# Patient Record
Sex: MALE | Race: WHITE | NOT HISPANIC OR LATINO | Employment: FULL TIME | URBAN - METROPOLITAN AREA
[De-identification: names, ages, dates, MRNs, and addresses within clinical notes are randomized per-mention and may not be internally consistent; named-entity substitution may affect disease eponyms.]

---

## 2018-02-14 ENCOUNTER — OFFICE VISIT (OUTPATIENT)
Dept: CARDIOLOGY CLINIC | Facility: CLINIC | Age: 53
End: 2018-02-14
Payer: COMMERCIAL

## 2018-02-14 VITALS
SYSTOLIC BLOOD PRESSURE: 124 MMHG | DIASTOLIC BLOOD PRESSURE: 82 MMHG | HEIGHT: 71 IN | OXYGEN SATURATION: 98 % | WEIGHT: 214 LBS | HEART RATE: 76 BPM | BODY MASS INDEX: 29.96 KG/M2

## 2018-02-14 DIAGNOSIS — Z72.0 TOBACCO ABUSE: ICD-10-CM

## 2018-02-14 DIAGNOSIS — E78.2 MIXED HYPERLIPIDEMIA: ICD-10-CM

## 2018-02-14 DIAGNOSIS — R06.02 SHORTNESS OF BREATH ON EXERTION: Primary | ICD-10-CM

## 2018-02-14 DIAGNOSIS — I25.10 CAD S/P PERCUTANEOUS CORONARY ANGIOPLASTY: ICD-10-CM

## 2018-02-14 DIAGNOSIS — I10 ESSENTIAL HYPERTENSION: ICD-10-CM

## 2018-02-14 DIAGNOSIS — Z98.61 CAD S/P PERCUTANEOUS CORONARY ANGIOPLASTY: ICD-10-CM

## 2018-02-14 PROCEDURE — 93000 ELECTROCARDIOGRAM COMPLETE: CPT | Performed by: INTERNAL MEDICINE

## 2018-02-14 PROCEDURE — 99244 OFF/OP CNSLTJ NEW/EST MOD 40: CPT | Performed by: INTERNAL MEDICINE

## 2018-02-14 RX ORDER — GLIMEPIRIDE 4 MG/1
4 TABLET ORAL
COMMUNITY
Start: 2017-06-15

## 2018-02-14 RX ORDER — ROSUVASTATIN CALCIUM 10 MG/1
1 TABLET, COATED ORAL DAILY
COMMUNITY
Start: 2018-02-13

## 2018-02-14 RX ORDER — CLONAZEPAM 1 MG/1
1 TABLET ORAL 2 TIMES DAILY
COMMUNITY
Start: 2017-12-12 | End: 2019-03-22

## 2018-02-14 RX ORDER — CARVEDILOL 6.25 MG/1
6.25 TABLET ORAL 2 TIMES DAILY
COMMUNITY

## 2018-02-14 RX ORDER — LISINOPRIL 5 MG/1
1 TABLET ORAL DAILY
COMMUNITY
Start: 2018-02-13

## 2018-02-14 NOTE — PROGRESS NOTES
CARDIOLOGY OFFICE VISIT  [facility]  [address], [place], [address]  Tel: [phone]      NAME: Malorie John  AGE: 46 y o  SEX: male  : 1965   MRN: [id number]      Chief Complaint:  Chief Complaint   Patient presents with    Establish Care         History of Present Illness:   Patient comes to establish with our practice  He works in [employer] with [employer]  States he is doing fine otherwise except that he gets short of breath if he exerts a lot  Denies chest pain    CAD s/p MI s/p PCI 2007 at [address] - denies chest pain / pressure / tightness, palpitations, lightheadedness, syncope, swelling feet, orthopnea, PND, claudication  On aspirin, carvedilol, lisinopril, rosuvastatin which he takes regularly    HTN -  Has been hypertensive for many years  Taking medications regularly  Denies lightheadedness, headache, medication side effects  HLP -  Has had hyperlipidemia for many years  Taking statin regularly along with diet control  Denies myalgia  PCP closely monitoring the blood work        Past Medical History:  CAD s/p PCI  HTN  HLP      Past Surgical History:  Past Surgical History:   Procedure Laterality Date    CORONARY ANGIOPLASTY WITH STENT PLACEMENT      HERNIA REPAIR           Family History:  Family History   Problem Relation Age of Onset    Heart attack Father          Social History:  Social History     Social History    Marital status: /Civil Union     Spouse name: N/A    Number of children: N/A    Years of education: N/A     Social History Main Topics    Smoking status: Current Every Day Smoker    Smokeless tobacco: Not on file    Alcohol use Not on file    Drug use: Unknown    Sexual activity: Not on file     Other Topics Concern    Not on file     Social History Narrative    No narrative on file       The following portions of the patient's history were reviewed and updated as appropriate: past medical history, past surgical history, past family history,  past social history, current medications, allergies  Review of Systems:  Constitutional: Denies fever, chills, fatigue  Eyes: Denies eye redness, eye discharge, double vision  ENT: Denies hearing loss, tinnitus, sneezing, nasal discharge, sore throat   Respiratory: Denies cough, expectoration, hemoptysis  +shortness of breath  Cardiovascular: Denies chest pain, palpitations, orthopnea, PND, lower extremity swelling  Gastrointestinal: Denies abdominal pain, nausea, vomiting, hematemesis, diarrhea, bloody stools  Genito-Urinary: Denies dysuria, incontinence  Musculoskeletal: Denies back pain, muscle pain  + left knee pain  Neurologic: Denies confusion, lightheadedness, syncope, headache, focal weakness, sensory changes, seizures  Endocrine: Denies polyuria, polydipsia, temperature intolerance  Allergy and Immunology: Denies hives, insect bite sensitivity  Hematological and Lymphatic: Denies bleeding problems, swollen glands   Psychological: Denies depression, suicidal ideation, anxiety, panic  Dermatological: Denies pruritus, rash, skin lesion changes      Vitals:  Vitals:    02/14/18 1425   BP: 124/82   Pulse: 76   SpO2: 98%       Body mass index is 29 85 kg/m²  Weight (last 2 days)     Date/Time   Weight    02/14/18 1425  97 1 (214)                Physical Examination:  General: Patient is not in acute distress  Awake, alert, oriented in time, place and person  Responding to commands  Head: Normocephalic  Atraumatic  Eyes: Both pupils normal sized, round and reactive to light  Nonicteric  ENT: Normal external ear canals  Nares normal, no drainage  Lips and oral mucosa normal  Neck: Supple  JVP not raised  Trachea central  No thyromegaly  Lungs: Bilateral bronchovascular breath sounds with no crackles or rhonchi  Chest wall: No tenderness  Cardiovascular: RRR   S1 and S2 normal  No murmur, rub or gallop  Gastrointestinal: Abdomen soft, nontender  No guarding or rigidity  Liver and spleen not palpable  Bowel sounds present  Neurologic: Patient is awake, alert, oriented in time, place and person  Responding to command  Moving all extremities  Integumentary:  No skin rash  Lymphatic: No cervical lymphadenopathy  Back: Symmetric  No CVA tenderness  Extremities: No clubbing, cyanosis or edema      Laboratory Results:    EKG:  Sinus rhythm      Medications:    Current Outpatient Prescriptions:     clonazePAM (KlonoPIN) 1 mg tablet, Take 1 mg by mouth 2 (two) times a day, Disp: , Rfl:     glimepiride (AMARYL) 4 mg tablet, Take 4 mg by mouth, Disp: , Rfl:     lisinopril (ZESTRIL) 5 mg tablet, Take 1 tablet by mouth daily, Disp: , Rfl:     rosuvastatin (CRESTOR) 10 MG tablet, Take 1 tablet by mouth daily, Disp: , Rfl:     aspirin 81 MG tablet, 1 tab(s), Disp: , Rfl:     carvedilol (COREG) 6 25 mg tablet, Take 6 25 mg by mouth 2 (two) times a day, Disp: , Rfl:     metFORMIN (GLUCOPHAGE) 1000 MG tablet, Take 1,000 mg by mouth, Disp: , Rfl:       Allergies:  No Known Allergies      Assessment and Plan:  1  Shortness of breath on exertion   2D echocardiogram and pharmacological nuclear stress test ordered for evaluation  Patient unable to walk on a treadmill due to left knee pain    2  CAD S/P percutaneous coronary angioplasty   workup as mentioned above  For now continue aspirin, beta-blocker, Ace inhibitor, statin    3  Mixed hyperlipidemia   continue statin  His PCP closely monitors his blood work    4  Essential hypertension   BP stable  Continue current medications    5  Tobacco abuse   strongly counseled to stop smoking  Patient states he is not ready to quit yet      Recommend aggressive risk factor modification and therapeutic lifestyle changes  Low-salt, low-calorie, low-fat, low-cholesterol diet with regular exercise and to optimize weight    I will defer the ordering and monitoring of necessity lab studies to you, but I am available and happy to review and manage any of the data at your request in the future  Discussed concepts of atherosclerosis, including signs and symptoms of cardiac disease  Previous studies were reviewed  Safety measures were reviewed  Questions were entertained and answered  Patient was advised to report any problems requiring medical attention  Follow-up with PCP and appropriate specialist and lab work as discussed  Return for follow up visit as scheduled or earlier, if needed  Further recommendations will be forthcoming after the above testing is performed and results available  Thank you for allowing me to participate in the care and evaluation of your patient  Should you have any questions, please feel free to contact me        Isai Benitez MD  5/72/4102,5:75 PM

## 2018-03-06 ENCOUNTER — TELEPHONE (OUTPATIENT)
Dept: CARDIOLOGY CLINIC | Facility: CLINIC | Age: 53
End: 2018-03-06

## 2019-03-22 ENCOUNTER — OFFICE VISIT (OUTPATIENT)
Dept: CARDIOLOGY CLINIC | Facility: CLINIC | Age: 54
End: 2019-03-22
Payer: COMMERCIAL

## 2019-03-22 VITALS
HEIGHT: 71 IN | DIASTOLIC BLOOD PRESSURE: 76 MMHG | HEART RATE: 82 BPM | SYSTOLIC BLOOD PRESSURE: 120 MMHG | OXYGEN SATURATION: 97 % | BODY MASS INDEX: 28.84 KG/M2 | WEIGHT: 206 LBS

## 2019-03-22 DIAGNOSIS — E78.2 MIXED HYPERLIPIDEMIA: ICD-10-CM

## 2019-03-22 DIAGNOSIS — I25.10 CAD S/P PERCUTANEOUS CORONARY ANGIOPLASTY: ICD-10-CM

## 2019-03-22 DIAGNOSIS — Z72.0 TOBACCO ABUSE: ICD-10-CM

## 2019-03-22 DIAGNOSIS — I10 ESSENTIAL HYPERTENSION: ICD-10-CM

## 2019-03-22 DIAGNOSIS — R06.02 SHORTNESS OF BREATH ON EXERTION: Primary | ICD-10-CM

## 2019-03-22 DIAGNOSIS — Z98.61 CAD S/P PERCUTANEOUS CORONARY ANGIOPLASTY: ICD-10-CM

## 2019-03-22 PROCEDURE — 99214 OFFICE O/P EST MOD 30 MIN: CPT | Performed by: INTERNAL MEDICINE

## 2019-03-22 NOTE — PROGRESS NOTES
CARDIOLOGY OFFICE VISIT  [facility]  [facility] [address], [facility], [address] [facility], [place], [facility] [address]  Tel: [phone]      NAME: Tonya Neff  AGE: 48 y o  SEX: male  : 1965   MRN: [id number]      Chief Complaint:  Chief Complaint   Patient presents with    Follow-up     1 yr          History of Present Illness:   Patient comes for follow up  States he feels short of breath with exertion such as climbing steps  Patient denies chest pain, palpitations, lightheadedness, syncope, swelling feet, orthopnea, PND, claudication  CAD s/p MI s/p PCI  at [facility], [place] [address] all medications ( aspirin, carvedilol, lisinopril, rosuvastatin) regularly  HTN -  Has been hypertensive for many years  Taking medications regularly  Denies lightheadedness, headache, medication side effects  HLP -  Has had hyperlipidemia for many years  Taking statin regularly along with diet control  Denies myalgia  PCP closely monitoring the blood work  He works in [employer] with [employer]          Past Medical History:  CAD, HTN, HLP      Past Surgical History:  Past Surgical History:   Procedure Laterality Date    CORONARY ANGIOPLASTY WITH STENT PLACEMENT      HERNIA REPAIR           Family History:  Family History   Problem Relation Age of Onset    Heart attack Father          Social History:  Social History     Socioeconomic History    Marital status: /Civil Union     Spouse name: Not on file    Number of children: Not on file    Years of education: Not on file    Highest education level: Not on file   Occupational History    Not on file   Social Needs    Financial resource strain: Not on file    Food insecurity:     Worry: Not on file     Inability: Not on file    Transportation needs:     Medical: Not on file     Non-medical: Not on file   Tobacco Use    Smoking status: Current Every Day Smoker   Substance and Sexual Activity    Alcohol use: Not on file    Drug use: Not on file    Sexual activity: Not on file   Lifestyle    Physical activity:     Days per week: Not on file     Minutes per session: Not on file    Stress: Not on file   Relationships    Social connections:     Talks on phone: Not on file     Gets together: Not on file     Attends Zoroastrianism service: Not on file     Active member of club or organization: Not on file     Attends meetings of clubs or organizations: Not on file     Relationship status: Not on file    Intimate partner violence:     Fear of current or ex partner: Not on file     Emotionally abused: Not on file     Physically abused: Not on file     Forced sexual activity: Not on file   Other Topics Concern    Not on file   Social History Narrative    Not on file       The following portions of the patient's history were reviewed and updated as appropriate: past medical history, past surgical history, past family history,  past social history, current medications, allergies list       Review of Systems:  Constitutional: Denies fever, chills, fatigue  Eyes: Denies eye redness, eye discharge, double vision  ENT: Denies hearing loss, tinnitus, sneezing, nasal discharge, sore throat   Respiratory: Denies cough, expectoration, hemoptysis   +shortness of breath  Cardiovascular: Denies chest pain, palpitations, orthopnea, PND, lower extremity swelling  Gastrointestinal: Denies abdominal pain, nausea, vomiting, hematemesis, diarrhea, bloody stools  Genito-Urinary: Denies dysuria, incontinence  Musculoskeletal: Denies back pain, joint pain, muscle pain  Neurologic: Denies confusion, lightheadedness, syncope, headache, focal weakness, sensory changes, seizures  Endocrine: Denies polyuria, polydipsia, temperature intolerance  Allergy and Immunology: Denies hives, insect bite sensitivity  Hematological and Lymphatic: Denies bleeding problems, swollen glands   Psychological: Denies depression, suicidal ideation, anxiety, panic  Dermatological: Denies pruritus, rash, skin lesion changes      Vitals:  Vitals:    03/22/19 0909   BP: 120/76   Pulse: 82   SpO2: 97%       Body mass index is 28 73 kg/m²  Weight (last 2 days)     Date/Time   Weight    03/22/19 0909   93 4 (206)                Physical Examination:  General: Patient is not in acute distress  Awake, alert, oriented in time, place and person  Responding to commands  Head: Normocephalic  Atraumatic  Eyes: Both pupils normal sized, round and reactive to light  Nonicteric  ENT: Normal external ear canals  Nares normal, no drainage  Lips and oral mucosa normal  Neck: Supple  JVP not raised  Trachea central  No thyromegaly  Lungs: Bilateral bronchovascular breath sounds with no crackles or rhonchi  Chest wall: No tenderness  Cardiovascular: RRR  S1 and S2 normal  No murmur, rub or gallop  Gastrointestinal: Abdomen soft, nontender  No guarding or rigidity  Liver and spleen not palpable  Bowel sounds present  Neurologic: Patient is awake, alert, oriented in time, place and person  Responding to command  Moving all extremities  Integumentary:  No skin rash  Lymphatic: No cervical lymphadenopathy  Back: Symmetric   No CVA tenderness  Extremities: No clubbing, cyanosis or edema    Medications:    Current Outpatient Medications:     Amphetamine-Dextroamphetamine (ADDERALL PO), Take by mouth, Disp: , Rfl:     aspirin 81 MG tablet, 1 tab(s), Disp: , Rfl:     carvedilol (COREG) 6 25 mg tablet, Take 6 25 mg by mouth 2 (two) times a day, Disp: , Rfl:     glimepiride (AMARYL) 4 mg tablet, Take 4 mg by mouth, Disp: , Rfl:     lisinopril (ZESTRIL) 5 mg tablet, Take 1 tablet by mouth daily, Disp: , Rfl:     metFORMIN (GLUCOPHAGE) 1000 MG tablet, Take 1,000 mg by mouth, Disp: , Rfl:     rosuvastatin (CRESTOR) 10 MG tablet, Take 1 tablet by mouth daily, Disp: , Rfl:     Zolpidem Tartrate (AMBIEN PO), Take by mouth, Disp: , Rfl:       Allergies:  No Known Allergies      Assessment and Plan:  1  Shortness of breath on exertion   2D echocardiogram and pharmacological nuclear stress test ordered for evaluation  Patient unable to walk on a treadmill due to left knee pain   CBC, CMP, lipid panel scripts also given    2  CAD S/P percutaneous coronary angioplasty   workup as mentioned above  Continue aspirin, beta-blocker, Ace inhibitor, statin    3  Essential hypertension   BP stable  Continue current medications    4  Mixed hyperlipidemia   continue statin and diet control  His PCP closely monitors his blood work    5  Tobacco abuse   strongly counseled to stop smoking    Recommend aggressive risk factor modification and therapeutic lifestyle changes  Low-salt, low-calorie, low-fat, low-cholesterol diet with regular exercise and to optimize weight  I will defer the ordering and monitoring of necessity lab studies to you, but I am available and happy to review and manage any of the data at your request in the future  Discussed concepts of atherosclerosis, including signs and symptoms of cardiac disease  Previous studies were reviewed  Safety measures were reviewed  Questions were entertained and answered  Patient was advised to report any problems requiring medical attention  Follow-up with PCP and appropriate specialist and lab work as discussed  Return for follow up visit as scheduled or earlier, if needed  Thank you for allowing me to participate in the care and evaluation of your patient  Should you have any questions, please feel free to contact me        Onesimo Yuan MD  4/53/9350,5:42 AM

## 2019-04-10 ENCOUNTER — APPOINTMENT (OUTPATIENT)
Dept: NON INVASIVE DIAGNOSTICS | Facility: CLINIC | Age: 54
End: 2019-04-10
Payer: COMMERCIAL

## 2019-04-10 ENCOUNTER — HOSPITAL ENCOUNTER (OUTPATIENT)
Dept: NON INVASIVE DIAGNOSTICS | Facility: CLINIC | Age: 54
Discharge: HOME | End: 2019-04-10
Payer: COMMERCIAL

## 2019-04-10 ENCOUNTER — HOSPITAL ENCOUNTER (OUTPATIENT)
Dept: NON INVASIVE DIAGNOSTICS | Facility: CLINIC | Age: 54
End: 2019-04-10
Payer: COMMERCIAL

## 2019-04-10 DIAGNOSIS — R06.02 SHORTNESS OF BREATH ON EXERTION: ICD-10-CM

## 2019-04-10 DIAGNOSIS — I25.10 CAD S/P PERCUTANEOUS CORONARY ANGIOPLASTY: ICD-10-CM

## 2019-04-10 DIAGNOSIS — Z98.61 CAD S/P PERCUTANEOUS CORONARY ANGIOPLASTY: ICD-10-CM

## 2019-04-10 PROCEDURE — 93306 TTE W/DOPPLER COMPLETE: CPT | Performed by: INTERNAL MEDICINE

## 2019-04-10 PROCEDURE — 93306 TTE W/DOPPLER COMPLETE: CPT

## 2019-04-11 ENCOUNTER — TELEPHONE (OUTPATIENT)
Dept: CARDIOLOGY CLINIC | Facility: CLINIC | Age: 54
End: 2019-04-11

## 2019-04-15 ENCOUNTER — HOSPITAL ENCOUNTER (OUTPATIENT)
Dept: NON INVASIVE DIAGNOSTICS | Facility: CLINIC | Age: 54
Discharge: HOME | End: 2019-04-15
Payer: COMMERCIAL

## 2019-04-15 DIAGNOSIS — Z98.61 CAD S/P PERCUTANEOUS CORONARY ANGIOPLASTY: ICD-10-CM

## 2019-04-15 DIAGNOSIS — I25.10 CAD S/P PERCUTANEOUS CORONARY ANGIOPLASTY: ICD-10-CM

## 2019-04-15 DIAGNOSIS — R06.02 SHORTNESS OF BREATH ON EXERTION: ICD-10-CM

## 2019-04-15 LAB
MAX DIASTOLIC BP: 86 MMHG
MAX HEART RATE: 100 BPM
MAX PREDICTED HEART RATE: 167 BPM
MAX. SYSTOLIC BP: 116 MMHG
PROTOCOL NAME: NORMAL
REASON FOR TERMINATION: NORMAL
TARGET HR FORMULA: NORMAL
TIME IN EXERCISE PHASE: NORMAL

## 2019-04-15 PROCEDURE — 78452 HT MUSCLE IMAGE SPECT MULT: CPT

## 2019-04-15 PROCEDURE — 93017 CV STRESS TEST TRACING ONLY: CPT

## 2019-04-15 PROCEDURE — A9502 TC99M TETROFOSMIN: HCPCS

## 2019-04-15 RX ADMIN — REGADENOSON 0.4 MG: 0.08 INJECTION, SOLUTION INTRAVENOUS at 09:14

## 2019-04-16 PROCEDURE — 78452 HT MUSCLE IMAGE SPECT MULT: CPT | Performed by: INTERNAL MEDICINE

## 2019-04-16 PROCEDURE — 93018 CV STRESS TEST I&R ONLY: CPT | Performed by: INTERNAL MEDICINE

## 2019-04-16 PROCEDURE — 93016 CV STRESS TEST SUPVJ ONLY: CPT | Performed by: INTERNAL MEDICINE

## 2023-06-01 LAB
LEFT EYE DIABETIC RETINOPATHY: NORMAL
RIGHT EYE DIABETIC RETINOPATHY: NORMAL

## 2023-10-23 ENCOUNTER — HOSPITAL ENCOUNTER (EMERGENCY)
Facility: HOSPITAL | Age: 58
Discharge: HOME | End: 2023-10-23
Attending: EMERGENCY MEDICINE
Payer: COMMERCIAL

## 2023-10-23 ENCOUNTER — APPOINTMENT (EMERGENCY)
Dept: CT IMAGING | Facility: HOSPITAL | Age: 58
End: 2023-10-23
Payer: COMMERCIAL

## 2023-10-23 VITALS
DIASTOLIC BLOOD PRESSURE: 93 MMHG | OXYGEN SATURATION: 96 % | TEMPERATURE: 97.9 F | HEART RATE: 84 BPM | RESPIRATION RATE: 16 BRPM | SYSTOLIC BLOOD PRESSURE: 162 MMHG

## 2023-10-23 DIAGNOSIS — R17 JAUNDICE: ICD-10-CM

## 2023-10-23 DIAGNOSIS — K86.89 PANCREATIC MASS: Primary | ICD-10-CM

## 2023-10-23 LAB
ALBUMIN SERPL BCP-MCNC: 3.7 G/DL (ref 3.5–5)
ALP SERPL-CCNC: 393 U/L (ref 34–104)
ALT SERPL W P-5'-P-CCNC: 164 U/L (ref 7–52)
ANION GAP SERPL CALCULATED.3IONS-SCNC: 6 MMOL/L
ANISOCYTOSIS BLD QL SMEAR: PRESENT
AST SERPL W P-5'-P-CCNC: 65 U/L (ref 13–39)
ATRIAL RATE: 63 BPM
BASOPHILS # BLD MANUAL: 0 THOUSAND/UL (ref 0–0.1)
BASOPHILS NFR MAR MANUAL: 0 % (ref 0–1)
BILIRUB SERPL-MCNC: 11.99 MG/DL (ref 0.2–1)
BUN SERPL-MCNC: 15 MG/DL (ref 5–25)
CALCIUM SERPL-MCNC: 9.1 MG/DL (ref 8.4–10.2)
CHLORIDE SERPL-SCNC: 107 MMOL/L (ref 96–108)
CO2 SERPL-SCNC: 26 MMOL/L (ref 21–32)
CREAT SERPL-MCNC: 0.86 MG/DL (ref 0.6–1.3)
EOSINOPHIL # BLD MANUAL: 0.25 THOUSAND/UL (ref 0–0.4)
EOSINOPHIL NFR BLD MANUAL: 2 % (ref 0–6)
ERYTHROCYTE [DISTWIDTH] IN BLOOD BY AUTOMATED COUNT: 15.8 % (ref 11.6–15.1)
GFR SERPL CREATININE-BSD FRML MDRD: 96 ML/MIN/1.73SQ M
GLUCOSE SERPL-MCNC: 199 MG/DL (ref 65–140)
HCT VFR BLD AUTO: 47.6 % (ref 36.5–49.3)
HGB BLD-MCNC: 16.5 G/DL (ref 12–17)
LIPASE SERPL-CCNC: 71 U/L (ref 11–82)
LYMPHOCYTES # BLD AUTO: 1.88 THOUSAND/UL (ref 0.6–4.47)
LYMPHOCYTES # BLD AUTO: 14 % (ref 14–44)
MCH RBC QN AUTO: 32.4 PG (ref 26.8–34.3)
MCHC RBC AUTO-ENTMCNC: 34.7 G/DL (ref 31.4–37.4)
MCV RBC AUTO: 93 FL (ref 82–98)
MONOCYTES # BLD AUTO: 1.51 THOUSAND/UL (ref 0–1.22)
MONOCYTES NFR BLD: 12 % (ref 4–12)
NEUTROPHILS # BLD MANUAL: 8.91 THOUSAND/UL (ref 1.85–7.62)
NEUTS SEG NFR BLD AUTO: 71 % (ref 43–75)
P AXIS: 76 DEGREES
PLATELET # BLD AUTO: 308 THOUSANDS/UL (ref 149–390)
PLATELET BLD QL SMEAR: ADEQUATE
PMV BLD AUTO: 10.7 FL (ref 8.9–12.7)
POIKILOCYTOSIS BLD QL SMEAR: PRESENT
POTASSIUM SERPL-SCNC: 3.6 MMOL/L (ref 3.5–5.3)
PR INTERVAL: 142 MS
PROT SERPL-MCNC: 6.4 G/DL (ref 6.4–8.4)
QRS AXIS: 99 DEGREES
QRSD INTERVAL: 90 MS
QT INTERVAL: 432 MS
QTC INTERVAL: 442 MS
RBC # BLD AUTO: 5.1 MILLION/UL (ref 3.88–5.62)
SODIUM SERPL-SCNC: 139 MMOL/L (ref 135–147)
STOMATOCYTES BLD QL SMEAR: PRESENT
T WAVE AXIS: 90 DEGREES
VARIANT LYMPHS # BLD AUTO: 1 %
VENTRICULAR RATE: 63 BPM
WBC # BLD AUTO: 12.55 THOUSAND/UL (ref 4.31–10.16)

## 2023-10-23 PROCEDURE — 36415 COLL VENOUS BLD VENIPUNCTURE: CPT | Performed by: EMERGENCY MEDICINE

## 2023-10-23 PROCEDURE — 99285 EMERGENCY DEPT VISIT HI MDM: CPT

## 2023-10-23 PROCEDURE — 99285 EMERGENCY DEPT VISIT HI MDM: CPT | Performed by: EMERGENCY MEDICINE

## 2023-10-23 PROCEDURE — 83690 ASSAY OF LIPASE: CPT | Performed by: EMERGENCY MEDICINE

## 2023-10-23 PROCEDURE — 80053 COMPREHEN METABOLIC PANEL: CPT | Performed by: EMERGENCY MEDICINE

## 2023-10-23 PROCEDURE — 93005 ELECTROCARDIOGRAM TRACING: CPT

## 2023-10-23 PROCEDURE — 85027 COMPLETE CBC AUTOMATED: CPT | Performed by: EMERGENCY MEDICINE

## 2023-10-23 PROCEDURE — 74177 CT ABD & PELVIS W/CONTRAST: CPT

## 2023-10-23 PROCEDURE — 85007 BL SMEAR W/DIFF WBC COUNT: CPT | Performed by: EMERGENCY MEDICINE

## 2023-10-23 PROCEDURE — 93010 ELECTROCARDIOGRAM REPORT: CPT | Performed by: INTERNAL MEDICINE

## 2023-10-23 RX ADMIN — IOHEXOL 100 ML: 350 INJECTION, SOLUTION INTRAVENOUS at 18:23

## 2023-10-24 ENCOUNTER — TELEPHONE (OUTPATIENT)
Dept: GASTROENTEROLOGY | Facility: CLINIC | Age: 58
End: 2023-10-24

## 2023-10-24 ENCOUNTER — PREP FOR PROCEDURE (OUTPATIENT)
Dept: GASTROENTEROLOGY | Facility: CLINIC | Age: 58
End: 2023-10-24

## 2023-10-24 ENCOUNTER — PREP FOR PROCEDURE (OUTPATIENT)
Dept: GASTROENTEROLOGY | Facility: MEDICAL CENTER | Age: 58
End: 2023-10-24

## 2023-10-24 DIAGNOSIS — K83.1 BILIARY OBSTRUCTION: Primary | ICD-10-CM

## 2023-10-24 DIAGNOSIS — K86.9 PANCREATIC LESION: Primary | ICD-10-CM

## 2023-10-24 NOTE — DISCHARGE INSTRUCTIONS
You were seen and evaluated today for jaundice. Your evaluation today including a CAT scan demonstrates a mass in the pancreas which is unfortunately suspicious for possible cancer. The next steps in your care involve getting a biopsy. I have included information on how to arrange this promptly.   Please feel free to return to the emergency department at any time if you have any further concerns, though specifically if you have severe abdominal pain nausea or vomiting, chest pain, or unable to catch your breath at rest.

## 2023-10-24 NOTE — TELEPHONE ENCOUNTER
----- Message from José Antonio Hobbs MD sent at 10/24/2023 12:17 PM EDT -----  Hi. We will need an EUS and ERCP for him this week please. Thank you.    Anneliese Vargas

## 2023-10-24 NOTE — ED PROVIDER NOTES
History  Chief Complaint   Patient presents with    Jaundice     Pt reports new onsetJaundice starting one week ago, ayse colored stools, denies CP, SOB. Reports L sided discomfort, heartburn. 49-year-old male presents emergency department for evaluation of jaundice. Patient has had progressive painless jaundice for the past week. Some noticed acholic stools in this time. No nausea vomiting no fevers or chills. Prior to Admission Medications   Prescriptions Last Dose Informant Patient Reported? Taking? Amphetamine-Dextroamphetamine (ADDERALL PO)   Yes No   Sig: Take by mouth   Zolpidem Tartrate (AMBIEN PO)   Yes No   Sig: Take by mouth   aspirin 81 MG tablet   Yes No   Si tab(s)   carvedilol (COREG) 6.25 mg tablet   Yes No   Sig: Take 6.25 mg by mouth 2 (two) times a day   glimepiride (AMARYL) 4 mg tablet   Yes No   Sig: Take 4 mg by mouth   lisinopril (ZESTRIL) 5 mg tablet   Yes No   Sig: Take 1 tablet by mouth daily   metFORMIN (GLUCOPHAGE) 1000 MG tablet   Yes No   Sig: Take 1,000 mg by mouth   rosuvastatin (CRESTOR) 10 MG tablet   Yes No   Sig: Take 1 tablet by mouth daily      Facility-Administered Medications: None       Past Medical History:   Diagnosis Date    Coronary artery disease     Hyperlipidemia     Hypertension        Past Surgical History:   Procedure Laterality Date    CORONARY ANGIOPLASTY WITH STENT PLACEMENT      HERNIA REPAIR         Family History   Problem Relation Age of Onset    Heart attack Father      I have reviewed and agree with the history as documented. E-Cigarette/Vaping     E-Cigarette/Vaping Substances     Social History     Tobacco Use    Smoking status: Every Day       Review of Systems   Constitutional:  Negative for appetite change, chills, fatigue and fever. HENT:  Negative for sneezing and sore throat. Eyes:  Negative for visual disturbance. Respiratory:  Negative for cough, choking, chest tightness, shortness of breath and wheezing. Cardiovascular:  Negative for chest pain and palpitations. Gastrointestinal:  Negative for abdominal pain, constipation, diarrhea, nausea and vomiting. Genitourinary:  Negative for difficulty urinating and dysuria. Skin:  Positive for color change. Neurological:  Negative for dizziness, weakness, light-headedness, numbness and headaches. All other systems reviewed and are negative. Physical Exam  Physical Exam  Constitutional:       General: He is not in acute distress. Appearance: He is well-developed. He is not diaphoretic. HENT:      Head: Normocephalic and atraumatic. Eyes:      Pupils: Pupils are equal, round, and reactive to light. Neck:      Vascular: No JVD. Trachea: No tracheal deviation. Cardiovascular:      Rate and Rhythm: Normal rate and regular rhythm. Heart sounds: Normal heart sounds. No murmur heard. No friction rub. No gallop. Pulmonary:      Effort: Pulmonary effort is normal. No respiratory distress. Breath sounds: Normal breath sounds. No wheezing or rales. Abdominal:      General: Bowel sounds are normal. There is no distension. Palpations: Abdomen is soft. Tenderness: There is abdominal tenderness in the right upper quadrant. There is no guarding or rebound. Skin:     General: Skin is warm and dry. Coloration: Skin is jaundiced. Skin is not pale. Neurological:      Mental Status: He is alert and oriented to person, place, and time. Cranial Nerves: No cranial nerve deficit. Motor: No abnormal muscle tone.    Psychiatric:         Behavior: Behavior normal.         Vital Signs  ED Triage Vitals   Temperature Pulse Respirations Blood Pressure SpO2   10/23/23 1626 10/23/23 1626 10/23/23 1626 10/23/23 1626 10/23/23 1626   97.9 °F (36.6 °C) 98 18 145/82 98 %      Temp Source Heart Rate Source Patient Position - Orthostatic VS BP Location FiO2 (%)   10/23/23 1626 10/23/23 1626 10/23/23 1626 10/23/23 1626 --   Temporal Monitor Sitting Right arm       Pain Score       10/23/23 1730       5           Vitals:    10/23/23 1900 10/23/23 1930 10/23/23 2000 10/23/23 2100   BP: 142/80 144/81 141/81 162/93   Pulse: 68 68 68 84   Patient Position - Orthostatic VS: Lying Lying  Lying         Visual Acuity      ED Medications  Medications   iohexol (OMNIPAQUE) 350 MG/ML injection (MULTI-DOSE) 100 mL (100 mL Intravenous Given 10/23/23 1823)       Diagnostic Studies  Results Reviewed       Procedure Component Value Units Date/Time    RBC Morphology Reflex Test [[id number]] Collected: 10/23/23 1641    Lab Status: Final result Specimen: Blood from Arm, Left Updated: 10/23/23 1801    Comprehensive metabolic panel [[id number]]  (Abnormal) Collected: 10/23/23 1738    Lab Status: Final result Specimen: Blood from Arm, Left Updated: 10/23/23 1759     Sodium 139 mmol/L      Potassium 3.6 mmol/L      Chloride 107 mmol/L      CO2 26 mmol/L      ANION GAP 6 mmol/L      BUN 15 mg/dL      Creatinine 0.86 mg/dL      Glucose 199 mg/dL      Calcium 9.1 mg/dL      AST 65 U/L       U/L      Alkaline Phosphatase 393 U/L      Total Protein 6.4 g/dL      Albumin 3.7 g/dL      Total Bilirubin 11.99 mg/dL      eGFR 96 ml/min/1.73sq m     Narrative:      Specimen Icteric.   [facility] guidelines for Chronic Kidney Disease (CKD):     Stage 1 with normal or high GFR (GFR > 90 mL/min/1.73 square meters)    Stage 2 Mild CKD (GFR = 60-89 mL/min/1.73 square meters)    Stage 3A Moderate CKD (GFR = 45-59 mL/min/1.73 square meters)    Stage 3B Moderate CKD (GFR = 30-44 mL/min/1.73 square meters)    Stage 4 Severe CKD (GFR = 15-29 mL/min/1.73 square meters)    Stage 5 End Stage CKD (GFR <15 mL/min/1.73 square meters)  Note: GFR calculation is accurate only with a steady state creatinine    Lipase [[id number]]  (Normal) Collected: 10/23/23 1738    Lab Status: Final result Specimen: Blood from Arm, Left Updated: 10/23/23 1759     Lipase 71 u/L Narrative:      Specimen Icteric. CBC and differential [[id number]]  (Abnormal) Collected: 10/23/23 1641    Lab Status: Final result Specimen: Blood from Arm, Left Updated: 10/23/23 1728     WBC 12.55 Thousand/uL      RBC 5.10 Million/uL      Hemoglobin 16.5 g/dL      Hematocrit 47.6 %      MCV 93 fL      MCH 32.4 pg      MCHC 34.7 g/dL      RDW 15.8 %      MPV 10.7 fL      Platelets 908 Thousands/uL     Narrative: This is an appended report. These results have been appended to a previously verified report. Manual Differential(PHLEBS Do Not Order) [[id number]]  (Abnormal) Collected: 10/23/23 1641    Lab Status: Final result Specimen: Blood from Arm, Left Updated: 10/23/23 1728     Segmented % 71 %      Lymphocytes % 14 %      Monocytes % 12 %      Eosinophils, % 2 %      Basophils % 0 %      Atypical Lymphocytes % 1 %      Absolute Neutrophils 8.91 Thousand/uL      Lymphocytes Absolute 1.88 Thousand/uL      Monocytes Absolute 1.51 Thousand/uL      Eosinophils Absolute 0.25 Thousand/uL      Basophils Absolute 0.00 Thousand/uL      Total Counted --     Platelet Estimate Adequate     Anisocytosis Present     Poikilocytes Present     Stomatocytes Present                   CT abdomen pelvis with contrast   Final Result by Sujatha Murray MD (10/23 2032)      Ill-defined hypoattenuating mass within the neck of the pancreas with subsequent dilatation and obstruction of the biliary tree and pancreatic duct. Highly likely to represent pancreatic neoplasm. See above for further details. Partially encasing the    main portal vein at the confluence with splenic vein and SMV with approximately 50% narrowing of the caliber at this level and dilatation of the SMV. No evidence for distant metastatic disease evident. Several small nodules within the left adrenal with features suggesting benign etiology as described above. The study was marked in [facility] for immediate notification.       Workstation performed: [id number]                    Procedures  Procedures         ED Course                                             Medical Decision Making  40-year-old male with painless jaundice, will check labs, CT, reassess. Work-up is returned, unfortunately CT demonstrates a suspicious mass in the pancreas. This in combination with the patient's clinical symptoms is highly concerning for pancreatic cancer. I did have a discussion with the patient regarding the significance of these findings and that while cancer is not diagnosed until biopsy, will be important to arrange this promptly. He expresses understanding. I was able to arrange for him an outpatient biopsy through EUS after consultation with GI here. Patient prefers to be discharged at this time and that he can go home and talk with his family. This is reasonable at this time. Amount and/or Complexity of Data Reviewed  Labs: ordered. Radiology: ordered. Risk  Prescription drug management. Disposition  Final diagnoses:   Pancreatic mass   Jaundice     Time reflects when diagnosis was documented in both MDM as applicable and the Disposition within this note       Time User Action Codes Description Comment    10/23/2023  9:19 PM Jen Cancer Add [K86.89] Pancreatic mass     10/23/2023  9:19 PM Jen Cancer Add [R17] Jaundice           ED Disposition       ED Disposition   Discharge    Condition   Stable    Date/Time   Mon Oct 23, 2023  9:16 PM    Comment   Deanne Michel discharge to home/self care.                    Follow-up Information       Follow up With Specialties Details Why Gege Ulloa MD Gastroenterology   [address]  [phone]              Discharge Medication List as of 10/23/2023  9:20 PM        CONTINUE these medications which have NOT CHANGED    Details   Amphetamine-Dextroamphetamine (ADDERALL PO) Take by mouth, Historical Med      aspirin 81 MG tablet 1 tab(s), Historical Med      carvedilol (COREG) 6.25 mg tablet Take 6.25 mg by mouth 2 (two) times a day, Historical Med      glimepiride (AMARYL) 4 mg tablet Take 4 mg by mouth, Starting Thu 6/15/2017, Historical Med      lisinopril (ZESTRIL) 5 mg tablet Take 1 tablet by mouth daily, Starting Tue 2/13/2018, Historical Med      metFORMIN (GLUCOPHAGE) 1000 MG tablet Take 1,000 mg by mouth, Historical Med      rosuvastatin (CRESTOR) 10 MG tablet Take 1 tablet by mouth daily, Starting Tue 2/13/2018, Historical Med      Zolpidem Tartrate (AMBIEN PO) Take by mouth, Historical Med                 PDMP Review       None            ED Provider  Electronically Signed by             Christos Bazan MD  10/24/23 0533

## 2023-10-25 NOTE — TELEPHONE ENCOUNTER
I have looked at schedule to try and rearrange. I have called GI lab and Larisa Carpio will be getting back to me if this is something that can be done this week . 10/26/2023 called and asked GI lab again , they are looking into it . Patient apparently scheduled appointment for Tyler Dawson for tomorrow. 10/27/2023  Patient is going to have a virtual with Tyler Dawson today  Still looking for a slot for procedures. Dr. Tyler Dawson is okay with a virtual appt. Due to the fact the the wife is homebound.

## 2023-10-25 NOTE — TELEPHONE ENCOUNTER
Pt called in concerned that he has not heard anything about scheduling of EUS/ERCP.  I reassured pt that the advanced team is aware of urgency of his case and that I would send a msg to the advanced team.

## 2023-10-27 ENCOUNTER — TELEPHONE (OUTPATIENT)
Dept: GASTROENTEROLOGY | Facility: HOSPITAL | Age: 58
End: 2023-10-27

## 2023-10-27 NOTE — TELEPHONE ENCOUNTER
Patient's EUS/ERCP is being planned for Monday 10/30 with Dr. Corinne Figueroa. Patient is on Jardiance. Spoke with Dr. Tong Kelly from anesthesia via [facility] regarding this patient being on jardiance and not within the 4 day hold. Per Dr. Tong Kelly that is fine. Spoke with patient. Informed patient EUS/ERCP is being planned for Monday 10/30 with Dr. Corinne Figueroa. Patient is aware to hold his Jardiance until after procedure.

## 2023-10-27 NOTE — TELEPHONE ENCOUNTER
Called and confirmed with patient that he is scheduled for Monday and that he is not to take his Jardiace till after the procedure. Also confirmed with the patient that the doctor will speak to him after the procedure about findings . He was grateful to have the procedure so soon.

## 2023-10-29 ENCOUNTER — ANESTHESIA EVENT (OUTPATIENT)
Dept: ANESTHESIOLOGY | Facility: HOSPITAL | Age: 58
End: 2023-10-29

## 2023-10-29 ENCOUNTER — ANESTHESIA (OUTPATIENT)
Dept: ANESTHESIOLOGY | Facility: HOSPITAL | Age: 58
End: 2023-10-29

## 2023-10-29 NOTE — ANESTHESIA PREPROCEDURE EVALUATION
Procedure:  PRE-OP ONLY    ERCP and Endoscopic US - elevated LFTs and bilirubin  Stress negative for ischemia  TTE: EF 60% with G1DD, RV wnl      HTN - ASA, coreg, lisinopril                Anesthesia Plan  ASA Score- 3     Anesthesia Type- general with ASA Monitors. Additional Monitors:     Airway Plan: ETT. Plan Factors-    Chart reviewed. EKG reviewed. Existing labs reviewed. Patient summary reviewed.               Induction-     Postoperative Plan-     Informed Consent-

## 2023-10-30 ENCOUNTER — ANESTHESIA (OUTPATIENT)
Dept: GASTROENTEROLOGY | Facility: HOSPITAL | Age: 58
End: 2023-10-30

## 2023-10-30 ENCOUNTER — ANESTHESIA EVENT (OUTPATIENT)
Dept: GASTROENTEROLOGY | Facility: HOSPITAL | Age: 58
End: 2023-10-30

## 2023-10-30 ENCOUNTER — HOSPITAL ENCOUNTER (OUTPATIENT)
Dept: RADIOLOGY | Facility: HOSPITAL | Age: 58
Discharge: HOME | End: 2023-10-30
Attending: INTERNAL MEDICINE
Payer: COMMERCIAL

## 2023-10-30 ENCOUNTER — HOSPITAL ENCOUNTER (OUTPATIENT)
Dept: GASTROENTEROLOGY | Facility: HOSPITAL | Age: 58
Setting detail: OUTPATIENT SURGERY
Discharge: HOME | End: 2023-10-30
Attending: INTERNAL MEDICINE | Admitting: INTERNAL MEDICINE
Payer: COMMERCIAL

## 2023-10-30 VITALS
DIASTOLIC BLOOD PRESSURE: 94 MMHG | BODY MASS INDEX: 22.07 KG/M2 | HEIGHT: 69 IN | RESPIRATION RATE: 16 BRPM | HEART RATE: 80 BPM | SYSTOLIC BLOOD PRESSURE: 163 MMHG | WEIGHT: 149 LBS | TEMPERATURE: 97.7 F | OXYGEN SATURATION: 96 %

## 2023-10-30 DIAGNOSIS — K83.1 BILIARY OBSTRUCTION: ICD-10-CM

## 2023-10-30 DIAGNOSIS — K86.9 PANCREATIC LESION: ICD-10-CM

## 2023-10-30 PROCEDURE — C1769 GUIDE WIRE: HCPCS

## 2023-10-30 PROCEDURE — 88160 CYTOPATH SMEAR OTHER SOURCE: CPT | Performed by: PATHOLOGY

## 2023-10-30 PROCEDURE — 88305 TISSUE EXAM BY PATHOLOGIST: CPT | Performed by: PATHOLOGY

## 2023-10-30 PROCEDURE — 88342 IMHCHEM/IMCYTCHM 1ST ANTB: CPT | Performed by: PATHOLOGY

## 2023-10-30 PROCEDURE — 88172 CYTP DX EVAL FNA 1ST EA SITE: CPT | Performed by: PATHOLOGY

## 2023-10-30 PROCEDURE — C1874 STENT, COATED/COV W/DEL SYS: HCPCS

## 2023-10-30 PROCEDURE — C1889 IMPLANT/INSERT DEVICE, NOC: HCPCS

## 2023-10-30 PROCEDURE — 74330 X-RAY BILE/PANC ENDOSCOPY: CPT

## 2023-10-30 PROCEDURE — 88341 IMHCHEM/IMCYTCHM EA ADD ANTB: CPT | Performed by: PATHOLOGY

## 2023-10-30 RX ORDER — ONDANSETRON 2 MG/ML
4 INJECTION INTRAMUSCULAR; INTRAVENOUS ONCE AS NEEDED
Status: CANCELLED | OUTPATIENT
Start: 2023-10-30

## 2023-10-30 RX ORDER — GLYCOPYRROLATE 0.2 MG/ML
INJECTION INTRAMUSCULAR; INTRAVENOUS AS NEEDED
Status: DISCONTINUED | OUTPATIENT
Start: 2023-10-30 | End: 2023-10-30

## 2023-10-30 RX ORDER — LIDOCAINE HYDROCHLORIDE 10 MG/ML
INJECTION, SOLUTION EPIDURAL; INFILTRATION; INTRACAUDAL; PERINEURAL AS NEEDED
Status: DISCONTINUED | OUTPATIENT
Start: 2023-10-30 | End: 2023-10-30

## 2023-10-30 RX ORDER — INDOMETHACIN 50 MG/1
100 SUPPOSITORY RECTAL ONCE
Status: COMPLETED | OUTPATIENT
Start: 2023-10-30 | End: 2023-10-30

## 2023-10-30 RX ORDER — FENTANYL CITRATE 50 UG/ML
INJECTION, SOLUTION INTRAMUSCULAR; INTRAVENOUS AS NEEDED
Status: DISCONTINUED | OUTPATIENT
Start: 2023-10-30 | End: 2023-10-30

## 2023-10-30 RX ORDER — ROCURONIUM BROMIDE 10 MG/ML
INJECTION, SOLUTION INTRAVENOUS AS NEEDED
Status: DISCONTINUED | OUTPATIENT
Start: 2023-10-30 | End: 2023-10-30

## 2023-10-30 RX ORDER — PROPOFOL 10 MG/ML
INJECTION, EMULSION INTRAVENOUS AS NEEDED
Status: DISCONTINUED | OUTPATIENT
Start: 2023-10-30 | End: 2023-10-30

## 2023-10-30 RX ORDER — ALBUTEROL SULFATE 2.5 MG/3ML
2.5 SOLUTION RESPIRATORY (INHALATION) ONCE AS NEEDED
Status: CANCELLED | OUTPATIENT
Start: 2023-10-30

## 2023-10-30 RX ORDER — SODIUM CHLORIDE, SODIUM LACTATE, POTASSIUM CHLORIDE, CALCIUM CHLORIDE 600; 310; 30; 20 MG/100ML; MG/100ML; MG/100ML; MG/100ML
INJECTION, SOLUTION INTRAVENOUS CONTINUOUS PRN
Status: DISCONTINUED | OUTPATIENT
Start: 2023-10-30 | End: 2023-10-30

## 2023-10-30 RX ORDER — SUCCINYLCHOLINE/SOD CL,ISO/PF 100 MG/5ML
SYRINGE (ML) INTRAVENOUS AS NEEDED
Status: DISCONTINUED | OUTPATIENT
Start: 2023-10-30 | End: 2023-10-30

## 2023-10-30 RX ORDER — EPHEDRINE SULFATE 50 MG/ML
INJECTION INTRAVENOUS AS NEEDED
Status: DISCONTINUED | OUTPATIENT
Start: 2023-10-30 | End: 2023-10-30

## 2023-10-30 RX ORDER — FENTANYL CITRATE/PF 50 MCG/ML
25 SYRINGE (ML) INJECTION
Status: CANCELLED | OUTPATIENT
Start: 2023-10-30

## 2023-10-30 RX ADMIN — LIDOCAINE HYDROCHLORIDE 50 MG: 10 INJECTION, SOLUTION EPIDURAL; INFILTRATION; INTRACAUDAL; PERINEURAL at 14:02

## 2023-10-30 RX ADMIN — INDOMETHACIN 100 MG: 50 SUPPOSITORY RECTAL at 15:09

## 2023-10-30 RX ADMIN — EPHEDRINE SULFATE 10 MG: 50 INJECTION INTRAVENOUS at 14:51

## 2023-10-30 RX ADMIN — SODIUM CHLORIDE, SODIUM LACTATE, POTASSIUM CHLORIDE, AND CALCIUM CHLORIDE: .6; .31; .03; .02 INJECTION, SOLUTION INTRAVENOUS at 13:57

## 2023-10-30 RX ADMIN — EPHEDRINE SULFATE 10 MG: 50 INJECTION INTRAVENOUS at 14:38

## 2023-10-30 RX ADMIN — ROCURONIUM BROMIDE 10 MG: 10 INJECTION, SOLUTION INTRAVENOUS at 14:02

## 2023-10-30 RX ADMIN — Medication 100 MG: at 14:02

## 2023-10-30 RX ADMIN — IOHEXOL 4 ML: 300 INJECTION, SOLUTION INTRAVENOUS at 15:40

## 2023-10-30 RX ADMIN — FENTANYL CITRATE 100 MCG: 50 INJECTION, SOLUTION INTRAMUSCULAR; INTRAVENOUS at 14:02

## 2023-10-30 RX ADMIN — GLYCOPYRROLATE 0.2 MCG: 0.2 INJECTION, SOLUTION INTRAMUSCULAR; INTRAVENOUS at 14:31

## 2023-10-30 RX ADMIN — EPHEDRINE SULFATE 20 MG: 50 INJECTION INTRAVENOUS at 14:43

## 2023-10-30 RX ADMIN — ROCURONIUM BROMIDE 10 MG: 10 INJECTION, SOLUTION INTRAVENOUS at 14:51

## 2023-10-30 RX ADMIN — PROPOFOL 170 MG: 10 INJECTION, EMULSION INTRAVENOUS at 14:02

## 2023-10-30 NOTE — H&P
History and Physical - [facility]  Melissa Washington 62 y.o. male MRN: [id number]                  HPI: Melissa Washington is a 62y.o. year old male who presents for pancreatic mass. REVIEW OF SYSTEMS: Per the HPI, and otherwise unremarkable.     Historical Information   Past Medical History:   Diagnosis Date    Coronary artery disease     Diabetes mellitus ([address])     Hyperlipidemia     Hypertension      Past Surgical History:   Procedure Laterality Date    CORONARY ANGIOPLASTY WITH STENT PLACEMENT      HERNIA REPAIR       Social History   Social History     Substance and Sexual Activity   Alcohol Use None     Social History     Substance and Sexual Activity   Drug Use Not on file     Social History     Tobacco Use   Smoking Status Every Day    Packs/day: 1.50    Types: Cigarettes   Smokeless Tobacco Not on file     Family History   Problem Relation Age of Onset    Heart attack Father        Meds/Allergies       Current Outpatient Medications:     Amphetamine-Dextroamphetamine (ADDERALL PO)    aspirin 81 MG tablet    carvedilol (COREG) 6.25 mg tablet    desvenlafaxine succinate (PRISTIQ) 50 mg 24 hr tablet    famotidine (PEPCID) 40 MG tablet    lisinopril (ZESTRIL) 5 mg tablet    metFORMIN (GLUCOPHAGE) 1000 MG tablet    omeprazole (PriLOSEC) 40 MG capsule    rosuvastatin (CRESTOR) 10 MG tablet    Tresiba FlexTouch 100 units/mL injection pen    Zolpidem Tartrate (AMBIEN PO)    amoxicillin (AMOXIL) 500 mg capsule    Continuous Blood Gluc Sensor (FreeStyle Geovanny 3 Sensor) MISC    glimepiride (AMARYL) 4 mg tablet    Jardiance 25 MG TABS    nicotine (NICODERM CQ) 21 mg/24 hr TD 24 hr patch    No Known Allergies    Objective     /74   Pulse 55   Temp (!) 96.3 °F (35.7 °C) (Tympanic)   Resp 17   Ht 5' 9" (1.753 m)   Wt 67.6 kg (149 lb)   SpO2 97%   BMI 22.00 kg/m²       PHYSICAL EXAM    Gen: NAD  Head: NCAT  CV: RRR  CHEST: Clear  ABD: soft, NT/ND  EXT: no edema      ASSESSMENT/PLAN:  This is a 62y.o. year old male here for EUS possible FNB, ERCP possible stent placement, and he is stable and optimized for his procedure.

## 2023-10-30 NOTE — ANESTHESIA PREPROCEDURE EVALUATION
Procedure:  ENDOSCOPIC ULTRASOUND (UPPER)  ERCP    ERCP and Endoscopic US - elevated LFTs and bilirubin  Stress negative for ischemia  TTE: EF 60% with G1DD, RV wnl        HTN - ASA, coreg, lisinopril     Denies the following: CP/SOB with exertion, asthma, COPD, ZO, stroke/TIA, seizure  Physical Exam    Airway    Mallampati score: II  TM Distance: >3 FB  Neck ROM: full     Dental   No notable dental hx     Cardiovascular      Pulmonary      Other Findings        Anesthesia Plan  ASA Score- 2     Anesthesia Type- general with ASA Monitors. Additional Monitors:     Airway Plan: ETT. Plan Factors-Exercise tolerance (METS): >4 METS. Chart reviewed. EKG reviewed. Existing labs reviewed. Patient summary reviewed. Patient is not a current smoker. Obstructive sleep apnea risk education given perioperatively. Induction- intravenous. Postoperative Plan-     Informed Consent- Anesthetic plan and risks discussed with patient. I personally reviewed this patient with the CRNA. Discussed and agreed on the Anesthesia Plan with the CRNA. Fanny Tiwari

## 2023-10-31 ENCOUNTER — TELEPHONE (OUTPATIENT)
Age: 58
End: 2023-10-31

## 2023-11-01 ENCOUNTER — DOCUMENTATION (OUTPATIENT)
Dept: HEMATOLOGY ONCOLOGY | Facility: CLINIC | Age: 58
End: 2023-11-01

## 2023-11-01 NOTE — PROGRESS NOTES
Intake received and chart reviewed for pathway workup evaluation. Patient presented to the ED on 10/23 for jaundice for on week and ruq tenderness. Referring provider-  Dr. Christopher Chacko      EUS date-  10/30/23      Impression-  The pancreas appeared atrophic. The duct was normal in the head, not seen in the neck and dilated in the body and tail of the pancreas. Irregular mass measuring 32 mm x 24 mm with well-defined margins was visualized in the neck of the pancreas. Apparent abutment of the portal vein and splenic vein (confluence); 4 fine needle biopsy passes were taken, sample found to be adequate and sent sample for histology analysis  The proximal bile duct was dilated. The mid bile duct was strictured. ERCP -  Impression - Extrinsic and severe stricture was visualized in the mid common bile duct with upstream dilation  Biliary sphincterotomy was performed. One 8 mm x 6 cm fully covered stent was placed in the common bile duct      Pathology-    Final Diagnosis   A.B. Pancreas, Neck mass (Cell Block and smear Preparations): Malignant  Adenocarcinoma. Labs-  10/23 - CMP and CBC     - needs to be collected      Imaging-  10/23 - CT ABDOMEN AND PELVIS WITH IV CONTRAST     INDICATION:   jaundice, ruq tenderness. COMPARISON:  None. TECHNIQUE:  CT examination of the abdomen and pelvis was performed. Multiplanar 2D reformatted images were created from the source data. This examination, like all CT scans performed in the [facility], was performed utilizing techniques to minimize radiation dose exposure, including the use of iterative reconstruction and automated exposure control. Radiation dose length   product (DLP) for this visit:  678 mGy-cm     IV Contrast:  100 mL of iohexol (OMNIPAQUE)  Enteric Contrast:  Enteric contrast was not administered.      FINDINGS:     ABDOMEN     LOWER CHEST:  No clinically significant abnormality identified in the visualized lower chest.     LIVER/BILIARY TREE: Moderate intra and extrahepatic biliary ductal dilatation. GALLBLADDER: Abnormally distended. Mild thickened wall. No calcified stones. SPLEEN:  Unremarkable. PANCREAS: There is a lesion suspicious for pancreatic cancer, which will be described based on Society of Abdominal Radiologists and American Pancreatic Association recommendations:     MORPHOLOGIC EVALUATION:  Appearance: Hypoattenuating. Size: Ill-defined margins, approximately 3.0 cm. Location: Tumor involves the pancreatic neck, straddling the plane of the SMV. Pancreatic duct narrowing/abrupt cut off with or without upstream dilatation: Moderate pancreatic ductal dilatation with abrupt cut off at the level of the mass. Pancreatic duct measuring up to 8 mm. Biliary tree abrupt cut off with or without upstream dilatation: Abrupt cut off of the common bile duct as it enters the pancreatic head, caliber dilated up to 19 mm. ARTERIAL EVALUATION:  Superior Mesenteric Artery Involvement:  Presence: The mass directly abuts less than 180 degrees of the SMA at its confluence with the length of vein/portal vein. Celiac Axis Involvement:  Presence: Absent. Common Hepatic Artery Involvement:  Presence: Present. Degree of solid soft-tissue contact: Traverses along the margin of the mass but remains patent. Less than 180 degrees. Degree of increased hazy attenuation/stranding contact: </= 180 degrees. Focal vessel narrowing or contour irregularity: Absent. Extension to celiac axis: Absent. Extension to the bifurcation of right/left hepatic artery: Absent. Arterial Variant:  Type: There are no arterial variants. VENOUS EVALUATION:  Main Portal Vein Involvement: Present. Degree of solid soft-tissue contact: REPORT AS LESS THAN OR EQUAL  DEGREES OR GREATER THAN 180 DEGREES.   Degree of increased hazy attenuation/stranding contact: REPORT AS LESS THAN OR EQUAL  DEGREES OR GREATER THAN 180 DEGREES. Focal vessel narrowing or contour irregularity (tethering or teardrop): Absent. Superior Mesenteric Vein Involvement: Present. Degree of solid soft-tissue contact: </= 180 degrees. Focal vessel narrowing or contour irregularity (tethering or teardrop): Present. Extension to first draining vein: Absent. Thrombus within vein: Absent. Venous collaterals: Increased caliber of the splenic vein and SMV. But without significant varix formation. FOR EVALUATION FOR POTENTIAL EXTRAPANCREATIC TUMOR, PLEASE SEE THE REST OF THE BODY OF THIS REPORT. ADRENAL GLANDS: Right adrenal within normal limits. 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 A smaller few additional nodules are also noted with similar features. These do not have an   appearance suggesting metastatic disease. KIDNEYS/URETERS: A few small bilateral renal cortical cysts are noted. No suspicious findings. STOMACH AND BOWEL: There is colonic diverticulosis without evidence of acute diverticulitis. APPENDIX:  No findings to suggest appendicitis. ABDOMINOPELVIC CAVITY:  No ascites. No pneumoperitoneum. A few shotty subcentimeter lymph nodes in the efren hepatis and gastrohepatic region remaining subcentimeter. .     VESSELS: Atherosclerotic changes are present. No evidence of aneurysm. PELVIS     REPRODUCTIVE ORGANS:  Unremarkable for patient's age. URINARY BLADDER:  Unremarkable. ABDOMINAL WALL/INGUINAL REGIONS: Lower abdominal wall hernia mesh repair noted. OSSEOUS STRUCTURES:  No acute fracture or destructive osseous lesion. IMPRESSION:     Ill-defined hypoattenuating mass within the neck of the pancreas with subsequent dilatation and obstruction of the biliary tree and pancreatic duct. Highly likely to represent pancreatic neoplasm. See above for further details.  Partially encasing the   main portal vein at the confluence with splenic vein and SMV with approximately 50% narrowing of the caliber at this level and dilatation of the SMV. No evidence for distant metastatic disease evident. Several small nodules within the left adrenal with features suggesting benign etiology as described above.     CT Chest scheduled for 11/6        Scheduled appointments-    11/16 - Dr. María Lion

## 2023-11-01 NOTE — PROGRESS NOTES
Intake received/ Chart reviewed for services completed outside of [facility]    Pathology completed: 10/30/2023 pending results    Imaging completed: CT chest scheduled 11/6/2023, CT abd/pelvis 10/23/2023  Appointments with Arturo Zavala and Mehnaz Manjarrez 12/14/2023    All records needed are in patients chart. No records retrieval needed at this time.

## 2023-11-02 LAB
ALBUMIN SERPL-MCNC: 4.1 G/DL (ref 3.8–4.9)
ALBUMIN/GLOB SERPL: 1.6 {RATIO} (ref 1.2–2.2)
ALP SERPL-CCNC: 484 IU/L (ref 44–121)
ALT SERPL-CCNC: 78 IU/L (ref 0–44)
AST SERPL-CCNC: 26 IU/L (ref 0–40)
BILIRUB SERPL-MCNC: 5.6 MG/DL (ref 0–1.2)
BUN SERPL-MCNC: 18 MG/DL (ref 6–24)
BUN/CREAT SERPL: 21 (ref 9–20)
CALCIUM SERPL-MCNC: 9.6 MG/DL (ref 8.7–10.2)
CANCER AG19-9 SERPL-ACNC: 932 U/ML (ref 0–35)
CHLORIDE SERPL-SCNC: 101 MMOL/L (ref 96–106)
CO2 SERPL-SCNC: 23 MMOL/L (ref 20–29)
CREAT SERPL-MCNC: 0.86 MG/DL (ref 0.76–1.27)
EGFR: 101 ML/MIN/1.73
GLOBULIN SER-MCNC: 2.5 G/DL (ref 1.5–4.5)
GLUCOSE SERPL-MCNC: 146 MG/DL (ref 70–99)
POTASSIUM SERPL-SCNC: 4.2 MMOL/L (ref 3.5–5.2)
PROT SERPL-MCNC: 6.6 G/DL (ref 6–8.5)
SODIUM SERPL-SCNC: 140 MMOL/L (ref 134–144)

## 2023-11-02 PROCEDURE — 88305 TISSUE EXAM BY PATHOLOGIST: CPT | Performed by: PATHOLOGY

## 2023-11-02 PROCEDURE — 88342 IMHCHEM/IMCYTCHM 1ST ANTB: CPT | Performed by: PATHOLOGY

## 2023-11-02 PROCEDURE — 88172 CYTP DX EVAL FNA 1ST EA SITE: CPT | Performed by: PATHOLOGY

## 2023-11-02 PROCEDURE — 88341 IMHCHEM/IMCYTCHM EA ADD ANTB: CPT | Performed by: PATHOLOGY

## 2023-11-02 PROCEDURE — 88160 CYTOPATH SMEAR OTHER SOURCE: CPT | Performed by: PATHOLOGY

## 2023-11-03 ENCOUNTER — PATIENT OUTREACH (OUTPATIENT)
Dept: HEMATOLOGY ONCOLOGY | Facility: CLINIC | Age: 58
End: 2023-11-03

## 2023-11-03 ENCOUNTER — TELEPHONE (OUTPATIENT)
Age: 58
End: 2023-11-03

## 2023-11-03 DIAGNOSIS — C25.9 PANCREATIC ADENOCARCINOMA (HCC): Primary | ICD-10-CM

## 2023-11-03 NOTE — PROGRESS NOTES
Phone outreach to the patient, I introduced myself and explained my service to him. I went over his upcoming appointments and the patient is aware of them. I explained that we are still working on finding a medical oncology appointment and we will be in touch with appointment detail. The patient reports understanding his diagnosis and was "calm" when speaking with him. Pt reports persistent 1/10 LLQ abd discomfort onset a few weeks ago. He nina N/V or diarrhea at present. He reports having a good appetite and is eat very well even more than normal and denies any weight loss. Pt reports living at home with his wife and his family is a good support for him. He denies any barriers getting to or from any of his appointments and states he will drive himself or family members can drive him if needed. He denies any physical barriers at present. I placed referrals for Palliative Care, Social Work, Smoking Cessation, and Genetics. I explained what each service can offer and the patient agreed. I discussed with him and explained in detail about a possible port placement for Chemotherapy if this is planned for his treatment and he confirmed understanding. We completed a General Assessment together, I provided my contact information and Katina Guerrero and instructed him to please call if he has any questions or concerns. I thanked him for his time.

## 2023-11-03 NOTE — TELEPHONE ENCOUNTER
Patients GI provider:  Dr. Trinh Mohr    Number to return call: [phone]    Reason for call: Pt calling requesting recent lab results. Please review results and contact patient.

## 2023-11-03 NOTE — RESULT ENCOUNTER NOTE
Inform patient via Victorious. Please review the pathology/lab result of further discussion. Copied from Victorious message :       [address] [place],     Your cancer antigen (CA 19-9) was high at 932. This goes along with the diagnosis of the pancreatic cancer. This is as we had discussed. Also your bile levels are down from 12 to 5 and hopefully will continue to improve.    Best regards,     José Antonio Hobbs MD

## 2023-11-06 ENCOUNTER — PATIENT OUTREACH (OUTPATIENT)
Dept: HEMATOLOGY ONCOLOGY | Facility: CLINIC | Age: 58
End: 2023-11-06

## 2023-11-06 ENCOUNTER — TELEPHONE (OUTPATIENT)
Dept: GENETICS | Facility: CLINIC | Age: 58
End: 2023-11-06

## 2023-11-06 ENCOUNTER — HOSPITAL ENCOUNTER (OUTPATIENT)
Dept: CT IMAGING | Facility: HOSPITAL | Age: 58
Discharge: HOME | End: 2023-11-06
Payer: COMMERCIAL

## 2023-11-06 DIAGNOSIS — K83.1 BILIARY OBSTRUCTION: ICD-10-CM

## 2023-11-06 DIAGNOSIS — K86.9 PANCREATIC LESION: ICD-10-CM

## 2023-11-06 PROCEDURE — 71260 CT THORAX DX C+: CPT

## 2023-11-06 RX ADMIN — IOHEXOL 85 ML: 350 INJECTION, SOLUTION INTRAVENOUS at 15:54

## 2023-11-06 NOTE — TELEPHONE ENCOUNTER
I called [facility] to schedule a new patient appointment with the Cancer Risk and Genetics Program.      Outcome:  Genetics appointment scheduled for 11/8 at 1 via televisit    Personal/Family History Related to Appointment:  Recently dx with Pancreatic Ca. Skin ca. Fhx skin ca. Non-Alevism.      History of Genetic Testing:  Patient reports no personal or family history of genetic testing    Genetics Family History Questionnaire:  Declined

## 2023-11-06 NOTE — PROGRESS NOTES
Phone outreach to the patient and offered him an appointment with medical oncology with Dr. Jessica Rivero at our [facility] office at [address] [place] [address].  Patient accepted and he thanked me.

## 2023-11-07 ENCOUNTER — PATIENT OUTREACH (OUTPATIENT)
Dept: CASE MANAGEMENT | Facility: HOSPITAL | Age: 58
End: 2023-11-07

## 2023-11-07 ENCOUNTER — PATIENT OUTREACH (OUTPATIENT)
Dept: OTHER | Facility: CLINIC | Age: 58
End: 2023-11-07

## 2023-11-07 ENCOUNTER — DOCUMENTATION (OUTPATIENT)
Dept: GENETICS | Facility: CLINIC | Age: 58
End: 2023-11-07

## 2023-11-07 NOTE — PROGRESS NOTES
OncSW referral received, chart review completed. Pt has upcoming consults with Med onc and Surg Onc. MSW will outreach when a tx plan is in place, no immediate needs noted on chart review.

## 2023-11-08 ENCOUNTER — DOCUMENTATION (OUTPATIENT)
Dept: GENETICS | Facility: CLINIC | Age: 58
End: 2023-11-08

## 2023-11-08 ENCOUNTER — TELEPHONE (OUTPATIENT)
Dept: HEMATOLOGY ONCOLOGY | Facility: CLINIC | Age: 58
End: 2023-11-08

## 2023-11-08 ENCOUNTER — CLINICAL SUPPORT (OUTPATIENT)
Dept: GENETICS | Facility: CLINIC | Age: 58
End: 2023-11-08
Payer: COMMERCIAL

## 2023-11-08 ENCOUNTER — OFFICE VISIT (OUTPATIENT)
Dept: HEMATOLOGY ONCOLOGY | Facility: CLINIC | Age: 58
End: 2023-11-08
Payer: COMMERCIAL

## 2023-11-08 VITALS
TEMPERATURE: 98.2 F | WEIGHT: 156.5 LBS | HEART RATE: 82 BPM | RESPIRATION RATE: 16 BRPM | BODY MASS INDEX: 23.18 KG/M2 | OXYGEN SATURATION: 98 % | HEIGHT: 69 IN | DIASTOLIC BLOOD PRESSURE: 86 MMHG | SYSTOLIC BLOOD PRESSURE: 142 MMHG

## 2023-11-08 DIAGNOSIS — C25.9 PANCREATIC ADENOCARCINOMA (HCC): Primary | ICD-10-CM

## 2023-11-08 DIAGNOSIS — C25.9 MALIGNANT NEOPLASM OF PANCREAS, UNSPECIFIED LOCATION OF MALIGNANCY (HCC): Primary | ICD-10-CM

## 2023-11-08 PROCEDURE — 96040 PR MEDICAL GENETICS COUNSELING EACH 30 MINUTES: CPT | Performed by: GENETIC COUNSELOR, MS

## 2023-11-08 PROCEDURE — 99205 OFFICE O/P NEW HI 60 MIN: CPT | Performed by: INTERNAL MEDICINE

## 2023-11-08 NOTE — TELEPHONE ENCOUNTER
Patient is scheduled for 11/13 at 9 am at [address] [place] [address]. Auth should be back by 11/10. Patient updated.

## 2023-11-08 NOTE — PROGRESS NOTES
[address] [facility] [address] [facility]  1965      PRIMARY HEMATOLOGIC/ONCOLOGIC DIAGNOSIS:  Adenocarcinoma of the pancreas. CA 19-9 of 932 at time of diagnosis. PATHOLOGY:   Final Diagnosis   A.B. Pancreas, Neck mass (Cell Block and smear Preparations): Malignant  Adenocarcinoma. Satisfactory for evaluation. Note: As reported in the [facility] Reporting System for Pancreaticobiliary Cytopathology *” the diagnostic category of “malignant” carries a % risk of malignancy being found in subsequent FNAB or resection. The usual management following an initial diagnosis of “malignant” is per clinical stage. Ultimately clinical and radiologic correlation is needed for this patient in arriving at the actual management plan. STAGING: Cancer Staging   No matching staging information was found for the patient. Oncology History    No history exists. HISTORY OF PRESENT ILLNESS:  Clement Jauregui is a 61yo male who presents for the evaluation and management of newly diagnosed pancreatic cancer. The patient presented to the ED on 10/23/23 with new onset jaundice. He reported ayse colored stools and left sided discomfort. CT A/P showed ill-defined hypoattenuating mass within the neck of the pancreas with subsequent dilatation and obstruction of the biliary tree and pancreatic duct. Highly likely to represent pancreatic neoplasm. Partially encasing the main portal vein at the confluence with splenic vein and SMV with approximately 50% narrowing of the caliber at this level and dilatation of the SMV. No evidence for distant metastatic disease was evident. ERCP was performed 10/30/23. The common bile duct was deeply cannulated after 1 attempt using a traction sphincterotome with 260 cm x 0.035" guidewire. Extrinsic and severe stricture was visualized in the mid common bile duct.  The stricture was traversable by wire. Dilation was observed in the proximal common bile duct  Medium-sized biliary sphincterotomy was performed using a sphincterotome. One 8 mm x 6 cm fully covered metal stent was placed in the common bile duct. EUS was also performed. The pancreas appeared atrophic. The pancreatic parenchyma was visualized in the body of the pancreas and tail of the pancreas. The parenchyma had hyperechoic foci. The pancreatic duct measured 3 mm at the tail. The duct in the body and tail of the pancreas was dilated. Irregular mass measuring 32 mm x 24 mm with well-defined margins was visualized in the neck of the pancreas. Apparent abutment of the portal vein and splenic vein; 4 successful fine needle biopsy passes were taken with a 25 gauge needle using a transduodenal approach guided by Doppler, sample found to be adequate and sent sample for histology analysis. Small lymph nodes were seen in the area adjacent to the tumor. The proximal bile duct was dilated. The mid bile duct was strictured. The bile duct measured 16 mm at the distal end and 4 mm at the proximal end. The parenchyma of the liver appeared normal. The hepatic ducts appeared normal.The gallbladder appeared distended. The gallbladder contained biliary sludge. Normal celiac axis. No lymphadenopathy was seen. Pathology was c/w malignant adenocarcinoma. PAST MEDICAL,PAST SURGICAL, FAMILY AND SOCIAL HISTORY:    There is no problem list on file for this patient.     Past Medical History:   Diagnosis Date    Coronary artery disease     Diabetes mellitus ([address])     Hyperlipidemia     Hypertension      Past Surgical History:   Procedure Laterality Date    CORONARY ANGIOPLASTY WITH STENT PLACEMENT      HERNIA REPAIR       Family History   Problem Relation Age of Onset    Heart attack Father      Social History     Socioeconomic History    Marital status: /Civil Union     Spouse name: Not on file    Number of children: Not on file    Years of education: Not on file    Highest education level: Not on file   Occupational History    Not on file   Tobacco Use    Smoking status: Every Day     Packs/day: 1.50     Types: Cigarettes    Smokeless tobacco: Not on file   Substance and Sexual Activity    Alcohol use: Not on file    Drug use: Not on file    Sexual activity: Not on file   Other Topics Concern    Not on file   Social History Narrative    Not on file     Social Determinants of Health     Financial Resource Strain: Not on file   Food Insecurity: Not on file   Transportation Needs: Not on file   Physical Activity: Not on file   Stress: Not on file   Social Connections: Not on file   Intimate Partner Violence: Not on file   Housing Stability: Not on file       Current Outpatient Medications:     Amphetamine-Dextroamphetamine (ADDERALL PO), Take by mouth, Disp: , Rfl:     aspirin 81 MG tablet, 1 tab(s), Disp: , Rfl:     carvedilol (COREG) 6.25 mg tablet, Take 6.25 mg by mouth 2 (two) times a day, Disp: , Rfl:     Continuous Blood Gluc Sensor (FreeStyle Geovanny 3 Sensor) MISC, use as directed TO MONITOR GLUCOSE DAILY, Disp: , Rfl:     desvenlafaxine succinate (PRISTIQ) 50 mg 24 hr tablet, Take 50 mg by mouth daily, Disp: , Rfl:     famotidine (PEPCID) 40 MG tablet, Take 40 mg by mouth daily, Disp: , Rfl:     Jardiance 25 MG TABS, Take 1 tablet by mouth every morning, Disp: , Rfl:     lisinopril (ZESTRIL) 5 mg tablet, Take 1 tablet by mouth daily, Disp: , Rfl:     metFORMIN (GLUCOPHAGE) 1000 MG tablet, Take 1,000 mg by mouth, Disp: , Rfl:     omeprazole (PriLOSEC) 40 MG capsule, Take 40 mg by mouth daily, Disp: , Rfl:     rosuvastatin (CRESTOR) 10 MG tablet, Take 1 tablet by mouth daily, Disp: , Rfl:     Ken Steele FlexTouch 100 units/mL injection pen, 40 units before bed time and increase by 02 units every 3rd night As directed by clinic. Max dose 80 units.  Dose adjustment, Disp: , Rfl:     Zolpidem Tartrate (AMBIEN PO), Take by mouth, Disp: , Rfl: amoxicillin (AMOXIL) 500 mg capsule, Take 500 mg by mouth 3 (three) times a day, Disp: , Rfl:     glimepiride (AMARYL) 4 mg tablet, Take 4 mg by mouth, Disp: , Rfl:     nicotine (NICODERM CQ) 21 mg/24 hr TD 24 hr patch, Place 1 patch on the skin daily, Disp: , Rfl:   No Known Allergies  There were no vitals filed for this visit. ROS:  CONSTITUTIONAL:  No fever. No chills. No dizziness. No weakness. EYES:  No pain, erythema, or discharge. No blurring of vision. ENT:  No sore throat, URI symptoms. No epistaxis. No tinnitus. CARDIOVASCULAR:  No chest pain. No palpitations. No lower extremity edema. RESPIRATORY:  No shortness of breath, cough, pain with respiration, pleuritic chest pain. No hemoptysis. No dyspnea. No paroxysmal nocturnal dyspnea. GASTROINTESTINAL:  Normal appetite. No nausea, vomiting, diarrhea. No pain. No bloating. No melena. GENITOURINARY:  No frequency, urgency, nocturia. No hematuria or dysuria. MUSCULOSKELETAL:  No arthralgias or myalgias. INTEGUMENTARY:  No swelling. No bruising. No contusions. No abrasions. No lymphangitis. NEUROLOGIC:  No headache. No neck pain. No numbness or tingling of the extremities. No weakness. PSYCHIATRIC:  No confusion. ENDOCRINE:  No fatigue. No weakness. No history of thyroid, diabetes or adrenal problems. HEMATOLOGICAL:  No bleeding. No petechiae. No bruising.     PHYSICAL EXAM:    GENERAL: AAO x 3  HEENT: AT,NC  CVS: S1S2 RRR  LUNGS: CTA b/l  ABD: NT,ND, +BS  EXTR: no edema  NEURO: CN II-XII grossly intact    LABS:  I have reviewed pertinent labs:  CBC:   Lab Results   Component Value Date    WBC 12.55 (H) 10/23/2023    RBC 5.10 10/23/2023    HGB 16.5 10/23/2023    HCT 47.6 10/23/2023    MCV 93 10/23/2023     10/23/2023    MCH 32.4 10/23/2023    MCHC 34.7 10/23/2023    RDW 15.8 (H) 10/23/2023    MPV 10.7 10/23/2023     CMP:   Lab Results   Component Value Date    SODIUM 140 11/01/2023    K 4.2 11/01/2023     11/01/2023    CO2 23 11/01/2023    AGAP 6 10/23/2023    BUN 18 11/01/2023    CREATININE 0.86 11/01/2023    GLUC 146 (H) 11/01/2023    CALCIUM 9.1 10/23/2023    AST 26 11/01/2023    ALT 78 (H) 11/01/2023    ALKPHOS 393 (H) 10/23/2023    TP 6.6 11/01/2023    ALB 4.1 11/01/2023    TBILI 5.6 (H) 11/01/2023    EGFR 101 11/01/2023     Liver Enzymes:   Lab Results   Component Value Date    AST 26 11/01/2023    ALT 78 (H) 11/01/2023    ALKPHOS 393 (H) 10/23/2023    TP 6.6 11/01/2023    ALB 4.1 11/01/2023    TBILI 5.6 (H) 11/01/2023     Vitamin B12 No results found for: "SBAQFRVN84"  Iron Study No results found for: "RETIC", "RETICCTPCT", "FERRITIN", "CONCFE", "TIBC", "PRIMIDONE", "FOLATEHEMO", "IRON"  Folate No results found for: "FOLATE"  Magnesium No results found for: "MG"  Phosphorus No results found for: "PHOS"  Coagulation Panel No results found for: "DDIMER", "PROTIME", "INR", "PTT"    IMAGING:  FL ERCP biliary and pancreatic    Result Date: 11/1/2023  Narrative: ERCP INDICATION: K86.9: Disease of pancreas, unspecified. COMPARISON: October 23, 2023 IMAGES:  5 FLUOROSCOPY TIME:   62.3 seconds CONTRAST: 4 mL of iohexol (OMNIPAQUE) FINDINGS: Fluoroscopic guidance was provided for performance of ERCP. BILIARY: Limited contrast opacified static submitted images were obtained as part of fluoroscopic guidance. Stricture of distal common duct. Enlargement of proximal common duct demonstrated. Final image depicts the presence of a metallic distal common bile duct stent. PANCREATIC:  None of the submitted static fluoroscopic images demonstrate contrast opacification of the pancreatic duct. Impression: Fluoroscopic guidance for ERCP. Please see procedure report for full details. Workstation performed: [id number]     ERCP    Result Date: 10/30/2023  Narrative: Table formatting from the original result was not included.  [address] [place] [id number] [address] [facility] [address]: 10/30/23 PHYSICIAN(S): Attending: Melissa Mcknight MD Fellow: Palmira Vale DO INDICATION: Biliary obstruction POST-OP DIAGNOSIS: See the impression below. PREPROCEDURE: Informed consent was obtained for the procedure, including sedation. Risks of perforation, hemorrhage, adverse drug reaction and aspiration were discussed. The patient was placed in the left lateral decubitus position. Patient was explained about the risks and benefits of the procedure. Risks including but not limited to bleeding, infection, and perforation were explained in detail. Also explained about less than 100% sensitivity with the exam and other alternatives. PROCEDURE: ERCP DETAILS OF PROCEDURE: Patient was taken to the procedure room where a time out was performed to confirm correct patient and correct procedure. The patient underwent general anesthesia, which was administered by an anesthesia professional. The patient's blood pressure, heart rate, level of consciousness, respirations, oxygen, ECG and ETCO2 were monitored throughout the procedure. The scope was advanced to the duodenum. Clinical intention was achieved. The patient experienced no blood loss. The procedure was not difficult. The patient tolerated the procedure well. There were no apparent adverse events. ANESTHESIA INFORMATION: ASA: II Anesthesia Type: General MEDICATIONS: indomethacin (INDOCIN) rectal suppository 100 mg 100 mg (Totals for administrations occurring from 1356 to 1534 on 10/30/23) FINDINGS: The common bile duct was deeply cannulated after 1 attempt using a traction sphincterotome with 260 cm x 0.035" guidewire. Cannulation was not difficult. Contrast was injected Extrinsic and severe stricture was visualized in the mid common bile duct. The stricture was traversable by wire Dilation was observed in the proximal common bile duct Medium-sized biliary sphincterotomy was performed using a sphincterotome. No bleeding was noted at the procedure site.  One 8 mm x 6 cm fully covered metal stent was placed in the common bile duct. There was good bile and contrast drainage seen. The fluoroscopy images for the , cholangiogram and post procedure were personally reviewed and interpreted during the procedure. SPECIMENS: ID Type Source Tests Collected by Time Destination 1 : pancreatic neck mass FNA Pancreas NON-GYNECOLOGIC CYTOLOGY, FINE NEEDLE ASPIRATION Edelmira Canseco MD 10/30/2023 1419       Impression: Extrinsic and severe stricture was visualized in the mid common bile duct with upstream dilation Biliary sphincterotomy was performed. One 8 mm x 6 cm fully covered stent was placed in the common bile duct RECOMMENDATION: Follow labs in one week. Follow up with oncology. Follow up with biopsies. Edelmira Canseco MD     Endoscopic ultrasonography, GI (Upper)    Result Date: 10/30/2023  Narrative: Table formatting from the original result was not included. [facility] [address] [place] [address] [phone] DATE OF SERVICE: 10/30/23 PHYSICIAN(S): Attending: Edelmira Canseco MD Fellow: Jazmin Lyon DO INDICATION: Pancreatic lesion POST-OP DIAGNOSIS: See the impression below. PREPROCEDURE: Informed consent was obtained for the procedure, including sedation. Risks of perforation, hemorrhage, adverse drug reaction and aspiration were discussed. The patient was placed in the left lateral decubitus position. Patient was explained about the risks and benefits of the procedure. Risks including but not limited to bleeding, infection, and perforation were explained in detail. Also explained about less than 100% sensitivity with the exam and other alternatives. PROCEDURE: EUS UPPER DETAILS OF PROCEDURE: Patient was taken to the procedure room where a time out was performed to confirm correct patient and correct procedure.  The patient underwent monitored anesthesia care, which was administered by an anesthesia professional. The patient's blood pressure, heart rate, oxygen, respirations, level of consciousness and ECG were monitored throughout the procedure. The endoscope was advanced to the duodenum. The patient experienced no blood loss. The procedure was not difficult. The patient tolerated the procedure well. There were no apparent adverse events. ANESTHESIA INFORMATION: ASA: II Anesthesia Type: General MEDICATIONS: indomethacin (INDOCIN) rectal suppository 100 mg 100 mg (Totals for administrations occurring from 1356 to 1534 on 10/30/23) FINDINGS: Limited endoscopic evaluation using a side-viewing endoscope was unremarkable. The pancreas appeared atrophic. The pancreatic parenchyma was visualized in the body of the pancreas and tail of the pancreas. . The parenchyma had hyperechoic foci. The pancreatic duct measured 3 mm at the tail. The duct in the body and tail of the pancreas was dilated. Irregular mass measuring 32 mm x 24 mm with well-defined margins was visualized in the neck of the pancreas. Apparent abutment of the portal vein and splenic vein; 4 successful fine needle biopsy passes were taken with a 25 gauge needle using a transduodenal approach guided by Doppler, sample found to be adequate and sent sample for histology analysis. Small lymph nodes were seen in the area adjacent to the tumor. The proximal bile duct was dilated. The mid bile duct was strictured. The bile duct measured 16 mm at the distal end and 4 mm at the proximal end. The parenchyma of the liver appeared normal. The hepatic ducts appeared normal. The gallbladder appeared distended. The gallbladder contained biliary sludge. Normal celiac axis. No lymphadenopathy was seen. SPECIMENS: ID Type Source Tests Collected by Time Destination 1 : pancreatic neck mass FNA Pancreas NON-GYNECOLOGIC CYTOLOGY, FINE NEEDLE ASPIRATION Charla Zhao MD 10/30/2023 [id number]       Impression: The pancreas appeared atrophic.  The duct was normal in the head, not seen in the neck and dilated in the body and tail of the pancreas. Irregular mass measuring 32 mm x 24 mm with well-defined margins was visualized in the neck of the pancreas. Apparent abutment of the portal vein and splenic vein (confluence); 4 fine needle biopsy passes were taken, sample found to be adequate and sent sample for histology analysis The proximal bile duct was dilated. The mid bile duct was strictured. RECOMMENDATION:  Await pathology results ERCP today. CT chest Follow up with oncology. Clementina Phoenix, MD     CT abdomen pelvis with contrast    Result Date: 10/23/2023  Narrative: CT ABDOMEN AND PELVIS WITH IV CONTRAST INDICATION:   jaundice, ruq tenderness. COMPARISON:  None. TECHNIQUE:  CT examination of the abdomen and pelvis was performed. Multiplanar 2D reformatted images were created from the source data. This examination, like all CT scans performed in the [facility], was performed utilizing techniques to minimize radiation dose exposure, including the use of iterative reconstruction and automated exposure control. Radiation dose length product (DLP) for this visit:  678 mGy-cm IV Contrast:  100 mL of iohexol (OMNIPAQUE) Enteric Contrast:  Enteric contrast was not administered. FINDINGS: ABDOMEN LOWER CHEST:  No clinically significant abnormality identified in the visualized lower chest. LIVER/BILIARY TREE: Moderate intra and extrahepatic biliary ductal dilatation. GALLBLADDER: Abnormally distended. Mild thickened wall. No calcified stones. SPLEEN:  Unremarkable. PANCREAS: There is a lesion suspicious for pancreatic cancer, which will be described based on Society of Abdominal Radiologists and American Pancreatic Association recommendations: MORPHOLOGIC EVALUATION: Appearance: Hypoattenuating. Size: Ill-defined margins, approximately 3.0 cm. Location: Tumor involves the pancreatic neck, straddling the plane of the SMV.  Pancreatic duct narrowing/abrupt cut off with or without upstream dilatation: Moderate pancreatic ductal dilatation with abrupt cut off at the level of the mass. Pancreatic duct measuring up to 8 mm. Biliary tree abrupt cut off with or without upstream dilatation: Abrupt cut off of the common bile duct as it enters the pancreatic head, caliber dilated up to 19 mm. ARTERIAL EVALUATION: Superior Mesenteric Artery Involvement: Presence: The mass directly abuts less than 180 degrees of the SMA at its confluence with the length of vein/portal vein. Celiac Axis Involvement: Presence: Absent. Common Hepatic Artery Involvement: Presence: Present. Degree of solid soft-tissue contact: Traverses along the margin of the mass but remains patent. Less than 180 degrees. Degree of increased hazy attenuation/stranding contact: </= 180 degrees. Focal vessel narrowing or contour irregularity: Absent. Extension to celiac axis: Absent. Extension to the bifurcation of right/left hepatic artery: Absent. Arterial Variant: Type: There are no arterial variants. VENOUS EVALUATION: Main Portal Vein Involvement: Present. Degree of solid soft-tissue contact: REPORT AS LESS THAN OR EQUAL  DEGREES OR GREATER THAN 180 DEGREES. Degree of increased hazy attenuation/stranding contact: REPORT AS LESS THAN OR EQUAL  DEGREES OR GREATER THAN 180 DEGREES. Focal vessel narrowing or contour irregularity (tethering or teardrop): Absent. Superior Mesenteric Vein Involvement: Present. Degree of solid soft-tissue contact: </= 180 degrees. Focal vessel narrowing or contour irregularity (tethering or teardrop): Present. Extension to first draining vein: Absent. Thrombus within vein: Absent. Venous collaterals: Increased caliber of the splenic vein and SMV. But without significant varix formation. FOR EVALUATION FOR POTENTIAL EXTRAPANCREATIC TUMOR, PLEASE SEE THE REST OF THE BODY OF THIS REPORT. ADRENAL GLANDS: Right adrenal within normal limits. 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  A smaller few additional nodules are also noted with similar features. These do not have an  appearance suggesting metastatic disease. KIDNEYS/URETERS: A few small bilateral renal cortical cysts are noted. No suspicious findings. STOMACH AND BOWEL: There is colonic diverticulosis without evidence of acute diverticulitis. APPENDIX:  No findings to suggest appendicitis. ABDOMINOPELVIC CAVITY:  No ascites. No pneumoperitoneum. A few shotty subcentimeter lymph nodes in the efren hepatis and gastrohepatic region remaining subcentimeter. . VESSELS: Atherosclerotic changes are present. No evidence of aneurysm. PELVIS REPRODUCTIVE ORGANS:  Unremarkable for patient's age. URINARY BLADDER:  Unremarkable. ABDOMINAL WALL/INGUINAL REGIONS: Lower abdominal wall hernia mesh repair noted. OSSEOUS STRUCTURES:  No acute fracture or destructive osseous lesion. Impression: Ill-defined hypoattenuating mass within the neck of the pancreas with subsequent dilatation and obstruction of the biliary tree and pancreatic duct. Highly likely to represent pancreatic neoplasm. See above for further details. Partially encasing the main portal vein at the confluence with splenic vein and SMV with approximately 50% narrowing of the caliber at this level and dilatation of the SMV. No evidence for distant metastatic disease evident. Several small nodules within the left adrenal with features suggesting benign etiology as described above. The study was marked in [facility] for immediate notification. Workstation performed: [id number]     I reviewed the above laboratory and imaging data. ASSESSMENT/PLAN:  Adenocarcinoma of the pancreas. CA 19-9 of 932 at time of diagnosis. Will obtain PET/CT. The patient has a follow-up with surgical oncology. Path will be sent for Korrio. Discussed the utility of neoadjuvant chemotherapy. Referral to IR for port placement.

## 2023-11-08 NOTE — TELEPHONE ENCOUNTER
Patient Call    Who are you speaking with? Patient    If it is not the patient, are they listed on an active communication consent form? Yes   What is the reason for this call? Need orders and authorization for PET scan sent fax [phone] [facility]. [address]72 is CPT code   Does this require a call back? Yes   If a call back is required, please list best call back number [phone]    If a call back is required, advise that a message will be forwarded to their care team and someone will return their call as soon as possible. Did you relay this information to the patient?  Yes

## 2023-11-08 NOTE — TELEPHONE ENCOUNTER
Called patient and he stated he can get scheduled at [facility] on Friday 11/10. Called Antonio Cam with finance to review the need for auth. She will submit for auth now. Once submitted it may be approved right away or take some time. Will update patient once I have more information.

## 2023-11-08 NOTE — LETTER
2023     Oneal Tse MD   [facility] [address]    Patient: Jose Roberto Loyd  YOB: 1965  Date of Visit: 2023      Dear Dr. Konstantin Yan:    Thank you for referring Jose Roberto Loyd to me for evaluation. Below are my notes for this consultation. If you have questions, please do not hesitate to call me. I look forward to following your patient along with you. Sincerely,        Kiara Wong GC        CC: No Recipients        Pre-Test Genetic Counseling Consult Note    Patient Name: Jose Roberto Loyd   /Age: 1965/58 y.o. Referring Provider: Oneal Tse MD     Date of Service: 2023  Genetic Counselor: Violeta Hyde MS, [address] [place] [address]  Interpretation Services: None  Location: Telephone consult   Length of Visit: 27 Minutes    Nathan Prader was referred to the [address] [place], [place] [address] and Genetic Assessment Program due to his personal history of pancreatic cancer . He presents today to discuss the possibility of a hereditary cancer syndrome, options for genetic testing, and implications for him and his family. Cancer History and Treatment:     Personal History: Personal history of pancreatic cancer     PRIMARY HEMATOLOGIC/ONCOLOGIC DIAGNOSIS:  Adenocarcinoma of the pancreas. CA 19-9 of 932 at time of diagnosis. PATHOLOGY:   Final Diagnosis   A.B. Pancreas, Neck mass (Cell Block and smear Preparations): Malignant  Adenocarcinoma.      Screening Hx:     Colon:  Colonoscopy: 09/10/21, history of <5 polyps     Skin:  No current screening    Prostate:  Prostate screening: No current screening     Medical and Surgical History  Pertinent surgical history:   Past Surgical History:   Procedure Laterality Date   • CORONARY ANGIOPLASTY WITH STENT PLACEMENT     • HERNIA REPAIR        Pertinent medical history:  Past Medical History:   Diagnosis Date   • Coronary artery disease    • Diabetes mellitus ([address] [facility])    • Hyperlipidemia    • Hypertension        Other History:  Height:   Ht Readings from Last 1 Encounters:   11/08/23 5' 9" (1.753 m)     Weight:   Wt Readings from Last 1 Encounters:   11/08/23 71 kg (156 lb 8 oz)     Relevant Family History   Patient reports no Ashkenazi Latter day ancestry. There is no known history of cancer in maternal or paternal aunts, uncles or cousins. Please refer to the scanned pedigree in the Media Tab for a complete family history     *All history is reported as provided by the patient; records are not available for review, except where indicated. Assessment:  We discussed sporadic, familial and hereditary cancer. We also discussed the many factors that influence our risk for cancer such as age, environmental exposures, lifestyle choices and family history. We reviewed the indications suggestive of a hereditary predisposition to cancer. Genetic testing is indicated for Vince Reynoso based on the following criteria: Meets NCCN V2.2024 Testing Criteria for Pancreatic Cancer Susceptibility Genes: Personal history of pancreatic cancer     The risks, benefits, and limitations of genetic testing were reviewed with the patient, as well as genetic discrimination laws, and possible test results (positive, negative, variants of uncertain significance) and their clinical implications. If positive for a mutation, options for managing cancer risk including increased surveillance, chemoprevention, and in some cases prophylactic surgery were discussed. Vince Seen was informed that if a hereditary cancer syndrome was identified in him, first degree relatives (parents, siblings, and children) have a chance of also inheriting the condition. Genetic testing would allow for predictive genetic testing in other relatives, who may also be at risk depending on their degree of relation. Billing:  Most individuals pay <$100 for hereditary cancer genetic testing.  If insurance covers the cost of the testing, individuals may still pay out of pocket secondary to co-pays, co-insurance, or deductibles. If the cost of the testing exceeds $100, the lab will reach out to the patient via phone or e-mail. The patient will then have the option to proceed with the testing, cancel the testing, or elect the self-pay option of $250. Cat Jones verbalized understanding. Plan: Patient decided to proceed with testing and provided consent. Summary:     Sample Collection:  A blood kit was mailed home to the patient    Genetic Testing Preformed: CustomNext: Cancer® +[employer]® (61 genes): APC, ROGER, AXIN2, BAP1, BARD1, BMPR1A, BRCA1, BRCA2, BRIP1, CDH1, CDK4, CDKN1B, CDKN2A, CHEK2, CTNNA1, DICER1, EGLN1, EPCAM, FH, FLCN, GREM1, HOXB13, KIF1B, KIT, MAX, MEN1, MET, MITF, MLH1, MSH2, MSH3, MSH6, MUTYH, NBN, NF1, NTHL1, PALB2, PMS2, POLD1, POLE, POT1, PTEN, RAD50, RAD51C, RAD51D, RB1, RECQL, RET, SDHA, SDHAF2, SDHB, SDHC, SDHD, SMAD4, SMARCA4, STK11, JLHT385, TP53, TSC1, TSC2, VHL      Result Call Information:  In the event that we need to reach Cat Jones via telephone:  I confirmed the patient's mobile number on file as the best number to call with results [phone]   I confirmed with the patient that we can leave a voicemail on the provided numbers    Results take approximately 2-3 weeks to complete once test is started. Cat Jones will be notified via [employer] once results are available. Additional recommendations for surveillance/medical management will be made pending genetic test results.

## 2023-11-08 NOTE — PROGRESS NOTES
Pre-Test Genetic Counseling Consult Note    Patient Name: Fernandez Meier   /Age: 1965/58 y.o. Referring Provider: Divine Gerard MD     Date of Service: 2023  Genetic Counselor: Steven Barrera MS, [address] [facility]  Interpretation Services: None  Location: Telephone consult   Length of Visit: 27 Minutes    Chyna Quiles was referred to the [address] [place] [address],[address] Floor and Genetic Assessment Program due to his personal history of pancreatic cancer . He presents today to discuss the possibility of a hereditary cancer syndrome, options for genetic testing, and implications for him and his family. Cancer History and Treatment:     Personal History: Personal history of pancreatic cancer     PRIMARY HEMATOLOGIC/ONCOLOGIC DIAGNOSIS:  Adenocarcinoma of the pancreas. CA 19-9 of 932 at time of diagnosis. PATHOLOGY:   Final Diagnosis   A.B. Pancreas, Neck mass (Cell Block and smear Preparations): Malignant  Adenocarcinoma. Screening Hx:     Colon:  Colonoscopy: 09/10/21, history of <5 polyps     Skin:  No current screening    Prostate:  Prostate screening: No current screening     Medical and Surgical History  Pertinent surgical history:   Past Surgical History:   Procedure Laterality Date    CORONARY ANGIOPLASTY WITH STENT PLACEMENT      HERNIA REPAIR        Pertinent medical history:  Past Medical History:   Diagnosis Date    Coronary artery disease     Diabetes mellitus ([address])     Hyperlipidemia     Hypertension        Other History:  Height:   Ht Readings from Last 1 Encounters:   23 5' 9" (1.753 m)     Weight:   Wt Readings from Last 1 Encounters:   23 71 kg (156 lb 8 oz)     Relevant Family History   Patient reports no Ashkenazi Mandaen ancestry. There is no known history of cancer in maternal or paternal aunts, uncles or cousins.           Please refer to the scanned pedigree in the Media Tab for a complete family history     *All history is reported as provided by the patient; records are not available for review, except where indicated. Assessment:  We discussed sporadic, familial and hereditary cancer. We also discussed the many factors that influence our risk for cancer such as age, environmental exposures, lifestyle choices and family history. We reviewed the indications suggestive of a hereditary predisposition to cancer. Genetic testing is indicated for Tarik Dewitt based on the following criteria: Meets NCCN V2.2024 Testing Criteria for Pancreatic Cancer Susceptibility Genes: Personal history of pancreatic cancer     The risks, benefits, and limitations of genetic testing were reviewed with the patient, as well as genetic discrimination laws, and possible test results (positive, negative, variants of uncertain significance) and their clinical implications. If positive for a mutation, options for managing cancer risk including increased surveillance, chemoprevention, and in some cases prophylactic surgery were discussed. Tarik Dewitt was informed that if a hereditary cancer syndrome was identified in him, first degree relatives (parents, siblings, and children) have a chance of also inheriting the condition. Genetic testing would allow for predictive genetic testing in other relatives, who may also be at risk depending on their degree of relation. Billing:  Most individuals pay <$100 for hereditary cancer genetic testing. If insurance covers the cost of the testing, individuals may still pay out of pocket secondary to co-pays, co-insurance, or deductibles. If the cost of the testing exceeds $100, the lab will reach out to the patient via phone or e-mail. The patient will then have the option to proceed with the testing, cancel the testing, or elect the self-pay option of $250. Tarik Dewitt verbalized understanding. Plan: Patient decided to proceed with testing and provided consent.     Summary:     Sample Collection:  A blood kit was mailed home to the patient    Genetic Testing Preformed: CustomNext: Itzel Powell +[employer]® (61 genes): APC, ROGER, AXIN2, BAP1, BARD1, BMPR1A, BRCA1, BRCA2, BRIP1, CDH1, CDK4, CDKN1B, CDKN2A, CHEK2, CTNNA1, DICER1, EGLN1, EPCAM, FH, FLCN, GREM1, HOXB13, KIF1B, KIT, MAX, MEN1, MET, MITF, MLH1, MSH2, MSH3, MSH6, MUTYH, NBN, NF1, NTHL1, PALB2, PMS2, POLD1, POLE, POT1, PTEN, RAD50, RAD51C, RAD51D, RB1, RECQL, RET, SDHA, SDHAF2, SDHB, SDHC, SDHD, SMAD4, SMARCA4, STK11, [id number], TP53, TSC1, TSC2, VHL      Result Call Information:  In the event that we need to reach Akosua Munoz via telephone:  I confirmed the patient's mobile number on file as the best number to call with results [phone]   I confirmed with the patient that we can leave a voicemail on the provided numbers    Results take approximately 2-3 weeks to complete once test is started. Akosua Munoz will be notified via [employer] once results are available. Additional recommendations for surveillance/medical management will be made pending genetic test results.

## 2023-11-10 ENCOUNTER — TELEPHONE (OUTPATIENT)
Dept: HEMATOLOGY ONCOLOGY | Facility: CLINIC | Age: 58
End: 2023-11-10

## 2023-11-13 ENCOUNTER — TELEPHONE (OUTPATIENT)
Dept: HEMATOLOGY ONCOLOGY | Facility: CLINIC | Age: 58
End: 2023-11-13

## 2023-11-13 ENCOUNTER — PATIENT OUTREACH (OUTPATIENT)
Dept: HEMATOLOGY ONCOLOGY | Facility: CLINIC | Age: 58
End: 2023-11-13

## 2023-11-13 NOTE — PROGRESS NOTES
Phone outreach to the patient to follow up with his appointment with Dr. Christie Ortiz on 11/10 if he had any questions to please do not hesitate to call me. I also verbalized his next appointment with Dr. Kale Barboza for 11/16 at [facility] and if he had any questions to return my call.

## 2023-11-15 PROBLEM — C25.9 PANCREATIC ADENOCARCINOMA (HCC): Status: ACTIVE | Noted: 2023-11-15

## 2023-11-16 ENCOUNTER — CONSULT (OUTPATIENT)
Dept: SURGICAL ONCOLOGY | Facility: CLINIC | Age: 58
End: 2023-11-16
Payer: COMMERCIAL

## 2023-11-16 VITALS
DIASTOLIC BLOOD PRESSURE: 80 MMHG | OXYGEN SATURATION: 96 % | SYSTOLIC BLOOD PRESSURE: 128 MMHG | HEART RATE: 85 BPM | WEIGHT: 154 LBS | TEMPERATURE: 97.2 F | HEIGHT: 69 IN | BODY MASS INDEX: 22.81 KG/M2 | RESPIRATION RATE: 16 BRPM

## 2023-11-16 DIAGNOSIS — C25.9 PANCREATIC ADENOCARCINOMA (HCC): Primary | ICD-10-CM

## 2023-11-16 PROCEDURE — 99245 OFF/OP CONSLTJ NEW/EST HI 55: CPT | Performed by: SURGERY

## 2023-11-16 RX ORDER — OXYCODONE HYDROCHLORIDE 5 MG/1
5 TABLET ORAL EVERY 4 HOURS PRN
Qty: 10 TABLET | Refills: 0 | Status: SHIPPED | OUTPATIENT
Start: 2023-11-16

## 2023-11-16 RX ORDER — DEXTROAMPHETAMINE SACCHARATE, AMPHETAMINE ASPARTATE MONOHYDRATE, DEXTROAMPHETAMINE SULFATE AND AMPHETAMINE SULFATE 5; 5; 5; 5 MG/1; MG/1; MG/1; MG/1
20 CAPSULE, EXTENDED RELEASE ORAL EVERY MORNING
COMMUNITY
Start: 2023-10-29

## 2023-11-16 NOTE — H&P (VIEW-ONLY)
Surgical Oncology Consult       [facility] [address] [facility] [address]  [facility]  [address]  [place] [address]    Delpha Drop  1965  [phone]  [facility] [place]  [facility] [address]  [address]  [phone]    Diagnoses and all orders for this visit:    Pancreatic adenocarcinoma [facility])  -     Case request operating room: INSERTION VENOUS PORT (PORT-A-CATH); Standing  -     oxyCODONE (Roxicodone) 5 immediate release tablet; Take 1 tablet (5 mg total) by mouth every 4 (four) hours as needed for moderate pain Max Daily Amount: 30 mg  -     Case request operating room: INSERTION VENOUS PORT (PORT-A-CATH)    Other orders  -     amphetamine-dextroamphetamine (ADDERALL XR) 20 MG 24 hr capsule; Take 20 mg by mouth every morning  -     Incentive spirometry; Standing  -     Insert and maintain IV line; Standing  -     Void On-Call to O.R.; Standing  -     Place sequential compression device; Standing  -     ceFAZolin (ANCEF) 1,000 mg in dextrose 5 % 100 mL IVPB        Chief Complaint   Patient presents with    Consult       Return in about 3 months (around 2/16/2024) for Office Visit. Oncology History   Pancreatic adenocarcinoma ([address])   10/30/2023 Biopsy    Endoscopic ultrasound:  Pancreas, Neck mass:  Malignant  Adenocarcinoma. 11/16/2023 -  Cancer Staged    Staging form: Pancreas, AJCC 8th Edition  - Clinical: Stage IB (cT2, cN0, cM0) - Signed by Hazel Yeager MD on 11/16/2023  Total positive nodes: 0           History of Present Illness: 51-year-old male who recently noticed dark urine and ayse colored stools. At that time he was jaundiced. CT of the abdomen from October 23, 2023 reveals a 3 cm mass in the pancreatic neck the pancreatic duct was dilated. The mass appeared to abut less than 180 degrees of the SMA. There was common hepatic artery involvement less than 180 degrees. There is a less than 180 degrees of involvement on the portal vein. EUS on October 30, 2023 showed a 3.2 x 2.4 cm mass in the neck of the pancreas. There was abutment of the portal and splenic vein. Small lymph nodes were seen adjacent to the tumor. A metal stent was placed at the same setting. Follow-up chest CT on November 6, 2023 revealed a 6 mm spiculated left upper lobe subpleural lesion as well as subcentimeter nodules. I personally reviewed the films. He did have a PET/CT that was negative for metastasis including the lung nodule. He comes in now to discuss further therapy. He has lost approximately 40 pounds from 2 years ago. His appetite is good. He does have a early satiety as well as bloating. No mid back pain. Review of Systems  Complete ROS Surg Onc:   Constitutional: The patient denies new or recent history of general fatigue. He does have weight loss. Eyes: No complaints of visual problems, no scleral icterus. ENT: no complaints of ear pain, no hoarseness, no difficulty swallowing,  no tinnitus and no new masses in head, oral cavity, or neck. Cardiovascular: No complaints of chest pain, no palpitations, no ankle edema. Respiratory: No complaints of shortness of breath, no cough. Gastrointestinal: No complaints of jaundice, no bloody stools, no pale stools. Genitourinary: No complaints of dysuria, no hematuria, no nocturia, no frequent urination, no urethral discharge. Musculoskeletal: No complaints of weakness, paralysis, joint stiffness or arthralgias. Integumentary: No complaints of rash, no new lesions. Neurological: No complaints of convulsions, no seizures, no dizziness. Hematologic/Lymphatic: No complaints of easy bruising. Endocrine:  No hot or cold intolerance. No polydipsia, polyphagia, or polyuria. Allergy/immunology:  No environmental allergies. No food allergies.   Not immunocompromised. Skin:  No pallor or rash. No wound.           Patient Active Problem List   Diagnosis    Pancreatic adenocarcinoma [facility])     Past Medical History:   Diagnosis Date    Coronary artery disease     Diabetes mellitus ([address])     Hyperlipidemia     Hypertension      Past Surgical History:   Procedure Laterality Date    CORONARY ANGIOPLASTY WITH STENT PLACEMENT      HERNIA REPAIR       Family History   Problem Relation Age of Onset    Heart attack Father     Skin cancer Father      Social History     Socioeconomic History    Marital status: /Civil Union     Spouse name: Not on file    Number of children: Not on file    Years of education: Not on file    Highest education level: Not on file   Occupational History    Not on file   Tobacco Use    Smoking status: Every Day     Packs/day: 1.50     Years: 35.00     Total pack years: 52.50     Types: Cigarettes    Smokeless tobacco: Not on file   Vaping Use    Vaping Use: Never used   Substance and Sexual Activity    Alcohol use: Never    Drug use: Never    Sexual activity: Yes     Partners: Female   Other Topics Concern    Not on file   Social History Narrative    Not on file     Social Determinants of Health     Financial Resource Strain: Not on file   Food Insecurity: Not on file   Transportation Needs: Not on file   Physical Activity: Not on file   Stress: Not on file   Social Connections: Not on file   Intimate Partner Violence: Not on file   Housing Stability: Not on file       Current Outpatient Medications:     amphetamine-dextroamphetamine (ADDERALL XR) 20 MG 24 hr capsule, Take 20 mg by mouth every morning, Disp: , Rfl:     aspirin 81 MG tablet, 1 tab(s), Disp: , Rfl:     carvedilol (COREG) 6.25 mg tablet, Take 6.25 mg by mouth 2 (two) times a day, Disp: , Rfl:     Continuous Blood Gluc Sensor (FreeStyle Geovanny 3 Sensor) MISC, use as directed TO MONITOR GLUCOSE DAILY, Disp: , Rfl:     desvenlafaxine succinate (PRISTIQ) 50 mg 24 hr tablet, Take 50 mg by mouth daily, Disp: , Rfl:     famotidine (PEPCID) 40 MG tablet, Take 40 mg by mouth daily, Disp: , Rfl:     Jardiance 25 MG TABS, Take 1 tablet by mouth every morning, Disp: , Rfl:     lisinopril (ZESTRIL) 5 mg tablet, Take 1 tablet by mouth daily, Disp: , Rfl:     metFORMIN (GLUCOPHAGE) 1000 MG tablet, Take 1,000 mg by mouth, Disp: , Rfl:     nicotine (NICODERM CQ) 21 mg/24 hr TD 24 hr patch, Place 1 patch on the skin daily, Disp: , Rfl:     oxyCODONE (Roxicodone) 5 immediate release tablet, Take 1 tablet (5 mg total) by mouth every 4 (four) hours as needed for moderate pain Max Daily Amount: 30 mg, Disp: 10 tablet, Rfl: 0    pancrelipase, Lip-Prot-Amyl, (CREON) 12,000 units capsule, Take 12,000 units of lipase by mouth 3 (three) times a day with meals, Disp: 180 capsule, Rfl: 0    rosuvastatin (CRESTOR) 10 MG tablet, Take 1 tablet by mouth daily, Disp: , Rfl:     Diego Palacio FlexTouch 100 units/mL injection pen, 40 units before bed time and increase by 02 units every 3rd night As directed by clinic. Max dose 80 units. Dose adjustment, Disp: , Rfl:     Zolpidem Tartrate (AMBIEN PO), Take by mouth, Disp: , Rfl:     amoxicillin (AMOXIL) 500 mg capsule, Take 500 mg by mouth 3 (three) times a day (Patient not taking: Reported on 11/16/2023), Disp: , Rfl:     Amphetamine-Dextroamphetamine (ADDERALL PO), Take by mouth (Patient not taking: Reported on 11/16/2023), Disp: , Rfl:     glimepiride (AMARYL) 4 mg tablet, Take 4 mg by mouth (Patient not taking: Reported on 11/16/2023), Disp: , Rfl:     omeprazole (PriLOSEC) 40 MG capsule, Take 40 mg by mouth daily (Patient not taking: Reported on 11/16/2023), Disp: , Rfl:   No Known Allergies  Vitals:    11/16/23 0952   BP: 128/80   Pulse: 85   Resp: 16   Temp: (!) 97.2 °F (36.2 °C)   SpO2: 96%       Physical Exam   Constitutional: General appearance: The Patient is well-developed and well-nourished who appears the stated age in no acute distress.  Patient is pleasant and talkative. HEENT:  Normocephalic. Sclerae are anicteric. Mucous membranes are moist. Neck is supple without adenopathy. No JVD. Chest: The lungs are clear to auscultation. Cardiac: Heart is regular rate. Abdomen: Abdomen is soft, non-tender, non-distended and without masses. Extremities: There is no clubbing or cyanosis. There is no edema. Symmetric. Neuro: Grossly nonfocal. Gait is normal.     Lymphatic: No evidence of cervical adenopathy bilaterally. No evidence of axillary adenopathy bilaterally. No evidence of inguinal adenopathy bilaterally. Skin: Warm, anicteric. Psych:  Patient is pleasant and talkative. Breasts:      Pathology:  Final Diagnosis   A.B. Pancreas, Neck mass (Cell Block and smear Preparations): Malignant  Adenocarcinoma. Satisfactory for evaluation. Note: As reported in the [facility] Reporting System for Pancreaticobiliary Cytopathology *” the diagnostic category of “malignant” carries a % risk of malignancy being found in subsequent FNAB or resection. The usual management following an initial diagnosis of “malignant” is per clinical stage. Ultimately clinical and radiologic correlation is needed for this patient in arriving at the actual management plan. *[facility] Hospital[facility]. (2022). WHO Reporting System for Pancreaticobiliary Cytopathology. IA. FNAB - fine needle aspiration/biopsy               Electronically signed by Dwayne Walters MD on 11/2/2023 at  8:46 AM       Labs:  Component  Ref Range & Units 11/1/23 12:54 PM   CA 19-9  0 - 35 U/mL 932 High        Imaging  CT chest w contrast    Result Date: 11/13/2023  Narrative: CT CHEST WITH IV CONTRAST INDICATION:   K86.9: Disease of pancreas, unspecified K83.1: Obstruction of bile duct. Newly diagnosed pancreatic cancer. COMPARISON:  None. TECHNIQUE: CT examination of the chest was performed. Multiplanar 2D reformatted images were created from the source data.  This examination, like all CT scans performed in the [facility], was performed utilizing techniques to minimize radiation dose exposure, including the use of iterative reconstruction and automated exposure control. Radiation dose length product (DLP) for this visit:  338.99 mGy-cm IV Contrast:  85 mL of iohexol (OMNIPAQUE) FINDINGS: LUNGS: Spiculated 6 mm left upper lobe subpleural nodule #606/83. 3 mm left upper lobe nodule #606/88. 3 mm left upper lobe nodule #606/96. 3 mm left upper lobe nodule #606/106. 3 mm right upper lobe nodule #606/94. Numerous other 2 mm and smaller subpleural right upper lobe nodules. No endotracheal or endobronchial lesion. PLEURA:  Unremarkable. HEART/GREAT VESSELS: Normal heart size. Coronary artery calcifications. No pericardial effusion. No thoracic aortic aneurysm. MEDIASTINUM AND ORA:  Unremarkable. CHEST WALL AND LOWER NECK:  Unremarkable. VISUALIZED STRUCTURES IN THE UPPER ABDOMEN: Partially imaged biliary stent. Reidentified pancreatic mass with ill-defined margins. 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 OSSEOUS STRUCTURES:  No acute fracture or destructive osseous lesion. Impression: 6 mm and smaller bilateral pulmonary nodules. Workstation performed: [id number]     [place] ERCP biliary and pancreatic    Result Date: 11/1/2023  Narrative: ERCP INDICATION: K86.9: Disease of pancreas, unspecified. COMPARISON: October 23, 2023 IMAGES:  5 FLUOROSCOPY TIME:   62.3 seconds CONTRAST: 4 mL of iohexol (OMNIPAQUE) FINDINGS: Fluoroscopic guidance was provided for performance of ERCP. BILIARY: Limited contrast opacified static submitted images were obtained as part of fluoroscopic guidance. Stricture of distal common duct. Enlargement of proximal common duct demonstrated. Final image depicts the presence of a metallic distal common bile duct stent. PANCREATIC:  None of the submitted static fluoroscopic images demonstrate contrast opacification of the pancreatic duct.      Impression: Fluoroscopic guidance for ERCP. Please see procedure report for full details. Workstation performed: [id number]     ERCP    Result Date: 10/30/2023  Narrative: Table formatting from the original result was not included. [facility] [address] DATE OF SERVICE: 10/30/23 PHYSICIAN(S): Attending: Clementina Phoenix, MD Fellow: Jose Fournier DO INDICATION: Biliary obstruction POST-OP DIAGNOSIS: See the impression below. PREPROCEDURE: Informed consent was obtained for the procedure, including sedation. Risks of perforation, hemorrhage, adverse drug reaction and aspiration were discussed. The patient was placed in the left lateral decubitus position. Patient was explained about the risks and benefits of the procedure. Risks including but not limited to bleeding, infection, and perforation were explained in detail. Also explained about less than 100% sensitivity with the exam and other alternatives. PROCEDURE: ERCP DETAILS OF PROCEDURE: Patient was taken to the procedure room where a time out was performed to confirm correct patient and correct procedure. The patient underwent general anesthesia, which was administered by an anesthesia professional. The patient's blood pressure, heart rate, level of consciousness, respirations, oxygen, ECG and ETCO2 were monitored throughout the procedure. The scope was advanced to the duodenum. Clinical intention was achieved. The patient experienced no blood loss. The procedure was not difficult. The patient tolerated the procedure well. There were no apparent adverse events. ANESTHESIA INFORMATION: ASA: II Anesthesia Type: General MEDICATIONS: indomethacin (INDOCIN) rectal suppository 100 mg 100 mg (Totals for administrations occurring from 1356 to 1534 on 10/30/23) FINDINGS: The common bile duct was deeply cannulated after 1 attempt using a traction sphincterotome with 260 cm x 0.035" guidewire.  Cannulation was not difficult. Contrast was injected Extrinsic and severe stricture was visualized in the mid common bile duct. The stricture was traversable by wire Dilation was observed in the proximal common bile duct Medium-sized biliary sphincterotomy was performed using a sphincterotome. No bleeding was noted at the procedure site. One 8 mm x 6 cm fully covered metal stent was placed in the common bile duct. There was good bile and contrast drainage seen. The fluoroscopy images for the , cholangiogram and post procedure were personally reviewed and interpreted during the procedure. SPECIMENS: ID Type Source Tests Collected by Time Destination 1 : pancreatic neck mass FNA Pancreas NON-GYNECOLOGIC CYTOLOGY, FINE NEEDLE ASPIRATION Kalee Bynum MD 10/30/2023 1419       Impression: Extrinsic and severe stricture was visualized in the mid common bile duct with upstream dilation Biliary sphincterotomy was performed. One 8 mm x 6 cm fully covered stent was placed in the common bile duct RECOMMENDATION: Follow labs in one week. Follow up with oncology. Follow up with biopsies. Kalee Bynum MD     Endoscopic ultrasonography, GI (Upper)    Result Date: 10/30/2023  Narrative: Table formatting from the original result was not included. [address] [place] [address] Endoscopy [address] [place] [address] [phone] DATE OF SERVICE: 10/30/23 PHYSICIAN(S): Attending: Kalee Bynum MD Fellow: Zayra Good DO INDICATION: Pancreatic lesion POST-OP DIAGNOSIS: See the impression below. PREPROCEDURE: Informed consent was obtained for the procedure, including sedation. Risks of perforation, hemorrhage, adverse drug reaction and aspiration were discussed. The patient was placed in the left lateral decubitus position. Patient was explained about the risks and benefits of the procedure. Risks including but not limited to bleeding, infection, and perforation were explained in detail.  Also explained about less than 100% sensitivity with the exam and other alternatives. PROCEDURE: EUS UPPER DETAILS OF PROCEDURE: Patient was taken to the procedure room where a time out was performed to confirm correct patient and correct procedure. The patient underwent monitored anesthesia care, which was administered by an anesthesia professional. The patient's blood pressure, heart rate, oxygen, respirations, level of consciousness and ECG were monitored throughout the procedure. The endoscope was advanced to the duodenum. The patient experienced no blood loss. The procedure was not difficult. The patient tolerated the procedure well. There were no apparent adverse events. ANESTHESIA INFORMATION: ASA: II Anesthesia Type: General MEDICATIONS: indomethacin (INDOCIN) rectal suppository 100 mg 100 mg (Totals for administrations occurring from 1356 to 1534 on 10/30/23) FINDINGS: Limited endoscopic evaluation using a side-viewing endoscope was unremarkable. The pancreas appeared atrophic. The pancreatic parenchyma was visualized in the body of the pancreas and tail of the pancreas. . The parenchyma had hyperechoic foci. The pancreatic duct measured 3 mm at the tail. The duct in the body and tail of the pancreas was dilated. Irregular mass measuring 32 mm x 24 mm with well-defined margins was visualized in the neck of the pancreas. Apparent abutment of the portal vein and splenic vein; 4 successful fine needle biopsy passes were taken with a 25 gauge needle using a transduodenal approach guided by Doppler, sample found to be adequate and sent sample for histology analysis. Small lymph nodes were seen in the area adjacent to the tumor. The proximal bile duct was dilated. The mid bile duct was strictured. The bile duct measured 16 mm at the distal end and 4 mm at the proximal end. The parenchyma of the liver appeared normal. The hepatic ducts appeared normal. The gallbladder appeared distended. The gallbladder contained biliary sludge. Normal celiac axis.  No lymphadenopathy was seen. SPECIMENS: ID Type Source Tests Collected by Time Destination 1 : pancreatic neck mass FNA Pancreas NON-GYNECOLOGIC CYTOLOGY, FINE NEEDLE ASPIRATION Liya Higuera MD 10/30/2023 1419       Impression: The pancreas appeared atrophic. The duct was normal in the head, not seen in the neck and dilated in the body and tail of the pancreas. Irregular mass measuring 32 mm x 24 mm with well-defined margins was visualized in the neck of the pancreas. Apparent abutment of the portal vein and splenic vein (confluence); 4 fine needle biopsy passes were taken, sample found to be adequate and sent sample for histology analysis The proximal bile duct was dilated. The mid bile duct was strictured. RECOMMENDATION:  Await pathology results ERCP today. CT chest Follow up with oncology. Liya Higuera MD     CT abdomen pelvis with contrast    Result Date: 10/23/2023  Narrative: CT ABDOMEN AND PELVIS WITH IV CONTRAST INDICATION:   jaundice, ruq tenderness. COMPARISON:  None. TECHNIQUE:  CT examination of the abdomen and pelvis was performed. Multiplanar 2D reformatted images were created from the source data. This examination, like all CT scans performed in the [facility], was performed utilizing techniques to minimize radiation dose exposure, including the use of iterative reconstruction and automated exposure control. Radiation dose length product (DLP) for this visit:  678 mGy-cm IV Contrast:  100 mL of iohexol (OMNIPAQUE) Enteric Contrast:  Enteric contrast was not administered. FINDINGS: ABDOMEN LOWER CHEST:  No clinically significant abnormality identified in the visualized lower chest. LIVER/BILIARY TREE: Moderate intra and extrahepatic biliary ductal dilatation. GALLBLADDER: Abnormally distended. Mild thickened wall. No calcified stones. SPLEEN:  Unremarkable.  PANCREAS: There is a lesion suspicious for pancreatic cancer, which will be described based on Society of Abdominal Radiologists and American Pancreatic Association recommendations: MORPHOLOGIC EVALUATION: Appearance: Hypoattenuating. Size: Ill-defined margins, approximately 3.0 cm. Location: Tumor involves the pancreatic neck, straddling the plane of the SMV. Pancreatic duct narrowing/abrupt cut off with or without upstream dilatation: Moderate pancreatic ductal dilatation with abrupt cut off at the level of the mass. Pancreatic duct measuring up to 8 mm. Biliary tree abrupt cut off with or without upstream dilatation: Abrupt cut off of the common bile duct as it enters the pancreatic head, caliber dilated up to 19 mm. ARTERIAL EVALUATION: Superior Mesenteric Artery Involvement: Presence: The mass directly abuts less than 180 degrees of the SMA at its confluence with the length of vein/portal vein. Celiac Axis Involvement: Presence: Absent. Common Hepatic Artery Involvement: Presence: Present. Degree of solid soft-tissue contact: Traverses along the margin of the mass but remains patent. Less than 180 degrees. Degree of increased hazy attenuation/stranding contact: </= 180 degrees. Focal vessel narrowing or contour irregularity: Absent. Extension to celiac axis: Absent. Extension to the bifurcation of right/left hepatic artery: Absent. Arterial Variant: Type: There are no arterial variants. VENOUS EVALUATION: Main Portal Vein Involvement: Present. Degree of solid soft-tissue contact: REPORT AS LESS THAN OR EQUAL  DEGREES OR GREATER THAN 180 DEGREES. Degree of increased hazy attenuation/stranding contact: REPORT AS LESS THAN OR EQUAL  DEGREES OR GREATER THAN 180 DEGREES. Focal vessel narrowing or contour irregularity (tethering or teardrop): Absent. Superior Mesenteric Vein Involvement: Present. Degree of solid soft-tissue contact: </= 180 degrees. Focal vessel narrowing or contour irregularity (tethering or teardrop): Present. Extension to first draining vein: Absent. Thrombus within vein: Absent.  Venous collaterals: Increased caliber of the splenic vein and SMV. But without significant varix formation. FOR EVALUATION FOR POTENTIAL EXTRAPANCREATIC TUMOR, PLEASE SEE THE REST OF THE BODY OF THIS REPORT. ADRENAL GLANDS: Right adrenal within normal limits. 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 A smaller few additional nodules are also noted with similar features. These do not have an  appearance suggesting metastatic disease. KIDNEYS/URETERS: A few small bilateral renal cortical cysts are noted. No suspicious findings. STOMACH AND BOWEL: There is colonic diverticulosis without evidence of acute diverticulitis. APPENDIX:  No findings to suggest appendicitis. ABDOMINOPELVIC CAVITY:  No ascites. No pneumoperitoneum. A few shotty subcentimeter lymph nodes in the efren hepatis and gastrohepatic region remaining subcentimeter. . VESSELS: Atherosclerotic changes are present. No evidence of aneurysm. PELVIS REPRODUCTIVE ORGANS:  Unremarkable for patient's age. URINARY BLADDER:  Unremarkable. ABDOMINAL WALL/INGUINAL REGIONS: Lower abdominal wall hernia mesh repair noted. OSSEOUS STRUCTURES:  No acute fracture or destructive osseous lesion. Impression: Ill-defined hypoattenuating mass within the neck of the pancreas with subsequent dilatation and obstruction of the biliary tree and pancreatic duct. Highly likely to represent pancreatic neoplasm. See above for further details. Partially encasing the main portal vein at the confluence with splenic vein and SMV with approximately 50% narrowing of the caliber at this level and dilatation of the SMV. No evidence for distant metastatic disease evident. Several small nodules within the left adrenal with features suggesting benign etiology as described above. The [facility] for [facility] notification.  Workstation performed: [id number]     I personally reviewed and interpreted the above laboratory and imaging data.    Discussion/Summary: 59-year-old male with locally advanced pancreas cancer. His staging work-up is negative for metastasis. He is a smoker, making the spiculated lung nodule concerning also for primary lung cancer. But this was negative on his PET/CT. This will continue to be observed with serial imaging. We discussed that with his pancreas cancer, there may be vessel abutment/involvement. I would recommend neoadjuvant chemotherapy. We discussed the reasoning and rationale behind this approach. This would ensure that there is no tumor that progresses through chemotherapy as well as potentially converting him to node negative. He will need a port for chemotherapy. I explained the risks including bleeding, infection, need for further surgery, wound complications, port malfunction, DVT and pneumothorax. Informed consent was obtained. We will schedule this at our earliest mutual convenience. I will see him again at the time of port placement and then again in 3 months to reassess whether he will continue chemotherapy versus undergo surgical resection at that time. We discussed ideally if he could undergo 5 to 6 months of neoadjuvant chemotherapy, surgical literature suggest that this will give him the best long-term outcome. He is agreeable to this plan. All his questions were answered.

## 2023-11-16 NOTE — PROGRESS NOTES
Surgical Oncology Consult       [facility] [address] [facility] [address]  [facility] 203  2001 [facility] [address]    Bijan Albrecht  1965  [phone]  [facility] ONC [facility] [place]  [facility] 203  [address]  [phone]    Diagnoses and all orders for this visit:    Pancreatic adenocarcinoma [facility])  -     Case request operating room: INSERTION VENOUS PORT (PORT-A-CATH); Standing  -     oxyCODONE (Roxicodone) 5 immediate release tablet; Take 1 tablet (5 mg total) by mouth every 4 (four) hours as needed for moderate pain Max Daily Amount: 30 mg  -     Case request operating room: INSERTION VENOUS PORT (PORT-A-CATH)    Other orders  -     amphetamine-dextroamphetamine (ADDERALL XR) 20 MG 24 hr capsule; Take 20 mg by mouth every morning  -     Incentive spirometry; Standing  -     Insert and maintain IV line; Standing  -     Void On-Call to O.R.; Standing  -     Place sequential compression device; Standing  -     ceFAZolin (ANCEF) 1,000 mg in dextrose 5 % 100 mL IVPB        Chief Complaint   Patient presents with    Consult       Return in about 3 months (around 2/16/2024) for Office Visit. Oncology History   Pancreatic adenocarcinoma ([address])   10/30/2023 Biopsy    Endoscopic ultrasound:  Pancreas, Neck mass:  Malignant  Adenocarcinoma. 11/16/2023 -  Cancer Staged    Staging form: Pancreas, AJCC 8th Edition  - Clinical: Stage IB (cT2, cN0, cM0) - Signed by Sydni Meek MD on 11/16/2023  Total positive nodes: 0           History of Present Illness: 42-year-old male who recently noticed dark urine and ayse colored stools. At that time he was jaundiced. CT of the abdomen from October 23, 2023 reveals a 3 cm mass in the pancreatic neck the pancreatic duct was dilated. The mass appeared to abut less than 180 degrees of the SMA. There was common hepatic artery involvement less than 180 degrees. There is a less than 180 degrees of involvement on the portal vein. EUS on October 30, 2023 showed a 3.2 x 2.4 cm mass in the neck of the pancreas. There was abutment of the portal and splenic vein. Small lymph nodes were seen adjacent to the tumor. A metal stent was placed at the same setting. Follow-up chest CT on November 6, 2023 revealed a 6 mm spiculated left upper lobe subpleural lesion as well as subcentimeter nodules. I personally reviewed the films. He did have a PET/CT that was negative for metastasis including the lung nodule. He comes in now to discuss further therapy. He has lost approximately 40 pounds from 2 years ago. His appetite is good. He does have a early satiety as well as bloating. No mid back pain. Review of Systems  Complete ROS Surg Onc:   Constitutional: The patient denies new or recent history of general fatigue. He does have weight loss. Eyes: No complaints of visual problems, no scleral icterus. ENT: no complaints of ear pain, no hoarseness, no difficulty swallowing,  no tinnitus and no new masses in head, oral cavity, or neck. Cardiovascular: No complaints of chest pain, no palpitations, no ankle edema. Respiratory: No complaints of shortness of breath, no cough. Gastrointestinal: No complaints of jaundice, no bloody stools, no pale stools. Genitourinary: No complaints of dysuria, no hematuria, no nocturia, no frequent urination, no urethral discharge. Musculoskeletal: No complaints of weakness, paralysis, joint stiffness or arthralgias. Integumentary: No complaints of rash, no new lesions. Neurological: No complaints of convulsions, no seizures, no dizziness. Hematologic/Lymphatic: No complaints of easy bruising. Endocrine:  No hot or cold intolerance. No polydipsia, polyphagia, or polyuria. Allergy/immunology:  No environmental allergies. No food allergies.   Not immunocompromised. Skin:  No pallor or rash. No wound.           Patient Active Problem List   Diagnosis    Pancreatic adenocarcinoma [facility])     Past Medical History:   Diagnosis Date    Coronary artery disease     Diabetes mellitus ([address])     Hyperlipidemia     Hypertension      Past Surgical History:   Procedure Laterality Date    CORONARY ANGIOPLASTY WITH STENT PLACEMENT      HERNIA REPAIR       Family History   Problem Relation Age of Onset    Heart attack Father     Skin cancer Father      Social History     Socioeconomic History    Marital status: /Civil Union     Spouse name: Not on file    Number of children: Not on file    Years of education: Not on file    Highest education level: Not on file   Occupational History    Not on file   Tobacco Use    Smoking status: Every Day     Packs/day: 1.50     Years: 35.00     Total pack years: 52.50     Types: Cigarettes    Smokeless tobacco: Not on file   Vaping Use    Vaping Use: Never used   Substance and Sexual Activity    Alcohol use: Never    Drug use: Never    Sexual activity: Yes     Partners: Female   Other Topics Concern    Not on file   Social History Narrative    Not on file     Social Determinants of Health     Financial Resource Strain: Not on file   Food Insecurity: Not on file   Transportation Needs: Not on file   Physical Activity: Not on file   Stress: Not on file   Social Connections: Not on file   Intimate Partner Violence: Not on file   Housing Stability: Not on file       Current Outpatient Medications:     amphetamine-dextroamphetamine (ADDERALL XR) 20 MG 24 hr capsule, Take 20 mg by mouth every morning, Disp: , Rfl:     aspirin 81 MG tablet, 1 tab(s), Disp: , Rfl:     carvedilol (COREG) 6.25 mg tablet, Take 6.25 mg by mouth 2 (two) times a day, Disp: , Rfl:     Continuous Blood Gluc Sensor (FreeStyle Geovanny 3 Sensor) MISC, use as directed TO MONITOR GLUCOSE DAILY, Disp: , Rfl:     desvenlafaxine succinate (PRISTIQ) 50 mg 24 hr tablet, Take 50 mg by mouth daily, Disp: , Rfl:     famotidine (PEPCID) 40 MG tablet, Take 40 mg by mouth daily, Disp: , Rfl:     Jardiance 25 MG TABS, Take 1 tablet by mouth every morning, Disp: , Rfl:     lisinopril (ZESTRIL) 5 mg tablet, Take 1 tablet by mouth daily, Disp: , Rfl:     metFORMIN (GLUCOPHAGE) 1000 MG tablet, Take 1,000 mg by mouth, Disp: , Rfl:     nicotine (NICODERM CQ) 21 mg/24 hr TD 24 hr patch, Place 1 patch on the skin daily, Disp: , Rfl:     oxyCODONE (Roxicodone) 5 immediate release tablet, Take 1 tablet (5 mg total) by mouth every 4 (four) hours as needed for moderate pain Max Daily Amount: 30 mg, Disp: 10 tablet, Rfl: 0    pancrelipase, Lip-Prot-Amyl, (CREON) 12,000 units capsule, Take 12,000 units of lipase by mouth 3 (three) times a day with meals, Disp: 180 capsule, Rfl: 0    rosuvastatin (CRESTOR) 10 MG tablet, Take 1 tablet by mouth daily, Disp: , Rfl:     Koby Amaro FlexTouch 100 units/mL injection pen, 40 units before bed time and increase by 02 units every 3rd night As directed by clinic. Max dose 80 units. Dose adjustment, Disp: , Rfl:     Zolpidem Tartrate (AMBIEN PO), Take by mouth, Disp: , Rfl:     amoxicillin (AMOXIL) 500 mg capsule, Take 500 mg by mouth 3 (three) times a day (Patient not taking: Reported on 11/16/2023), Disp: , Rfl:     Amphetamine-Dextroamphetamine (ADDERALL PO), Take by mouth (Patient not taking: Reported on 11/16/2023), Disp: , Rfl:     glimepiride (AMARYL) 4 mg tablet, Take 4 mg by mouth (Patient not taking: Reported on 11/16/2023), Disp: , Rfl:     omeprazole (PriLOSEC) 40 MG capsule, Take 40 mg by mouth daily (Patient not taking: Reported on 11/16/2023), Disp: , Rfl:   No Known Allergies  Vitals:    11/16/23 0952   BP: 128/80   Pulse: 85   Resp: 16   Temp: (!) 97.2 °F (36.2 °C)   SpO2: 96%       Physical Exam   Constitutional: General appearance: The Patient is well-developed and well-nourished who appears the stated age in no acute distress.  Patient is pleasant and talkative. HEENT:  Normocephalic. Sclerae are anicteric. Mucous membranes are moist. Neck is supple without adenopathy. No JVD. Chest: The lungs are clear to auscultation. Cardiac: Heart is regular rate. Abdomen: Abdomen is soft, non-tender, non-distended and without masses. Extremities: There is no clubbing or cyanosis. There is no edema. Symmetric. Neuro: Grossly nonfocal. Gait is normal.     Lymphatic: No evidence of cervical adenopathy bilaterally. No evidence of axillary adenopathy bilaterally. No evidence of inguinal adenopathy bilaterally. Skin: Warm, anicteric. Psych:  Patient is pleasant and talkative. Breasts:      Pathology:  Final Diagnosis   A.B. Pancreas, Neck mass (Cell Block and smear Preparations): Malignant  Adenocarcinoma. Satisfactory for evaluation. Note: As reported in the [facility] Reporting System for Pancreaticobiliary Cytopathology *” the diagnostic category of “malignant” carries a % risk of malignancy being found in subsequent FNAB or resection. The usual management following an initial diagnosis of “malignant” is per clinical stage. Ultimately clinical and radiologic correlation is needed for this patient in arriving at the actual management plan. *Field Aliya [facility] HospitalTarun. (2022). WHO Reporting System for Pancreaticobiliary Cytopathology. IARC. FNAB - fine needle aspiration/biopsy               Electronically signed by Belkys Fournier MD on 11/2/2023 at  8:46 AM       Labs:  Component  Ref Range & Units 11/1/23 12:54 PM   CA 19-9  0 - 35 U/mL 932 High        Imaging  CT chest w contrast    Result Date: 11/13/2023  Narrative: CT CHEST WITH IV CONTRAST INDICATION:   K86.9: Disease of pancreas, unspecified K83.1: Obstruction of bile duct. Newly diagnosed pancreatic cancer. COMPARISON:  None. TECHNIQUE: CT examination of the chest was performed. Multiplanar 2D reformatted images were created from the source data.  This examination, like all CT scans performed in the [facility], was performed utilizing techniques to minimize radiation dose exposure, including the use of iterative reconstruction and automated exposure control. Radiation dose length product (DLP) for this visit:  338.99 mGy-cm IV Contrast:  85 mL of iohexol (OMNIPAQUE) FINDINGS: LUNGS: Spiculated 6 mm left upper lobe subpleural nodule #606/83. 3 mm left upper lobe nodule #606/88. 3 mm left upper lobe nodule #606/96. 3 mm left upper lobe nodule #606/106. 3 mm right upper lobe nodule #606/94. Numerous other 2 mm and smaller subpleural right upper lobe nodules. No endotracheal or endobronchial lesion. PLEURA:  Unremarkable. HEART/GREAT VESSELS: Normal heart size. Coronary artery calcifications. No pericardial effusion. No thoracic aortic aneurysm. MEDIASTINUM AND ORA:  Unremarkable. CHEST WALL AND LOWER NECK:  Unremarkable. VISUALIZED STRUCTURES IN THE UPPER ABDOMEN: Partially imaged biliary stent. Reidentified pancreatic mass with ill-defined margins. 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 OSSEOUS STRUCTURES:  No acute fracture or destructive osseous lesion. Impression: 6 mm and smaller bilateral pulmonary nodules. Workstation performed: [id number]     [place] ERCP biliary and pancreatic    Result Date: 11/1/2023  Narrative: ERCP INDICATION: K86.9: Disease of pancreas, unspecified. COMPARISON: October 23, 2023 IMAGES:  5 FLUOROSCOPY TIME:   62.3 seconds CONTRAST: 4 mL of iohexol (OMNIPAQUE) FINDINGS: Fluoroscopic guidance was provided for performance of ERCP. BILIARY: Limited contrast opacified static submitted images were obtained as part of fluoroscopic guidance. Stricture of distal common duct. Enlargement of proximal common duct demonstrated. Final image depicts the presence of a metallic distal common bile duct stent. PANCREATIC:  None of the submitted static fluoroscopic images demonstrate contrast opacification of the pancreatic duct.      Impression: Fluoroscopic guidance for ERCP. Please see procedure report for full details. Workstation performed: [id number]     ERCP    Result Date: 10/30/2023  Narrative: Table formatting from the original result was not included. [address] [place] [address] Endoscopy [address] DATE OF SERVICE: 10/30/23 PHYSICIAN(S): Attending: Rupesh Corrigan MD Fellow: Mehnaz Cote DO INDICATION: Biliary obstruction POST-OP DIAGNOSIS: See the impression below. PREPROCEDURE: Informed consent was obtained for the procedure, including sedation. Risks of perforation, hemorrhage, adverse drug reaction and aspiration were discussed. The patient was placed in the left lateral decubitus position. Patient was explained about the risks and benefits of the procedure. Risks including but not limited to bleeding, infection, and perforation were explained in detail. Also explained about less than 100% sensitivity with the exam and other alternatives. PROCEDURE: ERCP DETAILS OF PROCEDURE: Patient was taken to the procedure room where a time out was performed to confirm correct patient and correct procedure. The patient underwent general anesthesia, which was administered by an anesthesia professional. The patient's blood pressure, heart rate, level of consciousness, respirations, oxygen, ECG and ETCO2 were monitored throughout the procedure. The scope was advanced to the duodenum. Clinical intention was achieved. The patient experienced no blood loss. The procedure was not difficult. The patient tolerated the procedure well. There were no apparent adverse events. ANESTHESIA INFORMATION: ASA: II Anesthesia Type: General MEDICATIONS: indomethacin (INDOCIN) rectal suppository 100 mg 100 mg (Totals for administrations occurring from 1356 to 1534 on 10/30/23) FINDINGS: The common bile duct was deeply cannulated after 1 attempt using a traction sphincterotome with 260 cm x 0.035" guidewire.  Cannulation was not difficult. Contrast was injected Extrinsic and severe stricture was visualized in the mid common bile duct. The stricture was traversable by wire Dilation was observed in the proximal common bile duct Medium-sized biliary sphincterotomy was performed using a sphincterotome. No bleeding was noted at the procedure site. One 8 mm x 6 cm fully covered metal stent was placed in the common bile duct. There was good bile and contrast drainage seen. The fluoroscopy images for the , cholangiogram and post procedure were personally reviewed and interpreted during the procedure. SPECIMENS: ID Type Source Tests Collected by Time Destination 1 : pancreatic neck mass FNA Pancreas NON-GYNECOLOGIC CYTOLOGY, FINE NEEDLE ASPIRATION Des Perry MD 10/30/2023 1419       Impression: Extrinsic and severe stricture was visualized in the mid common bile duct with upstream dilation Biliary sphincterotomy was performed. One 8 mm x 6 cm fully covered stent was placed in the common bile duct RECOMMENDATION: Follow labs in one week. Follow up with oncology. Follow up with biopsies. Des Perry MD     Endoscopic ultrasonography, GI (Upper)    Result Date: 10/30/2023  Narrative: Table formatting from the original result was not included. [address] [place] [address] Endoscopy [address] [place] [address] [phone] DATE OF SERVICE: 10/30/23 PHYSICIAN(S): Attending: Des Perry MD Fellow: Raza Boston DO INDICATION: Pancreatic lesion POST-OP DIAGNOSIS: See the impression below. PREPROCEDURE: Informed consent was obtained for the procedure, including sedation. Risks of perforation, hemorrhage, adverse drug reaction and aspiration were discussed. The patient was placed in the left lateral decubitus position. Patient was explained about the risks and benefits of the procedure. Risks including but not limited to bleeding, infection, and perforation were explained in detail.  Also explained about less than 100% sensitivity with the exam and other alternatives. PROCEDURE: EUS UPPER DETAILS OF PROCEDURE: Patient was taken to the procedure room where a time out was performed to confirm correct patient and correct procedure. The patient underwent monitored anesthesia care, which was administered by an anesthesia professional. The patient's blood pressure, heart rate, oxygen, respirations, level of consciousness and ECG were monitored throughout the procedure. The endoscope was advanced to the duodenum. The patient experienced no blood loss. The procedure was not difficult. The patient tolerated the procedure well. There were no apparent adverse events. ANESTHESIA INFORMATION: ASA: II Anesthesia Type: General MEDICATIONS: indomethacin (INDOCIN) rectal suppository 100 mg 100 mg (Totals for administrations occurring from 1356 to 1534 on 10/30/23) FINDINGS: Limited endoscopic evaluation using a side-viewing endoscope was unremarkable. The pancreas appeared atrophic. The pancreatic parenchyma was visualized in the body of the pancreas and tail of the pancreas. . The parenchyma had hyperechoic foci. The pancreatic duct measured 3 mm at the tail. The duct in the body and tail of the pancreas was dilated. Irregular mass measuring 32 mm x 24 mm with well-defined margins was visualized in the neck of the pancreas. Apparent abutment of the portal vein and splenic vein; 4 successful fine needle biopsy passes were taken with a 25 gauge needle using a transduodenal approach guided by Doppler, sample found to be adequate and sent sample for histology analysis. Small lymph nodes were seen in the area adjacent to the tumor. The proximal bile duct was dilated. The mid bile duct was strictured. The bile duct measured 16 mm at the distal end and 4 mm at the proximal end. The parenchyma of the liver appeared normal. The hepatic ducts appeared normal. The gallbladder appeared distended. The gallbladder contained biliary sludge. Normal celiac axis.  No lymphadenopathy was seen. SPECIMENS: ID Type Source Tests Collected by Time Destination 1 : pancreatic neck mass FNA Pancreas NON-GYNECOLOGIC CYTOLOGY, FINE NEEDLE ASPIRATION Maggie Gann MD 10/30/2023 1419       Impression: The pancreas appeared atrophic. The duct was normal in the head, not seen in the neck and dilated in the body and tail of the pancreas. Irregular mass measuring 32 mm x 24 mm with well-defined margins was visualized in the neck of the pancreas. Apparent abutment of the portal vein and splenic vein (confluence); 4 fine needle biopsy passes were taken, sample found to be adequate and sent sample for histology analysis The proximal bile duct was dilated. The mid bile duct was strictured. RECOMMENDATION:  Await pathology results ERCP today. CT chest Follow up with oncology. Maggie Gann MD     CT abdomen pelvis with contrast    Result Date: 10/23/2023  Narrative: CT ABDOMEN AND PELVIS WITH IV CONTRAST INDICATION:   jaundice, ruq tenderness. COMPARISON:  None. TECHNIQUE:  CT examination of the abdomen and pelvis was performed. Multiplanar 2D reformatted images were created from the source data. This examination, like all CT scans performed in the [facility], was performed utilizing techniques to minimize radiation dose exposure, including the use of iterative reconstruction and automated exposure control. Radiation dose length product (DLP) for this visit:  678 mGy-cm IV Contrast:  100 mL of iohexol (OMNIPAQUE) Enteric Contrast:  Enteric contrast was not administered. FINDINGS: ABDOMEN LOWER CHEST:  No clinically significant abnormality identified in the visualized lower chest. LIVER/BILIARY TREE: Moderate intra and extrahepatic biliary ductal dilatation. GALLBLADDER: Abnormally distended. Mild thickened wall. No calcified stones. SPLEEN:  Unremarkable.  PANCREAS: There is a lesion suspicious for pancreatic cancer, which will be described based on Society of Abdominal Radiologists and American Pancreatic Association recommendations: MORPHOLOGIC EVALUATION: Appearance: Hypoattenuating. Size: Ill-defined margins, approximately 3.0 cm. Location: Tumor involves the pancreatic neck, straddling the plane of the SMV. Pancreatic duct narrowing/abrupt cut off with or without upstream dilatation: Moderate pancreatic ductal dilatation with abrupt cut off at the level of the mass. Pancreatic duct measuring up to 8 mm. Biliary tree abrupt cut off with or without upstream dilatation: Abrupt cut off of the common bile duct as it enters the pancreatic head, caliber dilated up to 19 mm. ARTERIAL EVALUATION: Superior Mesenteric Artery Involvement: Presence: The mass directly abuts less than 180 degrees of the SMA at its confluence with the length of vein/portal vein. Celiac Axis Involvement: Presence: Absent. Common Hepatic Artery Involvement: Presence: Present. Degree of solid soft-tissue contact: Traverses along the margin of the mass but remains patent. Less than 180 degrees. Degree of increased hazy attenuation/stranding contact: </= 180 degrees. Focal vessel narrowing or contour irregularity: Absent. Extension to celiac axis: Absent. Extension to the bifurcation of right/left hepatic artery: Absent. Arterial Variant: Type: There are no arterial variants. VENOUS EVALUATION: Main Portal Vein Involvement: Present. Degree of solid soft-tissue contact: REPORT AS LESS THAN OR EQUAL  DEGREES OR GREATER THAN 180 DEGREES. Degree of increased hazy attenuation/stranding contact: REPORT AS LESS THAN OR EQUAL  DEGREES OR GREATER THAN 180 DEGREES. Focal vessel narrowing or contour irregularity (tethering or teardrop): Absent. Superior Mesenteric Vein Involvement: Present. Degree of solid soft-tissue contact: </= 180 degrees. Focal vessel narrowing or contour irregularity (tethering or teardrop): Present. Extension to first draining vein: Absent. Thrombus within vein: Absent.  Venous collaterals: Increased caliber of the splenic vein and SMV. But without significant varix formation. FOR EVALUATION FOR POTENTIAL EXTRAPANCREATIC TUMOR, PLEASE SEE THE REST OF THE BODY OF THIS REPORT. ADRENAL GLANDS: Right adrenal within normal limits. 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 A smaller few additional nodules are also noted with similar features. These do not have an  appearance suggesting metastatic disease. KIDNEYS/URETERS: A few small bilateral renal cortical cysts are noted. No suspicious findings. STOMACH AND BOWEL: There is colonic diverticulosis without evidence of acute diverticulitis. APPENDIX:  No findings to suggest appendicitis. ABDOMINOPELVIC CAVITY:  No ascites. No pneumoperitoneum. A few shotty subcentimeter lymph nodes in the efren hepatis and gastrohepatic region remaining subcentimeter. . VESSELS: Atherosclerotic changes are present. No evidence of aneurysm. PELVIS REPRODUCTIVE ORGANS:  Unremarkable for patient's age. URINARY BLADDER:  Unremarkable. ABDOMINAL WALL/INGUINAL REGIONS: Lower abdominal wall hernia mesh repair noted. OSSEOUS STRUCTURES:  No acute fracture or destructive osseous lesion. Impression: Ill-defined hypoattenuating mass within the neck of the pancreas with subsequent dilatation and obstruction of the biliary tree and pancreatic duct. Highly likely to represent pancreatic neoplasm. See above for further details. Partially encasing the main portal vein at the confluence with splenic vein and SMV with approximately 50% narrowing of the caliber at this level and dilatation of the SMV. No evidence for distant metastatic disease evident. Several small nodules within the left adrenal with features suggesting benign etiology as described above. The study was marked in [facility] for immediate notification.  Workstation performed: [id number]     I personally reviewed and interpreted the above laboratory and imaging data.    Discussion/Summary: 60-year-old male with locally advanced pancreas cancer. His staging work-up is negative for metastasis. He is a smoker, making the spiculated lung nodule concerning also for primary lung cancer. But this was negative on his PET/CT. This will continue to be observed with serial imaging. We discussed that with his pancreas cancer, there may be vessel abutment/involvement. I would recommend neoadjuvant chemotherapy. We discussed the reasoning and rationale behind this approach. This would ensure that there is no tumor that progresses through chemotherapy as well as potentially converting him to node negative. He will need a port for chemotherapy. I explained the risks including bleeding, infection, need for further surgery, wound complications, port malfunction, DVT and pneumothorax. Informed consent was obtained. We will schedule this at our earliest mutual convenience. I will see him again at the time of port placement and then again in 3 months to reassess whether he will continue chemotherapy versus undergo surgical resection at that time. We discussed ideally if he could undergo 5 to 6 months of neoadjuvant chemotherapy, surgical literature suggest that this will give him the best long-term outcome. He is agreeable to this plan. All his questions were answered.

## 2023-11-17 RX ORDER — CEFAZOLIN SODIUM 1 G/50ML
1000 SOLUTION INTRAVENOUS ONCE
Status: CANCELLED | OUTPATIENT
Start: 2023-11-17 | End: 2023-11-17

## 2023-11-17 RX ORDER — SODIUM CHLORIDE 9 MG/ML
75 INJECTION, SOLUTION INTRAVENOUS CONTINUOUS
Status: CANCELLED | OUTPATIENT
Start: 2023-11-17

## 2023-11-21 ENCOUNTER — TELEPHONE (OUTPATIENT)
Dept: SURGICAL ONCOLOGY | Facility: CLINIC | Age: 58
End: 2023-11-21

## 2023-11-21 ENCOUNTER — OFFICE VISIT (OUTPATIENT)
Dept: FAMILY MEDICINE CLINIC | Facility: CLINIC | Age: 58
End: 2023-11-21
Payer: COMMERCIAL

## 2023-11-21 VITALS
BODY MASS INDEX: 22.96 KG/M2 | HEART RATE: 105 BPM | SYSTOLIC BLOOD PRESSURE: 130 MMHG | DIASTOLIC BLOOD PRESSURE: 88 MMHG | OXYGEN SATURATION: 97 % | TEMPERATURE: 96.4 F | HEIGHT: 69 IN | WEIGHT: 155 LBS

## 2023-11-21 DIAGNOSIS — I10 PRIMARY HYPERTENSION: ICD-10-CM

## 2023-11-21 DIAGNOSIS — Z76.89 ENCOUNTER TO ESTABLISH CARE WITH NEW DOCTOR: ICD-10-CM

## 2023-11-21 DIAGNOSIS — E78.2 MIXED HYPERLIPIDEMIA: ICD-10-CM

## 2023-11-21 DIAGNOSIS — F51.01 PRIMARY INSOMNIA: ICD-10-CM

## 2023-11-21 DIAGNOSIS — E11.00 TYPE 2 DIABETES MELLITUS WITH HYPEROSMOLARITY WITHOUT COMA, WITHOUT LONG-TERM CURRENT USE OF INSULIN (HCC): Primary | ICD-10-CM

## 2023-11-21 DIAGNOSIS — K21.9 GASTROESOPHAGEAL REFLUX DISEASE, UNSPECIFIED WHETHER ESOPHAGITIS PRESENT: ICD-10-CM

## 2023-11-21 DIAGNOSIS — C25.9 PANCREATIC ADENOCARCINOMA (HCC): ICD-10-CM

## 2023-11-21 DIAGNOSIS — I25.10 CORONARY ARTERY DISEASE INVOLVING NATIVE CORONARY ARTERY OF NATIVE HEART WITHOUT ANGINA PECTORIS: ICD-10-CM

## 2023-11-21 DIAGNOSIS — Z85.828 HISTORY OF SKIN CANCER: ICD-10-CM

## 2023-11-21 DIAGNOSIS — Z00.00 ANNUAL PHYSICAL EXAM: ICD-10-CM

## 2023-11-21 LAB
CREAT UR-MCNC: 50.7 MG/DL
MICROALBUMIN UR-MCNC: 62.8 MG/L
MICROALBUMIN/CREAT 24H UR: 124 MG/G CREATININE (ref 0–30)
SL AMB POCT HEMOGLOBIN AIC: 7.8 (ref ?–6.5)

## 2023-11-21 PROCEDURE — 83036 HEMOGLOBIN GLYCOSYLATED A1C: CPT | Performed by: STUDENT IN AN ORGANIZED HEALTH CARE EDUCATION/TRAINING PROGRAM

## 2023-11-21 PROCEDURE — 82570 ASSAY OF URINE CREATININE: CPT | Performed by: STUDENT IN AN ORGANIZED HEALTH CARE EDUCATION/TRAINING PROGRAM

## 2023-11-21 PROCEDURE — 82043 UR ALBUMIN QUANTITATIVE: CPT | Performed by: STUDENT IN AN ORGANIZED HEALTH CARE EDUCATION/TRAINING PROGRAM

## 2023-11-21 PROCEDURE — 99204 OFFICE O/P NEW MOD 45 MIN: CPT | Performed by: STUDENT IN AN ORGANIZED HEALTH CARE EDUCATION/TRAINING PROGRAM

## 2023-11-21 PROCEDURE — 99396 PREV VISIT EST AGE 40-64: CPT | Performed by: STUDENT IN AN ORGANIZED HEALTH CARE EDUCATION/TRAINING PROGRAM

## 2023-11-21 NOTE — PROGRESS NOTES
[address] [facility]    NAME: Tangela Negrete  AGE: 62 y.o. SEX: male  : 1965     DATE: 2023     Assessment and Plan:     Problem List Items Addressed This Visit        Digestive    Pancreatic adenocarcinoma [facility])     Port placement Friday, oncology next week reviewed surg onc note         Relevant Orders    Ambulatory Referral to Nutrition Services for Oncology    Gastroesophageal reflux disease       Endocrine    Type 2 diabetes mellitus with hyperosmolarity without coma, without long-term current use of insulin ([address]) - Primary       Lab Results   Component Value Date    HGBA1C 7.8 (A) 2023       Sugars increased likely from trying to consume excess calories due to weight loss. Keep on current regimen and starts chemo next week and would expect due to side effects likely sugars are to decrease. Follows with outside eye exams and foot exam completed. Continue metformin, Jardiance 25 and insulin. Relevant Orders    [facility] Diabetic eye exam    POCT hemoglobin A1c (Completed)    Comprehensive metabolic panel    CBC and differential    Lipid Panel with Direct LDL reflex    TSH, 3rd generation with Free T4 reflex    Ambulatory Referral to Nutrition Services for Oncology    Ambulatory referral to Diabetic Education    Albumin / creatinine urine ratio (Completed)       Cardiovascular and Mediastinum    Coronary artery disease involving native coronary artery of native heart without angina pectoris     Status post stent placement.   Continue secondary prevention with optimizing diabetes, Crestor            Other    Mixed hyperlipidemia     Follow-up labs is on Crestor         Primary insomnia     Continue Ambien        Other Visit Diagnoses     Encounter to establish care with new doctor        Primary hypertension        Annual physical exam        History of skin cancer              Immunizations and preventive care screenings were discussed with patient today. Appropriate education was printed on patient's after visit summary. Discussed risks and benefits of prostate cancer screening. We discussed the controversial history of PSA screening for prostate cancer in the [facility] as well as the risk of over detection and over treatment of prostate cancer by way of PSA screening. The patient understands that PSA blood testing is an imperfect way to screen for prostate cancer and that elevated PSA levels in the blood may also be caused by infection, inflammation, prostatic trauma or manipulation, urological procedures, or by benign prostatic enlargement. The role of the digital rectal examination in prostate cancer screening was also discussed and I discussed with him that there is large interobserver variability in the findings of digital rectal examination. Counseling:  Exercise: the importance of regular exercise/physical activity was discussed. Recommend exercise 3-5 times per week for at least 30 minutes. Depression Screening and Follow-up Plan: Patient was screened for depression during today's encounter. They screened negative with a PHQ-2 score of 2. Return in about 4 months (around 3/21/2024) for Recheck. Chief Complaint:     Chief Complaint   Patient presents with   • Physical Exam   • Establish Care      History of Present Illness:     Adult Annual Physical   Patient here for a comprehensive physical exam. The patient reports problems - see below    Est care. Cad hx    New diagnosis of pancreatic cancer    On insulin, trying to gain weight with the recent cancer diagnosis     Ambien for sleep (5mg)    Diet and Physical Activity  Diet/Nutrition: well balanced diet. Exercise: no formal exercise.       Depression Screening  PHQ-2/9 Depression Screening    Little interest or pleasure in doing things: 1 - several days  Feeling down, depressed, or hopeless: 1 - several days  PHQ-2 Score: 2  PHQ-2 Interpretation: Negative depression screen       General Health  Sleep: sleeps well. Hearing: normal - bilateral.  Vision: no vision problems and goes for regular eye exams. Dental: regular dental visits.  Health  Symptoms include: none     Review of Systems:     Review of Systems   Constitutional:  Negative for activity change, appetite change, chills, fatigue and fever. HENT:  Negative for congestion, rhinorrhea and sore throat. Eyes:  Negative for visual disturbance. Respiratory:  Negative for cough and shortness of breath. Cardiovascular:  Negative for chest pain and palpitations. Gastrointestinal:  Negative for abdominal pain, constipation, diarrhea, nausea and vomiting. Genitourinary:  Negative for difficulty urinating, dysuria and frequency. Musculoskeletal:  Negative for arthralgias and myalgias. Skin:  Negative for color change and rash. Neurological:  Negative for weakness and headaches.       Past Medical History:     Past Medical History:   Diagnosis Date   • Coronary artery disease    • Diabetes mellitus ([address])    • Hyperlipidemia    • Hypertension       Past Surgical History:     Past Surgical History:   Procedure Laterality Date   • CORONARY ANGIOPLASTY WITH STENT PLACEMENT     • HERNIA REPAIR        Family History:     Family History   Problem Relation Age of Onset   • Heart attack Father    • Skin cancer Father       Social History:     Social History     Socioeconomic History   • Marital status: /Civil Union     Spouse name: None   • Number of children: None   • Years of education: None   • Highest education level: None   Occupational History   • None   Tobacco Use   • Smoking status: Every Day     Packs/day: 1.50     Years: 35.00     Total pack years: 52.50     Types: Cigarettes   • Smokeless tobacco: None   Vaping Use   • Vaping Use: Never used   Substance and Sexual Activity   • Alcohol use: Never   • Drug use: Never   • Sexual activity: Yes     Partners: Female   Other Topics Concern   • None   Social History Narrative   • None     Social Determinants of Health     Financial Resource Strain: Not on file   Food Insecurity: Not on file   Transportation Needs: Not on file   Physical Activity: Not on file   Stress: Not on file   Social Connections: Not on file   Intimate Partner Violence: Not on file   Housing Stability: Not on file      Current Medications:     Current Outpatient Medications   Medication Sig Dispense Refill   • amphetamine-dextroamphetamine (ADDERALL XR) 20 MG 24 hr capsule Take 20 mg by mouth every morning     • aspirin 81 MG tablet 1 tab(s)     • carvedilol (COREG) 6.25 mg tablet Take 6.25 mg by mouth 2 (two) times a day     • Continuous Blood Gluc Sensor ([employer] Geovanny 3 Sensor) MISC use as directed TO MONITOR GLUCOSE DAILY     • desvenlafaxine succinate (PRISTIQ) 50 mg 24 hr tablet Take 50 mg by mouth daily     • Jardiance 25 MG TABS Take 1 tablet by mouth every morning     • lisinopril (ZESTRIL) 5 mg tablet Take 1 tablet by mouth daily     • metFORMIN (GLUCOPHAGE) 1000 MG tablet Take 1,000 mg by mouth     • oxyCODONE (Roxicodone) 5 immediate release tablet Take 1 tablet (5 mg total) by mouth every 4 (four) hours as needed for moderate pain Max Daily Amount: 30 mg 10 tablet 0   • pancrelipase, Lip-Prot-Amyl, (CREON) 12,000 units capsule Take 12,000 units of lipase by mouth 3 (three) times a day with meals 180 capsule 0   • rosuvastatin (CRESTOR) 10 MG tablet Take 1 tablet by mouth daily     • Tresiba FlexTouch 100 units/mL injection pen 40 units before bed time and increase by 02 units every 3rd night As directed by clinic. Max dose 80 units. Dose adjustment     • Zolpidem Tartrate (AMBIEN PO) Take by mouth       No current facility-administered medications for this visit.       Allergies:     No Known Allergies   Physical Exam:     /88 (BP Location: Left arm, Patient Position: Sitting, Cuff Size: Standard) Pulse 105   Temp (!) 96.4 °F (35.8 °C) (Tympanic)   Ht 5' 9" (1.753 m)   Wt 70.3 kg (155 lb)   SpO2 97%   BMI 22.89 kg/m²     Physical Exam  Constitutional:       General: He is not in acute distress. Appearance: Normal appearance. He is not ill-appearing. HENT:      Head: Normocephalic and atraumatic. Right Ear: Tympanic membrane, ear canal and external ear normal.      Left Ear: Tympanic membrane, ear canal and external ear normal.      Nose: Nose normal.      Mouth/Throat:      Mouth: Mucous membranes are moist.      Pharynx: Oropharynx is clear. No oropharyngeal exudate or posterior oropharyngeal erythema. Eyes:      General: No scleral icterus. Right eye: No discharge. Left eye: No discharge. Extraocular Movements: Extraocular movements intact. Conjunctiva/sclera: Conjunctivae normal.      Pupils: Pupils are equal, round, and reactive to light. Cardiovascular:      Rate and Rhythm: Normal rate and regular rhythm. Pulses: Normal pulses. no weak pulses          Dorsalis pedis pulses are 2+ on the right side and 2+ on the left side. Posterior tibial pulses are 2+ on the right side and 2+ on the left side. Heart sounds: Normal heart sounds. No murmur heard. Pulmonary:      Effort: Pulmonary effort is normal. No respiratory distress. Breath sounds: Normal breath sounds. Abdominal:      General: Bowel sounds are normal.      Palpations: Abdomen is soft. Tenderness: There is no abdominal tenderness. Musculoskeletal:         General: Normal range of motion. Cervical back: Normal range of motion and neck supple. Feet:      Right foot:      Skin integrity: No ulcer, skin breakdown, erythema, warmth, callus or dry skin. Left foot:      Skin integrity: No ulcer, skin breakdown, erythema, warmth, callus or dry skin. Lymphadenopathy:      Cervical: No cervical adenopathy. Skin:     General: Skin is warm and dry.       Capillary Refill: Capillary refill takes less than 2 seconds. Neurological:      General: No focal deficit present. Mental Status: He is alert and oriented to person, place, and time. Mental status is at baseline. Cranial Nerves: No cranial nerve deficit. Psychiatric:         Mood and Affect: Mood normal.          Patient's shoes and socks removed. Right Foot/Ankle   Right Foot Inspection  Skin Exam: skin normal and skin intact. No dry skin, no warmth, no callus, no erythema, no maceration, no abnormal color, no pre-ulcer, no ulcer and no callus. Toe Exam: ROM and strength within normal limits. No swelling, no tenderness, erythema and  no right toe deformity    Sensory   Monofilament testing: intact    Vascular  Capillary refills: < 3 seconds  The right DP pulse is 2+. The right PT pulse is 2+. Left Foot/Ankle  Left Foot Inspection  Skin Exam: skin normal and skin intact. No dry skin, no warmth, no erythema, no maceration, normal color, no pre-ulcer, no ulcer and no callus. Toe Exam: ROM and strength within normal limits. No swelling, no tenderness, no erythema and no left toe deformity. Sensory   Monofilament testing: intact    Vascular  Capillary refills: < 3 seconds  The left DP pulse is 2+. The left PT pulse is 2+.      Assign Risk Category  No deformity present  No loss of protective sensation  No weak pulses  Risk: 0        Kalyani Mallory MD  [address] [place] [address]

## 2023-11-21 NOTE — TELEPHONE ENCOUNTER
Spoke to patient to confirm port placement will be done via our I.R. department on 11/24/23. Patient confirmed port will be placed at [facility] as it is closer to his home. Case has been canceled with Sandi Roth for 11/24/23. Patient appreciated the call.

## 2023-11-21 NOTE — PATIENT INSTRUCTIONS
Wellness Visit for Adults   AMBULATORY CARE:   A wellness visit  is when you see your healthcare provider to get screened for health problems. Your healthcare provider will also give you advice on how to stay healthy. Write down your questions so you remember to ask them. Ask your healthcare provider how often you should have a wellness visit. What happens at a wellness visit:  Your healthcare provider will ask about your health, and your family history of health problems. This includes high blood pressure, heart disease, and cancer. He or she will ask if you have symptoms that concern you, if you smoke, and about your mood. You may also be asked about your intake of medicines, supplements, food, and alcohol. Any of the following may be done: Your weight  will be checked. Your height may also be checked so your body mass index (BMI) can be calculated. Your BMI shows if you are at a healthy weight. Your blood pressure  and heart rate will be checked. Your temperature may also be checked. Blood and urine tests  may be done. Blood tests may be done to check your cholesterol levels. Abnormal cholesterol levels increase your risk for heart disease and stroke. You may also need a blood or urine test to check for diabetes if you are at increased risk. Urine tests may be done to look for signs of an infection or kidney disease. A physical exam  includes checking your heartbeat and lungs with a stethoscope. Your healthcare provider may also check your skin to look for sun damage. Screening tests  may be recommended. A screening test is done to check for diseases that may not cause symptoms. The screening tests you may need depend on your age, gender, family history, and lifestyle habits. For example, colorectal screening may be recommended if you are 48years old or older. Screening tests you need if you are a woman:   A Pap smear  is used to screen for cervical cancer.  Pap smears are usually done every 3 to 5 years depending on your age. You may need them more often if you have had abnormal Pap smear test results in the past. Ask your healthcare provider how often you should have a Pap smear. A mammogram  is an x-ray of your breasts to screen for breast cancer. Experts recommend mammograms every 2 years starting at age 48 years. You may need a mammogram at age 52 years or younger if you have an increased risk for breast cancer. Talk to your healthcare provider about when you should start having mammograms and how often you need them. Vaccines you may need:   Get an influenza vaccine  every year. The influenza vaccine protects you from the flu. Several types of viruses cause the flu. The viruses change over time, so new vaccines are made each year. Get a tetanus-diphtheria (Td) booster vaccine  every 10 years. This vaccine protects you against tetanus and diphtheria. Tetanus is a severe infection that may cause painful muscle spasms and lockjaw. Diphtheria is a severe bacterial infection that causes a thick covering in the back of your mouth and throat. Get a human papillomavirus (HPV) vaccine  if you are female and aged 23 to 32 or male 23 to 24 and never received it. This vaccine protects you from HPV infection. HPV is the most common infection spread by sexual contact. HPV may also cause vaginal, penile, and anal cancers. Get a pneumococcal vaccine  if you are aged 72 years or older. The pneumococcal vaccine is an injection given to protect you from pneumococcal disease. Pneumococcal disease is an infection caused by pneumococcal bacteria. The infection may cause pneumonia, meningitis, or an ear infection. Get a shingles vaccine  if you are 60 or older, even if you have had shingles before. The shingles vaccine is an injection to protect you from the varicella-zoster virus. This is the same virus that causes chickenpox.  Shingles is a painful rash that develops in people who had chickenpox or have been exposed to the virus. How to eat healthy:  My Plate is a model for planning healthy meals. It shows the types and amounts of foods that should go on your plate. Fruits and vegetables make up about half of your plate, and grains and protein make up the other half. A serving of dairy is included on the side of your plate. The amount of calories and serving sizes you need depends on your age, gender, weight, and height. Examples of healthy foods are listed below:  Eat a variety of vegetables  such as dark green, red, and orange vegetables. You can also include canned vegetables low in sodium (salt) and frozen vegetables without added butter or sauces. Eat a variety of fresh fruits , canned fruit in 100% juice, frozen fruit, and dried fruit. Include whole grains. At least half of the grains you eat should be whole grains. Examples include whole-wheat bread, wheat pasta, brown rice, and whole-grain cereals such as oatmeal.    Eat a variety of protein foods such as seafood (fish and shellfish), lean meat, and poultry without skin (turkey and chicken). Examples of lean meats include pork leg, shoulder, or tenderloin, and beef round, sirloin, tenderloin, and extra lean ground beef. Other protein foods include eggs and egg substitutes, beans, peas, soy products, nuts, and seeds. Choose low-fat dairy products such as skim or 1% milk or low-fat yogurt, cheese, and cottage cheese. Limit unhealthy fats  such as butter, hard margarine, and shortening. Exercise:  Exercise at least 30 minutes per day on most days of the week. Some examples of exercise include walking, biking, dancing, and swimming. You can also fit in more physical activity by taking the stairs instead of the elevator or parking farther away from stores. Include muscle strengthening activities 2 days each week. Regular exercise provides many health benefits.  It helps you manage your weight, and decreases your risk for type 2 diabetes, heart disease, stroke, and high blood pressure. Exercise can also help improve your mood. Ask your healthcare provider about the best exercise plan for you. General health and safety guidelines:   Do not smoke. Nicotine and other chemicals in cigarettes and cigars can cause lung damage. Ask your healthcare provider for information if you currently smoke and need help to quit. E-cigarettes or smokeless tobacco still contain nicotine. Talk to your healthcare provider before you use these products. Limit alcohol. A drink of alcohol is 12 ounces of beer, 5 ounces of wine, or 1½ ounces of liquor. Lose weight, if needed. Being overweight increases your risk of certain health conditions. These include heart disease, high blood pressure, type 2 diabetes, and certain types of cancer. Protect your skin. Do not sunbathe or use tanning beds. Use sunscreen with a SPF 15 or higher. Apply sunscreen at least 15 minutes before you go outside. Reapply sunscreen every 2 hours. Wear protective clothing, hats, and sunglasses when you are outside. Drive safely. Always wear your seatbelt. Make sure everyone in your car wears a seatbelt. A seatbelt can save your life if you are in an accident. Do not use your cell phone when you are driving. This could distract you and cause an accident. Pull over if you need to make a call or send a text message. Practice safe sex. Use latex condoms if are sexually active and have more than one partner. Your healthcare provider may recommend screening tests for sexually transmitted infections (STIs). Wear helmets, lifejackets, and protective gear. Always wear a helmet when you ride a bike or motorcycle, go skiing, or play sports that could cause a head injury. Wear protective equipment when you play sports. Wear a lifejacket when you are on a boat or doing water sports.     © Copyright Melisa Alatorre 2023 Information is for End User's use only and may not be sold, redistributed or otherwise used for commercial purposes. The above information is an  only. It is not intended as medical advice for individual conditions or treatments. Talk to your doctor, nurse or pharmacist before following any medical regimen to see if it is safe and effective for you.

## 2023-11-22 ENCOUNTER — TELEPHONE (OUTPATIENT)
Dept: HEMATOLOGY ONCOLOGY | Facility: CLINIC | Age: 58
End: 2023-11-22

## 2023-11-22 ENCOUNTER — TELEPHONE (OUTPATIENT)
Dept: NUTRITION | Facility: CLINIC | Age: 58
End: 2023-11-22

## 2023-11-22 ENCOUNTER — TELEPHONE (OUTPATIENT)
Dept: ADMINISTRATIVE | Facility: OTHER | Age: 58
End: 2023-11-22

## 2023-11-22 DIAGNOSIS — C25.9 PANCREATIC ADENOCARCINOMA (HCC): Primary | ICD-10-CM

## 2023-11-22 PROBLEM — E78.2 MIXED HYPERLIPIDEMIA: Status: ACTIVE | Noted: 2023-11-22

## 2023-11-22 PROBLEM — F51.01 PRIMARY INSOMNIA: Status: ACTIVE | Noted: 2023-11-22

## 2023-11-22 PROBLEM — K21.9 GASTROESOPHAGEAL REFLUX DISEASE: Status: ACTIVE | Noted: 2023-11-22

## 2023-11-22 PROBLEM — I25.10 CORONARY ARTERY DISEASE INVOLVING NATIVE CORONARY ARTERY OF NATIVE HEART WITHOUT ANGINA PECTORIS: Status: ACTIVE | Noted: 2023-11-22

## 2023-11-22 NOTE — TELEPHONE ENCOUNTER
Patient aware of first cycle and also aware appointments are updated on mychart. Patient will receive AVS when seeing provider on 11/27.

## 2023-11-22 NOTE — TELEPHONE ENCOUNTER
While we try to accommodate patient requests, our priority is to schedule treatment according to Doctor's orders and site availability. Does the Provider use the intake sheet or checkout note? What would be a preferred day of the week that would work best for your infusion appointment? Any day   Do you prefer mornings or afternoons for your appointments? Mornings   Are there any days or dates that do not work for your schedule, including any upcoming vacations? We are going to try our best to schedule you at the infusion center closest to your home. In the event that we are unable to what would be your next preferred infusion site or sites? 1. [place] infusion       Do you have transportation to take you to all of your appointments?  Yes   Would you like the infusion center to draw labs from your port? (disregard if patient doesn't have a port or need labs for infusion appointment) yes

## 2023-11-22 NOTE — TELEPHONE ENCOUNTER
Contacted Keenan Hazel to set up nutrition consult after receiving notification by Sony Yeung MD on 11/21/23 that pt is appropriate for oncology nutrition care (reason for referral: Ambulatory referral for T2DM, pancreatic cancer ). Spoke with Keenan Hazel, introduced self and oncology nutrition services available. Initial oncology nutrition consultation set up for 11/28/23 during Der[facility]'s infusion.

## 2023-11-22 NOTE — TELEPHONE ENCOUNTER
Upon review of the In Basket request we were able to locate, review, and update the patient chart as requested for CRC: Colonoscopy and Diabetic Eye Exam.    Any additional questions or concerns should be emailed to the Practice Liaisons via the appropriate education email address, please do not reply via In Basket.     Thank you  Terra Wilson MA

## 2023-11-22 NOTE — ASSESSMENT & PLAN NOTE
Lab Results   Component Value Date    HGBA1C 7.8 (A) 11/21/2023       Sugars increased likely from trying to consume excess calories due to weight loss. Keep on current regimen and starts chemo next week and would expect due to side effects likely sugars are to decrease. Follows with outside eye exams and foot exam completed. Continue metformin, Jardiance 25 and insulin.

## 2023-11-22 NOTE — TELEPHONE ENCOUNTER
Vinicius Walker is calling patient to give brief review on treatment, waiting till call is finished to call patient directly for preferences.

## 2023-11-22 NOTE — TELEPHONE ENCOUNTER
----- Message from Iban Ponce sent at 11/21/2023  3:55 PM EST -----  Regarding: care gap request colonoscopy  11/21/23 3:55 PM    Hello, our patient attached above has had CRC: Colonoscopy completed/performed. Please assist in updating the patient chart by pulling the Care Everywhere (CE) document. The date of service is 09/10/2021. Thank you,  Iban [facility]  [address]  11/21/23 3:57 PM    Hello, our patient attached above has had Diabetic Eye Exam completed/performed. Please assist in updating the patient chart by pulling the Care Everywhere (CE) document. The date of service is 06/01/2023.     Thank you,  Iban Ponce   [address]  [facility]

## 2023-11-22 NOTE — TELEPHONE ENCOUNTER
Called patient to make him aware he will be receiving a phone call from infusion to get him on the schedule for treatment and then on Monday we will go over side effects of treatment and sign consent. He was appreciative of the phone call, is in agreement with that plan and has no other questions or concerns.

## 2023-11-24 ENCOUNTER — HOSPITAL ENCOUNTER (OUTPATIENT)
Dept: NON INVASIVE DIAGNOSTICS | Facility: HOSPITAL | Age: 58
Discharge: HOME | End: 2023-11-24
Attending: INTERNAL MEDICINE
Payer: COMMERCIAL

## 2023-11-24 ENCOUNTER — TELEPHONE (OUTPATIENT)
Dept: INFUSION CENTER | Facility: CLINIC | Age: 58
End: 2023-11-24

## 2023-11-24 VITALS
SYSTOLIC BLOOD PRESSURE: 125 MMHG | RESPIRATION RATE: 23 BRPM | OXYGEN SATURATION: 94 % | DIASTOLIC BLOOD PRESSURE: 79 MMHG | HEIGHT: 69 IN | HEART RATE: 83 BPM | TEMPERATURE: 97.1 F | WEIGHT: 156.75 LBS | BODY MASS INDEX: 23.22 KG/M2

## 2023-11-24 DIAGNOSIS — C25.9 PANCREATIC ADENOCARCINOMA (HCC): Primary | ICD-10-CM

## 2023-11-24 DIAGNOSIS — C25.9 PANCREATIC ADENOCARCINOMA (HCC): ICD-10-CM

## 2023-11-24 LAB — GLUCOSE SERPL-MCNC: 154 MG/DL (ref 65–140)

## 2023-11-24 PROCEDURE — C1788 PORT, INDWELLING, IMP: HCPCS

## 2023-11-24 PROCEDURE — 36561 INSERT TUNNELED CV CATH: CPT

## 2023-11-24 PROCEDURE — 82948 REAGENT STRIP/BLOOD GLUCOSE: CPT

## 2023-11-24 PROCEDURE — 76937 US GUIDE VASCULAR ACCESS: CPT | Performed by: RADIOLOGY

## 2023-11-24 PROCEDURE — 36561 INSERT TUNNELED CV CATH: CPT | Performed by: RADIOLOGY

## 2023-11-24 PROCEDURE — 77001 FLUOROGUIDE FOR VEIN DEVICE: CPT | Performed by: RADIOLOGY

## 2023-11-24 PROCEDURE — 76937 US GUIDE VASCULAR ACCESS: CPT

## 2023-11-24 PROCEDURE — 77001 FLUOROGUIDE FOR VEIN DEVICE: CPT

## 2023-11-24 RX ORDER — LIDOCAINE HYDROCHLORIDE AND EPINEPHRINE 10; 10 MG/ML; UG/ML
INJECTION, SOLUTION INFILTRATION; PERINEURAL AS NEEDED
Status: DISCONTINUED | OUTPATIENT
Start: 2023-11-24 | End: 2023-11-25 | Stop reason: HOSPADM

## 2023-11-24 RX ORDER — ATROPINE SULFATE 1 MG/ML
0.25 INJECTION, SOLUTION INTRAVENOUS ONCE
Status: CANCELLED | OUTPATIENT
Start: 2023-11-28

## 2023-11-24 RX ORDER — SODIUM CHLORIDE 9 MG/ML
20 INJECTION, SOLUTION INTRAVENOUS ONCE AS NEEDED
Status: CANCELLED | OUTPATIENT
Start: 2023-11-28

## 2023-11-24 RX ORDER — CEFAZOLIN SODIUM 1 G/50ML
1000 SOLUTION INTRAVENOUS ONCE
Status: COMPLETED | OUTPATIENT
Start: 2023-11-24 | End: 2023-11-24

## 2023-11-24 RX ORDER — ATROPINE SULFATE 1 MG/ML
0.25 INJECTION, SOLUTION INTRAVENOUS ONCE AS NEEDED
Status: CANCELLED | OUTPATIENT
Start: 2023-11-28

## 2023-11-24 RX ORDER — DEXTROSE MONOHYDRATE 50 MG/ML
20 INJECTION, SOLUTION INTRAVENOUS ONCE
Status: CANCELLED | OUTPATIENT
Start: 2023-11-28

## 2023-11-24 RX ORDER — SODIUM CHLORIDE 9 MG/ML
75 INJECTION, SOLUTION INTRAVENOUS CONTINUOUS
Status: DISCONTINUED | OUTPATIENT
Start: 2023-11-24 | End: 2023-11-25 | Stop reason: HOSPADM

## 2023-11-24 RX ADMIN — LIDOCAINE HYDROCHLORIDE,EPINEPHRINE BITARTRATE 10 ML: 10; .01 INJECTION, SOLUTION INFILTRATION; PERINEURAL at 12:59

## 2023-11-24 RX ADMIN — CEFAZOLIN SODIUM 1000 MG: 1 SOLUTION INTRAVENOUS at 12:27

## 2023-11-24 RX ADMIN — SODIUM CHLORIDE 75 ML/HR: 0.9 INJECTION, SOLUTION INTRAVENOUS at 12:27

## 2023-11-24 NOTE — DISCHARGE INSTRUCTIONS
Implanted Venous Access Port     WHAT YOU NEED TO KNOW:   An implanted venous access port is a device used to give treatments and take blood. It may also be called a central venous access device (CVAD). The port is a small container that is placed under your skin, usually in your upper chest. The port is attached to a catheter that enters a large vein. DISCHARGE INSTRUCTIONS:   Resume your normal diet. Small sips of flat soda will help with mild nausea. Prevent an infection:   Wash your hands often. Use soap and water. Clean your hands before and after you care for your port. Remind everyone who cares for your port to wash their hands. Check your skin for infection every day. Look for redness, swelling, or fluid oozing from the port site. Care for your port:   1. You may shower beginning 48 hours after procedure. 2.  Leave glue in place. 3. It is normal for some bruising to occur. 4. Use Tylenol for pain. 5. Limit use of arm on the side that your port was placed. Lift nothing heavier than 5 pounds for 1 week, and then gradually increase activity as tolerated. 6. DO NOT apply ointment, lotion or cream to port site until incision is healed. Allow glue to fall off. DO NOT attempt to peel glue from skin even it it begins to flake. 7. After the port incision is healed you may swim, bathe. Notify the Interventional Radiologist if you have any of the followin. Fever above 101 F    2. Increased redness or swelling after 1st day. 3. Increased pain after 1st day. 4. Any sign of infection (drainage from port site, skin separation, hot to touch). 5. Persistent nausea or vomiting. Contact Interventional Radiology at [phone] [facility] PATIENTS: Contact Interventional Radiology at [phone]) ([id number] [facility] 281: Contact Interventional Radiology at [phone]).

## 2023-11-24 NOTE — INTERVAL H&P NOTE
Patient arrived to IR for port placement    The procedure and risks were discussed with the patient. All questions were answered. Informed consent was obtained. H & P reviewed after examining the patient and I find no changes in the patient condition since the H & P has been written. /84   Pulse 99   Temp 97.5 °F (36.4 °C) (Temporal)   Resp 19   Ht 5' 9" (1.753 m)   Wt 71.1 kg (156 lb 12 oz)   SpO2 98%   BMI 23.15 kg/m²     Patient re-evaluated.  Accept as history and physical.    Tracy De Leon, DO/November 24, 2023/12:04 PM

## 2023-11-24 NOTE — TELEPHONE ENCOUNTER
Left voicemail for pt regarding infusion appointment scheduled for 11/28. Reviewed no show and visitor policies and pt reminded to have labs done prior to appointment.

## 2023-11-27 ENCOUNTER — OFFICE VISIT (OUTPATIENT)
Dept: HEMATOLOGY ONCOLOGY | Facility: CLINIC | Age: 58
End: 2023-11-27
Payer: COMMERCIAL

## 2023-11-27 ENCOUNTER — APPOINTMENT (OUTPATIENT)
Dept: LAB | Facility: HOSPITAL | Age: 58
End: 2023-11-27
Attending: INTERNAL MEDICINE
Payer: COMMERCIAL

## 2023-11-27 ENCOUNTER — TELEPHONE (OUTPATIENT)
Dept: HEMATOLOGY ONCOLOGY | Facility: CLINIC | Age: 58
End: 2023-11-27

## 2023-11-27 VITALS
DIASTOLIC BLOOD PRESSURE: 86 MMHG | SYSTOLIC BLOOD PRESSURE: 130 MMHG | BODY MASS INDEX: 23.4 KG/M2 | HEART RATE: 107 BPM | HEIGHT: 69 IN | RESPIRATION RATE: 16 BRPM | OXYGEN SATURATION: 96 % | TEMPERATURE: 98.2 F | WEIGHT: 158 LBS

## 2023-11-27 DIAGNOSIS — C25.9 PANCREATIC ADENOCARCINOMA (HCC): Primary | ICD-10-CM

## 2023-11-27 DIAGNOSIS — C25.9 MALIGNANT NEOPLASM OF PANCREAS, UNSPECIFIED LOCATION OF MALIGNANCY (HCC): Primary | ICD-10-CM

## 2023-11-27 DIAGNOSIS — C25.9 PANCREATIC ADENOCARCINOMA (HCC): ICD-10-CM

## 2023-11-27 LAB
ALBUMIN SERPL BCP-MCNC: 4.4 G/DL (ref 3.5–5)
ALP SERPL-CCNC: 121 U/L (ref 34–104)
ALT SERPL W P-5'-P-CCNC: 17 U/L (ref 7–52)
ANION GAP SERPL CALCULATED.3IONS-SCNC: 6 MMOL/L
AST SERPL W P-5'-P-CCNC: 13 U/L (ref 13–39)
BASOPHILS # BLD AUTO: 0.09 THOUSANDS/ÂΜL (ref 0–0.1)
BASOPHILS NFR BLD AUTO: 1 % (ref 0–1)
BILIRUB SERPL-MCNC: 1.32 MG/DL (ref 0.2–1)
BUN SERPL-MCNC: 20 MG/DL (ref 5–25)
CALCIUM SERPL-MCNC: 10.1 MG/DL (ref 8.4–10.2)
CHLORIDE SERPL-SCNC: 106 MMOL/L (ref 96–108)
CO2 SERPL-SCNC: 29 MMOL/L (ref 21–32)
CREAT SERPL-MCNC: 0.88 MG/DL (ref 0.6–1.3)
EOSINOPHIL # BLD AUTO: 0.32 THOUSAND/ÂΜL (ref 0–0.61)
EOSINOPHIL NFR BLD AUTO: 2 % (ref 0–6)
ERYTHROCYTE [DISTWIDTH] IN BLOOD BY AUTOMATED COUNT: 14.1 % (ref 11.6–15.1)
GFR SERPL CREATININE-BSD FRML MDRD: 94 ML/MIN/1.73SQ M
GLUCOSE P FAST SERPL-MCNC: 210 MG/DL (ref 65–99)
HCT VFR BLD AUTO: 53.5 % (ref 36.5–49.3)
HGB BLD-MCNC: 18.4 G/DL (ref 12–17)
IMM GRANULOCYTES # BLD AUTO: 0.08 THOUSAND/UL (ref 0–0.2)
IMM GRANULOCYTES NFR BLD AUTO: 1 % (ref 0–2)
LYMPHOCYTES # BLD AUTO: 2.45 THOUSANDS/ÂΜL (ref 0.6–4.47)
LYMPHOCYTES NFR BLD AUTO: 16 % (ref 14–44)
MCH RBC QN AUTO: 32.2 PG (ref 26.8–34.3)
MCHC RBC AUTO-ENTMCNC: 34.4 G/DL (ref 31.4–37.4)
MCV RBC AUTO: 94 FL (ref 82–98)
MONOCYTES # BLD AUTO: 1.24 THOUSAND/ÂΜL (ref 0.17–1.22)
MONOCYTES NFR BLD AUTO: 8 % (ref 4–12)
NEUTROPHILS # BLD AUTO: 11.5 THOUSANDS/ÂΜL (ref 1.85–7.62)
NEUTS SEG NFR BLD AUTO: 72 % (ref 43–75)
NRBC BLD AUTO-RTO: 0 /100 WBCS
PLATELET # BLD AUTO: 275 THOUSANDS/UL (ref 149–390)
PMV BLD AUTO: 9.8 FL (ref 8.9–12.7)
POTASSIUM SERPL-SCNC: 5.5 MMOL/L (ref 3.5–5.3)
PROT SERPL-MCNC: 7.7 G/DL (ref 6.4–8.4)
RBC # BLD AUTO: 5.72 MILLION/UL (ref 3.88–5.62)
SODIUM SERPL-SCNC: 141 MMOL/L (ref 135–147)
WBC # BLD AUTO: 15.68 THOUSAND/UL (ref 4.31–10.16)

## 2023-11-27 PROCEDURE — 85025 COMPLETE CBC W/AUTO DIFF WBC: CPT

## 2023-11-27 PROCEDURE — 36415 COLL VENOUS BLD VENIPUNCTURE: CPT

## 2023-11-27 PROCEDURE — 80053 COMPREHEN METABOLIC PANEL: CPT

## 2023-11-27 PROCEDURE — 99215 OFFICE O/P EST HI 40 MIN: CPT | Performed by: INTERNAL MEDICINE

## 2023-11-27 NOTE — PROGRESS NOTES
[address] H[facility]ATOLOGY ONCOLOGY SPECIALISTS [facility] [address] [facility]  1965      PRIMARY HEMATOLOGIC/ONCOLOGIC DIAGNOSIS:  Adenocarcinoma of the pancreas. CA 19-9 of 932 at time of diagnosis. PATHOLOGY:   Final Diagnosis   A.B. Pancreas, Neck mass (Cell Block and smear Preparations): Malignant  Adenocarcinoma. Satisfactory for evaluation. Note: As reported in the [facility] Reporting System for Pancreaticobiliary Cytopathology *” the diagnostic category of “malignant” carries a % risk of malignancy being found in subsequent FNAB or resection. The usual management following an initial diagnosis of “malignant” is per clinical stage. Ultimately clinical and radiologic correlation is needed for this patient in arriving at the actual management plan. STAGING: Cancer Staging   No matching staging information was found for the patient. Oncology History   Pancreatic adenocarcinoma ([address])   10/30/2023 Biopsy    Endoscopic ultrasound:  Pancreas, Neck mass:  Malignant  Adenocarcinoma. 11/16/2023 -  Cancer Staged    Staging form: Pancreas, AJCC 8th Edition  - Clinical: Stage IB (cT2, cN0, cM0) - Signed by Candido Ramachandran MD on 11/16/2023  Total positive nodes: 0       11/28/2023 -  Chemotherapy    alteplase (CATHFLO), 2 mg, Intracatheter, Every 1 Minute as needed, 0 of 12 cycles  pegfilgrastim (NEULASTA ONPRO), 6 mg, Subcutaneous, Once, 0 of 12 cycles  irinotecan (CAMPTOSAR) chemo infusion, 150 mg/m2 = 278 mg, Intravenous, Once, 0 of 12 cycles  oxaliplatin (ELOXATIN) chemo infusion, 65 mg/m2 = 120.25 mg, Intravenous, Once, 0 of 12 cycles  fluorouracil (ADRUCIL) ambulatory infusion Soln, 1,200 mg/m2/day = 4,440 mg, Intravenous, Over 46 hours, 0 of 12 cycles       HISTORY OF PRESENT ILLNESS:  Kathy Gomez is a 63yo male who presents for the evaluation and management of newly diagnosed pancreatic cancer.  The patient presented to the ED on 10/23/23 with new onset jaundice. He reported ayse colored stools and left sided discomfort. CT A/P showed ill-defined hypoattenuating mass within the neck of the pancreas with subsequent dilatation and obstruction of the biliary tree and pancreatic duct. Highly likely to represent pancreatic neoplasm. Partially encasing the main portal vein at the confluence with splenic vein and SMV with approximately 50% narrowing of the caliber at this level and dilatation of the SMV. No evidence for distant metastatic disease was evident. ERCP was performed 10/30/23. The common bile duct was deeply cannulated after 1 attempt using a traction sphincterotome with 260 cm x 0.035" guidewire. Extrinsic and severe stricture was visualized in the mid common bile duct. The stricture was traversable by wire. Dilation was observed in the proximal common bile duct  Medium-sized biliary sphincterotomy was performed using a sphincterotome. One 8 mm x 6 cm fully covered metal stent was placed in the common bile duct. EUS was also performed. The pancreas appeared atrophic. The pancreatic parenchyma was visualized in the body of the pancreas and tail of the pancreas. The parenchyma had hyperechoic foci. The pancreatic duct measured 3 mm at the tail. The duct in the body and tail of the pancreas was dilated. Irregular mass measuring 32 mm x 24 mm with well-defined margins was visualized in the neck of the pancreas. Apparent abutment of the portal vein and splenic vein; 4 successful fine needle biopsy passes were taken with a 25 gauge needle using a transduodenal approach guided by Doppler, sample found to be adequate and sent sample for histology analysis. Small lymph nodes were seen in the area adjacent to the tumor. The proximal bile duct was dilated. The mid bile duct was strictured. The bile duct measured 16 mm at the distal end and 4 mm at the proximal end.  The parenchyma of the liver appeared normal. The hepatic ducts appeared normal.The gallbladder appeared distended. The gallbladder contained biliary sludge. Normal celiac axis. No lymphadenopathy was seen. Pathology was c/w malignant adenocarcinoma. INTERIM HISTORY:  The patient presents for a follow-up after undergoing evaluation by surgical oncology.      PAST MEDICAL,PAST SURGICAL, FAMILY AND SOCIAL HISTORY:    Patient Active Problem List   Diagnosis    Pancreatic adenocarcinoma (HCC)    Type 2 diabetes mellitus with hyperosmolarity without coma, without long-term current use of insulin (HCC)    Mixed hyperlipidemia    Coronary artery disease involving native coronary artery of native heart without angina pectoris    Gastroesophageal reflux disease    Primary insomnia     Past Medical History:   Diagnosis Date    Cancer ([address])     pancreatic    Coronary artery disease     Diabetes mellitus ([address])     Hyperlipidemia     Hypertension      Past Surgical History:   Procedure Laterality Date    CORONARY ANGIOPLASTY WITH STENT PLACEMENT      ERCP W/ PLASTIC STENT PLACEMENT      HERNIA REPAIR      IR PORT PLACEMENT  11/24/2023     Family History   Problem Relation Age of Onset    Heart attack Father     Skin cancer Father      Social History     Socioeconomic History    Marital status: /Civil Union     Spouse name: Not on file    Number of children: Not on file    Years of education: Not on file    Highest education level: Not on file   Occupational History    Not on file   Tobacco Use    Smoking status: Every Day     Packs/day: 1.50     Years: 35.00     Total pack years: 52.50     Types: Cigarettes    Smokeless tobacco: Not on file   Vaping Use    Vaping Use: Never used   Substance and Sexual Activity    Alcohol use: Never    Drug use: Never    Sexual activity: Yes     Partners: Female   Other Topics Concern    Not on file   Social History Narrative    Not on file     Social Determinants of Health     Financial Resource Strain: Not on file   Food Insecurity: Not on file   Transportation Needs: Not on file   Physical Activity: Not on file   Stress: Not on file   Social Connections: Not on file   Intimate Partner Violence: Not on file   Housing Stability: Not on file       Current Outpatient Medications:     amphetamine-dextroamphetamine (ADDERALL XR) 20 MG 24 hr capsule, Take 20 mg by mouth every morning, Disp: , Rfl:     aspirin 81 MG tablet, 1 tab(s), Disp: , Rfl:     carvedilol (COREG) 6.25 mg tablet, Take 6.25 mg by mouth 2 (two) times a day, Disp: , Rfl:     Continuous Blood Gluc Sensor (FreeStyle Geovanny 3 Sensor) MISC, use as directed TO MONITOR GLUCOSE DAILY, Disp: , Rfl:     desvenlafaxine succinate (PRISTIQ) 50 mg 24 hr tablet, Take 50 mg by mouth daily, Disp: , Rfl:     [START ON 11/28/2023] fluorouracil 4,440 mg in CADD/Elastomeric Infusion Device, Infuse 4,440 mg (1,200 mg/m2/day x 1.85 m2) into a catheter in a vein via infusion device over 46 hours for 2 days  Infusion planned for November 28, 2023., Disp: 1 each, Rfl: 0    Jardiance 25 MG TABS, Take 1 tablet by mouth every morning, Disp: , Rfl:     lisinopril (ZESTRIL) 5 mg tablet, Take 1 tablet by mouth daily, Disp: , Rfl:     metFORMIN (GLUCOPHAGE) 1000 MG tablet, Take 1,000 mg by mouth, Disp: , Rfl:     oxyCODONE (Roxicodone) 5 immediate release tablet, Take 1 tablet (5 mg total) by mouth every 4 (four) hours as needed for moderate pain Max Daily Amount: 30 mg, Disp: 10 tablet, Rfl: 0    pancrelipase, Lip-Prot-Amyl, (CREON) 12,000 units capsule, Take 12,000 units of lipase by mouth 3 (three) times a day with meals, Disp: 180 capsule, Rfl: 0    rosuvastatin (CRESTOR) 10 MG tablet, Take 1 tablet by mouth daily, Disp: , Rfl:     Anola Severin FlexTouch 100 units/mL injection pen, 40 units before bed time and increase by 02 units every 3rd night As directed by clinic. Max dose 80 units.  Dose adjustment, Disp: , Rfl:     Zolpidem Tartrate (AMBIEN PO), Take by mouth, Disp: , Rfl:   No Known Allergies  There were no vitals filed for this visit. ROS:  CONSTITUTIONAL:  No fever. No chills. No dizziness. No weakness. EYES:  No pain, erythema, or discharge. No blurring of vision. ENT:  No sore throat, URI symptoms. No epistaxis. No tinnitus. CARDIOVASCULAR:  No chest pain. No palpitations. No lower extremity edema. RESPIRATORY:  No shortness of breath, cough, pain with respiration, pleuritic chest pain. No hemoptysis. No dyspnea. No paroxysmal nocturnal dyspnea. GASTROINTESTINAL:  Normal appetite. No nausea, vomiting, diarrhea. No pain. No bloating. No melena. GENITOURINARY:  No frequency, urgency, nocturia. No hematuria or dysuria. MUSCULOSKELETAL:  No arthralgias or myalgias. INTEGUMENTARY:  No swelling. No bruising. No contusions. No abrasions. No lymphangitis. NEUROLOGIC:  No headache. No neck pain. No numbness or tingling of the extremities. No weakness. PSYCHIATRIC:  No confusion. ENDOCRINE:  No fatigue. No weakness. No history of thyroid, diabetes or adrenal problems. HEMATOLOGICAL:  No bleeding. No petechiae. No bruising.     PHYSICAL EXAM:    GENERAL: AAO x 3  HEENT: AT,NC  CVS: S1S2 RRR  LUNGS: CTA b/l  ABD: NT,ND, +BS  EXTR: no edema  NEURO: CN II-XII grossly intact    LABS:  I have reviewed pertinent labs:  CBC:   Lab Results   Component Value Date    WBC 12.55 (H) 10/23/2023    RBC 5.10 10/23/2023    HGB 16.5 10/23/2023    HCT 47.6 10/23/2023    MCV 93 10/23/2023     10/23/2023    MCH 32.4 10/23/2023    MCHC 34.7 10/23/2023    RDW 15.8 (H) 10/23/2023    MPV 10.7 10/23/2023     CMP:   Lab Results   Component Value Date    SODIUM 140 11/01/2023    K 4.2 11/01/2023     11/01/2023    CO2 23 11/01/2023    AGAP 6 10/23/2023    BUN 18 11/01/2023    CREATININE 0.86 11/01/2023    GLUC 146 (H) 11/01/2023    CALCIUM 9.1 10/23/2023    AST 26 11/01/2023    ALT 78 (H) 11/01/2023    ALKPHOS 393 (H) 10/23/2023    TP 6.6 11/01/2023    ALB 4.1 11/01/2023    TBILI 5.6 (H) 11/01/2023    EGFR 101 11/01/2023     Liver Enzymes:   Lab Results   Component Value Date    AST 26 11/01/2023    ALT 78 (H) 11/01/2023    ALKPHOS 393 (H) 10/23/2023    TP 6.6 11/01/2023    ALB 4.1 11/01/2023    TBILI 5.6 (H) 11/01/2023     Vitamin B12 No results found for: "LLFKIOPQ34"  Iron Study No results found for: "RETIC", "RETICCTPCT", "FERRITIN", "CONCFE", "TIBC", "PRIMIDONE", "Diaz Willard", "IRON"  Folate No results found for: "FOLATE"  Magnesium No results found for: "MG"  Phosphorus No results found for: "PHOS"  Coagulation Panel No results found for: "DDIMER", "PROTIME", "INR", "PTT"    IMAGING:  FL ERCP biliary and pancreatic    Result Date: 11/1/2023  Narrative: ERCP INDICATION: K86.9: Disease of pancreas, unspecified. COMPARISON: October 23, 2023 IMAGES:  5 FLUOROSCOPY TIME:   62.3 seconds CONTRAST: 4 mL of iohexol (OMNIPAQUE) FINDINGS: Fluoroscopic guidance was provided for performance of ERCP. BILIARY: Limited contrast opacified static submitted images were obtained as part of fluoroscopic guidance. Stricture of distal common duct. Enlargement of proximal common duct demonstrated. Final image depicts the presence of a metallic distal common bile duct stent. PANCREATIC:  None of the submitted static fluoroscopic images demonstrate contrast opacification of the pancreatic duct. Impression: Fluoroscopic guidance for ERCP. Please see procedure report for full details. Workstation performed: [id number]     ERCP    Result Date: 10/30/2023  Narrative: Table formatting from the original result was not included. [address] [place] [address] Endoscopy [address] DATE OF SERVICE: 10/30/23 PHYSICIAN(S): Attending: Amanda Garay MD Fellow: Camilo Wright DO INDICATION: Biliary obstruction POST-OP DIAGNOSIS: See the impression below. PREPROCEDURE: Informed consent was obtained for the procedure, including sedation. Risks of perforation, hemorrhage, adverse drug reaction and aspiration were discussed.  The patient was placed in the left lateral decubitus position. Patient was explained about the risks and benefits of the procedure. Risks including but not limited to bleeding, infection, and perforation were explained in detail. Also explained about less than 100% sensitivity with the exam and other alternatives. PROCEDURE: ERCP DETAILS OF PROCEDURE: Patient was taken to the procedure room where a time out was performed to confirm correct patient and correct procedure. The patient underwent general anesthesia, which was administered by an anesthesia professional. The patient's blood pressure, heart rate, level of consciousness, respirations, oxygen, ECG and ETCO2 were monitored throughout the procedure. The scope was advanced to the duodenum. Clinical intention was achieved. The patient experienced no blood loss. The procedure was not difficult. The patient tolerated the procedure well. There were no apparent adverse events. ANESTHESIA INFORMATION: ASA: II Anesthesia Type: General MEDICATIONS: indomethacin (INDOCIN) rectal suppository 100 mg 100 mg (Totals for administrations occurring from 1356 to 1534 on 10/30/23) FINDINGS: The common bile duct was deeply cannulated after 1 attempt using a traction sphincterotome with 260 cm x 0.035" guidewire. Cannulation was not difficult. Contrast was injected Extrinsic and severe stricture was visualized in the mid common bile duct. The stricture was traversable by wire Dilation was observed in the proximal common bile duct Medium-sized biliary sphincterotomy was performed using a sphincterotome. No bleeding was noted at the procedure site. One 8 mm x 6 cm fully covered metal stent was placed in the common bile duct. There was good bile and contrast drainage seen. The fluoroscopy images for the , cholangiogram and post procedure were personally reviewed and interpreted during the procedure.  SPECIMENS: ID Type Source Tests Collected by Time Destination 1 : pancreatic neck mass FNA Pancreas NON-GYNECOLOGIC CYTOLOGY, FINE NEEDLE ASPIRATION Uche Bowden MD 10/30/2023 1419       Impression: Extrinsic and severe stricture was visualized in the mid common bile duct with upstream dilation Biliary sphincterotomy was performed. One 8 mm x 6 cm fully covered stent was placed in the common bile duct RECOMMENDATION: Follow labs in one week. Follow up with oncology. Follow up with biopsies. Uche Bowden MD     Endoscopic ultrasonography, GI (Upper)    Result Date: 10/30/2023  Narrative: Table formatting from the original result was not included. [address] [place] [address] Endoscopy [address] [facility] [phone] DATE OF SERVICE: 10/30/23 PHYSICIAN(S): Attending: Uche Bowden MD Fellow: Zaria Melton DO INDICATION: Pancreatic lesion POST-OP DIAGNOSIS: See the impression below. PREPROCEDURE: Informed consent was obtained for the procedure, including sedation. Risks of perforation, hemorrhage, adverse drug reaction and aspiration were discussed. The patient was placed in the left lateral decubitus position. Patient was explained about the risks and benefits of the procedure. Risks including but not limited to bleeding, infection, and perforation were explained in detail. Also explained about less than 100% sensitivity with the exam and other alternatives. PROCEDURE: EUS UPPER DETAILS OF PROCEDURE: Patient was taken to the procedure room where a time out was performed to confirm correct patient and correct procedure. The patient underwent monitored anesthesia care, which was administered by an anesthesia professional. The patient's blood pressure, heart rate, oxygen, respirations, level of consciousness and ECG were monitored throughout the procedure. The endoscope was advanced to the duodenum. The patient experienced no blood loss. The procedure was not difficult. The patient tolerated the procedure well. There were no apparent adverse events.  ANESTHESIA INFORMATION: ASA: II Anesthesia Type: General MEDICATIONS: indomethacin (INDOCIN) rectal suppository 100 mg 100 mg (Totals for administrations occurring from 1356 to 1534 on 10/30/23) FINDINGS: Limited endoscopic evaluation using a side-viewing endoscope was unremarkable. The pancreas appeared atrophic. The pancreatic parenchyma was visualized in the body of the pancreas and tail of the pancreas. . The parenchyma had hyperechoic foci. The pancreatic duct measured 3 mm at the tail. The duct in the body and tail of the pancreas was dilated. Irregular mass measuring 32 mm x 24 mm with well-defined margins was visualized in the neck of the pancreas. Apparent abutment of the portal vein and splenic vein; 4 successful fine needle biopsy passes were taken with a 25 gauge needle using a transduodenal approach guided by Doppler, sample found to be adequate and sent sample for histology analysis. Small lymph nodes were seen in the area adjacent to the tumor. The proximal bile duct was dilated. The mid bile duct was strictured. The bile duct measured 16 mm at the distal end and 4 mm at the proximal end. The parenchyma of the liver appeared normal. The hepatic ducts appeared normal. The gallbladder appeared distended. The gallbladder contained biliary sludge. Normal celiac axis. No lymphadenopathy was seen. SPECIMENS: ID Type Source Tests Collected by Time Destination 1 : pancreatic neck mass FNA Pancreas NON-GYNECOLOGIC CYTOLOGY, FINE NEEDLE ASPIRATION Uche Bowden MD 10/30/2023 1419       Impression: The pancreas appeared atrophic. The duct was normal in the head, not seen in the neck and dilated in the body and tail of the pancreas. Irregular mass measuring 32 mm x 24 mm with well-defined margins was visualized in the neck of the pancreas. Apparent abutment of the portal vein and splenic vein (confluence); 4 fine needle biopsy passes were taken, sample found to be adequate and sent sample for histology analysis The proximal bile duct was dilated. The mid bile duct was strictured. RECOMMENDATION:  Await pathology results ERCP today. CT chest Follow up with oncology. Joanne Castañeda MD     CT abdomen pelvis with contrast    Result Date: 10/23/2023  Narrative: CT ABDOMEN AND PELVIS WITH IV CONTRAST INDICATION:   jaundice, ruq tenderness. COMPARISON:  None. TECHNIQUE:  CT examination of the abdomen and pelvis was performed. Multiplanar 2D reformatted images were created from the source data. This examination, like all CT scans performed in the [facility], was performed utilizing techniques to minimize radiation dose exposure, including the use of iterative reconstruction and automated exposure control. Radiation dose length product (DLP) for this visit:  678 mGy-cm IV Contrast:  100 mL of iohexol (OMNIPAQUE) Enteric Contrast:  Enteric contrast was not administered. FINDINGS: ABDOMEN LOWER CHEST:  No clinically significant abnormality identified in the visualized lower chest. LIVER/BILIARY TREE: Moderate intra and extrahepatic biliary ductal dilatation. GALLBLADDER: Abnormally distended. Mild thickened wall. No calcified stones. SPLEEN:  Unremarkable. PANCREAS: There is a lesion suspicious for pancreatic cancer, which will be described based on Society of Abdominal Radiologists and American Pancreatic Association recommendations: MORPHOLOGIC EVALUATION: Appearance: Hypoattenuating. Size: Ill-defined margins, approximately 3.0 cm. Location: Tumor involves the pancreatic neck, straddling the plane of the SMV. Pancreatic duct narrowing/abrupt cut off with or without upstream dilatation: Moderate pancreatic ductal dilatation with abrupt cut off at the level of the mass. Pancreatic duct measuring up to 8 mm. Biliary tree abrupt cut off with or without upstream dilatation: Abrupt cut off of the common bile duct as it enters the pancreatic head, caliber dilated up to 19 mm. ARTERIAL EVALUATION: Superior Mesenteric Artery Involvement: Presence:  The mass directly abuts less than 180 degrees of the SMA at its confluence with the length of vein/portal vein. Celiac Axis Involvement: Presence: Absent. Common Hepatic Artery Involvement: Presence: Present. Degree of solid soft-tissue contact: Traverses along the margin of the mass but remains patent. Less than 180 degrees. Degree of increased hazy attenuation/stranding contact: </= 180 degrees. Focal vessel narrowing or contour irregularity: Absent. Extension to celiac axis: Absent. Extension to the bifurcation of right/left hepatic artery: Absent. Arterial Variant: Type: There are no arterial variants. VENOUS EVALUATION: Main Portal Vein Involvement: Present. Degree of solid soft-tissue contact: REPORT AS LESS THAN OR EQUAL  DEGREES OR GREATER THAN 180 DEGREES. Degree of increased hazy attenuation/stranding contact: REPORT AS LESS THAN OR EQUAL  DEGREES OR GREATER THAN 180 DEGREES. Focal vessel narrowing or contour irregularity (tethering or teardrop): Absent. Superior Mesenteric Vein Involvement: Present. Degree of solid soft-tissue contact: </= 180 degrees. Focal vessel narrowing or contour irregularity (tethering or teardrop): Present. Extension to first draining vein: Absent. Thrombus within vein: Absent. Venous collaterals: Increased caliber of the splenic vein and SMV. But without significant varix formation. FOR EVALUATION FOR POTENTIAL EXTRAPANCREATIC TUMOR, PLEASE SEE THE REST OF THE BODY OF THIS REPORT. ADRENAL GLANDS: Right adrenal within normal limits. 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 A smaller few additional nodules are also noted with similar features. These do not have an  appearance suggesting metastatic disease. KIDNEYS/URETERS: A few small bilateral renal cortical cysts are noted. No suspicious findings. STOMACH AND BOWEL: There is colonic diverticulosis without evidence of acute diverticulitis.  APPENDIX:  No findings to suggest appendicitis. ABDOMINOPELVIC CAVITY:  No ascites. No pneumoperitoneum. A few shotty subcentimeter lymph nodes in the efren hepatis and gastrohepatic region remaining subcentimeter. . VESSELS: Atherosclerotic changes are present. No evidence of aneurysm. PELVIS REPRODUCTIVE ORGANS:  Unremarkable for patient's age. URINARY BLADDER:  Unremarkable. ABDOMINAL WALL/INGUINAL REGIONS: Lower abdominal wall hernia mesh repair noted. OSSEOUS STRUCTURES:  No acute fracture or destructive osseous lesion. Impression: Ill-defined hypoattenuating mass within the neck of the pancreas with subsequent dilatation and obstruction of the biliary tree and pancreatic duct. Highly likely to represent pancreatic neoplasm. See above for further details. Partially encasing the main portal vein at the confluence with splenic vein and SMV with approximately 50% narrowing of the caliber at this level and dilatation of the SMV. No evidence for distant metastatic disease evident. Several small nodules within the left adrenal with features suggesting benign etiology as described above. The study was marked in [facility] for immediate notification. Workstation performed: [id number]     I reviewed the above laboratory and imaging data. ASSESSMENT/PLAN:  Adenocarcinoma of the pancreas. CA 19-9 of 932 at time of diagnosis. PET/CT--no evidence of metastatic disease. The patient followed-up with surgical oncology. Path was sent for WebNotes-- results pending. Discussed the utility of neoadjuvant chemotherapy--mFOLFIRINOX. S/p port placement on 11/24/23. Possible adverse effects discussed, additional literature provided.

## 2023-11-28 ENCOUNTER — HOSPITAL ENCOUNTER (OUTPATIENT)
Dept: INFUSION CENTER | Facility: CLINIC | Age: 58
Discharge: HOME | End: 2023-11-28
Payer: COMMERCIAL

## 2023-11-28 ENCOUNTER — TELEPHONE (OUTPATIENT)
Dept: HEMATOLOGY ONCOLOGY | Facility: CLINIC | Age: 58
End: 2023-11-28

## 2023-11-28 ENCOUNTER — NUTRITION (OUTPATIENT)
Dept: NUTRITION | Facility: CLINIC | Age: 58
End: 2023-11-28

## 2023-11-28 VITALS
BODY MASS INDEX: 23.41 KG/M2 | HEIGHT: 69 IN | HEART RATE: 91 BPM | WEIGHT: 158.07 LBS | SYSTOLIC BLOOD PRESSURE: 132 MMHG | TEMPERATURE: 98.8 F | RESPIRATION RATE: 16 BRPM | DIASTOLIC BLOOD PRESSURE: 78 MMHG

## 2023-11-28 DIAGNOSIS — T45.1X5A CHEMOTHERAPY-INDUCED NAUSEA: Primary | ICD-10-CM

## 2023-11-28 DIAGNOSIS — Z71.3 NUTRITIONAL COUNSELING: Primary | ICD-10-CM

## 2023-11-28 DIAGNOSIS — C25.9 MALIGNANT NEOPLASM OF PANCREAS, UNSPECIFIED LOCATION OF MALIGNANCY (HCC): Primary | ICD-10-CM

## 2023-11-28 DIAGNOSIS — R11.0 CHEMOTHERAPY-INDUCED NAUSEA: Primary | ICD-10-CM

## 2023-11-28 DIAGNOSIS — C25.9 PANCREATIC ADENOCARCINOMA (HCC): ICD-10-CM

## 2023-11-28 DIAGNOSIS — C25.9 PANCREATIC ADENOCARCINOMA (HCC): Primary | ICD-10-CM

## 2023-11-28 PROCEDURE — 96417 CHEMO IV INFUS EACH ADDL SEQ: CPT

## 2023-11-28 PROCEDURE — G0498 CHEMO EXTEND IV INFUS W/PUMP: HCPCS

## 2023-11-28 PROCEDURE — 96413 CHEMO IV INFUSION 1 HR: CPT

## 2023-11-28 PROCEDURE — 96367 TX/PROPH/DG ADDL SEQ IV INF: CPT

## 2023-11-28 PROCEDURE — 96415 CHEMO IV INFUSION ADDL HR: CPT

## 2023-11-28 PROCEDURE — 96375 TX/PRO/DX INJ NEW DRUG ADDON: CPT

## 2023-11-28 RX ORDER — FAMOTIDINE 10 MG/ML
20 INJECTION, SOLUTION INTRAVENOUS EVERY 12 HOURS SCHEDULED
Status: DISCONTINUED | OUTPATIENT
Start: 2023-11-28 | End: 2023-12-01 | Stop reason: HOSPADM

## 2023-11-28 RX ORDER — ONDANSETRON HYDROCHLORIDE 8 MG/1
8 TABLET, FILM COATED ORAL EVERY 8 HOURS PRN
Qty: 20 TABLET | Refills: 2 | Status: SHIPPED | OUTPATIENT
Start: 2023-11-28 | End: 2023-12-18

## 2023-11-28 RX ORDER — SODIUM CHLORIDE 9 MG/ML
20 INJECTION, SOLUTION INTRAVENOUS ONCE AS NEEDED
Status: DISCONTINUED | OUTPATIENT
Start: 2023-11-28 | End: 2023-12-01 | Stop reason: HOSPADM

## 2023-11-28 RX ORDER — ATROPINE SULFATE 1 MG/ML
0.25 INJECTION, SOLUTION INTRAVENOUS ONCE
Status: DISCONTINUED | OUTPATIENT
Start: 2023-11-28 | End: 2023-12-01 | Stop reason: HOSPADM

## 2023-11-28 RX ORDER — FAMOTIDINE 10 MG/ML
INJECTION, SOLUTION INTRAVENOUS
Status: COMPLETED
Start: 2023-11-28 | End: 2023-11-28

## 2023-11-28 RX ORDER — DEXTROSE MONOHYDRATE 50 MG/ML
20 INJECTION, SOLUTION INTRAVENOUS ONCE
Status: COMPLETED | OUTPATIENT
Start: 2023-11-28 | End: 2023-11-28

## 2023-11-28 RX ORDER — ATROPINE SULFATE 1 MG/ML
0.25 INJECTION, SOLUTION INTRAVENOUS ONCE AS NEEDED
Status: DISCONTINUED | OUTPATIENT
Start: 2023-11-28 | End: 2023-12-01 | Stop reason: HOSPADM

## 2023-11-28 RX ORDER — DIPHENHYDRAMINE HYDROCHLORIDE 50 MG/ML
25 INJECTION INTRAMUSCULAR; INTRAVENOUS
Status: ACTIVE | OUTPATIENT
Start: 2023-11-28 | End: 2023-11-28

## 2023-11-28 RX ORDER — METHYLPREDNISOLONE SODIUM SUCCINATE 40 MG/ML
10 INJECTION, POWDER, LYOPHILIZED, FOR SOLUTION INTRAMUSCULAR; INTRAVENOUS
Status: ACTIVE | OUTPATIENT
Start: 2023-11-28 | End: 2023-11-28

## 2023-11-28 RX ORDER — ONDANSETRON 4 MG/1
4 TABLET, FILM COATED ORAL EVERY 8 HOURS PRN
Qty: 20 TABLET | Refills: 0 | Status: SHIPPED | OUTPATIENT
Start: 2023-11-28

## 2023-11-28 RX ADMIN — IRINOTECAN HYDROCHLORIDE 278 MG: 20 INJECTION, SOLUTION INTRAVENOUS at 15:37

## 2023-11-28 RX ADMIN — OXALIPLATIN 120.25 MG: 5 INJECTION, SOLUTION INTRAVENOUS at 13:37

## 2023-11-28 RX ADMIN — FAMOTIDINE 20 MG: 10 INJECTION, SOLUTION INTRAVENOUS at 17:07

## 2023-11-28 RX ADMIN — ATROPINE SULFATE 0.25 MG: 1 INJECTION, SOLUTION INTRAVENOUS at 15:33

## 2023-11-28 RX ADMIN — METHYLPREDNISOLONE SODIUM SUCCINATE 10 MG: 40 INJECTION, POWDER, FOR SOLUTION INTRAMUSCULAR; INTRAVENOUS at 17:05

## 2023-11-28 RX ADMIN — SODIUM CHLORIDE 20 ML/HR: 0.9 INJECTION, SOLUTION INTRAVENOUS at 12:34

## 2023-11-28 RX ADMIN — DEXAMETHASONE SODIUM PHOSPHATE: 10 INJECTION, SOLUTION INTRAMUSCULAR; INTRAVENOUS at 12:34

## 2023-11-28 RX ADMIN — DIPHENHYDRAMINE HYDROCHLORIDE 25 MG: 50 INJECTION, SOLUTION INTRAMUSCULAR; INTRAVENOUS at 17:16

## 2023-11-28 RX ADMIN — DEXTROSE 20 ML/HR: 5 SOLUTION INTRAVENOUS at 13:34

## 2023-11-28 NOTE — PROGRESS NOTES
Clarified note and orders with Jen Alanis RN, patient does NOT get leucovorin of the 5FU push with his plan.

## 2023-11-28 NOTE — TELEPHONE ENCOUNTER
Patient Call    Who are you speaking with? Spouse    If it is not the patient, are they listed on an active communication consent form? N/A   What is the reason for this call? Gayl Najjar is calling because the patient needs his nausea medication filled, I informed Marilynomari Najjar that the medication was sent to the pharmacy today. I also spoke to the pharmacy and they stated that they are working on filling the prescription. Does this require a call back? No   If a call back is required, please list best call back number N/A   If a call back is required, advise that a message will be forwarded to their care team and someone will return their call as soon as possible. Did you relay this information to the patient?  No

## 2023-11-28 NOTE — PATIENT INSTRUCTIONS
Nutrition Rx & Recommendations:  Diet: High calorie, high protein, carbohydrate controlled   Include a variety of foods (as tolerated/allowed by diet). Incorporate physical activity as able/allowed. Stay hydrated by sipping fluids of choice/tolerance throughout the day. Liquid nutrition may be better tolerated than solids at times. Alter food choices and eating patterns to accommodate changing needs. Refer to Eating Hints book for other meal/snack ideas and symptom management. Weigh yourself regularly. If you notice weight loss, make an effort to increase your daily food/calorie intake. If you continue to notice loss after these efforts, reach out to your dietitian to establish a plan to stabilize weight. Caution with high fat/greasy/fried foods, caution with high sugar foods (ex. >10g sugar per serving). Eat slowly and chew food thoroughly     Ways to increase calorie and protein intake should appetite/weight start to decrease: (refer to pages 49-53 of Eating Hints Book for ways to add protein and calories)  Eat when feeling most hungry. Choose foods that are easy to eat: yogurt, oatmeal, eggs, soup, cereal, muffins, granola bars. Keep non perishable snacks nearby (pretzels, crackers, trail mix).   5-6 small meals/snacks daily  Protein at all meals/snacks: eggs (scrambled, hard boiled, pan fried, egg whites), fish (salmon, tuna steal, swordfish, cm, shrimp, cod, angel luis), beans (chickpeas, black beans, lentils), nuts/nut butters, quinoa, peas, oatmeal, seeds (lane, flax, pumpkin), soy milk, cheese, Greek yogurt, cottage cheese, chicken, lean ground beef, lean cuts of beef (loin, round, flank), turkey breast.   Add high calorie foods to meals: cheese, milk, olive oil, avocado, peanut butter, extra sauces/gravies, nuts  Choose liquids with calories: milk, Fairlife milk (higher protein/lactose-free milk), chocolate milk, 100% fruit juice, diluted juice, bone broth (higher protein broth), creamy soups, sports drinks (Gatorade, Poweraide, Pedialyte, etc.), Deuce ice, popsicles, milkshakes, smoothies, oral nutrition supplements (Ensure, Boost, etc.), gelatin/Jello, etc.  Use oral nutrition supplements to make homemade smoothies or milkshakes. Choose oral nutrition supplements with >300 calories per serving.       Follow Up Plan: 1/10/24 during infusion  Recommend Referral to Other Providers: none at this time

## 2023-11-28 NOTE — PROGRESS NOTES
Pt presents for FOLFIRINOX offering no compliants. With 9 minutes left of irinotecan infusion patient started feeling hot, sweaty and having arm spasms. Medication stopped and hypersensitivity protocol initiated. 25mg of benadryl given, 10mg of solumderol and 20mg of pepcid given. Patient had relief with sweating and feeling hot but spasming continued. Reached out to Ashwin Farah MD on call. MD spoke with Dr. Miryam Akhtar who recommended patient go to ER. Patient refusing to go to ER at this time and stated if symptoms worsen at home he will then go but feels well enough to go home. Daughter at bedside also in agreement. Per Dr. Miryam Akhtar do not finish irinotecan infusion and hook up Chemo ball at this time. Chemo ball connected to patient with clamps open. Patient aware of when to return for pump removal. AVS printed. Next appointment reviewed.

## 2023-11-28 NOTE — TELEPHONE ENCOUNTER
Called to inform the pt that the appt for 1/3/24 was scheduled for him to see Dr. Josué Minor for a 4-6 week f/u, call back number was also left if any questions or if appt time does not work

## 2023-11-28 NOTE — PROGRESS NOTES
Outpatient Oncology Nutrition Consultation   Type of Consult: Initial Consult  Care Location: [facility]    Reason for referral: notification by Miko Childress MD on 11/21/23 (reason for referral: Ambulatory referral for T2DM, pancreatic cancer ). Nutrition Assessment:   Oncology Diagnosis & Treatments:   Diagnosed with pancreatic cancer 10/30/2023. Starting chemo (camptosar, oxaliplatin, adrucil) today 11/28/23. Oncology History   Pancreatic adenocarcinoma ([address])   10/30/2023 Biopsy    Endoscopic ultrasound:  Pancreas, Neck mass:  Malignant  Adenocarcinoma.         11/16/2023 -  Cancer Staged    Staging form: Pancreas, AJCC 8th Edition  - Clinical: Stage IB (cT2, cN0, cM0) - Signed by Jorden Medrano MD on 11/16/2023  Total positive nodes: 0       11/28/2023 -  Chemotherapy    alteplase (CATHFLO), 2 mg, Intracatheter, Every 1 Minute as needed, 1 of 12 cycles  irinotecan (CAMPTOSAR) chemo infusion, 150 mg/m2 = 278 mg, Intravenous, Once, 1 of 12 cycles  oxaliplatin (ELOXATIN) chemo infusion, 65 mg/m2 = 120.25 mg, Intravenous, Once, 1 of 12 cycles  fluorouracil (ADRUCIL) ambulatory infusion Soln, 1,200 mg/m2/day = 4,440 mg, Intravenous, Over 46 hours, 1 of 12 cycles       Past Medical & Surgical Hx:   Patient Active Problem List   Diagnosis    Pancreatic adenocarcinoma ([address])    Type 2 diabetes mellitus with hyperosmolarity without coma, without long-term current use of insulin ([address])    Mixed hyperlipidemia    Coronary artery disease involving native coronary artery of native heart without angina pectoris    Gastroesophageal reflux disease    Primary insomnia     Past Medical History:   Diagnosis Date    Cancer ([address])     pancreatic    Coronary artery disease     Diabetes mellitus ([address])     Hyperlipidemia     Hypertension      Past Surgical History:   Procedure Laterality Date    CORONARY ANGIOPLASTY WITH STENT PLACEMENT      ERCP W/ PLASTIC STENT PLACEMENT      HERNIA REPAIR      IR PORT PLACEMENT  11/24/2023 Review of Medications:   Vitamins, Supplements and Herbals: No, pt denies taking supplements    Current Outpatient Medications:     amphetamine-dextroamphetamine (ADDERALL XR) 20 MG 24 hr capsule, Take 20 mg by mouth every morning, Disp: , Rfl:     aspirin 81 MG tablet, 1 tab(s), Disp: , Rfl:     carvedilol (COREG) 6.25 mg tablet, Take 6.25 mg by mouth 2 (two) times a day, Disp: , Rfl:     Continuous Blood Gluc Sensor (FreeStyle Geovanny 3 Sensor) MISC, use as directed TO MONITOR GLUCOSE DAILY, Disp: , Rfl:     desvenlafaxine succinate (PRISTIQ) 50 mg 24 hr tablet, Take 50 mg by mouth daily, Disp: , Rfl:     fluorouracil 4,440 mg in CADD/Elastomeric Infusion Device, Infuse 4,440 mg (1,200 mg/m2/day x 1.85 m2) into a catheter in a vein via infusion device over 46 hours for 2 days  Infusion planned for November 28, 2023., Disp: 1 each, Rfl: 0    Jardiance 25 MG TABS, Take 1 tablet by mouth every morning, Disp: , Rfl:     lisinopril (ZESTRIL) 5 mg tablet, Take 1 tablet by mouth daily, Disp: , Rfl:     metFORMIN (GLUCOPHAGE) 1000 MG tablet, Take 1,000 mg by mouth, Disp: , Rfl:     ondansetron (ZOFRAN) 4 mg tablet, Take 1 tablet (4 mg total) by mouth every 8 (eight) hours as needed for nausea or vomiting, Disp: 20 tablet, Rfl: 0    oxyCODONE (Roxicodone) 5 immediate release tablet, Take 1 tablet (5 mg total) by mouth every 4 (four) hours as needed for moderate pain Max Daily Amount: 30 mg (Patient not taking: Reported on 11/27/2023), Disp: 10 tablet, Rfl: 0    pancrelipase, Lip-Prot-Amyl, (CREON) 12,000 units capsule, Take 12,000 units of lipase by mouth 3 (three) times a day with meals, Disp: 180 capsule, Rfl: 0    rosuvastatin (CRESTOR) 10 MG tablet, Take 1 tablet by mouth daily, Disp: , Rfl:     Reading Morita FlexTouch 100 units/mL injection pen, 40 units before bed time and increase by 02 units every 3rd night As directed by clinic. Max dose 80 units.  Dose adjustment, Disp: , Rfl:     Zolpidem Tartrate (AMBIEN PO), Take by mouth, Disp: , Rfl:   No current facility-administered medications for this visit.     Facility-Administered Medications Ordered in Other Visits:     alteplase (CATHFLO) injection 2 mg, 2 mg, Intracatheter, Q1MIN PRN, Padmini Monzon,     Atropine Sulfate injection 0.25 mg, 0.25 mg, Intravenous, Once, Padmini Monzon,     Atropine Sulfate injection 0.25 mg, 0.25 mg, Intravenous, Once PRN, Cyndie Pastor,     irinotecan (CAMPTOSAR) 278 mg in dextrose 5 % 500 mL chemo infusion, 150 mg/m2 (Treatment Plan Recorded), Intravenous, Once, Cyndie Dawit, DO    oxaliplatin (ELOXATIN) 120.25 mg in dextrose 5 % 250 mL chemo infusion, 65 mg/m2 (Treatment Plan Recorded), Intravenous, Once, Cyndie Dawit, DO, Last Rate: 137 mL/hr at 11/28/23 1337, 120.25 mg at 11/28/23 1337    sodium chloride 0.9 % infusion, 20 mL/hr, Intravenous, Once PRN, Cyndie Pastor, , Last Rate: 20 mL/hr at 11/28/23 1234, 20 mL/hr at 11/28/23 1234    Most Recent Lab Results:   Lab Results   Component Value Date    WBC 15.68 (H) 11/27/2023    NEUTROABS 11.50 (H) 11/27/2023    ALT 17 11/27/2023    AST 13 11/27/2023    ALB 4.4 11/27/2023    SODIUM 141 11/27/2023    SODIUM 140 11/01/2023    K 5.5 (H) 11/27/2023    K 4.2 11/01/2023     11/27/2023    BUN 20 11/27/2023    BUN 18 11/01/2023    CREATININE 0.88 11/27/2023    CREATININE 0.86 11/01/2023    EGFR 94 11/27/2023    POCGLU 154 (H) 11/24/2023    GLUF 210 (H) 11/27/2023    GLUC 146 (H) 11/01/2023    HGBA1C 7.8 (A) 11/21/2023    HGBA1C 8.5 (H) 09/06/2023    HGBA1C 6.6 (H) 06/01/2023    CALCIUM 10.1 11/27/2023       Anthropometric Measurements:   Height: 69"  Ht Readings from Last 1 Encounters:   11/28/23 5' 9.29" (1.76 m)     Wt Readings from Last 20 Encounters:   11/28/23 71.7 kg (158 lb 1.1 oz)   11/27/23 71.7 kg (158 lb)   11/24/23 71.1 kg (156 lb 12 oz)   11/21/23 70.3 kg (155 lb)   11/16/23 69.9 kg (154 lb)   11/08/23 71 kg (156 lb 8 oz)   10/30/23 67.6 kg (149 lb) 03/22/19 93.4 kg (206 lb)   02/14/18 97.1 kg (214 lb)       Weight History:   Usual Weight: 155#  Varian: n/a  Home Scale: none    Oncology Nutrition-Anthropometrics      Flowsheet Row Nutrition from 11/28/2023 in [id number] [facility]   Patient age (years): 62 years   Patient (male) height (in): 71 in   Current weight (lbs): 158.1 lbs   Current weight to be used for anthropometric calculations (kg) 71.9 kg   BMI: 23.3   IBW male 160 lb   IBW (kg) male 72.7 kg   IBW % (male) 98.8 %   Adjusted BW (male): 159.5 lbs   Adjusted BW in kg (male): 72.5 kg   % weight change after 1 week: 2 %   Weight change after 1 week (lbs) 3.1 lbs   % weight change after 1 month: 6.1 %   Weight change after 1 month (lbs) 9.1 lbs            Nutrition-Focused Physical Findings: none observed    Food/Nutrition-Related History & Client/Social History:    Current Nutrition Impact Symptoms:  [] Nausea  [] Reduced Appetite  [] Acid Reflux    [] Vomiting  [] Unintended Wt Loss  [] Malabsorption    [] Diarrhea  [] Unintended Wt Gain  [] Dumping Syndrome    [] Constipation  [] Thick Mucous/Secretions  [] Abdominal Pain    [] Dysgeusia (Altered Taste)  [] Xerostomia (Dry Mouth)  [] Gas    [] Dysosmia (Altered Smell)  [] Thrush  [] Difficulty Chewing    [] Oral Mucositis (Sore Mouth)  [] Fatigue  [x] Hyperglycemia ; fasting BG 210mg/dL on 11/27/23   -was taking insulin but stopped with the start of chemo per instruction from his physician  -still taking metformin and jardiance  Lab Results   Component Value Date    HGBA1C 7.8 (A) 11/21/2023      [] Odynophagia  [] Esophagitis  [] Other: hyperkalemia 11/27/23    [] Dysphagia  [] Early Satiety  [x] No Problems Eating      Food Allergies & Intolerances: no    Current Diet: CHO-Controlled  Current Nutrition Intake: More than usual  Appetite: Good  Nutrition Route: PO  Oral Care: brushes QD  Activity level: Adl, ambulates independently.      24 Hr Diet Recall:   Breakfast: 3 eggs, griffin or ham, 1 slice toast, coffee with sugar   Lunch: [place] salad or sandwich   Dinner: fish or chicken, broccoli, steak fries   Snack: ice cream     Beverages: coffee with sugar (12oz x1), Low sugar gatorade- G2 (12oz x2)  Supplements:   None      Oncology Nutrition-Estimated Needs      Flowsheet Row Nutrition from 2023 in [id number] [facility],[address]   Weight type used Actual weight   Weight in kilograms (kg) used for estimated needs 71.9 kg   Energy needs formula:  30-35 kcal/kg   Energy needs based on 30 kcal/k kcal   Energy needs based on 35 kcal/k kcal   Protein needs formula: 1.2-1.5 g/kg   Protein needs based on 1.2 g/k g   Protein needs based on 1.5 g/kg 108 g   Fluid needs formula: 30-35 mL/kg   Fluid needs based on 30 mL/kg 2154 mL   Fluid needs in ounces 73 oz   Fluid needs based on 35 mL/kg 2513 mL   Fluid needs in ounces 85 oz             Discussion & Intervention:   Ember Ash was evaluated today for an initial RD consultation regarding  diet guidance through pancreatic cancer treatment . Ember Ash is currently undergoing tx for pancreatic cancer . Today is his first chemotherapy. At this time he does not have any difficulty eating. HE does have T2DM, and he is mindful of his carbohydrate/sugar choices. Reviewed 24 hour recall, which revealed an adequate po intake, and discussed ways to optimize nutrient intake. Also reviewed the importance of wt management throughout the tx process and the role of a high kcal/ high protein diet and carbohydrate controlled diet in managing wt and overall health. Also discussed the importance of diet leniency should appetite/weight decrease throughout the treatment process.   Based on today's assessment, discussion included: MNT for: weight management, pancreatic cancer, T2DM, a high kcal/protein diet & food choices to include at all meals & snacks (Examples of high kcal foods: cheese, full-fat dairy products, nut butter, plant-based fats, coconut oil/milk, avocado, butter, cream soup, etc. Examples of high protein foods: eggs, chicken, fish, beans/legumes, nuts/nut butters, bone broth, etc.) , spreading carbohydrate intake throughout the day & counting carbohydrates for adequate glycemic control, fortifying foods for added kcal and protein (examples include: adding cheese to foods such as eggs, mashed potatoes, casseroles, etc.; Making oatmeal with whole milk rather than water; Making fortified mashed potatoes with cream, butter, dry milk powder, plain [place] yogurt, and cheese.), adequate hydration & fluid choices, sipping on calorie containing beverages (examples include: adding whole milk or cream to coffee, oral nutrition supplements, juice, electrolyte replacement beverages, milk, etc.), eating smaller more frequent meals every 2-3 hours (5-6 small meals/day), having consistent and planned snacks between meals, and adding extra fat to foods while cooking such as butter, plant-based oil, coconut oil/milk, avocado, nut butters, etc..   Moving forward, Lina Lemons will make efforts to continue to eat well, be mindful of carbohydrate choices, and increase hydration. Patient would like to follow up in January. Materials Provided: [facility] Eating Hints book and PCAN Diet and Nutrition booklet, Nutrition During Cancer Treatment   All questions and concerns addressed during today’s visit. Lina Lemons has RD contact information. Nutrition Diagnosis:   Increased Nutrient Needs (kcal & pro) related to increased demand for nutrients and disease state as evidenced by cancer dx and pt undergoing tx for cancer.   Altered Nutrition-Related Lab Values related to endocrine dysfunction as evidenced by hyperglycemia   Monitoring & Evaluation:   Goals:  weight maintenance/stabilization  adequate nutrition impact symptom management  pt to meet >/=75% estimated nutrition needs daily  adequate glycemic control  increase fluid intake    Progress Towards Goals: Initiated    Nutrition Rx & Recommendations:  Diet: High calorie, high protein, carbohydrate controlled   Include a variety of foods (as tolerated/allowed by diet). Incorporate physical activity as able/allowed. Stay hydrated by sipping fluids of choice/tolerance throughout the day. Liquid nutrition may be better tolerated than solids at times. Alter food choices and eating patterns to accommodate changing needs. Refer to Eating Hints book for other meal/snack ideas and symptom management. Weigh yourself regularly. If you notice weight loss, make an effort to increase your daily food/calorie intake. If you continue to notice loss after these efforts, reach out to your dietitian to establish a plan to stabilize weight. Caution with high fat/greasy/fried foods, caution with high sugar foods (ex. >10g sugar per serving). Eat slowly and chew food thoroughly     Ways to increase calorie and protein intake should appetite/weight start to decrease: (refer to pages 49-53 of Eating Hints Book for ways to add protein and calories)  Eat when feeling most hungry. Choose foods that are easy to eat: yogurt, oatmeal, eggs, soup, cereal, muffins, granola bars. Keep non perishable snacks nearby (pretzels, crackers, trail mix).   5-6 small meals/snacks daily  Protein at all meals/snacks: eggs (scrambled, hard boiled, pan fried, egg whites), fish (salmon, tuna steal, swordfish, cm, shrimp, cod, angel luis), beans (chickpeas, black beans, lentils), nuts/nut butters, quinoa, peas, oatmeal, seeds (lane, flax, pumpkin), soy milk, cheese, Greek yogurt, cottage cheese, chicken, lean ground beef, lean cuts of beef (loin, round, flank), turkey breast.   Add high calorie foods to meals: cheese, milk, olive oil, avocado, peanut butter, extra sauces/gravies, nuts  Choose liquids with calories: milk, Fairlife milk (higher protein/lactose-free milk), chocolate milk, 100% fruit juice, diluted juice, bone broth (higher protein broth), creamy soups, sports drinks (Gatorade, Poweraide, Pedialyte, etc.), Deuce ice, popsicles, milkshakes, smoothies, oral nutrition supplements (Ensure, Boost, etc.), gelatin/Jello, etc.  Use oral nutrition supplements to make homemade smoothies or milkshakes. Choose oral nutrition supplements with >300 calories per serving.       Follow Up Plan:  1/10/24 during infusion  Recommend Referral to Other Providers: none at this time

## 2023-11-29 ENCOUNTER — PATIENT OUTREACH (OUTPATIENT)
Dept: CASE MANAGEMENT | Facility: HOSPITAL | Age: 58
End: 2023-11-29

## 2023-11-29 NOTE — PROGRESS NOTES
Biopsychosocial and Barriers Assessment    Cancer Diagnosis: pancreatic, stage IB  Home/Cell Phone: [phone]  Emergency Contact: wife Miller Ashley  Marital Status:   Interpretation concerns, speaks another language, preferred language: speaks [place]  Cultural concerns: none noted  Ability to read or write: independent    Caregiver/Support: wife, 3 young adult children  Children: 3 sons, one daughter  Child/Elder care: NA    Housing: lives local to [facility] [address]   Lives With: wife, two adult children  Daily Living Activities: independent  Durable Medical Equipment: none  Ambulation: independent    Preferred Pharmacy:   High co-pays with insurance: see note  High co-pays with medication coverage: NA  No medication coverage: NA    Primary Care Provider: Dr. Ansley Knight  Hx of [address] [facility] [address]: none  Hx of Short term rehab: none  Mental Health Hx: none  Substance Abuse Hx: none  Employment: works full time in IT   Status/Location: served 11 years with [employer] to pay bills: not a concern  POA/LW/AD: wife  Transportation Plan/Concerns: pt drives, family will assist as needed. What do you know about your Cancer Diagnosis    What has your doctor told you about your cancer diagnosis:    What has your doctor told you about your cancer treatment:    What specific concerns do you have about your diagnosis and treatment:    Have you been made aware of any hair loss associated with treatment:    Additional Comments:      MSW s/w pt by phone this morning, he returned my call shortly after I LM for him. Pt tells me that his first day in the infusion center yesterday overall went well, he did share that he had a chemo reaction but otherwise says that things went well. He was very complimentary of the nurses on the unit before and during his reaction. Pt tells me that he is realistic about his disease and prognosis, and mentioned joining a message board community online for pancreatic cancer patients. He says that he is using his humor to help cope. He shared that he is not concerned about having treatment, he does have some concerns about suffering when passing away and more emotional concerns about his family and how they will manage without him. I did talk to him about palliative care and their role with cancer patients, and reassured him that he will have support throughout the entire journey, no matter how long, and suffering is something we all want to prevent from happening. Pt talked about being a control freak and wanting to have plans in place around his passing, and has looked into lodges in [place] for assistance with this. He was clear that he wants to see his treatment through and see how he responds, and is not suicidal in any way, but is more researching options for when that time comes. Pt says that he has very strong support from his family, including his wife and 3 young adult children. 2 of his children are still living at home. He feels that his sons are not coping well with this news at all and says that his daughter is hiding it very well and trying to be strong for him. He says that they are having open and honest conversations and they are all aware of his prognosis. I offered information on support groups for all of them as well and he is interested in this, I will send this information over via email today. Pt is working full time and plans to continue to do so as long as he can, he works in IT. He shared his 6 year service with the [employer] as well. He denies any practical needs like transportation, insurance concerns, finances, etc.  I do see an outstanding balance on his account and will include the [address] application in my email to him as well. Pt was very appreciative of the call and the time spent talking today, I reassured him that there are no off topic subjects and he is able to speak openly with me, which he said he appreciated.   I encouraged him to reach out as needed moving forward and I will check in with him periodically if I do not hear from him first.  He denies any other needs at this time, MSW will remain available to him as needed moving forward. Statement Selected

## 2023-11-30 ENCOUNTER — HOSPITAL ENCOUNTER (OUTPATIENT)
Dept: INFUSION CENTER | Facility: CLINIC | Age: 58
Discharge: HOME | End: 2023-11-30

## 2023-11-30 DIAGNOSIS — C25.9 PANCREATIC ADENOCARCINOMA (HCC): Primary | ICD-10-CM

## 2023-11-30 NOTE — PROGRESS NOTES
Pt to clinic for cadd d/c. Chemo ball appears to be empty and deflated,ran for 46+ hours. Pt offers no complaints today. Tolerated infusion without complications. Pt aware of next appointment. Pt flushed and de-accessed with positive blood returned. AVS was offered, pt declined. Pt ambulated out of clinic safely.

## 2023-12-04 ENCOUNTER — TELEPHONE (OUTPATIENT)
Dept: HEMATOLOGY ONCOLOGY | Facility: CLINIC | Age: 58
End: 2023-12-04

## 2023-12-04 ENCOUNTER — HOSPITAL ENCOUNTER (OUTPATIENT)
Dept: INFUSION CENTER | Facility: CLINIC | Age: 58
Discharge: HOME | End: 2023-12-04
Payer: COMMERCIAL

## 2023-12-04 VITALS
HEART RATE: 103 BPM | RESPIRATION RATE: 18 BRPM | DIASTOLIC BLOOD PRESSURE: 73 MMHG | TEMPERATURE: 97.5 F | SYSTOLIC BLOOD PRESSURE: 114 MMHG

## 2023-12-04 DIAGNOSIS — E86.0 DEHYDRATION: Primary | ICD-10-CM

## 2023-12-04 DIAGNOSIS — C25.9 MALIGNANT NEOPLASM OF PANCREAS, UNSPECIFIED LOCATION OF MALIGNANCY (HCC): ICD-10-CM

## 2023-12-04 DIAGNOSIS — C25.9 PANCREATIC ADENOCARCINOMA (HCC): ICD-10-CM

## 2023-12-04 LAB
ALBUMIN SERPL BCP-MCNC: 3.9 G/DL (ref 3.5–5)
ALP SERPL-CCNC: 89 U/L (ref 34–104)
ALT SERPL W P-5'-P-CCNC: 10 U/L (ref 7–52)
ANION GAP SERPL CALCULATED.3IONS-SCNC: 7 MMOL/L
AST SERPL W P-5'-P-CCNC: 9 U/L (ref 13–39)
BASOPHILS # BLD AUTO: 0.07 THOUSANDS/ÂΜL (ref 0–0.1)
BASOPHILS NFR BLD AUTO: 1 % (ref 0–1)
BILIRUB SERPL-MCNC: 0.78 MG/DL (ref 0.2–1)
BUN SERPL-MCNC: 17 MG/DL (ref 5–25)
CALCIUM SERPL-MCNC: 9.1 MG/DL (ref 8.4–10.2)
CARIS GENOMIC LOH - EXOME: NORMAL
CARIS MISMATCH REPAIR STATUS: NORMAL
CARIS MLH1: NORMAL
CARIS MSH2: NORMAL
CARIS MSH6: NORMAL
CARIS MSI - EXOME: NORMAL
CARIS PD-L1 (SP142): NEGATIVE
CARIS PMS2: NORMAL
CARIS TMB - EXOME: NORMAL
CHLORIDE SERPL-SCNC: 103 MMOL/L (ref 96–108)
CO2 SERPL-SCNC: 25 MMOL/L (ref 21–32)
CREAT SERPL-MCNC: 0.73 MG/DL (ref 0.6–1.3)
EOSINOPHIL # BLD AUTO: 0.22 THOUSAND/ÂΜL (ref 0–0.61)
EOSINOPHIL NFR BLD AUTO: 2 % (ref 0–6)
ERYTHROCYTE [DISTWIDTH] IN BLOOD BY AUTOMATED COUNT: 13.3 % (ref 11.6–15.1)
GFR SERPL CREATININE-BSD FRML MDRD: 102 ML/MIN/1.73SQ M
GLUCOSE SERPL-MCNC: 323 MG/DL (ref 65–140)
HCT VFR BLD AUTO: 51.9 % (ref 36.5–49.3)
HGB BLD-MCNC: 17.9 G/DL (ref 12–17)
IMM GRANULOCYTES # BLD AUTO: 0.09 THOUSAND/UL (ref 0–0.2)
IMM GRANULOCYTES NFR BLD AUTO: 1 % (ref 0–2)
LYMPHOCYTES # BLD AUTO: 2.32 THOUSANDS/ÂΜL (ref 0.6–4.47)
LYMPHOCYTES NFR BLD AUTO: 22 % (ref 14–44)
MCH RBC QN AUTO: 32.2 PG (ref 26.8–34.3)
MCHC RBC AUTO-ENTMCNC: 34.5 G/DL (ref 31.4–37.4)
MCV RBC AUTO: 93 FL (ref 82–98)
MONOCYTES # BLD AUTO: 0.59 THOUSAND/ÂΜL (ref 0.17–1.22)
MONOCYTES NFR BLD AUTO: 6 % (ref 4–12)
NEUTROPHILS # BLD AUTO: 7.43 THOUSANDS/ÂΜL (ref 1.85–7.62)
NEUTS SEG NFR BLD AUTO: 68 % (ref 43–75)
NRBC BLD AUTO-RTO: 0 /100 WBCS
PLATELET # BLD AUTO: 329 THOUSANDS/UL (ref 149–390)
PMV BLD AUTO: 9.4 FL (ref 8.9–12.7)
POTASSIUM SERPL-SCNC: 3.8 MMOL/L (ref 3.5–5.3)
PROT SERPL-MCNC: 6.7 G/DL (ref 6.4–8.4)
RBC # BLD AUTO: 5.56 MILLION/UL (ref 3.88–5.62)
SODIUM SERPL-SCNC: 135 MMOL/L (ref 135–147)
WBC # BLD AUTO: 10.72 THOUSAND/UL (ref 4.31–10.16)

## 2023-12-04 PROCEDURE — 80053 COMPREHEN METABOLIC PANEL: CPT | Performed by: INTERNAL MEDICINE

## 2023-12-04 PROCEDURE — 96360 HYDRATION IV INFUSION INIT: CPT

## 2023-12-04 PROCEDURE — 85025 COMPLETE CBC W/AUTO DIFF WBC: CPT | Performed by: INTERNAL MEDICINE

## 2023-12-04 PROCEDURE — 87040 BLOOD CULTURE FOR BACTERIA: CPT | Performed by: INTERNAL MEDICINE

## 2023-12-04 RX ADMIN — SODIUM CHLORIDE 1000 ML: 0.9 INJECTION, SOLUTION INTRAVENOUS at 15:26

## 2023-12-04 NOTE — TELEPHONE ENCOUNTER
Called patient wife back, patient wife informed that she was mistaken and that he would be coming in for the treatment today.  Expressed understanding and confirmed patient appt for 12/6 as well with Dr. Tricia Winter

## 2023-12-04 NOTE — TELEPHONE ENCOUNTER
Patient informed of the appt for the hydration that is scheduled for today at 2:30, patient voiced understanding

## 2023-12-04 NOTE — TELEPHONE ENCOUNTER
Patient Call    Who are you speaking with? Spouse    If it is not the patient, are they listed on an active communication consent form? Yes   What is the reason for this call? Patients wife said he is dehydrated and is wanting a treatment done ASAP. He's been sick the last few days and sleeping too much. Does this require a call back? Yes   If a call back is required, please list best call back number [phone]   If a call back is required, advise that a message will be forwarded to their care team and someone will return their call as soon as possible. Did you relay this information to the patient?  Yes

## 2023-12-04 NOTE — TELEPHONE ENCOUNTER
Returned call to patient's wife. She is concerned patient is dehydrated and needs nutritional support. He met with the registered dietician but the patient does not remember what he was told. She states pt is not eating, drinking and sleeping all of the time. Advised will reach out to dietician regarding nutritional needs, and discuss symptoms and hydration with Dr. Bety Haq and call them back.

## 2023-12-04 NOTE — TELEPHONE ENCOUNTER
Patient scheduled for 1L NSS today at [facility] infusion at 1430. Pt aware of appt.   Dr. Marmolejo Client added CBC, CMP and blood cultures to be drawn today

## 2023-12-04 NOTE — PROGRESS NOTES
Pt presents for IV fluids reporting diarrhea, N/V, denies any s/s of infection or illness. Dania Francisco RN made aware and reporting Dr. Neeraj Duran wants blood cultures to be drawn. CBC, CMP and blood cultures drawn peripherally. Pt tolerated fluids well. AVS declined, next appointment reviewed.

## 2023-12-04 NOTE — TELEPHONE ENCOUNTER
Patient Call    Who are you speaking with? Spouse    If it is not the patient, are they listed on an active communication consent form? Yes   What is the reason for this call? She wants pt to be set up for hydration tomorrow   Does this require a call back? Yes   If a call back is required, please list best call back number [phone]   If a call back is required, advise that a message will be forwarded to their care team and someone will return their call as soon as possible. Did you relay this information to the patient?  Yes

## 2023-12-06 ENCOUNTER — TELEPHONE (OUTPATIENT)
Dept: HEMATOLOGY ONCOLOGY | Facility: CLINIC | Age: 58
End: 2023-12-06

## 2023-12-06 ENCOUNTER — OFFICE VISIT (OUTPATIENT)
Dept: HEMATOLOGY ONCOLOGY | Facility: CLINIC | Age: 58
End: 2023-12-06
Payer: COMMERCIAL

## 2023-12-06 VITALS
WEIGHT: 156 LBS | HEIGHT: 69 IN | TEMPERATURE: 98.2 F | RESPIRATION RATE: 16 BRPM | BODY MASS INDEX: 23.11 KG/M2 | HEART RATE: 78 BPM | OXYGEN SATURATION: 99 % | DIASTOLIC BLOOD PRESSURE: 74 MMHG | SYSTOLIC BLOOD PRESSURE: 120 MMHG

## 2023-12-06 DIAGNOSIS — C25.9 MALIGNANT NEOPLASM OF PANCREAS, UNSPECIFIED LOCATION OF MALIGNANCY (HCC): Primary | ICD-10-CM

## 2023-12-06 DIAGNOSIS — C25.9 PANCREATIC ADENOCARCINOMA (HCC): Primary | ICD-10-CM

## 2023-12-06 PROCEDURE — 99215 OFFICE O/P EST HI 40 MIN: CPT | Performed by: INTERNAL MEDICINE

## 2023-12-06 RX ORDER — OMEPRAZOLE 40 MG/1
CAPSULE, DELAYED RELEASE ORAL
COMMUNITY
Start: 2023-11-24

## 2023-12-06 RX ORDER — FAMOTIDINE 40 MG/1
40 TABLET, FILM COATED ORAL
COMMUNITY
Start: 2023-11-24

## 2023-12-06 NOTE — PROGRESS NOTES
[address] [facility]ATOLOG[facility] [address] [facility]  1965      PRIMARY HEMATOLOGIC/ONCOLOGIC DIAGNOSIS:  Adenocarcinoma of the pancreas. CA 19-9 of 932 at time of diagnosis. PATHOLOGY:   Final Diagnosis   A.B. Pancreas, Neck mass (Cell Block and smear Preparations): Malignant  Adenocarcinoma. Satisfactory for evaluation. Note: As reported in the [facility] Reporting System for Pancreaticobiliary Cytopathology *” the diagnostic category of “malignant” carries a % risk of malignancy being found in subsequent FNAB or resection. The usual management following an initial diagnosis of “malignant” is per clinical stage. Ultimately clinical and radiologic correlation is needed for this patient in arriving at the actual management plan. STAGING: Cancer Staging   No matching staging information was found for the patient. Oncology History   Pancreatic adenocarcinoma ([address])   10/30/2023 Biopsy    Endoscopic ultrasound:  Pancreas, Neck mass:  Malignant  Adenocarcinoma. 11/16/2023 -  Cancer Staged    Staging form: Pancreas, AJCC 8th Edition  - Clinical: Stage IB (cT2, cN0, cM0) - Signed by Marcia Lunsford MD on 11/16/2023  Total positive nodes: 0       11/28/2023 -  Chemotherapy    alteplase (CATHFLO), 2 mg, Intracatheter, Every 1 Minute as needed, 1 of 12 cycles  irinotecan (CAMPTOSAR) chemo infusion, 150 mg/m2 = 278 mg, Intravenous, Once, 1 of 12 cycles  Administration: 278 mg (11/28/2023)  oxaliplatin (ELOXATIN) chemo infusion, 65 mg/m2 = 120.25 mg, Intravenous, Once, 1 of 12 cycles  Administration: 120.25 mg (11/28/2023)  fluorouracil (ADRUCIL) ambulatory infusion Soln, 1,200 mg/m2/day = 4,440 mg, Intravenous, Over 46 hours, 1 of 12 cycles       HISTORY OF PRESENT ILLNESS:  Tony Ponce is a 61yo male who presents for the evaluation and management of newly diagnosed pancreatic cancer.  The patient presented to the ED on 10/23/23 with new onset jaundice. He reported ayse colored stools and left sided discomfort. CT A/P showed ill-defined hypoattenuating mass within the neck of the pancreas with subsequent dilatation and obstruction of the biliary tree and pancreatic duct. Highly likely to represent pancreatic neoplasm. Partially encasing the main portal vein at the confluence with splenic vein and SMV with approximately 50% narrowing of the caliber at this level and dilatation of the SMV. No evidence for distant metastatic disease was evident. ERCP was performed 10/30/23. The common bile duct was deeply cannulated after 1 attempt using a traction sphincterotome with 260 cm x 0.035" guidewire. Extrinsic and severe stricture was visualized in the mid common bile duct. The stricture was traversable by wire. Dilation was observed in the proximal common bile duct  Medium-sized biliary sphincterotomy was performed using a sphincterotome. One 8 mm x 6 cm fully covered metal stent was placed in the common bile duct. EUS was also performed. The pancreas appeared atrophic. The pancreatic parenchyma was visualized in the body of the pancreas and tail of the pancreas. The parenchyma had hyperechoic foci. The pancreatic duct measured 3 mm at the tail. The duct in the body and tail of the pancreas was dilated. Irregular mass measuring 32 mm x 24 mm with well-defined margins was visualized in the neck of the pancreas. Apparent abutment of the portal vein and splenic vein; 4 successful fine needle biopsy passes were taken with a 25 gauge needle using a transduodenal approach guided by Doppler, sample found to be adequate and sent sample for histology analysis. Small lymph nodes were seen in the area adjacent to the tumor. The proximal bile duct was dilated. The mid bile duct was strictured. The bile duct measured 16 mm at the distal end and 4 mm at the proximal end.  The parenchyma of the liver appeared normal. The hepatic ducts appeared normal.The gallbladder appeared distended. The gallbladder contained biliary sludge. Normal celiac axis. No lymphadenopathy was seen. Pathology was c/w malignant adenocarcinoma. INTERIM HISTORY:  The patient presents for a follow-up after undergoing C1 of treatment. With 9 minutes left of irinotecan infusion patient started feeling hot, sweaty and having arm spasms. He reports having similar symptoms when he gets dehydrated.      PAST MEDICAL,PAST SURGICAL, FAMILY AND SOCIAL HISTORY:    Patient Active Problem List   Diagnosis    Pancreatic adenocarcinoma (HCC)    Type 2 diabetes mellitus with hyperosmolarity without coma, without long-term current use of insulin (HCC)    Mixed hyperlipidemia    Coronary artery disease involving native coronary artery of native heart without angina pectoris    Gastroesophageal reflux disease    Primary insomnia    Dehydration     Past Medical History:   Diagnosis Date    Cancer ([address])     pancreatic    Coronary artery disease     Diabetes mellitus ([address])     Hyperlipidemia     Hypertension      Past Surgical History:   Procedure Laterality Date    CORONARY ANGIOPLASTY WITH STENT PLACEMENT      ERCP W/ PLASTIC STENT PLACEMENT      HERNIA REPAIR      IR PORT PLACEMENT  11/24/2023     Family History   Problem Relation Age of Onset    Heart attack Father     Skin cancer Father      Social History     Socioeconomic History    Marital status: /Civil Union     Spouse name: Not on file    Number of children: Not on file    Years of education: Not on file    Highest education level: Not on file   Occupational History    Not on file   Tobacco Use    Smoking status: Every Day     Packs/day: 1.50     Years: 35.00     Total pack years: 52.50     Types: Cigarettes    Smokeless tobacco: Not on file   Vaping Use    Vaping Use: Never used   Substance and Sexual Activity    Alcohol use: Never    Drug use: Never    Sexual activity: Yes     Partners: Female   Other Topics Concern Not on file   Social History Narrative    Not on file     Social Determinants of Health     Financial Resource Strain: Not on file   Food Insecurity: Not on file   Transportation Needs: Not on file   Physical Activity: Not on file   Stress: Not on file   Social Connections: Not on file   Intimate Partner Violence: Not on file   Housing Stability: Not on file       Current Outpatient Medications:     amphetamine-dextroamphetamine (ADDERALL XR) 20 MG 24 hr capsule, Take 20 mg by mouth every morning, Disp: , Rfl:     aspirin 81 MG tablet, 1 tab(s), Disp: , Rfl:     carvedilol (COREG) 6.25 mg tablet, Take 6.25 mg by mouth 2 (two) times a day, Disp: , Rfl:     Continuous Blood Gluc Sensor (FreeStyle Geovanny 3 Sensor) MISC, use as directed TO MONITOR GLUCOSE DAILY, Disp: , Rfl:     desvenlafaxine succinate (PRISTIQ) 50 mg 24 hr tablet, Take 50 mg by mouth daily, Disp: , Rfl:     famotidine (PEPCID) 40 MG tablet, Take 40 mg by mouth daily at bedtime, Disp: , Rfl:     Jardiance 25 MG TABS, Take 1 tablet by mouth every morning, Disp: , Rfl:     lisinopril (ZESTRIL) 5 mg tablet, Take 1 tablet by mouth daily, Disp: , Rfl:     metFORMIN (GLUCOPHAGE) 1000 MG tablet, Take 1,000 mg by mouth, Disp: , Rfl:     omeprazole (PriLOSEC) 40 MG capsule, take 1 capsule by mouth every morning 1 hour before FIRST MEAL OF THE DAY, Disp: , Rfl:     ondansetron (ZOFRAN) 4 mg tablet, Take 1 tablet (4 mg total) by mouth every 8 (eight) hours as needed for nausea or vomiting, Disp: 20 tablet, Rfl: 0    ondansetron (ZOFRAN) 8 mg tablet, Take 1 tablet (8 mg total) by mouth every 8 (eight) hours as needed for nausea or vomiting for up to 20 days, Disp: 20 tablet, Rfl: 2    oxyCODONE (Roxicodone) 5 immediate release tablet, Take 1 tablet (5 mg total) by mouth every 4 (four) hours as needed for moderate pain Max Daily Amount: 30 mg, Disp: 10 tablet, Rfl: 0    pancrelipase, Lip-Prot-Amyl, (CREON) 12,000 units capsule, Take 12,000 units of lipase by mouth 3 (three) times a day with meals, Disp: 180 capsule, Rfl: 0    rosuvastatin (CRESTOR) 10 MG tablet, Take 1 tablet by mouth daily, Disp: , Rfl:     Seretha Joses FlexTouch 100 units/mL injection pen, 40 units before bed time and increase by 02 units every 3rd night As directed by clinic. Max dose 80 units. Dose adjustment, Disp: , Rfl:     Zolpidem Tartrate (AMBIEN PO), Take by mouth, Disp: , Rfl:   No current facility-administered medications for this visit. Facility-Administered Medications Ordered in Other Visits:     alteplase (CATHFLO) injection 2 mg, 2 mg, Intracatheter, Q1MIN PRN, Sammy Rosales MD  No Known Allergies  Vitals:    12/06/23 1434   BP: 120/74   Pulse: 78   Resp: 16   Temp: 98.2 °F (36.8 °C)   SpO2: 99%       ROS:  CONSTITUTIONAL:  No fever. No chills. No dizziness. No weakness. EYES:  No pain, erythema, or discharge. No blurring of vision. ENT:  No sore throat, URI symptoms. No epistaxis. No tinnitus. CARDIOVASCULAR:  No chest pain. No palpitations. No lower extremity edema. RESPIRATORY:  No shortness of breath, cough, pain with respiration, pleuritic chest pain. No hemoptysis. No dyspnea. No paroxysmal nocturnal dyspnea. GASTROINTESTINAL:  Normal appetite. No nausea, vomiting, diarrhea. No pain. No bloating. No melena. GENITOURINARY:  No frequency, urgency, nocturia. No hematuria or dysuria. MUSCULOSKELETAL:  No arthralgias or myalgias. INTEGUMENTARY:  No swelling. No bruising. No contusions. No abrasions. No lymphangitis. NEUROLOGIC:  No headache. No neck pain. No numbness or tingling of the extremities. No weakness. PSYCHIATRIC:  No confusion. ENDOCRINE:  No fatigue. No weakness. No history of thyroid, diabetes or adrenal problems. HEMATOLOGICAL:  No bleeding. No petechiae. No bruising.     PHYSICAL EXAM:    GENERAL: AAO x 3  HEENT: AT,NC  CVS: S1S2 RRR  LUNGS: CTA b/l  ABD: NT,ND, +BS  EXTR: no edema  NEURO: CN II-XII grossly intact    LABS:  I have reviewed pertinent labs:  CBC:   Lab Results   Component Value Date    WBC 10.72 (H) 12/04/2023    RBC 5.56 12/04/2023    HGB 17.9 (H) 12/04/2023    HCT 51.9 (H) 12/04/2023    MCV 93 12/04/2023     12/04/2023    MCH 32.2 12/04/2023    MCHC 34.5 12/04/2023    RDW 13.3 12/04/2023    MPV 9.4 12/04/2023    NEUTROABS 7.43 12/04/2023     CMP:   Lab Results   Component Value Date    SODIUM 135 12/04/2023    K 3.8 12/04/2023     12/04/2023    CO2 25 12/04/2023    AGAP 7 12/04/2023    BUN 17 12/04/2023    CREATININE 0.73 12/04/2023    GLUC 323 (H) 12/04/2023    GLUF 210 (H) 11/27/2023    CALCIUM 9.1 12/04/2023    AST 9 (L) 12/04/2023    ALT 10 12/04/2023    ALKPHOS 89 12/04/2023    TP 6.7 12/04/2023    ALB 3.9 12/04/2023    TBILI 0.78 12/04/2023    EGFR 102 12/04/2023     Liver Enzymes:   Lab Results   Component Value Date    AST 9 (L) 12/04/2023    ALT 10 12/04/2023    ALKPHOS 89 12/04/2023    TP 6.7 12/04/2023    ALB 3.9 12/04/2023    TBILI 0.78 12/04/2023     Vitamin B12 No results found for: "[id number]"  Iron Study No results found for: "RETIC", "RETICCTPCT", "FERRITIN", "CONCFE", "TIBC", "PRIMIDONE", "FOLATEHEMO", "IRON"  Folate No results found for: "FOLATE"  Magnesium No results found for: "MG"  Phosphorus No results found for: "PHOS"  Coagulation Panel No results found for: "DDIMER", "PROTIME", "INR", "PTT"    IMAGING:  FL ERCP biliary and pancreatic    Result Date: 11/1/2023  Narrative: ERCP INDICATION: K86.9: Disease of pancreas, unspecified. COMPARISON: October 23, 2023 IMAGES:  5 FLUOROSCOPY TIME:   62.3 seconds CONTRAST: 4 mL of iohexol (OMNIPAQUE) FINDINGS: Fluoroscopic guidance was provided for performance of ERCP. BILIARY: Limited contrast opacified static submitted images were obtained as part of fluoroscopic guidance. Stricture of distal common duct. Enlargement of proximal common duct demonstrated. Final image depicts the presence of a metallic distal common bile duct stent.  PANCREATIC:  None of the submitted static fluoroscopic images demonstrate contrast opacification of the pancreatic duct. Impression: Fluoroscopic guidance for ERCP. Please see procedure report for full details. Workstation performed: [id number]     ERCP    Result Date: 10/30/2023  Narrative: Table formatting from the original result was not included. [address] [place] [address] Endoscopy [address] [facility] [address] [place] [address] DATE OF SERVICE: 10/30/23 PHYSICIAN(S): Attending: Uche Bowden MD Fellow: Zaria Melton DO INDICATION: Biliary obstruction POST-OP DIAGNOSIS: See the impression below. PREPROCEDURE: Informed consent was obtained for the procedure, including sedation. Risks of perforation, hemorrhage, adverse drug reaction and aspiration were discussed. The patient was placed in the left lateral decubitus position. Patient was explained about the risks and benefits of the procedure. Risks including but not limited to bleeding, infection, and perforation were explained in detail. Also explained about less than 100% sensitivity with the exam and other alternatives. PROCEDURE: ERCP DETAILS OF PROCEDURE: Patient was taken to the procedure room where a time out was performed to confirm correct patient and correct procedure. The patient underwent general anesthesia, which was administered by an anesthesia professional. The patient's blood pressure, heart rate, level of consciousness, respirations, oxygen, ECG and ETCO2 were monitored throughout the procedure. The scope was advanced to the duodenum. Clinical intention was achieved. The patient experienced no blood loss. The procedure was not difficult. The patient tolerated the procedure well. There were no apparent adverse events.  ANESTHESIA INFORMATION: ASA: II Anesthesia Type: General MEDICATIONS: indomethacin (INDOCIN) rectal suppository 100 mg 100 mg (Totals for administrations occurring from 1356 to 1534 on 10/30/23) FINDINGS: The common bile duct was deeply cannulated after 1 attempt using a traction sphincterotome with 260 cm x 0.035" guidewire. Cannulation was not difficult. Contrast was injected Extrinsic and severe stricture was visualized in the mid common bile duct. The stricture was traversable by wire Dilation was observed in the proximal common bile duct Medium-sized biliary sphincterotomy was performed using a sphincterotome. No bleeding was noted at the procedure site. One 8 mm x 6 cm fully covered metal stent was placed in the common bile duct. There was good bile and contrast drainage seen. The fluoroscopy images for the , cholangiogram and post procedure were personally reviewed and interpreted during the procedure. SPECIMENS: ID Type Source Tests Collected by Time Destination 1 : pancreatic neck mass FNA Pancreas NON-GYNECOLOGIC CYTOLOGY, FINE NEEDLE ASPIRATION Donna Leonardo MD 10/30/2023 1419       Impression: Extrinsic and severe stricture was visualized in the mid common bile duct with upstream dilation Biliary sphincterotomy was performed. One 8 mm x 6 cm fully covered stent was placed in the common bile duct RECOMMENDATION: Follow labs in one week. Follow up with oncology. Follow up with biopsies. Donna Leonardo MD     Endoscopic ultrasonography, GI (Upper)    Result Date: 10/30/2023  Narrative: Table formatting from the original result was not included. [address] [place] [address] Endoscopy [address] [place] [address] [phone] DATE OF SERVICE: 10/30/23 PHYSICIAN(S): Attending: Donna Leonardo MD Fellow: Samira Albarado DO INDICATION: Pancreatic lesion POST-OP DIAGNOSIS: See the impression below. PREPROCEDURE: Informed consent was obtained for the procedure, including sedation. Risks of perforation, hemorrhage, adverse drug reaction and aspiration were discussed. The patient was placed in the left lateral decubitus position. Patient was explained about the risks and benefits of the procedure.  Risks including but not limited to bleeding, infection, and perforation were explained in detail. Also explained about less than 100% sensitivity with the exam and other alternatives. PROCEDURE: EUS UPPER DETAILS OF PROCEDURE: Patient was taken to the procedure room where a time out was performed to confirm correct patient and correct procedure. The patient underwent monitored anesthesia care, which was administered by an anesthesia professional. The patient's blood pressure, heart rate, oxygen, respirations, level of consciousness and ECG were monitored throughout the procedure. The endoscope was advanced to the duodenum. The patient experienced no blood loss. The procedure was not difficult. The patient tolerated the procedure well. There were no apparent adverse events. ANESTHESIA INFORMATION: ASA: II Anesthesia Type: General MEDICATIONS: indomethacin (INDOCIN) rectal suppository 100 mg 100 mg (Totals for administrations occurring from 1356 to 1534 on 10/30/23) FINDINGS: Limited endoscopic evaluation using a side-viewing endoscope was unremarkable. The pancreas appeared atrophic. The pancreatic parenchyma was visualized in the body of the pancreas and tail of the pancreas. . The parenchyma had hyperechoic foci. The pancreatic duct measured 3 mm at the tail. The duct in the body and tail of the pancreas was dilated. Irregular mass measuring 32 mm x 24 mm with well-defined margins was visualized in the neck of the pancreas. Apparent abutment of the portal vein and splenic vein; 4 successful fine needle biopsy passes were taken with a 25 gauge needle using a transduodenal approach guided by Doppler, sample found to be adequate and sent sample for histology analysis. Small lymph nodes were seen in the area adjacent to the tumor. The proximal bile duct was dilated. The mid bile duct was strictured. The bile duct measured 16 mm at the distal end and 4 mm at the proximal end.  The parenchyma of the liver appeared normal. The hepatic ducts appeared normal. The gallbladder appeared distended. The gallbladder contained biliary sludge. Normal celiac axis. No lymphadenopathy was seen. SPECIMENS: ID Type Source Tests Collected by Time Destination 1 : pancreatic neck mass FNA Pancreas NON-GYNECOLOGIC CYTOLOGY, FINE NEEDLE ASPIRATION Melissa Mcknight MD 10/30/2023 1419       Impression: The pancreas appeared atrophic. The duct was normal in the head, not seen in the neck and dilated in the body and tail of the pancreas. Irregular mass measuring 32 mm x 24 mm with well-defined margins was visualized in the neck of the pancreas. Apparent abutment of the portal vein and splenic vein (confluence); 4 fine needle biopsy passes were taken, sample found to be adequate and sent sample for histology analysis The proximal bile duct was dilated. The mid bile duct was strictured. RECOMMENDATION:  Await pathology results ERCP today. CT chest Follow up with oncology. Melissa Mcknight MD     CT abdomen pelvis with contrast    Result Date: 10/23/2023  Narrative: CT ABDOMEN AND PELVIS WITH IV CONTRAST INDICATION:   jaundice, ruq tenderness. COMPARISON:  None. TECHNIQUE:  CT examination of the abdomen and pelvis was performed. Multiplanar 2D reformatted images were created from the source data. This examination, like all CT scans performed in the [facility], was performed utilizing techniques to minimize radiation dose exposure, including the use of iterative reconstruction and automated exposure control. Radiation dose length product (DLP) for this visit:  678 mGy-cm IV Contrast:  100 mL of iohexol (OMNIPAQUE) Enteric Contrast:  Enteric contrast was not administered. FINDINGS: ABDOMEN LOWER CHEST:  No clinically significant abnormality identified in the visualized lower chest. LIVER/BILIARY TREE: Moderate intra and extrahepatic biliary ductal dilatation. GALLBLADDER: Abnormally distended. Mild thickened wall. No calcified stones. SPLEEN:  Unremarkable.  PANCREAS: There is a lesion suspicious for pancreatic cancer, which will be described based on Society of Abdominal Radiologists and American Pancreatic Association recommendations: MORPHOLOGIC EVALUATION: Appearance: Hypoattenuating. Size: Ill-defined margins, approximately 3.0 cm. Location: Tumor involves the pancreatic neck, straddling the plane of the SMV. Pancreatic duct narrowing/abrupt cut off with or without upstream dilatation: Moderate pancreatic ductal dilatation with abrupt cut off at the level of the mass. Pancreatic duct measuring up to 8 mm. Biliary tree abrupt cut off with or without upstream dilatation: Abrupt cut off of the common bile duct as it enters the pancreatic head, caliber dilated up to 19 mm. ARTERIAL EVALUATION: Superior Mesenteric Artery Involvement: Presence: The mass directly abuts less than 180 degrees of the SMA at its confluence with the length of vein/portal vein. Celiac Axis Involvement: Presence: Absent. Common Hepatic Artery Involvement: Presence: Present. Degree of solid soft-tissue contact: Traverses along the margin of the mass but remains patent. Less than 180 degrees. Degree of increased hazy attenuation/stranding contact: </= 180 degrees. Focal vessel narrowing or contour irregularity: Absent. Extension to celiac axis: Absent. Extension to the bifurcation of right/left hepatic artery: Absent. Arterial Variant: Type: There are no arterial variants. VENOUS EVALUATION: Main Portal Vein Involvement: Present. Degree of solid soft-tissue contact: REPORT AS LESS THAN OR EQUAL  DEGREES OR GREATER THAN 180 DEGREES. Degree of increased hazy attenuation/stranding contact: REPORT AS LESS THAN OR EQUAL  DEGREES OR GREATER THAN 180 DEGREES. Focal vessel narrowing or contour irregularity (tethering or teardrop): Absent. Superior Mesenteric Vein Involvement: Present. Degree of solid soft-tissue contact: </= 180 degrees. Focal vessel narrowing or contour irregularity (tethering or teardrop): Present.  Extension to first draining vein: Absent. Thrombus within vein: Absent. Venous collaterals: Increased caliber of the splenic vein and SMV. But without significant varix formation. FOR EVALUATION FOR POTENTIAL EXTRAPANCREATIC TUMOR, PLEASE SEE THE REST OF THE BODY OF THIS REPORT. ADRENAL GLANDS: Right adrenal within normal limits. 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 A smaller few additional nodules are also noted with similar features. These do not have an  appearance suggesting metastatic disease. KIDNEYS/URETERS: A few small bilateral renal cortical cysts are noted. No suspicious findings. STOMACH AND BOWEL: There is colonic diverticulosis without evidence of acute diverticulitis. APPENDIX:  No findings to suggest appendicitis. ABDOMINOPELVIC CAVITY:  No ascites. No pneumoperitoneum. A few shotty subcentimeter lymph nodes in the efren hepatis and gastrohepatic region remaining subcentimeter. . VESSELS: Atherosclerotic changes are present. No evidence of aneurysm. PELVIS REPRODUCTIVE ORGANS:  Unremarkable for patient's age. URINARY BLADDER:  Unremarkable. ABDOMINAL WALL/INGUINAL REGIONS: Lower abdominal wall hernia mesh repair noted. OSSEOUS STRUCTURES:  No acute fracture or destructive osseous lesion. Impression: Ill-defined hypoattenuating mass within the neck of the pancreas with subsequent dilatation and obstruction of the biliary tree and pancreatic duct. Highly likely to represent pancreatic neoplasm. See above for further details. Partially encasing the main portal vein at the confluence with splenic vein and SMV with approximately 50% narrowing of the caliber at this level and dilatation of the SMV. No evidence for distant metastatic disease evident. Several small nodules within the left adrenal with features suggesting benign etiology as described above. The study was marked in [facility] for immediate notification.  Workstation performed: [id number]     I reviewed the above laboratory and imaging data. ASSESSMENT/PLAN:  Adenocarcinoma of the pancreas. CA 19-9 of 932 at time of diagnosis. PET/CT--no evidence of metastatic disease. The patient followed-up with surgical oncology. Path was sent for [facility] NGS-- KRAS Exon 2, SMAD4 Exon 11. On neoadjuvant chemotherapy--mFOLFIRINOX. S/p C1 D1 on 11/28/23. With 9 minutes left of irinotecan infusion the patient started feeling hot, sweaty and having arm spasms. Medication stopped and hypersensitivity protocol initiated. 25mg of benadryl given, 10mg of solumderol and 20mg of pepcid given. Will dose reduce irinotecan to 125mg/m2. Will add pre-medication with benadryl and pepcid.

## 2023-12-06 NOTE — TELEPHONE ENCOUNTER
----- Message from Jesus Mccabe MD sent at 12/6/2023  3:00 PM EST -----  Irinotecan dose to be changed to 125mg/m2 from 150mg/m2 for C2 only

## 2023-12-07 ENCOUNTER — TELEMEDICINE (OUTPATIENT)
Dept: DIABETES SERVICES | Facility: HOSPITAL | Age: 58
End: 2023-12-07
Payer: COMMERCIAL

## 2023-12-07 VITALS — HEIGHT: 69 IN | WEIGHT: 156 LBS | BODY MASS INDEX: 23.11 KG/M2

## 2023-12-07 DIAGNOSIS — E11.00 TYPE 2 DIABETES MELLITUS WITH HYPEROSMOLARITY WITHOUT COMA, WITHOUT LONG-TERM CURRENT USE OF INSULIN (HCC): Primary | ICD-10-CM

## 2023-12-07 PROCEDURE — 97802 MEDICAL NUTRITION INDIV IN: CPT | Performed by: DIETITIAN, REGISTERED

## 2023-12-07 NOTE — PROGRESS NOTES
Medical Nutrition Therapy     Assessment    Visit Type: Initial visit  Chief complaint:  Type 2 Diabetes     HPI: Met with Destini Pierre for initial medical nutrition therapy. States diabetes type 2 for a number of years. Currently starting chemotherapy for pancreatic cancer. He has been noticing that his blood sugars have been running higher. I was able to link to his Carrington Reyes during our visit. Previous A1c was 7.8%. Based on his download, it would estimate an A1c closer to 9% at this point. He is high the majority of the time and he gets fluctuations of blood sugars. He was wondering if mealtime insulin might help so that he can be able to correct when sugars are high. He would like to connect with an endocrinologist, I gave him our department's phone number. He is currently doing chemotherapy for pancreatic cancer and surgery will be coming in a few months. Discussed his diabetes will likely be more challenging to manage after surgery, connecting with an endocrinologist might be helpful for him. Food recall shows primary issues: Seems to mostly be looking at sugar on foods rather than carbohydrates. He eats a bowl of raisin bran multiple times a day, discussed this is the highest carbohydrate cereal there is and is likely one of the major contributors to his elevated blood sugars. He will shift off of reason brand and start doing a smaller bowl of Cheerios instead. Discussed the benefit of consistent carbohydrate intake throughout the day, especially if set insulin doses come in to play. Set a goal of about 45 carbs per meal, with a minimum of 30. Carbs at his meals and snacks in between being lower carb and more protein based. Together we discussed what foods contain CHO, reading a food label, serving sizes, and set a carb goal of 30-45g CHO/meal to promote weight loss with 15g snacks. Put together sample meals for Derick's reference and evaluated Derick's current eating plan.  Good understanding, Destini Pierre will call with questions or for more education. Follow up as needed if not improving. Ht Readings from Last 1 Encounters:   12/07/23 5' 9" (1.753 m)     Wt Readings from Last 3 Encounters:   12/07/23 70.8 kg (156 lb)   12/06/23 70.8 kg (156 lb)   11/28/23 71.7 kg (158 lb 1.1 oz)        Body mass index is 23.04 kg/m². Lab Results   Component Value Date    HGBA1C 7.8 (A) 11/21/2023    HGBA1C 8.5 (H) 09/06/2023    HGBA1C 6.6 (H) 06/01/2023       No results found for: "CHOL"  No results found for: "HDL"  No results found for: "LDLCALC"  No results found for: "TRIG"  No results found for: "CHOLHDL"    Weight Change: No    Medical Diagnosis/ICD 10 Code:  E11.00    Barriers to Learning: no barriers    Do you follow any special diet presently?: No  Who shops: patient and spouse  Who cooks: patient and spouse    Food Log: Completed via the method of food recall- lives with wife and grown, works from home, desk job     Breakfast: up around 6:30am, breakfast by 7am: small bowl of raisin bran with milk, coffee 1 Tbsp sugar. Morning Snack: 9am: 2 eggs, griffin, 1 toast.   Lunch:11am: sandwich with peaches and cream or salad with dressing and chicken. Afternoon Snack: bran cereal if needed. Dinner:4:30-5pm: meat starch veggies  Evening Snack:edin cup right after dinner. Bed 10, before bed sandwich or cereal or something to keep sugar up. Likes some fruits. Beverages: soda used to he his big issue, now on G2. Water not much, coffee 3-4 cups a day all day.  Rare tea, no milk except cereal, juice not much   Eating out/Take out:once a month   Exercise: ADL, going to be taking time off of work, plans to going to the pool soon    Nutrition Diagnosis:  Food and nutrition related knowledge deficit  related to Lack of prior exposure to accurate nutrition related information as evidenced by [employer] inaccurate or incomplete information    Intervention: plate method, label reading, behavior modification strategies, carbohydrate counting, meal planning, individualized meal plan, and monitoring portion control     Treatment Goals: Patient understands education and recommendations    Education Material Given  Barrett Ta was provided the Portion Booklet and Planning Healthy Meals     Monitoring and evaluation:    Term code indicator  FH 1.6.3 Carbohydrate Intake Criteria: 30-45g CHO per meal, 15g CHO snacks    Patient’s Response to Instruction:  Abundio Carmina  Expected Compliancegood    Thank you for coming to the [address] [place] [address] for education today. Please feel free to call with any questions or concerns. Naima Borja RD  [address] [phone]        Virtual Regular Visit    Verification of patient location:    Patient is located at Home in the following state in which I hold an active license [place]      Assessment/Plan:    Problem List Items Addressed This Visit          Endocrine    Type 2 diabetes mellitus with hyperosmolarity without coma, without long-term current use of insulin ([address])            Reason for visit is   Chief Complaint   Patient presents with    Diabetes Type 2    Virtual Regular Visit          Encounter provider Naima Borja RD    Provider located at [address] [place] [phone]      Recent Visits  No visits were found meeting these conditions. Showing recent visits within past 7 days and meeting all other requirements  Today's Visits  Date Type Provider Dept   12/07/23 Telemedicine Naima Borja RD [facility] Noreen Fu today's visits and meeting all other requirements  Future Appointments  No visits were found meeting these conditions. Showing future appointments within next 150 days and meeting all other requirements       The patient was identified by name and date of birth.  Tony Ponce was informed that this is a telemedicine visit and that the visit is being conducted through the [facility]. He agrees to proceed. .  My office door was closed. No one else was in the room. He acknowledged consent and understanding of privacy and security of the video platform. The patient has agreed to participate and understands they can discontinue the visit at any time. Patient is aware this is a billable service. Ada Riddle is a 62 y.o. male see notes above .       HPI     Past Medical History:   Diagnosis Date    Cancer ([address])     pancreatic    Coronary artery disease     Diabetes mellitus ([address])     Hyperlipidemia     Hypertension        Past Surgical History:   Procedure Laterality Date    CORONARY ANGIOPLASTY WITH STENT PLACEMENT      ERCP W/ PLASTIC STENT PLACEMENT      HERNIA REPAIR      IR PORT PLACEMENT  11/24/2023       Current Outpatient Medications   Medication Sig Dispense Refill    Continuous Blood Gluc Sensor (Easy Pairings Geovanny 3 Sensor) MISC use as directed TO MONITOR GLUCOSE DAILY      Jardiance 25 MG TABS Take 1 tablet by mouth every morning      metFORMIN (GLUCOPHAGE) 1000 MG tablet Take 1,000 mg by mouth Twice daily      amphetamine-dextroamphetamine (ADDERALL XR) 20 MG 24 hr capsule Take 20 mg by mouth every morning      aspirin 81 MG tablet 1 tab(s)      carvedilol (COREG) 6.25 mg tablet Take 6.25 mg by mouth 2 (two) times a day      desvenlafaxine succinate (PRISTIQ) 50 mg 24 hr tablet Take 50 mg by mouth daily      famotidine (PEPCID) 40 MG tablet Take 40 mg by mouth daily at bedtime      [START ON 12/12/2023] fluorouracil 4,440 mg in CADD/Elastomeric Infusion Device Infuse 4,440 mg (1,200 mg/m2/day x 1.85 m2) into a catheter in a vein via infusion device over 46 hours for 2 days  Infusion planned for December 12, 2023. 1 each 0    lisinopril (ZESTRIL) 5 mg tablet Take 1 tablet by mouth daily      omeprazole (PriLOSEC) 40 MG capsule take 1 capsule by mouth every morning 1 hour before FIRST MEAL OF THE DAY ondansetron (ZOFRAN) 4 mg tablet Take 1 tablet (4 mg total) by mouth every 8 (eight) hours as needed for nausea or vomiting 20 tablet 0    ondansetron (ZOFRAN) 8 mg tablet Take 1 tablet (8 mg total) by mouth every 8 (eight) hours as needed for nausea or vomiting for up to 20 days 20 tablet 2    oxyCODONE (Roxicodone) 5 immediate release tablet Take 1 tablet (5 mg total) by mouth every 4 (four) hours as needed for moderate pain Max Daily Amount: 30 mg 10 tablet 0    pancrelipase, Lip-Prot-Amyl, (CREON) 12,000 units capsule Take 12,000 units of lipase by mouth 3 (three) times a day with meals 180 capsule 0    rosuvastatin (CRESTOR) 10 MG tablet Take 1 tablet by mouth daily      Tresiba FlexTouch 100 units/mL injection pen 43u      Zolpidem Tartrate (AMBIEN PO) Take by mouth       No current facility-administered medications for this visit.         No Known Allergies    Review of Systems    Video Exam    Vitals:    12/07/23 1534   Weight: 70.8 kg (156 lb)   Height: 5' 9" (1.753 m)       Physical Exam     Visit Time  Total Visit Duration: 60min

## 2023-12-09 LAB
BACTERIA BLD CULT: NORMAL
BACTERIA BLD CULT: NORMAL

## 2023-12-11 ENCOUNTER — APPOINTMENT (OUTPATIENT)
Dept: LAB | Facility: HOSPITAL | Age: 58
End: 2023-12-11
Attending: INTERNAL MEDICINE
Payer: COMMERCIAL

## 2023-12-11 DIAGNOSIS — C25.9 MALIGNANT NEOPLASM OF PANCREAS, UNSPECIFIED LOCATION OF MALIGNANCY (HCC): ICD-10-CM

## 2023-12-11 DIAGNOSIS — C25.9 PANCREATIC ADENOCARCINOMA (HCC): ICD-10-CM

## 2023-12-11 LAB
ALBUMIN SERPL BCP-MCNC: 4.1 G/DL (ref 3.5–5)
ALP SERPL-CCNC: 100 U/L (ref 34–104)
ALT SERPL W P-5'-P-CCNC: 13 U/L (ref 7–52)
ANION GAP SERPL CALCULATED.3IONS-SCNC: 8 MMOL/L
AST SERPL W P-5'-P-CCNC: 12 U/L (ref 13–39)
BASOPHILS # BLD AUTO: 0.06 THOUSANDS/ÂΜL (ref 0–0.1)
BASOPHILS NFR BLD AUTO: 1 % (ref 0–1)
BILIRUB SERPL-MCNC: 0.72 MG/DL (ref 0.2–1)
BUN SERPL-MCNC: 24 MG/DL (ref 5–25)
CALCIUM SERPL-MCNC: 9.8 MG/DL (ref 8.4–10.2)
CHLORIDE SERPL-SCNC: 100 MMOL/L (ref 96–108)
CO2 SERPL-SCNC: 28 MMOL/L (ref 21–32)
CREAT SERPL-MCNC: 0.95 MG/DL (ref 0.6–1.3)
EOSINOPHIL # BLD AUTO: 0.25 THOUSAND/ÂΜL (ref 0–0.61)
EOSINOPHIL NFR BLD AUTO: 2 % (ref 0–6)
ERYTHROCYTE [DISTWIDTH] IN BLOOD BY AUTOMATED COUNT: 13.6 % (ref 11.6–15.1)
GFR SERPL CREATININE-BSD FRML MDRD: 87 ML/MIN/1.73SQ M
GLUCOSE SERPL-MCNC: 302 MG/DL (ref 65–140)
HCT VFR BLD AUTO: 53.7 % (ref 36.5–49.3)
HGB BLD-MCNC: 18.7 G/DL (ref 12–17)
IMM GRANULOCYTES # BLD AUTO: 0.05 THOUSAND/UL (ref 0–0.2)
IMM GRANULOCYTES NFR BLD AUTO: 0 % (ref 0–2)
LYMPHOCYTES # BLD AUTO: 2.56 THOUSANDS/ÂΜL (ref 0.6–4.47)
LYMPHOCYTES NFR BLD AUTO: 22 % (ref 14–44)
MCH RBC QN AUTO: 33.1 PG (ref 26.8–34.3)
MCHC RBC AUTO-ENTMCNC: 34.8 G/DL (ref 31.4–37.4)
MCV RBC AUTO: 95 FL (ref 82–98)
MONOCYTES # BLD AUTO: 0.83 THOUSAND/ÂΜL (ref 0.17–1.22)
MONOCYTES NFR BLD AUTO: 7 % (ref 4–12)
NEUTROPHILS # BLD AUTO: 7.93 THOUSANDS/ÂΜL (ref 1.85–7.62)
NEUTS SEG NFR BLD AUTO: 68 % (ref 43–75)
NRBC BLD AUTO-RTO: 0 /100 WBCS
PLATELET # BLD AUTO: 362 THOUSANDS/UL (ref 149–390)
PMV BLD AUTO: 9.3 FL (ref 8.9–12.7)
POTASSIUM SERPL-SCNC: 4.8 MMOL/L (ref 3.5–5.3)
PROT SERPL-MCNC: 7.6 G/DL (ref 6.4–8.4)
RBC # BLD AUTO: 5.65 MILLION/UL (ref 3.88–5.62)
SODIUM SERPL-SCNC: 136 MMOL/L (ref 135–147)
WBC # BLD AUTO: 11.68 THOUSAND/UL (ref 4.31–10.16)

## 2023-12-11 PROCEDURE — 80053 COMPREHEN METABOLIC PANEL: CPT

## 2023-12-11 PROCEDURE — 36415 COLL VENOUS BLD VENIPUNCTURE: CPT

## 2023-12-11 PROCEDURE — 85025 COMPLETE CBC W/AUTO DIFF WBC: CPT

## 2023-12-11 RX ORDER — ATROPINE SULFATE 1 MG/ML
0.25 INJECTION, SOLUTION INTRAVENOUS ONCE AS NEEDED
Status: CANCELLED | OUTPATIENT
Start: 2023-12-12

## 2023-12-11 RX ORDER — SODIUM CHLORIDE 9 MG/ML
20 INJECTION, SOLUTION INTRAVENOUS ONCE AS NEEDED
Status: CANCELLED | OUTPATIENT
Start: 2023-12-12

## 2023-12-11 RX ORDER — ATROPINE SULFATE 1 MG/ML
0.25 INJECTION, SOLUTION INTRAVENOUS ONCE
Status: CANCELLED | OUTPATIENT
Start: 2023-12-12

## 2023-12-11 RX ORDER — DEXTROSE MONOHYDRATE 50 MG/ML
20 INJECTION, SOLUTION INTRAVENOUS ONCE
Status: CANCELLED | OUTPATIENT
Start: 2023-12-12

## 2023-12-12 ENCOUNTER — TELEPHONE (OUTPATIENT)
Dept: HEMATOLOGY ONCOLOGY | Facility: CLINIC | Age: 58
End: 2023-12-12

## 2023-12-12 ENCOUNTER — HOSPITAL ENCOUNTER (OUTPATIENT)
Dept: INFUSION CENTER | Facility: CLINIC | Age: 58
Discharge: HOME | End: 2023-12-12
Payer: COMMERCIAL

## 2023-12-12 VITALS
RESPIRATION RATE: 18 BRPM | HEIGHT: 69 IN | TEMPERATURE: 97.2 F | WEIGHT: 157 LBS | DIASTOLIC BLOOD PRESSURE: 68 MMHG | BODY MASS INDEX: 23.25 KG/M2 | SYSTOLIC BLOOD PRESSURE: 114 MMHG | HEART RATE: 83 BPM

## 2023-12-12 DIAGNOSIS — C25.9 PANCREATIC ADENOCARCINOMA (HCC): Primary | ICD-10-CM

## 2023-12-12 PROCEDURE — 96415 CHEMO IV INFUSION ADDL HR: CPT

## 2023-12-12 PROCEDURE — 96375 TX/PRO/DX INJ NEW DRUG ADDON: CPT

## 2023-12-12 PROCEDURE — 96367 TX/PROPH/DG ADDL SEQ IV INF: CPT

## 2023-12-12 PROCEDURE — 96417 CHEMO IV INFUS EACH ADDL SEQ: CPT

## 2023-12-12 PROCEDURE — G0498 CHEMO EXTEND IV INFUS W/PUMP: HCPCS

## 2023-12-12 PROCEDURE — 96413 CHEMO IV INFUSION 1 HR: CPT

## 2023-12-12 RX ORDER — ATROPINE SULFATE 1 MG/ML
0.25 INJECTION, SOLUTION INTRAVENOUS ONCE
Status: COMPLETED | OUTPATIENT
Start: 2023-12-12 | End: 2023-12-12

## 2023-12-12 RX ORDER — SODIUM CHLORIDE 9 MG/ML
20 INJECTION, SOLUTION INTRAVENOUS ONCE AS NEEDED
Status: DISCONTINUED | OUTPATIENT
Start: 2023-12-12 | End: 2023-12-15 | Stop reason: HOSPADM

## 2023-12-12 RX ORDER — ATROPINE SULFATE 1 MG/ML
0.25 INJECTION, SOLUTION INTRAVENOUS ONCE AS NEEDED
Status: DISCONTINUED | OUTPATIENT
Start: 2023-12-12 | End: 2023-12-15 | Stop reason: HOSPADM

## 2023-12-12 RX ORDER — DEXTROSE MONOHYDRATE 50 MG/ML
20 INJECTION, SOLUTION INTRAVENOUS ONCE
Status: COMPLETED | OUTPATIENT
Start: 2023-12-12 | End: 2023-12-12

## 2023-12-12 RX ADMIN — ATROPINE SULFATE 0.25 MG: 1 INJECTION, SOLUTION INTRAVENOUS at 16:10

## 2023-12-12 RX ADMIN — DIPHENHYDRAMINE HYDROCHLORIDE 25 MG: 50 INJECTION, SOLUTION INTRAMUSCULAR; INTRAVENOUS at 13:05

## 2023-12-12 RX ADMIN — DEXTROSE 20 ML/HR: 5 SOLUTION INTRAVENOUS at 12:38

## 2023-12-12 RX ADMIN — IRINOTECAN HYDROCHLORIDE 231 MG: 20 INJECTION, SOLUTION INTRAVENOUS at 16:10

## 2023-12-12 RX ADMIN — OXALIPLATIN 120.25 MG: 5 INJECTION, SOLUTION INTRAVENOUS at 14:06

## 2023-12-12 RX ADMIN — DEXAMETHASONE SODIUM PHOSPHATE: 10 INJECTION, SOLUTION INTRAMUSCULAR; INTRAVENOUS at 12:38

## 2023-12-12 RX ADMIN — FAMOTIDINE 20 MG: 10 INJECTION, SOLUTION INTRAVENOUS at 13:28

## 2023-12-12 NOTE — PROGRESS NOTES
Pt finished the remainder of his infusion without issue. Pump connected to port with all clamps open. Pt left clinic in stable condition.

## 2023-12-12 NOTE — TELEPHONE ENCOUNTER
Per provder: The patient is on Omeprazole and Pepcid. Can we find out what he actually takes and how. Attempted to call patient, no answer. I left a detailed voicemail inquiring if patient is taking one or both medications and how often. I left my direct number for patient to return phone call at his earliest convenience.

## 2023-12-12 NOTE — PROGRESS NOTES
Pt to clinic for chemotherapy. Pt offers no complaints today, denies abx use and denies s/s of infection. Tolerating infusion this far without any complications. AVS given to pt. Pt aware next appt is 12/14/23 for cadd disconnect. Anticipated time will be given by Socrates Aguillon. Report given to Socrates Aguillon.

## 2023-12-14 ENCOUNTER — TELEPHONE (OUTPATIENT)
Dept: HEMATOLOGY ONCOLOGY | Facility: CLINIC | Age: 58
End: 2023-12-14

## 2023-12-14 ENCOUNTER — HOSPITAL ENCOUNTER (OUTPATIENT)
Dept: INFUSION CENTER | Facility: CLINIC | Age: 58
Discharge: HOME | End: 2023-12-14

## 2023-12-14 DIAGNOSIS — C25.9 PANCREATIC ADENOCARCINOMA (HCC): Primary | ICD-10-CM

## 2023-12-14 DIAGNOSIS — E86.0 DEHYDRATION: Primary | ICD-10-CM

## 2023-12-14 NOTE — PROGRESS NOTES
"Pt presents for CADD pump disconnection. Pt reporting abdominal pressure/discomfort and diarrhea/loose stools. Pt also reported having an episode of feeling dizzy at home, resolved by laying on the floor, pt reported \"BP dropped to 90/60\". Pt reported symptoms have improved, he is feeling better at this time. Requesting hydration tomorrow on d/c day for future cycles. Justina Patrick RN made aware and reported pt ok to have 1L NSS tomorrow and will discuss plan of care with Dr. Dumas. Pump deflated upon assessment. Port flushed and de accessed without difficulty. AVS declined, next appointment 12/15 1:30pm.     "

## 2023-12-14 NOTE — TELEPHONE ENCOUNTER
Received call from infusion RN. Patient feeling dehydrated and requesting hydration for tomorrow and possibly on disconnect day for future cycles. 1L NSS hydration ordered for tomorrow. Infusion RN to call patient with appt time for tomorrow.

## 2023-12-15 ENCOUNTER — HOSPITAL ENCOUNTER (OUTPATIENT)
Dept: INFUSION CENTER | Facility: CLINIC | Age: 58
Discharge: HOME | End: 2023-12-15

## 2023-12-15 ENCOUNTER — APPOINTMENT (EMERGENCY)
Dept: CT IMAGING | Facility: HOSPITAL | Age: 58
End: 2023-12-15
Payer: COMMERCIAL

## 2023-12-15 ENCOUNTER — APPOINTMENT (OUTPATIENT)
Dept: ULTRASOUND IMAGING | Facility: HOSPITAL | Age: 58
End: 2023-12-15
Payer: COMMERCIAL

## 2023-12-15 ENCOUNTER — HOSPITAL ENCOUNTER (OUTPATIENT)
Facility: HOSPITAL | Age: 58
Setting detail: OBSERVATION
Discharge: LEFT BEFORE DISCHARGE | End: 2023-12-15
Attending: EMERGENCY MEDICINE | Admitting: STUDENT IN AN ORGANIZED HEALTH CARE EDUCATION/TRAINING PROGRAM
Payer: COMMERCIAL

## 2023-12-15 ENCOUNTER — TELEPHONE (OUTPATIENT)
Dept: INFUSION CENTER | Facility: CLINIC | Age: 58
End: 2023-12-15

## 2023-12-15 VITALS
OXYGEN SATURATION: 95 % | HEART RATE: 71 BPM | RESPIRATION RATE: 17 BRPM | DIASTOLIC BLOOD PRESSURE: 84 MMHG | TEMPERATURE: 98 F | SYSTOLIC BLOOD PRESSURE: 131 MMHG

## 2023-12-15 DIAGNOSIS — R93.89 ABNORMAL CT SCAN: ICD-10-CM

## 2023-12-15 DIAGNOSIS — E11.00 TYPE 2 DIABETES MELLITUS WITH HYPEROSMOLARITY WITHOUT COMA, WITHOUT LONG-TERM CURRENT USE OF INSULIN (HCC): ICD-10-CM

## 2023-12-15 DIAGNOSIS — C25.9 PANCREATIC ADENOCARCINOMA (HCC): ICD-10-CM

## 2023-12-15 DIAGNOSIS — R10.11 RIGHT UPPER QUADRANT PAIN: Primary | ICD-10-CM

## 2023-12-15 PROBLEM — F41.8 DEPRESSION WITH ANXIETY: Status: ACTIVE | Noted: 2017-12-11

## 2023-12-15 PROBLEM — F90.2 ATTENTION DEFICIT HYPERACTIVITY DISORDER (ADHD), COMBINED TYPE: Status: ACTIVE | Noted: 2019-10-14

## 2023-12-15 PROBLEM — R65.10 SIRS (SYSTEMIC INFLAMMATORY RESPONSE SYNDROME) (HCC): Status: ACTIVE | Noted: 2023-12-15

## 2023-12-15 PROBLEM — C25.3: Status: ACTIVE | Noted: 2023-12-15

## 2023-12-15 LAB
2HR DELTA HS TROPONIN: 0 NG/L
4HR DELTA HS TROPONIN: 0 NG/L
ALBUMIN SERPL BCP-MCNC: 4.1 G/DL (ref 3.5–5)
ALP SERPL-CCNC: 90 U/L (ref 34–104)
ALT SERPL W P-5'-P-CCNC: 13 U/L (ref 7–52)
ANION GAP SERPL CALCULATED.3IONS-SCNC: 6 MMOL/L
AST SERPL W P-5'-P-CCNC: 13 U/L (ref 13–39)
ATRIAL RATE: 87 BPM
BACTERIA UR QL AUTO: ABNORMAL /HPF
BASOPHILS # BLD AUTO: 0.05 THOUSANDS/ÂΜL (ref 0–0.1)
BASOPHILS NFR BLD AUTO: 0 % (ref 0–1)
BILIRUB SERPL-MCNC: 0.73 MG/DL (ref 0.2–1)
BILIRUB UR QL STRIP: NEGATIVE
BUN SERPL-MCNC: 16 MG/DL (ref 5–25)
CALCIUM SERPL-MCNC: 9.7 MG/DL (ref 8.4–10.2)
CARDIAC TROPONIN I PNL SERPL HS: 3 NG/L
CHLORIDE SERPL-SCNC: 102 MMOL/L (ref 96–108)
CLARITY UR: CLEAR
CO2 SERPL-SCNC: 28 MMOL/L (ref 21–32)
COLOR UR: ABNORMAL
CREAT SERPL-MCNC: 0.76 MG/DL (ref 0.6–1.3)
EOSINOPHIL # BLD AUTO: 0.3 THOUSAND/ÂΜL (ref 0–0.61)
EOSINOPHIL NFR BLD AUTO: 2 % (ref 0–6)
ERYTHROCYTE [DISTWIDTH] IN BLOOD BY AUTOMATED COUNT: 13.1 % (ref 11.6–15.1)
GFR SERPL CREATININE-BSD FRML MDRD: 100 ML/MIN/1.73SQ M
GLUCOSE SERPL-MCNC: 100 MG/DL (ref 65–140)
GLUCOSE SERPL-MCNC: 134 MG/DL (ref 65–140)
GLUCOSE SERPL-MCNC: 79 MG/DL (ref 65–140)
GLUCOSE UR STRIP-MCNC: ABNORMAL MG/DL
HCT VFR BLD AUTO: 52.1 % (ref 36.5–49.3)
HGB BLD-MCNC: 17.8 G/DL (ref 12–17)
HGB UR QL STRIP.AUTO: ABNORMAL
IMM GRANULOCYTES # BLD AUTO: 0.05 THOUSAND/UL (ref 0–0.2)
IMM GRANULOCYTES NFR BLD AUTO: 0 % (ref 0–2)
KETONES UR STRIP-MCNC: NEGATIVE MG/DL
LEUKOCYTE ESTERASE UR QL STRIP: ABNORMAL
LIPASE SERPL-CCNC: 80 U/L (ref 11–82)
LYMPHOCYTES # BLD AUTO: 2.98 THOUSANDS/ÂΜL (ref 0.6–4.47)
LYMPHOCYTES NFR BLD AUTO: 20 % (ref 14–44)
MCH RBC QN AUTO: 32.1 PG (ref 26.8–34.3)
MCHC RBC AUTO-ENTMCNC: 34.2 G/DL (ref 31.4–37.4)
MCV RBC AUTO: 94 FL (ref 82–98)
MISCELLANEOUS LAB TEST RESULT: NORMAL
MONOCYTES # BLD AUTO: 0.8 THOUSAND/ÂΜL (ref 0.17–1.22)
MONOCYTES NFR BLD AUTO: 5 % (ref 4–12)
NEUTROPHILS # BLD AUTO: 10.55 THOUSANDS/ÂΜL (ref 1.85–7.62)
NEUTS SEG NFR BLD AUTO: 73 % (ref 43–75)
NITRITE UR QL STRIP: NEGATIVE
NON-SQ EPI CELLS URNS QL MICRO: ABNORMAL /HPF
NRBC BLD AUTO-RTO: 0 /100 WBCS
P AXIS: 70 DEGREES
PH UR STRIP.AUTO: 6 [PH]
PLATELET # BLD AUTO: 326 THOUSANDS/UL (ref 149–390)
PMV BLD AUTO: 8.8 FL (ref 8.9–12.7)
POTASSIUM SERPL-SCNC: 4.5 MMOL/L (ref 3.5–5.3)
PR INTERVAL: 148 MS
PROT SERPL-MCNC: 7.3 G/DL (ref 6.4–8.4)
PROT UR STRIP-MCNC: ABNORMAL MG/DL
QRS AXIS: 96 DEGREES
QRSD INTERVAL: 96 MS
QT INTERVAL: 396 MS
QTC INTERVAL: 476 MS
RBC # BLD AUTO: 5.55 MILLION/UL (ref 3.88–5.62)
RBC #/AREA URNS AUTO: ABNORMAL /HPF
SODIUM SERPL-SCNC: 136 MMOL/L (ref 135–147)
SP GR UR STRIP.AUTO: >=1.05 (ref 1–1.03)
T WAVE AXIS: 69 DEGREES
UROBILINOGEN UR STRIP-ACNC: <2 MG/DL
VENTRICULAR RATE: 87 BPM
WBC # BLD AUTO: 14.73 THOUSAND/UL (ref 4.31–10.16)
WBC #/AREA URNS AUTO: ABNORMAL /HPF

## 2023-12-15 PROCEDURE — 99285 EMERGENCY DEPT VISIT HI MDM: CPT | Performed by: EMERGENCY MEDICINE

## 2023-12-15 PROCEDURE — G1004 CDSM NDSC: HCPCS

## 2023-12-15 PROCEDURE — 96360 HYDRATION IV INFUSION INIT: CPT

## 2023-12-15 PROCEDURE — 99236 HOSP IP/OBS SAME DATE HI 85: CPT | Performed by: FAMILY MEDICINE

## 2023-12-15 PROCEDURE — 99284 EMERGENCY DEPT VISIT MOD MDM: CPT

## 2023-12-15 PROCEDURE — 99244 OFF/OP CNSLTJ NEW/EST MOD 40: CPT | Performed by: PHYSICIAN ASSISTANT

## 2023-12-15 PROCEDURE — 81001 URINALYSIS AUTO W/SCOPE: CPT | Performed by: EMERGENCY MEDICINE

## 2023-12-15 PROCEDURE — 76705 ECHO EXAM OF ABDOMEN: CPT

## 2023-12-15 PROCEDURE — 93005 ELECTROCARDIOGRAM TRACING: CPT

## 2023-12-15 PROCEDURE — 82948 REAGENT STRIP/BLOOD GLUCOSE: CPT

## 2023-12-15 PROCEDURE — 85025 COMPLETE CBC W/AUTO DIFF WBC: CPT | Performed by: EMERGENCY MEDICINE

## 2023-12-15 PROCEDURE — NC001 PR NO CHARGE: Performed by: FAMILY MEDICINE

## 2023-12-15 PROCEDURE — 99284 EMERGENCY DEPT VISIT MOD MDM: CPT | Performed by: INTERNAL MEDICINE

## 2023-12-15 PROCEDURE — 80053 COMPREHEN METABOLIC PANEL: CPT | Performed by: EMERGENCY MEDICINE

## 2023-12-15 PROCEDURE — 96361 HYDRATE IV INFUSION ADD-ON: CPT

## 2023-12-15 PROCEDURE — 71275 CT ANGIOGRAPHY CHEST: CPT

## 2023-12-15 PROCEDURE — 36415 COLL VENOUS BLD VENIPUNCTURE: CPT

## 2023-12-15 PROCEDURE — 74177 CT ABD & PELVIS W/CONTRAST: CPT

## 2023-12-15 PROCEDURE — 87040 BLOOD CULTURE FOR BACTERIA: CPT | Performed by: PHYSICIAN ASSISTANT

## 2023-12-15 PROCEDURE — 84484 ASSAY OF TROPONIN QUANT: CPT

## 2023-12-15 PROCEDURE — 83690 ASSAY OF LIPASE: CPT | Performed by: EMERGENCY MEDICINE

## 2023-12-15 PROCEDURE — 84484 ASSAY OF TROPONIN QUANT: CPT | Performed by: EMERGENCY MEDICINE

## 2023-12-15 RX ORDER — DEXTROAMPHETAMINE SACCHARATE, AMPHETAMINE ASPARTATE, DEXTROAMPHETAMINE SULFATE AND AMPHETAMINE SULFATE 2.5; 2.5; 2.5; 2.5 MG/1; MG/1; MG/1; MG/1
10 TABLET ORAL 2 TIMES DAILY
Status: DISCONTINUED | OUTPATIENT
Start: 2023-12-15 | End: 2023-12-15 | Stop reason: HOSPADM

## 2023-12-15 RX ORDER — ACETAMINOPHEN 325 MG/1
650 TABLET ORAL EVERY 6 HOURS PRN
Status: DISCONTINUED | OUTPATIENT
Start: 2023-12-15 | End: 2023-12-15 | Stop reason: HOSPADM

## 2023-12-15 RX ORDER — PRAVASTATIN SODIUM 40 MG
80 TABLET ORAL
Status: DISCONTINUED | OUTPATIENT
Start: 2023-12-15 | End: 2023-12-15 | Stop reason: CLARIF

## 2023-12-15 RX ORDER — CIPROFLOXACIN 500 MG/1
500 TABLET, FILM COATED ORAL EVERY 12 HOURS SCHEDULED
Qty: 20 TABLET | Refills: 0 | Status: SHIPPED | OUTPATIENT
Start: 2023-12-15 | End: 2023-12-25

## 2023-12-15 RX ORDER — HYDROMORPHONE HCL/PF 1 MG/ML
0.5 SYRINGE (ML) INJECTION EVERY 6 HOURS PRN
Status: DISCONTINUED | OUTPATIENT
Start: 2023-12-15 | End: 2023-12-15 | Stop reason: HOSPADM

## 2023-12-15 RX ORDER — ZOLPIDEM TARTRATE 5 MG/1
10 TABLET ORAL
Status: DISCONTINUED | OUTPATIENT
Start: 2023-12-15 | End: 2023-12-15 | Stop reason: HOSPADM

## 2023-12-15 RX ORDER — SODIUM CHLORIDE 9 MG/ML
125 INJECTION, SOLUTION INTRAVENOUS CONTINUOUS
Status: DISCONTINUED | OUTPATIENT
Start: 2023-12-15 | End: 2023-12-15

## 2023-12-15 RX ORDER — ENOXAPARIN SODIUM 100 MG/ML
40 INJECTION SUBCUTANEOUS
Status: DISCONTINUED | OUTPATIENT
Start: 2023-12-15 | End: 2023-12-15 | Stop reason: HOSPADM

## 2023-12-15 RX ORDER — ATORVASTATIN CALCIUM 20 MG/1
20 TABLET, FILM COATED ORAL
Status: DISCONTINUED | OUTPATIENT
Start: 2023-12-15 | End: 2023-12-15 | Stop reason: HOSPADM

## 2023-12-15 RX ORDER — DOCUSATE SODIUM 100 MG/1
100 CAPSULE, LIQUID FILLED ORAL 2 TIMES DAILY
Status: DISCONTINUED | OUTPATIENT
Start: 2023-12-15 | End: 2023-12-15 | Stop reason: HOSPADM

## 2023-12-15 RX ORDER — ONDANSETRON 2 MG/ML
4 INJECTION INTRAMUSCULAR; INTRAVENOUS EVERY 6 HOURS PRN
Status: DISCONTINUED | OUTPATIENT
Start: 2023-12-15 | End: 2023-12-15 | Stop reason: HOSPADM

## 2023-12-15 RX ORDER — CALCIUM CARBONATE 500 MG/1
1000 TABLET, CHEWABLE ORAL DAILY PRN
Status: DISCONTINUED | OUTPATIENT
Start: 2023-12-15 | End: 2023-12-15 | Stop reason: HOSPADM

## 2023-12-15 RX ORDER — CARVEDILOL 12.5 MG/1
6.25 TABLET ORAL 2 TIMES DAILY
Status: DISCONTINUED | OUTPATIENT
Start: 2023-12-15 | End: 2023-12-15 | Stop reason: HOSPADM

## 2023-12-15 RX ORDER — CEFTRIAXONE 1 G/50ML
1000 INJECTION, SOLUTION INTRAVENOUS EVERY 24 HOURS
Status: DISCONTINUED | OUTPATIENT
Start: 2023-12-15 | End: 2023-12-15 | Stop reason: HOSPADM

## 2023-12-15 RX ORDER — FAMOTIDINE 20 MG/1
40 TABLET, FILM COATED ORAL
Status: DISCONTINUED | OUTPATIENT
Start: 2023-12-15 | End: 2023-12-15 | Stop reason: HOSPADM

## 2023-12-15 RX ORDER — OXYCODONE HYDROCHLORIDE 5 MG/1
5 TABLET ORAL EVERY 4 HOURS PRN
Status: DISCONTINUED | OUTPATIENT
Start: 2023-12-15 | End: 2023-12-15 | Stop reason: HOSPADM

## 2023-12-15 RX ORDER — ASPIRIN 81 MG/1
81 TABLET, CHEWABLE ORAL ONCE
Status: COMPLETED | OUTPATIENT
Start: 2023-12-15 | End: 2023-12-15

## 2023-12-15 RX ORDER — INSULIN LISPRO 100 [IU]/ML
1-5 INJECTION, SOLUTION INTRAVENOUS; SUBCUTANEOUS
Status: DISCONTINUED | OUTPATIENT
Start: 2023-12-15 | End: 2023-12-15 | Stop reason: HOSPADM

## 2023-12-15 RX ORDER — METRONIDAZOLE 500 MG/1
500 TABLET ORAL EVERY 8 HOURS SCHEDULED
Qty: 30 TABLET | Refills: 0 | Status: SHIPPED | OUTPATIENT
Start: 2023-12-15 | End: 2023-12-25

## 2023-12-15 RX ORDER — INSULIN GLARGINE 100 [IU]/ML
30 INJECTION, SOLUTION SUBCUTANEOUS
Status: DISCONTINUED | OUTPATIENT
Start: 2023-12-15 | End: 2023-12-15 | Stop reason: HOSPADM

## 2023-12-15 RX ORDER — LISINOPRIL 5 MG/1
5 TABLET ORAL DAILY
Status: DISCONTINUED | OUTPATIENT
Start: 2023-12-15 | End: 2023-12-15 | Stop reason: HOSPADM

## 2023-12-15 RX ORDER — DESVENLAFAXINE SUCCINATE 50 MG/1
50 TABLET, EXTENDED RELEASE ORAL DAILY
Status: DISCONTINUED | OUTPATIENT
Start: 2023-12-15 | End: 2023-12-15 | Stop reason: HOSPADM

## 2023-12-15 RX ORDER — PANTOPRAZOLE SODIUM 20 MG/1
20 TABLET, DELAYED RELEASE ORAL
Status: DISCONTINUED | OUTPATIENT
Start: 2023-12-15 | End: 2023-12-15 | Stop reason: HOSPADM

## 2023-12-15 RX ADMIN — LISINOPRIL 5 MG: 5 TABLET ORAL at 09:14

## 2023-12-15 RX ADMIN — SODIUM CHLORIDE 1000 ML: 0.9 INJECTION, SOLUTION INTRAVENOUS at 02:33

## 2023-12-15 RX ADMIN — CEFTRIAXONE 1000 MG: 1 INJECTION, SOLUTION INTRAVENOUS at 15:18

## 2023-12-15 RX ADMIN — ASPIRIN 81 MG 81 MG: 81 TABLET ORAL at 06:34

## 2023-12-15 RX ADMIN — PANCRELIPASE 12000 UNITS: 30000; 6000; 19000 CAPSULE, DELAYED RELEASE PELLETS ORAL at 07:54

## 2023-12-15 RX ADMIN — DEXTROAMPHETAMINE SACCHARATE, AMPHETAMINE ASPARTATE, DEXTROAMPHETAMINE SULFATE AND AMPHETAMINE SULFATE 10 MG: 2.5; 2.5; 2.5; 2.5 TABLET ORAL at 13:04

## 2023-12-15 RX ADMIN — SODIUM CHLORIDE 125 ML/HR: 0.9 INJECTION, SOLUTION INTRAVENOUS at 06:34

## 2023-12-15 RX ADMIN — CARVEDILOL 6.25 MG: 12.5 TABLET, FILM COATED ORAL at 09:14

## 2023-12-15 RX ADMIN — DOCUSATE SODIUM 100 MG: 100 CAPSULE, LIQUID FILLED ORAL at 09:14

## 2023-12-15 RX ADMIN — ENOXAPARIN SODIUM 40 MG: 40 INJECTION SUBCUTANEOUS at 09:16

## 2023-12-15 RX ADMIN — PANCRELIPASE 12000 UNITS: 30000; 6000; 19000 CAPSULE, DELAYED RELEASE PELLETS ORAL at 13:04

## 2023-12-15 RX ADMIN — IOHEXOL 100 ML: 350 INJECTION, SOLUTION INTRAVENOUS at 02:41

## 2023-12-15 RX ADMIN — DEXTROAMPHETAMINE SACCHARATE, AMPHETAMINE ASPARTATE, DEXTROAMPHETAMINE SULFATE AND AMPHETAMINE SULFATE 10 MG: 2.5; 2.5; 2.5; 2.5 TABLET ORAL at 07:35

## 2023-12-15 RX ADMIN — PANTOPRAZOLE SODIUM 20 MG: 20 TABLET, DELAYED RELEASE ORAL at 06:34

## 2023-12-15 NOTE — ASSESSMENT & PLAN NOTE
Patient reports right upper quadrant pain and diarrhea that started approximately 4-5 hrs prior to the arrival to the ED. CTA abdomen (12/15/23): Distended gallbladder with questionable mild wall thickening although with similar in appearance to prior study. Correlate clinically and consider further evaluation with right upper quadrant ultrasound. Re-identified biliary stent in situ with stable trace pneumobilia. Similar appearance of pancreatic neck mass with probable encasement of the main portal vein at the confluence with the splenic vein and SMV and associated pancreatic ductal dilatation compatible with patient's known neoplasm. Some new haziness noted around the pancreatic head and uncinate process, nonspecific, possibly inflammatory in etiology and/or related to prior recent intervention. RUQ US (12/15/23): Pancreatic mass. The common bile duct distal to the pancreatic mass appears normal in caliber. No evidence of intrahepatic ductal dilatation. Gallbladder sludge with wall thickening and possible polyp versus phrygian cap at the fundus. No sonographic Kee sign or gallstone. Minimal pericholecystic fluid. Hepatobiliary study may be useful if there is high index of suspicion for acute cholecystitis.   GI consult, recommendations appreciated  Concern that this is acute cholecystitis based on imaging  Recommended Gen Surg consult  General Surgery consult, appreciate input  Recommending HIDA scan to evaluate for gallbladder function

## 2023-12-15 NOTE — CONSULTS
GENERAL SURGERY CONSULT                                                                                                                                               Jaden Wolf 62 y.o. male MRN:                                                                     [id number]  Unit/Bed#: ED 24                                                         Encounter: [id number]                                                        Assessment/Plan   Adenocarcinoma of the Pancreas 10/2023, s/p CBD stent placement on Chemotherapy  Abdominal Pain known, now worsening. Leukocytosis 14 on admission  Distended Gall Bladder on CT with questionable mild wall thickening similar to previous imaging.     -recommend HIDA to evaluate gall bladder function.  -Pt signed out AMA. Pt is aware of life threatening conditions that could occur. He states he is well aware that this could be life threatening, he does not want to spend what time he has left inside of a hospital. He will return if he develops a fever. CHIEF COMPLAINT:  I am leaving. I know the risk of leaving. I do not want to spend any more time in the hospital. I will return if have a fever. HPI: Jaden Wolf is a 62y.o. year oldmale,PMH: Adenocarcinoma of the Pancreas diagnosed 10/2023, receiving chemotherapy. S/p CBD metal stent for extrinsic severe stricture of CBD. CAD with Angioplasty and Stent insertion, DM, HLD< HTN   IR Port, Hernia Repair,       consulted for worsening abdominal pain. Pt states he is leaving AMA. Discussed with him the seriousness of high white count, risk of infection, and life threatening circumstances. He states he is aware and not staying. He is getting dressed. He is standing and does not appear to be in pain. He is A & O x 3.             Imaging Studies: US right upper quadrant    Result Date: 12/15/2023  Impression: Pancreatic mass. The common bile duct distal to the pancreatic mass appears normal in caliber.  No evidence of intrahepatic ductal dilatation. Gallbladder sludge with wall thickening and possible polyp versus phrygian cap at the fundus. No sonographic Kee sign or gallstone. Minimal pericholecystic fluid. Hepatobiliary study may be useful if there is high index of suspicion for acute cholecystitis. The study was marked in [facility] for immediate notification. Workstation performed: [id number]     PE Study with CT abdomen & pelvis with contrast    Result Date: 12/15/2023  Impression: No evidence for pulmonary embolus. No consolidation or effusion. Stable pulmonary nodules as above. Distended gallbladder with questionable mild wall thickening although with similar in appearance to prior study. Correlate clinically and consider further evaluation with right upper quadrant ultrasound. Reidentified biliary stent in situ with stable trace pneumobilia. Similar appearance of pancreatic neck mass with probable encasement of the main portal vein at the confluence with the splenic vein and SMV and associated pancreatic ductal dilatation compatible with patient's known neoplasm. Some new haziness noted around the pancreatic head and uncinate process, nonspecific, possibly inflammatory in etiology and/or related to prior recent intervention. Recommend clinical correlation and with laboratory values. No acute bowel pathology. Colonic diverticulosis. Study was marked in [facility] for immediate notification.  Workstation performed: [id number]     Lab Results:   Lab Results   Component Value Date    WBC 14.73 (H) 12/15/2023    HGB 17.8 (H) 12/15/2023    HCT 52.1 (H) 12/15/2023     12/15/2023     Lab Results   Component Value Date    K 4.5 12/15/2023    K 4.2 11/01/2023     12/15/2023     11/01/2023    CO2 28 12/15/2023    CO2 23 11/01/2023    BUN 16 12/15/2023    BUN 18 11/01/2023    CREATININE 0.76 12/15/2023       Inpatient consult to Acute Care Surgery  Consult performed by: Nikhil Rosario PA-C  Consult ordered by: Elle Smith BALTAZAR Wilson          Historical Information   Past Medical History:   Diagnosis Date    Cancer ([address])     pancreatic    Coronary artery disease     Diabetes mellitus ([address])     Hyperlipidemia     Hypertension      Past Surgical History:   Procedure Laterality Date    CORONARY ANGIOPLASTY WITH STENT PLACEMENT      ERCP W/ PLASTIC STENT PLACEMENT      HERNIA REPAIR      IR PORT PLACEMENT  11/24/2023     Social History   Social History     Substance and Sexual Activity   Alcohol Use Never     Social History     Substance and Sexual Activity   Drug Use Never     Social History     Tobacco Use   Smoking Status Every Day    Current packs/day: 1.50    Average packs/day: 1.5 packs/day for 35.0 years (52.5 ttl pk-yrs)    Types: Cigarettes   Smokeless Tobacco Not on file     Family History: non-contributory    No Known Allergies    Meds/Allergies   current meds:   Current Facility-Administered Medications   Medication Dose Route Frequency    acetaminophen (TYLENOL) tablet 650 mg  650 mg Oral Q6H PRN    amphetamine-dextroamphetamine (ADDERALL) tablet 10 mg  10 mg Oral BID    atorvastatin (LIPITOR) tablet 20 mg  20 mg Oral Daily With Dinner    calcium carbonate (TUMS) chewable tablet 1,000 mg  1,000 mg Oral Daily PRN    carvedilol (COREG) tablet 6.25 mg  6.25 mg Oral BID    cefTRIAXone (ROCEPHIN) IVPB (premix in dextrose) 1,000 mg 50 mL  1,000 mg Intravenous Q24H    desvenlafaxine succinate (PRISTIQ) 24 hr tablet 50 mg  50 mg Oral Daily    docusate sodium (COLACE) capsule 100 mg  100 mg Oral BID    enoxaparin (LOVENOX) subcutaneous injection 40 mg  40 mg Subcutaneous Q24H JACE    famotidine (PEPCID) tablet 40 mg  40 mg Oral HS    HYDROmorphone (DILAUDID) injection 0.5 mg  0.5 mg Intravenous Q6H PRN    insulin glargine (LANTUS) subcutaneous injection 30 Units 0.3 mL  30 Units Subcutaneous HS    insulin lispro (HumaLOG) 100 units/mL subcutaneous injection 1-5 Units  1-5 Units Subcutaneous TID AC    lisinopril (ZESTRIL) tablet 5 mg 5 mg Oral Daily    ondansetron (ZOFRAN) injection 4 mg  4 mg Intravenous Q6H PRN    oxyCODONE (ROXICODONE) IR tablet 5 mg  5 mg Oral Q4H PRN    pancrelipase (Lip-Prot-Amyl) (CREON) delayed release capsule 12,000 Units  12,000 Units Oral TID With Meals    pantoprazole (PROTONIX) EC tablet 20 mg  20 mg Oral Early Morning    sodium chloride 0.9 % infusion  125 mL/hr Intravenous Continuous    zolpidem (AMBIEN) tablet 10 mg  10 mg Oral HS PRN     Facility-Administered Medications Ordered in Other Encounters   Medication Dose Route Frequency    [MAR Hold] alteplase (CATHFLO) injection 2 mg  2 mg Intracatheter Q1MIN PRN              Objective   Vitals:  No intake or output data in the 24 hours ending 12/15/23 1451  Invasive Devices       Central Venous Catheter Line  Duration             Port A Cath 11/24/23 Right Internal jugular 21 days              Peripheral Intravenous Line  Duration             Peripheral IV 12/15/23 Left;Ventral (anterior) Forearm <1 day                    Review of Systems  12 point ROS reviewed and Negative except[de-identified]   What is noted in the HPI    Physical Exam    Blood pressure 131/84, pulse 71, temperature 98 °F (36.7 °C), temperature source Oral, resp. rate 17, SpO2 95%. GEN: A & O x 3, cooperative   Pt refused to be examined.  He was dressing to leave AMA   HEENT:  NECK:  LUNGS:   COR:    ABD:    EXTREM:  SKIN:  NEUROZona Scheuermann, PA-C  12/15/2023

## 2023-12-15 NOTE — H&P
[address]  H&P  Name: [facility] 62 y.o. male I MRN: [id number]  Unit/Bed#: ED 24 I Date of Admission: 12/15/2023   Date of Service: 12/15/2023 I Hospital Day: 0      Assessment/Plan   * Right upper quadrant pain  Assessment & Plan  Patient reports right upper quadrant pain and diarrhea that started approximately 4-5hrs prior to the arrival to the ED. Abdominal pain is worse than usual  cancer pain. Of note patient started his second round of chemo and symptoms started soon after. Patient denies fever, chills, SOB, nausea, vomiting  CTA abdomen 12/15: Distended gallbladder with questionable mild wall thickening although with similar in appearance to prior study. Correlate clinically and consider further evaluation with right upper quadrant ultrasound. Reidentified biliary stent in situ with stable trace pneumobilia. Similar appearance of pancreatic neck mass with probable encasement of the main portal vein at the confluence with the splenic vein and SMV and associated pancreatic ductal dilatation compatible with patient's known neoplasm. Some new haziness noted around the pancreatic head and uncinate process, nonspecific, possibly inflammatory in etiology and/or related to prior recent intervention.   Clear liquid diet  IV hydration  Pain management  Right Upper quadrant U/S pending  GI consult, recommendations appreciated    Primary adenocarcinoma of pancreatic duct [facility])  Assessment & Plan  Dx 10/2023 Clinical: Stage IB   Followed by [facility] outpatient Hem/Onc  PET/CT--no evidence of metastatic disease       Depression with anxiety  Assessment & Plan  Mood currently stable  Continue prestiq    Attention deficit hyperactivity disorder (ADHD), combined type  Assessment & Plan  Continue adderral as needed    Type 2 diabetes mellitus with hyperosmolarity without coma, without long-term current use of insulin [facility])  Assessment & Plan  Lab Results   Component Value Date    HGBA1C 7.8 (A) 11/21/2023       No results for input(s): "POCGLU" in the last 72 hours. Blood Sugar Average: Last 72 hrs:    Diabetes management with metformin, jardiance, Tresiba  Continue subcutaneous insulin, hold oral medications  SSI coverage, adjust as needed goal 140-180  Fingersticks AC/HS  Hypoglycemia protocol       VTE Pharmacologic Prophylaxis: VTE Score: 2 Moderate Risk (Score 3-4) - Pharmacological DVT Prophylaxis Ordered: enoxaparin (Lovenox). Code Status: Level 1 - Full Code   Discussion with family:  family to be notified during day shift. Anticipated Length of Stay: Patient will be admitted on an observation basis with an anticipated length of stay of less than 2 midnights secondary to evaluation by gastroenterology. Total Time Spent on Date of Encounter in care of patient: 75 mins. This time was spent on one or more of the following: performing physical exam; counseling and coordination of care; obtaining or reviewing history; documenting in the medical record; reviewing/ordering tests, medications or procedures; communicating with other healthcare professionals and discussing with patient's family/caregivers. Chief Complaint:    Chief Complaint   Patient presents with    Abdominal Pain     Pt reports has generalized abdominal pain x 4 hours. Pt has hx of pancreatic cancer, currently receiving chemo. Denies n/v/d. History of Present Illness:  Melissa [place] is a 62 y.o. male with a PMH of T2DM,CAD, Depression and pancreatic cancer who presents with right upper quadrant abdominal pain. Patient reports after the completion of his second chemo infusion he experience increased abdominal pain and diarrhea. He reports this pain is until his usual cancer pain. Patient denies fever, chills, SOB, chest pain, n/v. During his last treatment he experienced some hypotension requiring IV fluids. He has been tolerating meals. CT Scan in ED reveal some gallbladder wall thickness.  The ED provider discussed case with on-call General surgeon. Recommending possible HIDA and percutaneous wilfrido tube. Will admit inpatient for further evaluation. All questions answered to patient's satisfaction. Review of Systems:  Review of Systems   Constitutional: Negative. HENT: Negative. Respiratory:  Negative for chest tightness and shortness of breath. Cardiovascular:  Negative for chest pain, palpitations and leg swelling. Gastrointestinal:  Positive for abdominal pain and diarrhea. Negative for nausea and vomiting. Genitourinary: Negative. Musculoskeletal: Negative. Neurological: Negative. Hematological: Negative. Psychiatric/Behavioral: Negative. Past Medical and Surgical History:   Past Medical History:   Diagnosis Date    Cancer [facility])     pancreatic    Coronary artery disease     Diabetes mellitus ([address] )     Hyperlipidemia     Hypertension        Past Surgical History:   Procedure Laterality Date    CORONARY ANGIOPLASTY WITH STENT PLACEMENT      ERCP W/ PLASTIC STENT PLACEMENT      HERNIA REPAIR      IR PORT PLACEMENT  11/24/2023       Meds/Allergies:  Prior to Admission medications    Medication Sig Start Date End Date Taking?  Authorizing Provider   amphetamine-dextroamphetamine (ADDERALL XR) 20 MG 24 hr capsule Take 20 mg by mouth every morning 10/29/23   Historical Provider, MD   aspirin 81 MG tablet 1 tab(s)    Historical Provider, MD   carvedilol (COREG) 6.25 mg tablet Take 6.25 mg by mouth 2 (two) times a day    Historical Provider, MD   Continuous Blood Gluc Sensor (FreeStyle Geovanny 3 Sensor) MISC use as directed TO MONITOR GLUCOSE DAILY 10/16/23   Historical Provider, MD   desvenlafaxine succinate (PRISTIQ) 50 mg 24 hr tablet Take 50 mg by mouth daily 9/28/23   Historical Provider, MD   famotidine (PEPCID) 40 MG tablet Take 40 mg by mouth daily at bedtime 11/24/23   Historical Provider, MD   fluorouracil 4,440 mg in CADD/Elastomeric Infusion Device Infuse 4,440 mg (1,200 mg/m2/day x 1.85 m2) into a catheter in a vein via infusion device over 46 hours for 2 days  Infusion planned for December 12, 2023. 12/12/23 12/14/23  Gualberto Gordon MD   Jardiance 25 MG TABS Take 1 tablet by mouth every morning 5/27/23   Historical Provider, MD   lisinopril (ZESTRIL) 5 mg tablet Take 1 tablet by mouth daily 2/13/18   Historical Provider, MD   metFORMIN (GLUCOPHAGE) 1000 MG tablet Take 1,000 mg by mouth Twice daily    Historical Provider, MD   omeprazole (PriLOSEC) 40 MG capsule take 1 capsule by mouth every morning 1 hour before [facility] 11/24/23   Historical Provider, MD   ondansetron (ZOFRAN) 4 mg tablet Take 1 tablet (4 mg total) by mouth every 8 (eight) hours as needed for nausea or vomiting 11/28/23   Gualberto Gordon MD   ondansetron (ZOFRAN) 8 mg tablet Take 1 tablet (8 mg total) by mouth every 8 (eight) hours as needed for nausea or vomiting for up to 20 days 11/28/23 12/18/23  Mukesh Harrison DO   oxyCODONE (Roxicodone) 5 immediate release tablet Take 1 tablet (5 mg total) by mouth every 4 (four) hours as needed for moderate pain Max Daily Amount: 30 mg 11/16/23   Kanwal Arshad MD   pancrelipase, Lip-Prot-Amyl, (CREON) 12,000 units capsule Take 12,000 units of lipase by mouth 3 (three) times a day with meals 11/8/23 1/7/24  Gualberto Gordon MD   rosuvastatin (CRESTOR) 10 MG tablet Take 1 tablet by mouth daily 2/13/18   Historical Provider, MD Smith Inch FlexTouch 100 units/mL injection pen 43u 9/6/23   Historical Provider, MD   Zolpidem Tartrate (AMBIEN PO) Take by mouth    Historical Provider, MD     I have reviewed home medications with patient personally.     Allergies: No Known Allergies    Social History:  Marital Status: /Civil Union   Occupation:   Patient Pre-hospital Living Situation: Home  Patient Pre-hospital Level of Mobility: walks  Patient Pre-hospital Diet Restrictions:   Substance Use History:   Social History     Substance and Sexual Activity   Alcohol Use Never     Social History     Tobacco Use   Smoking Status Every Day    Current packs/day: 1.50    Average packs/day: 1.5 packs/day for 35.0 years (52.5 ttl pk-yrs)    Types: Cigarettes   Smokeless Tobacco Not on file     Social History     Substance and Sexual Activity   Drug Use Never       Family History:  Family History   Problem Relation Age of Onset    Heart attack Father     Skin cancer Father        Physical Exam:     Vitals:   Blood Pressure: 128/81 (12/15/23 0235)  Pulse: 87 (12/15/23 0235)  Temperature: 98 °F (36.7 °C) (12/15/23 0110)  Temp Source: Oral (12/15/23 0110)  Respirations: 20 (12/15/23 0235)  SpO2: 94 % (12/15/23 0235)    Physical Exam  Constitutional:       General: He is not in acute distress. HENT:      Head: Normocephalic. Mouth/Throat:      Mouth: Mucous membranes are moist.   Eyes:      General: No scleral icterus. Cardiovascular:      Rate and Rhythm: Normal rate and regular rhythm. Pulses: Normal pulses. Heart sounds: Normal heart sounds. No murmur heard. Pulmonary:      Effort: Pulmonary effort is normal. No respiratory distress. Breath sounds: Normal breath sounds. Abdominal:      General: Bowel sounds are normal. There is no distension. Palpations: Abdomen is soft. Tenderness: There is abdominal tenderness. There is no guarding. Musculoskeletal:         General: Normal range of motion. Skin:     General: Skin is warm and dry. Capillary Refill: Capillary refill takes less than 2 seconds. Neurological:      General: No focal deficit present. Mental Status: He is alert and oriented to person, place, and time. Mental status is at baseline. Psychiatric:         Mood and Affect: Mood normal.         Behavior: Behavior normal.         Thought Content:  Thought content normal.          Additional Data:     Lab Results:  Results from last 7 days   Lab Units 12/15/23  0113   WBC Thousand/uL 14.73*   HEMOGLOBIN g/dL 17.8*   HEMATOCRIT % 52.1* PLATELETS Thousands/uL 326   NEUTROS PCT % 73   LYMPHS PCT % 20   MONOS PCT % 5   EOS PCT % 2     Results from last 7 days   Lab Units 12/15/23  0113   SODIUM mmol/L 136   POTASSIUM mmol/L 4.5   CHLORIDE mmol/L 102   CO2 mmol/L 28   BUN mg/dL 16   CREATININE mg/dL 0.76   ANION GAP mmol/L 6   CALCIUM mg/dL 9.7   ALBUMIN g/dL 4.1   TOTAL BILIRUBIN mg/dL 0.73   ALK PHOS U/L 90   ALT U/L 13   AST U/L 13   GLUCOSE RANDOM mg/dL 100                       Lines/Drains:  Invasive Devices       Central Venous Catheter Line  Duration             Port A Cath 11/24/23 Right Internal jugular 20 days              Peripheral Intravenous Line  Duration             Peripheral IV 12/15/23 Left;Ventral (anterior) Forearm <1 day                    Central Line:  Goal for removal: Port accessed. Will de-access as appropriate. Imaging: Reviewed radiology reports from this admission including: abdominal/pelvic CT  PE Study with CT abdomen & pelvis with contrast   Final Result by Cooper Fischer MD (12/15 8812)      No evidence for pulmonary embolus. No consolidation or effusion. Stable pulmonary nodules as above. Distended gallbladder with questionable mild wall thickening although with similar in appearance to prior study. Correlate clinically and consider further evaluation with right upper quadrant ultrasound. Reidentified biliary stent in situ with stable trace pneumobilia. Similar appearance of pancreatic neck mass with probable encasement of the main portal vein at the confluence with the splenic vein and SMV and associated pancreatic ductal dilatation    compatible with patient's known neoplasm. Some new haziness noted around the pancreatic head and uncinate process, nonspecific, possibly inflammatory in etiology and/or related to prior recent intervention. Recommend clinical correlation and with    laboratory values. No acute bowel pathology. Colonic diverticulosis.       Study was marked in [facility] for immediate notification. Workstation performed: [id number]         US right upper quadrant    (Results Pending)       EKG and Other Studies Reviewed on Admission:   EKG: NSR. HR 87.    ** Please Note: This note has been constructed using a voice recognition system.  **

## 2023-12-15 NOTE — ASSESSMENT & PLAN NOTE
Dx 10/2023 Clinical: Stage IB   [facility]  PET/CT--no evidence of metastatic disease

## 2023-12-15 NOTE — CASE MANAGEMENT
Case Management Assessment & Discharge Planning Note    Patient name Raven Cook ED 24/ED 24 MRN [id number]  : 1965 Date 12/15/2023       Current Admission Date: 12/15/2023  Current Admission Diagnosis:Right upper quadrant pain   Patient Active Problem List    Diagnosis Date Noted    Right upper quadrant abdominal pain 12/15/2023    Primary adenocarcinoma of pancreatic duct ([address]) 12/15/2023    SIRS (systemic inflammatory response syndrome) ([address]) 12/15/2023    Dehydration 2023    Mixed hyperlipidemia 2023    Coronary artery disease involving native coronary artery of native heart without angina pectoris 2023    Gastroesophageal reflux disease 2023    Primary insomnia 2023    Type 2 diabetes mellitus with hyperosmolarity without coma, without long-term current use of insulin ([address]) 2023    Right upper quadrant pain 11/15/2023    Attention deficit hyperactivity disorder (ADHD), combined type 10/14/2019    Depression with anxiety 2017      LOS (days): 0  Geometric Mean LOS (GMLOS) (days):   Days to GMLOS:     OBJECTIVE:              Current admission status: Observation       Preferred Pharmacy:   [address] [place] [address]  Phone: [phone] Fax: [phone]    Primary Care Provider: Dash Hodge MD    Primary Insurance: BLUE CROSS  Secondary Insurance:     ASSESSMENT:  [facility] Proxies    There are no active Health Care Proxies on file. Readmission Root Cause  30 Day Readmission: No    Patient Information  Admitted from[de-identified] Home  Mental Status: Alert  During Assessment patient was accompanied by: Daughter  Assessment information provided by[de-identified] Patient  Primary Caregiver: Self  Support Systems: Family members, Spouse/significant other  [place] of Residence: [address] [place] [address] do you live in?: [facility] entry access options. Select all that apply. Laura Gutiérrez Stairs  Number of steps to enter home. : 1  Do the steps have railings?: Yes  Type of Current Residence: 2 story home  Upon entering residence, is there a bedroom on the main floor (no further steps)?: Yes  Upon entering residence, is there a bathroom on the main floor (no further steps)?: Yes  Living Arrangements: Lives w/ Spouse/significant other  Is patient a ?: Yes  Is patient active with [facility])?: No    Activities of Daily Living Prior to Admission  Functional Status: Independent  Completes ADLs independently?: Yes  Ambulates independently?: Yes  Does patient use assisted devices?: No  Does patient currently own DME?: No  Does patient have a history of Outpatient Therapy (PT/OT)?: No  Does the patient have a history of Short-Term Rehab?: No  Does patient have a history of HHC?: No  Does patient currently have [address]?: No         Patient Information Continued  Income Source: Employed  Does patient have prescription coverage?: Yes  Does patient receive dialysis treatments?: No  Does patient have a history of substance abuse?: No  Does patient have a history of Mental Health Diagnosis?: No    PHQ 2/9 Screening   Reviewed PHQ 2/9 Depression Screening Score?: No    Means of Transportation  Means of Transport to Appts[de-identified] Drives Self      Housing Stability: Low Risk  (12/15/2023)    Housing Stability Vital Sign     Unable to Pay for Housing in the Last Year: No     Number of Places Lived in the Last Year: 1     Unstable Housing in the Last Year: No   Food Insecurity: No Food Insecurity (12/15/2023)    Hunger Vital Sign     Worried About Running Out of Food in the Last Year: Never true     Ran Out of Food in the Last Year: Never true   Transportation Needs: No Transportation Needs (12/15/2023)    PRAPARE - Transportation     Lack of Transportation (Medical): No     Lack of Transportation (Non-Medical):  No   Utilities: Not At Risk (12/15/2023)    [facility] Utilities     Threatened with loss of utilities: No DISCHARGE DETAILS:    Discharge planning discussed with[de-identified] Patient and daughter at the bedside  Freedom of Choice: Yes  Comments - Freedom of Choice: FOC maintained in discussion regarding discharge planning. Patient is sleeping, spoke with daughter who is alert oriented and competent to make decisions. Introduced self and role, explained role of CM in arranging services such as DME, STR, HHC, and providing communitity resource information. Discussed current living situation and needs at discharge. Patient is IPTA. CM offered HHC, STR, DME, PCP, OP CM, community resources. Patient declined resources, describes no CM needs at this time. Does not use DME. CM contacted family/caregiver?: Yes  Were Treatment Team discharge recommendations reviewed with patient/caregiver?: Yes  Did patient/caregiver verbalize understanding of patient care needs?: Yes  Were patient/caregiver advised of the risks associated with not following Treatment Team discharge recommendations?: Yes    Contacts  Patient Contacts: Saritha Carvalho (Spouse)  [phone] (Mobile)  Relationship to Patient[de-identified] Family  Reason/Outcome: Emergency [address]         Is the patient interested in [facility] at discharge?: No    DME Referral Provided  Referral made for DME?: No    Other Referral/Resources/Interventions Provided:  Interventions: Declines resources  Referral Comments: CM will continue to follow for needs.     Would you like to participate in our [address] [address] service program?  : No - Declined    Treatment Team Recommendation: Home  Discharge Destination Plan[de-identified] Home  Transport at Discharge : Family

## 2023-12-15 NOTE — ASSESSMENT & PLAN NOTE
Dx 10/2023 Clinical: Stage IB   [facility]  PET/CT - no evidence of metastatic disease

## 2023-12-15 NOTE — TELEPHONE ENCOUNTER
Pts wife called and canceled his infusion appt for today because he's in the ED. Please call her to discuss plan going forward. Thanks!

## 2023-12-15 NOTE — ASSESSMENT & PLAN NOTE
Patient reports right upper quadrant pain and diarrhea that started approximately 4-5hrs prior to the arrival to the ED. Abdominal pain is worse than usual  cancer pain. Of note patient started his second round of chemo and symptoms started soon after. Patient denies fever, chills, SOB, nausea, vomiting  CTA abdomen 12/15: Distended gallbladder with questionable mild wall thickening although with similar in appearance to prior study. Correlate clinically and consider further evaluation with right upper quadrant ultrasound. Reidentified biliary stent in situ with stable trace pneumobilia. Similar appearance of pancreatic neck mass with probable encasement of the main portal vein at the confluence with the splenic vein and SMV and associated pancreatic ductal dilatation compatible with patient's known neoplasm. Some new haziness noted around the pancreatic head and uncinate process, nonspecific, possibly inflammatory in etiology and/or related to prior recent intervention.   Clear liquid diet  IV hydration  Pain management  Right Upper quadrant U/S pending  GI consult, recommendations appreciated

## 2023-12-15 NOTE — ASSESSMENT & PLAN NOTE
Lab Results   Component Value Date    HGBA1C 7.8 (A) 11/21/2023       No results for input(s): "POCGLU" in the last 72 hours.     Blood Sugar Average: Last 72 hrs:    Diabetes management with metformin, jardiance, Tresiba  Continue subcutaneous insulin, hold oral medications  SSI coverage, adjust as needed goal 140-180  Fingersticks AC/HS  Hypoglycemia protocol

## 2023-12-15 NOTE — Clinical Note
Case was discussed with JUAN ALBERTO and the patient's admission status was agreed to be Admission Status: observation status to the service of Dr. Boykin Opitz .

## 2023-12-15 NOTE — ASSESSMENT & PLAN NOTE
Present on admission, evidenced by leukocytosis and tachycardia  Blood cultures x 2 obtained, pending  UA completed, negative for UTI  Patient with concerns for acute cholecystitis  Patient was receiving Ceftriaxone here, will transition to Cipro/Flagyl on discharge to cover for intraabdominal infection  Follow up OP

## 2023-12-15 NOTE — DISCHARGE SUMMARY
[address]  [facility] 1965[facility] 62 y[facility] MRN: [id number]  Unit/Bed#: ED 24 Encounter: [id number]  Primary Care Provider: Diaz Diaz MD   Date and time admitted to hospital: 12/15/2023  1:30 AM    * Right upper quadrant pain  Assessment & Plan  Patient reports right upper quadrant pain and diarrhea that started approximately 4-5 hrs prior to the arrival to the ED. CTA abdomen (12/15/23): Distended gallbladder with questionable mild wall thickening although with similar in appearance to prior study. Correlate clinically and consider further evaluation with right upper quadrant ultrasound. Re-identified biliary stent in situ with stable trace pneumobilia. Similar appearance of pancreatic neck mass with probable encasement of the main portal vein at the confluence with the splenic vein and SMV and associated pancreatic ductal dilatation compatible with patient's known neoplasm. Some new haziness noted around the pancreatic head and uncinate process, nonspecific, possibly inflammatory in etiology and/or related to prior recent intervention. RUQ US (12/15/23): Pancreatic mass. The common bile duct distal to the pancreatic mass appears normal in caliber. No evidence of intrahepatic ductal dilatation. Gallbladder sludge with wall thickening and possible polyp versus phrygian cap at the fundus. No sonographic Kee sign or gallstone. Minimal pericholecystic fluid. Hepatobiliary study may be useful if there is high index of suspicion for acute cholecystitis.   GI consult, recommendations appreciated  Concern that this is acute cholecystitis based on imaging  Recommended Gen Surg consult  General Surgery consult, appreciate input  Recommending HIDA scan to evaluate for gallbladder function    SIRS (systemic inflammatory response syndrome) ([address])  Assessment & Plan  Present on admission, evidenced by leukocytosis and tachycardia  Blood cultures x 2 obtained, pending  UA completed, negative for UTI  Patient with concerns for acute cholecystitis  Patient was receiving Ceftriaxone here, will transition to Cipro/Flagyl on discharge to cover for intraabdominal infection  Follow up OP    Primary adenocarcinoma of pancreatic duct [facility])  Assessment & Plan  Dx 10/2023 Clinical: Stage IB   Followed by [facility] Hem[facility]Onc  PET/CT - no evidence of metastatic disease       Depression with anxiety  Assessment & Plan  Mood currently stable  Continue prestiq    Attention deficit hyperactivity disorder (ADHD), combined type  Assessment & Plan  Continue adderral as needed    Type 2 diabetes mellitus with hyperosmolarity without coma, without long-term current use of insulin ([address])  Assessment & Plan  Restart home anti-diabetic medications  Patient advised to not start his Metformin until 12/18  Okay to restart Jardiance/Tresiba right away      Medical Problems       Resolved Problems  Date Reviewed: 12/15/2023   None       Discharging Physician / Practitioner: PHYLLIS Park  PCP: Timothy Alford MD  Admission Date:   Admission Orders (From admission, onward)       Ordered        12/15/23 0438  Place in Observation  Once                          Discharge Date: 12/15/23    Consultations During Hospital Stay:  [address]  IP CONSULT TO ACUTE CARE SURGERY    Procedures Performed:   None    Significant Findings / Test Results:   CTA Chest, CT Abd/Pelvis (12/15/23): No evidence for pulmonary embolus. No consolidation or effusion. Stable pulmonary nodules as above. Distended gallbladder with questionable mild wall thickening although with similar in appearance to prior study. Correlate clinically and consider further evaluation with right upper quadrant ultrasound. Reidentified biliary stent in situ with stable trace pneumobilia.  Similar appearance of pancreatic neck mass with probable encasement of the main portal vein at the confluence with the splenic vein and SMV and associated pancreatic ductal dilatation compatible with patient's known neoplasm. Some new haziness noted around the pancreatic head and uncinate process, nonspecific, possibly inflammatory in etiology and/or related to prior recent intervention. Recommend clinical correlation and with laboratory values. No acute bowel pathology. Colonic diverticulosis. RUQ US (12/15/23): Pancreatic mass. The common bile duct distal to the pancreatic mass appears normal in caliber. No evidence of intrahepatic ductal dilatation. Gallbladder sludge with wall thickening and possible polyp versus phrygian cap at the fundus. No sonographic Kee sign or gallstone. Minimal pericholecystic fluid. Hepatobiliary study may be useful if there is high index of suspicion for acute cholecystitis. Incidental Findings:   None    Test Results Pending at Discharge (will require follow up): None     Outpatient Tests Requested: Follow up with PCP  Follow up with Gen Surg    Complications:  None    Reason for Admission: Right upper quadrant pain    Hospital Course:   Bijan Albrecht is a 62 y.o. male patient past medical history of CAD, diabetes mellitus, hyperlipidemia, hypertension, and pancreatic cancer on chemotherapy who originally presented to the hospital on 12/15/2023 due to right upper quadrant pain and diarrhea that began 4 to 5 hours prior to coming to the ED. CT of the abdomen indicated a distended gallbladder with questionable mild wall thickening, recommending a right upper quadrant ultrasound. The right upper quadrant ultrasound was completed and concerning for acute cholecystitis. The patient's outpatient GI provider reviewed imaging and was concerned that this was acute cholecystitis as well. GI and general surgery were consulted. General surgery recommending patient to have a HIDA scan however patient refusing at this time. He was also meeting SIRS criteria on admission evidenced by leukocytosis and tachycardia.   No lactic acid was obtained on admission however blood cultures were obtained and they are pending at this time. A UA was also completed without signs of UTI. Fortunately at this time the patient is not having any fevers. Patient opting to leave the hospital [address] [facility], stating that he does not want to be here any longer than he has to be as he only has about 2 years left to live and does not want to spend his time in the hospital.  He is aware of the potential risks of leaving the hospital without further evaluation which include sepsis, organ damage, and death. When this was explained to the patient he stated " I will be dead in 2 years anyway". Patient is aware that if he develops a fever he will come back to the hospital.      Please see above list of diagnoses and related plan for additional information. Condition at Discharge: guarded    Discharge Day Visit / Exam:   * Please refer to separate progress note for these details *    Discussion with Family: Updated  (wife) at bedside. Discharge instructions/Information to patient and family:   See after visit summary for information provided to patient and family. Provisions for Follow-Up Care:  See after visit summary for information related to follow-up care and any pertinent home health orders. Mobility at time of Discharge:      Critical access hospital Goal achieved. Continue to encourage appropriate mobility. Patient independent     Disposition:   Home Against Medical Advice    Planned Readmission: Likely     Discharge Statement:  I spent 45 minutes discharging the patient. This time was spent on the day of discharge. I had direct contact with the patient on the day of discharge. Greater than 50% of the total time was spent examining patient, answering all patient questions, arranging and discussing plan of care with patient as well as directly providing post-discharge instructions.   Additional time then spent on discharge activities. Discharge Medications:  See after visit summary for reconciled discharge medications provided to patient and/or family.       **Please Note: This note may have been constructed using a voice recognition system**

## 2023-12-15 NOTE — CONSULTS
Consultation - [address] [place] [address] [facility]  Omer Riddle 62 y.o. male MRN: [id number]  Unit/Bed#: ED 24 Encounter: [id number]         Reason for Consult / Principal Problem: RUQ Pain    HPI: Akosua Munoz is a 62year old male with history of recently diagnosed adenocarcinoma of the pancreas status post CBD stent placement on chemotherapy. Patient presents with RUQ pain. Patient reports at baseline he has mid abdominal pain from his known pancreas mass. However, began having acute onset severe RUQ pain he describes as burning. Patient denies fever, chills, or vomiting. CT on admission revealing no evidence of pulmonary embolism, distended gallbladder, and known pancreatic mass with invasion into the vasculature. LFTs are WNL. White blood cell count is 14,000. Patient was diagnosed with pancreatic cancer in October 2023. Patient underwent ERCP and EUS with Dr. Mert Soto. ERCP revealing an extrinsic severe stricture in the mid common bile duct requiring a metal stent. Fortunately his bilirubin went from 11 to 0.7. EUS revealing an irregular mass. Patient started chemotherapy as an outpatient following his diagnosis. Also seeing Dr. Edin To. Review of Systems:    CONSTITUTIONAL: Denies any fever, chills, or rigors. Positive for weight loss. HEENT: No earache or tinnitus. Denies hearing loss or visual disturbances. CARDIOVASCULAR: No chest pain or palpitations. RESPIRATORY: Denies any cough, hemoptysis, shortness of breath or dyspnea on exertion. GASTROINTESTINAL: As noted in the History of Present Illness. GENITOURINARY: No problems with urination. Denies any hematuria or dysuria. NEUROLOGIC: No dizziness or vertigo, denies headaches. MUSCULOSKELETAL: Denies any muscle or joint pain. SKIN: Denies skin rashes or itching. ENDOCRINE: Denies excessive thirst. Denies intolerance to heat or cold. PSYCHOSOCIAL: Denies depression or anxiety. Denies any recent memory loss.        Historical Information Past Medical History:   Diagnosis Date    Cancer ([address] [facility])     pancreatic    Coronary artery disease     Diabetes mellitus ([address] [facility])     Hyperlipidemia     Hypertension      Past Surgical History:   Procedure Laterality Date    CORONARY ANGIOPLASTY WITH STENT PLACEMENT      ERCP W/ PLASTIC STENT PLACEMENT      HERNIA REPAIR      IR PORT PLACEMENT  11/24/2023     Social History   Social History     Substance and Sexual Activity   Alcohol Use Never     Social History     Substance and Sexual Activity   Drug Use Never     Social History     Tobacco Use   Smoking Status Every Day    Current packs/day: 1.50    Average packs/day: 1.5 packs/day for 35.0 years (52.5 ttl pk-yrs)    Types: Cigarettes   Smokeless Tobacco Not on file     Family History   Problem Relation Age of Onset    Heart attack Father     Skin cancer Father         Meds/Allergies     Current Facility-Administered Medications   Medication Dose Route Frequency    acetaminophen (TYLENOL) tablet 650 mg  650 mg Oral Q6H PRN    amphetamine-dextroamphetamine (ADDERALL) tablet 10 mg  10 mg Oral BID    atorvastatin (LIPITOR) tablet 20 mg  20 mg Oral Daily With Dinner    calcium carbonate (TUMS) chewable tablet 1,000 mg  1,000 mg Oral Daily PRN    carvedilol (COREG) tablet 6.25 mg  6.25 mg Oral BID    desvenlafaxine succinate (PRISTIQ) 24 hr tablet 50 mg  50 mg Oral Daily    docusate sodium (COLACE) capsule 100 mg  100 mg Oral BID    enoxaparin (LOVENOX) subcutaneous injection 40 mg  40 mg Subcutaneous Q24H JACE    famotidine (PEPCID) tablet 40 mg  40 mg Oral HS    HYDROmorphone (DILAUDID) injection 0.5 mg  0.5 mg Intravenous Q6H PRN    insulin glargine (LANTUS) subcutaneous injection 30 Units 0.3 mL  30 Units Subcutaneous HS    insulin lispro (HumaLOG) 100 units/mL subcutaneous injection 1-5 Units  1-5 Units Subcutaneous TID AC    lisinopril (ZESTRIL) tablet 5 mg  5 mg Oral Daily    ondansetron (ZOFRAN) injection 4 mg  4 mg Intravenous Q6H PRN    oxyCODONE (ROXICODONE) IR tablet 5 mg  5 mg Oral Q4H PRN    pancrelipase (Lip-Prot-Amyl) (CREON) delayed release capsule 12,000 Units  12,000 Units Oral TID With Meals    pantoprazole (PROTONIX) EC tablet 20 mg  20 mg Oral Early Morning    sodium chloride 0.9 % infusion  125 mL/hr Intravenous Continuous    zolpidem (AMBIEN) tablet 10 mg  10 mg Oral HS PRN     Facility-Administered Medications Ordered in Other Encounters   Medication Dose Route Frequency    [MAR Hold] alteplase (CATHFLO) injection 2 mg  2 mg Intracatheter Q1MIN PRN       No Known Allergies      Objective     Blood pressure 120/79, pulse 84, temperature 98 °F (36.7 °C), temperature source Oral, resp. rate 20, SpO2 96%. No intake or output data in the 24 hours ending 12/15/23 1128      PHYSICAL EXAM:      General Appearance:   Alert and oriented x 3. Cooperative, and in no respiratory distress   HEENT:   Normocephalic, atraumatic, anicteric. Neck:  Supple, symmetrical, trachea midline   Lungs:   Clear to auscultation bilaterally; no rales, rhonchi or wheezing; respirations unlabored    Heart[de-identified]   S1 and S2 normal; regular rate and rhythm; no murmur, rub, or gallop.    Abdomen:   Soft, non-tender, non-distended; normal bowel sounds; no masses, no organomegaly    Genitalia:   Deferred    Rectal:   Deferred    Extremities:  No cyanosis, clubbing or edema    Pulses:  2+ and symmetric all extremities    Skin:  Skin color, texture, turgor normal, no rashes or lesions    Lymph nodes:  No palpable cervical or supraclavicular lymphadenopathy        Lab Results:   Results from last 7 days   Lab Units 12/15/23  0113   WBC Thousand/uL 14.73*   HEMOGLOBIN g/dL 17.8*   HEMATOCRIT % 52.1*   PLATELETS Thousands/uL 326   NEUTROS PCT % 73   LYMPHS PCT % 20   MONOS PCT % 5   EOS PCT % 2     Results from last 7 days   Lab Units 12/15/23  0113   POTASSIUM mmol/L 4.5   CHLORIDE mmol/L 102   CO2 mmol/L 28   BUN mg/dL 16   CREATININE mg/dL 0.76   CALCIUM mg/dL 9.7   ALK PHOS U/L 90   ALT U/L 13 AST U/L 13         Results from last 7 days   Lab Units 12/15/23  0113   LIPASE u/L 80       Imaging Studies:  PE Study with CT abdomen & pelvis with contrast    Result Date: 12/15/2023  Impression: No evidence for pulmonary embolus. No consolidation or effusion. Stable pulmonary nodules as above. Distended gallbladder with questionable mild wall thickening although with similar in appearance to prior study. Correlate clinically and consider further evaluation with right upper quadrant ultrasound. Reidentified biliary stent in situ with stable trace pneumobilia. Similar appearance of pancreatic neck mass with probable encasement of the main portal vein at the confluence with the splenic vein and SMV and associated pancreatic ductal dilatation compatible with patient's known neoplasm. Some new haziness noted around the pancreatic head and uncinate process, nonspecific, possibly inflammatory in etiology and/or related to prior recent intervention. Recommend clinical correlation and with laboratory values. No acute bowel pathology. Colonic diverticulosis. Study was marked in [facility] for immediate notification. Workstation performed: [employer]       ASSESSMENT and PLAN:      1) RUQ pain; distended gallbladder and history of pancreatic cancer on neoadjuvant chemotherapy - Imaging reviewed by Dr. Zulay Mccabe who performed his stent placement who feels it could be acute cholecystitis based on imaging as the CBD stent shows good drainage based on his normal LFTs. There is also a degree of PD dilation.  - RUQ U/S pending   - Blood culture   - Pain control   - Further recommendations based on above   - Discussed with Dr. Blayne Winters    - Outpatient follow-up with oncology and surgical oncology   - Patient and patient's daughter agree with plan    The patient was seen and examined by Dr. Heather Cuevas, all jay medical decisions were made with Dr. Heather Cuevas. Thank you for allowing us to participate in the care of this pleasant patient.   We will follow up with you closely. Portions of the record may have been created with voice recognition software. Occasional wrong word or "sound a like" substitutions may have occurred due to the inherent limitations of voice recognition software. Read the chart carefully and recognize, using context, where substitutions have occurred.

## 2023-12-17 LAB
BACTERIA BLD CULT: NORMAL
BACTERIA BLD CULT: NORMAL

## 2023-12-18 ENCOUNTER — TRANSITIONAL CARE MANAGEMENT (OUTPATIENT)
Dept: FAMILY MEDICINE CLINIC | Facility: CLINIC | Age: 58
End: 2023-12-18

## 2023-12-18 DIAGNOSIS — E11.00 TYPE 2 DIABETES MELLITUS WITH HYPEROSMOLARITY WITHOUT COMA, WITHOUT LONG-TERM CURRENT USE OF INSULIN (HCC): Primary | ICD-10-CM

## 2023-12-18 NOTE — ED PROVIDER NOTES
History  Chief Complaint   Patient presents with    Abdominal Pain     Pt reports has generalized abdominal pain x 4 hours. Pt has hx of pancreatic cancer, currently receiving chemo. Denies n/v/d.      57 y/o male, hx of pancreatic cancer currently on chemo, presents to the ED for RUQ abd pain. He states that he has had burning sensation in his RUQ over the last 4-5 hours.  States that it is worse with deep breathing.  States that it feels different than prior abdominal pain.  He reports that it has been constant and waxes and wanes in severity.  He also reports nausea but denies any vomiting.  Denies any fever/chills, chest pain, shortness of breath, diarrhea/constipation, or urinary symptoms.  No other complaints.      History provided by:  Patient  Abdominal Pain  Pain location:  RUQ  Pain quality: sharp and stabbing    Pain radiates to:  Does not radiate  Pain severity:  Moderate  Onset quality:  Sudden  Timing:  Constant  Progression:  Waxing and waning  Chronicity:  New  Context: not previous surgeries and not sick contacts    Relieved by:  None tried  Worsened by:  Nothing  Ineffective treatments:  None tried  Associated symptoms: nausea    Associated symptoms: no chest pain, no chills, no cough, no diarrhea, no dysuria, no fever, no hematuria, no shortness of breath, no sore throat and no vomiting        Prior to Admission Medications   Prescriptions Last Dose Informant Patient Reported? Taking?   Continuous Blood Gluc Sensor (FreeStyle Geovanny 3 Sensor) MISC  Self Yes No   Sig: use as directed TO MONITOR GLUCOSE DAILY   Jardiance 25 MG TABS  Self Yes No   Sig: Take 1 tablet by mouth every morning   Tresiba FlexTouch 100 units/mL injection pen  Self Yes No   Siu   Zolpidem Tartrate (AMBIEN PO)  Self Yes No   Sig: Take by mouth   amphetamine-dextroamphetamine (ADDERALL XR) 20 MG 24 hr capsule  Self Yes No   Sig: Take 20 mg by mouth every morning   aspirin 81 MG tablet  Self Yes No   Si tab(s)    carvedilol (COREG) 6.25 mg tablet  Self Yes No   Sig: Take 6.25 mg by mouth 2 (two) times a day   desvenlafaxine succinate (PRISTIQ) 50 mg 24 hr tablet  Self Yes No   Sig: Take 50 mg by mouth daily   famotidine (PEPCID) 40 MG tablet  Self Yes No   Sig: Take 40 mg by mouth daily at bedtime   fluorouracil 4,440 mg in CADD/Elastomeric Infusion Device   No No   Sig: Infuse 4,440 mg (1,200 mg/m2/day x 1.85 m2) into a catheter in a vein via infusion device over 46 hours for 2 days  Infusion planned for December 12, 2023.   fluorouracil 4,440 mg in CADD/Elastomeric Infusion Device   No Yes   Sig: Infuse 4,440 mg (1,200 mg/m2/day x 1.85 m2) into a catheter in a vein via infusion device over 46 hours for 2 days  Infusion planned for December 15, 2023.   lisinopril (ZESTRIL) 5 mg tablet  Self Yes No   Sig: Take 1 tablet by mouth daily   metFORMIN (GLUCOPHAGE) 1000 MG tablet  Self Yes No   Sig: Take 1,000 mg by mouth Twice daily   metFORMIN (GLUCOPHAGE) 1000 MG tablet   No Yes   Sig: Take 1 tablet (1,000 mg total) by mouth 2 (two) times a day with meals Twice daily Do not start before December 18, 2023.   omeprazole (PriLOSEC) 40 MG capsule  Self Yes No   Sig: take 1 capsule by mouth every morning 1 hour before FIRST MEAL OF THE DAY   ondansetron (ZOFRAN) 4 mg tablet  Self No No   Sig: Take 1 tablet (4 mg total) by mouth every 8 (eight) hours as needed for nausea or vomiting   ondansetron (ZOFRAN) 8 mg tablet  Self No No   Sig: Take 1 tablet (8 mg total) by mouth every 8 (eight) hours as needed for nausea or vomiting for up to 20 days   oxyCODONE (Roxicodone) 5 immediate release tablet  Self No No   Sig: Take 1 tablet (5 mg total) by mouth every 4 (four) hours as needed for moderate pain Max Daily Amount: 30 mg   pancrelipase, Lip-Prot-Amyl, (CREON) 12,000 units capsule  Self No No   Sig: Take 12,000 units of lipase by mouth 3 (three) times a day with meals   rosuvastatin (CRESTOR) 10 MG tablet  Self Yes No   Sig: Take 1  tablet by mouth daily      Facility-Administered Medications: None       Past Medical History:   Diagnosis Date    Cancer (HCC)     pancreatic    Coronary artery disease     Diabetes mellitus (HCC)     Hyperlipidemia     Hypertension        Past Surgical History:   Procedure Laterality Date    CORONARY ANGIOPLASTY WITH STENT PLACEMENT      ERCP W/ PLASTIC STENT PLACEMENT      HERNIA REPAIR      IR PORT PLACEMENT  11/24/2023       Family History   Problem Relation Age of Onset    Heart attack Father     Skin cancer Father      I have reviewed and agree with the history as documented.    E-Cigarette/Vaping    E-Cigarette Use Never User      E-Cigarette/Vaping Substances     Social History     Tobacco Use    Smoking status: Every Day     Current packs/day: 1.50     Average packs/day: 1.5 packs/day for 35.0 years (52.5 ttl pk-yrs)     Types: Cigarettes   Vaping Use    Vaping status: Never Used   Substance Use Topics    Alcohol use: Never    Drug use: Never       Review of Systems   Constitutional:  Negative for chills and fever.   HENT:  Negative for congestion, ear pain and sore throat.    Eyes:  Negative for pain and visual disturbance.   Respiratory:  Negative for cough, shortness of breath and wheezing.    Cardiovascular:  Negative for chest pain and leg swelling.   Gastrointestinal:  Positive for abdominal pain and nausea. Negative for diarrhea and vomiting.   Genitourinary:  Negative for dysuria, frequency, hematuria and urgency.   Musculoskeletal:  Negative for neck pain and neck stiffness.   Skin:  Negative for rash and wound.   Neurological:  Negative for weakness, numbness and headaches.   Psychiatric/Behavioral:  Negative for agitation and confusion.    All other systems reviewed and are negative.      Physical Exam  Physical Exam  Vitals and nursing note reviewed.   Constitutional:       Appearance: He is well-developed.   HENT:      Head: Normocephalic and atraumatic.   Eyes:      Pupils: Pupils are equal,  round, and reactive to light.   Cardiovascular:      Rate and Rhythm: Normal rate and regular rhythm.   Pulmonary:      Effort: Pulmonary effort is normal.      Breath sounds: Normal breath sounds.   Abdominal:      General: Bowel sounds are normal.      Palpations: Abdomen is soft.      Tenderness: There is abdominal tenderness in the right upper quadrant. There is no guarding or rebound. Positive signs include Kee's sign.   Musculoskeletal:         General: Normal range of motion.      Cervical back: Normal range of motion and neck supple.   Skin:     General: Skin is warm and dry.   Neurological:      General: No focal deficit present.      Mental Status: He is alert and oriented to person, place, and time.      Comments: No focal deficits         Vital Signs  ED Triage Vitals [12/15/23 0110]   Temperature Pulse Respirations Blood Pressure SpO2   98 °F (36.7 °C) 99 18 132/82 100 %      Temp Source Heart Rate Source Patient Position - Orthostatic VS BP Location FiO2 (%)   Oral Monitor Sitting Left arm --      Pain Score       7           Vitals:    12/15/23 0900 12/15/23 1100 12/15/23 1300 12/15/23 1400   BP: 120/79 138/89 119/79 131/84   Pulse: 84 79 79 71   Patient Position - Orthostatic VS: Sitting Sitting  Sitting         Visual Acuity      ED Medications  Medications   sodium chloride 0.9 % bolus 1,000 mL (0 mL Intravenous Stopped 12/15/23 0634)   iohexol (OMNIPAQUE) 350 MG/ML injection (MULTI-DOSE) 100 mL (100 mL Intravenous Given 12/15/23 0241)   aspirin chewable tablet 81 mg (81 mg Oral Given 12/15/23 0634)       Diagnostic Studies  Results Reviewed       Procedure Component Value Units Date/Time    Blood culture [[id number]] Collected: 12/15/23 1311    Lab Status: Preliminary result Specimen: Blood from Arm, Right Updated: 12/17/23 1801     Blood Culture No Growth at 48 hrs.    Blood culture [[id number]] Collected: 12/15/23 1311    Lab Status: Preliminary result Specimen: Blood from Arm, Left Updated:  12/17/23 1801     Blood Culture No Growth at 48 hrs.    Fingerstick Glucose (POCT) [[id number]]  (Normal) Collected: 12/15/23 1141    Lab Status: Final result Updated: 12/15/23 1144     POC Glucose 134 mg/dl     HS Troponin I 4hr [[id number]]  (Normal) Collected: 12/15/23 0919    Lab Status: Final result Specimen: Blood from Arm, Right Updated: 12/15/23 0950     hs TnI 4hr 3 ng/L      Delta 4hr hsTnI 0 ng/L     Fingerstick Glucose (POCT) [[id number]]  (Normal) Collected: 12/15/23 0715    Lab Status: Final result Updated: 12/15/23 0716     POC Glucose 79 mg/dl     HS Troponin I 2hr [[id number]]  (Normal) Collected: 12/15/23 0632    Lab Status: Final result Specimen: Blood from Arm, Left Updated: 12/15/23 0702     hs TnI 2hr 3 ng/L      Delta 2hr hsTnI 0 ng/L     Urine Microscopic [[id number]]  (Abnormal) Collected: 12/15/23 0259    Lab Status: Final result Specimen: Urine, Clean Catch Updated: 12/15/23 0357     RBC, UA 2-4 /hpf      WBC, UA 1-2 /hpf      Epithelial Cells None Seen /hpf      Bacteria, UA None Seen /hpf     UA w Reflex to Microscopic w Reflex to Culture [[id number]]  (Abnormal) Collected: 12/15/23 0259    Lab Status: Final result Specimen: Urine, Clean Catch Updated: 12/15/23 0356     Color, UA Light Yellow     Clarity, UA Clear     Specific Gravity, UA >=1.050     pH, UA 6.0     Leukocytes, UA Elevated glucose may cause decreased leukocyte values. See urine microscopic for UWBC result     Nitrite, UA Negative     Protein, UA Trace mg/dl      Glucose, UA >=1000 (1%) mg/dl      Ketones, UA Negative mg/dl      Urobilinogen, UA <2.0 mg/dl      Bilirubin, UA Negative     Occult Blood, UA Trace    HS Troponin 0hr (reflex protocol) [[id number]]  (Normal) Collected: 12/15/23 0305    Lab Status: Final result Specimen: Blood from Arm, Right Updated: 12/15/23 0342     hs TnI 0hr 3 ng/L     Comprehensive metabolic panel [[id number]] Collected: 12/15/23 0113    Lab Status: Final result Specimen: Blood from Arm, Right  Updated: 12/15/23 0149     Sodium 136 mmol/L      Potassium 4.5 mmol/L      Chloride 102 mmol/L      CO2 28 mmol/L      ANION GAP 6 mmol/L      BUN 16 mg/dL      Creatinine 0.76 mg/dL      Glucose 100 mg/dL      Calcium 9.7 mg/dL      AST 13 U/L      ALT 13 U/L      Alkaline Phosphatase 90 U/L      Total Protein 7.3 g/dL      Albumin 4.1 g/dL      Total Bilirubin 0.73 mg/dL      eGFR 100 ml/min/1.73sq m     Narrative:      National Kid[employer] guidelines for Chronic Kidney Disease (CKD):     Stage 1 with normal or high GFR (GFR > 90 mL/min/1.73 square meters)    Stage 2 Mild CKD (GFR = 60-89 mL/min/1.73 square meters)    Stage 3A Moderate CKD (GFR = 45-59 mL/min/1.73 square meters)    Stage 3B Moderate CKD (GFR = 30-44 mL/min/1.73 square meters)    Stage 4 Severe CKD (GFR = 15-29 mL/min/1.73 square meters)    Stage 5 End Stage CKD (GFR <15 mL/min/1.73 square meters)  Note: GFR calculation is accurate only with a steady state creatinine    Lipase [[id number]]  (Normal) Collected: 12/15/23 0113    Lab Status: Final result Specimen: Blood from Arm, Right Updated: 12/15/23 0149     Lipase 80 u/L     CBC and differential [[id number]]  (Abnormal) Collected: 12/15/23 0113    Lab Status: Final result Specimen: Blood from Arm, Right Updated: 12/15/23 0120     WBC 14.73 Thousand/uL      RBC 5.55 Million/uL      Hemoglobin 17.8 g/dL      Hematocrit 52.1 %      MCV 94 fL      MCH 32.1 pg      MCHC 34.2 g/dL      RDW 13.1 %      MPV 8.8 fL      Platelets 326 Thousands/uL      nRBC 0 /100 WBCs      Neutrophils Relative 73 %      Immat GRANS % 0 %      Lymphocytes Relative 20 %      Monocytes Relative 5 %      Eosinophils Relative 2 %      Basophils Relative 0 %      Neutrophils Absolute 10.55 Thousands/µL      Immature Grans Absolute 0.05 Thousand/uL      Lymphocytes Absolute 2.98 Thousands/µL      Monocytes Absolute 0.80 Thousand/µL      Eosinophils Absolute 0.30 Thousand/µL      Basophils Absolute 0.05 Thousands/µL                     US right upper quadrant   Final Result by Johnnie Davis MD (12/15 7215)      Pancreatic mass.      The common bile duct distal to the pancreatic mass appears normal in caliber. No evidence of intrahepatic ductal dilatation.      Gallbladder sludge with wall thickening and possible polyp versus phrygian cap at the fundus. No sonographic Kee sign or gallstone. Minimal pericholecystic fluid. Hepatobiliary study may be useful if there is high index of suspicion for acute    cholecystitis.      The study was marked in EPIC for immediate notification.      Workstation performed: [id number]         PE Study with CT abdomen & pelvis with contrast   Final Result by Lalito uPlido MD (12/15 8011)      No evidence for pulmonary embolus.      No consolidation or effusion.      Stable pulmonary nodules as above.      Distended gallbladder with questionable mild wall thickening although with similar in appearance to prior study. Correlate clinically and consider further evaluation with right upper quadrant ultrasound.      Reidentified biliary stent in situ with stable trace pneumobilia. Similar appearance of pancreatic neck mass with probable encasement of the main portal vein at the confluence with the splenic vein and SMV and associated pancreatic ductal dilatation    compatible with patient's known neoplasm. Some new haziness noted around the pancreatic head and uncinate process, nonspecific, possibly inflammatory in etiology and/or related to prior recent intervention. Recommend clinical correlation and with    laboratory values.      No acute bowel pathology. Colonic diverticulosis.      Study was marked in EPIC for immediate notification.      Workstation performed: [id number]                    Procedures  Procedures         ED Course  ED Course as of 12/18/23 1504   Fri Dec 15, 2023   0209 Sitting watching tv and developed ruq abd pain. On chemo for pancreatic cancer. Just got off chemo today. 1m.  This is second treatment- Tuesday to Thursday every other week. Burning sensation. 4-5 hours ago. Worse with breathing deep. Different then prior pain. Constant and waxes and wanes.    0429 Spoke with Dr Newton from [facility] surgery - recommends admission to [facility] with HIDA and possible perc wilfrido tube                                SBIRT 20yo+      Flowsheet Row Most Recent Value   Initial Alcohol Screen: US AUDIT-C     1. How often do you have a drink containing alcohol? 0 Filed at: 12/15/2023 0110   2. How many drinks containing alcohol do you have on a typical day you are drinking?  0 Filed at: 12/15/2023 0110   3a. Male UNDER 65: How often do you have five or more drinks on one occasion? 0 Filed at: 12/15/2023 0110   Audit-C Score 0 Filed at: 12/15/2023 0110   SCAR: How many times in the past year have you...    Used an illegal drug or used a prescription medication for non-medical reasons? Never Filed at: 12/15/2023 0110                      Medical Decision Making  59 y/o male with RUQ abd pain- will get labs, ct scan, and UA.     Labs and ct reviewed and will speak with surgery. Spoke with Dr Newton- recommends admission to [facility]     Amount and/or Complexity of Data Reviewed  Labs: ordered.  Radiology: ordered.    Risk  Prescription drug management.             Disposition  Final diagnoses:   Right upper quadrant pain   Abnormal CT scan     Time reflects when diagnosis was documented in both MDM as applicable and the Disposition within this note       Time User Action Codes Description Comment    12/15/2023  4:38 AM Tova Joseph Add [R10.11] Right upper quadrant pain     12/15/2023  4:39 AM Tova Joseph Add [R93.89] Abnormal CT scan     12/15/2023  5:52 AM Shae Sheikh Add [C25.9] Pancreatic adenocarcinoma (HCC)     12/15/2023  3:09 PM Neida Morrow Add [E11.00] Type 2 diabetes mellitus with hyperosmolarity without coma, without long-term current use of insulin (HCC)           ED  Disposition       ED Disposition   AMA    Condition   --    Date/Time   Fri Dec 15, 2023 1601    Comment   Case was discussed with JUAN ALBERTO and the patient's admission status was agreed to be Admission Status: observation status to the service of Dr. Vega .               Follow-up Information    None         Discharge Medication List as of 12/15/2023  3:20 PM        START taking these medications    Details   ciprofloxacin (CIPRO) 500 mg tablet Take 1 tablet (500 mg total) by mouth every 12 (twelve) hours for 10 days, Starting Fri 12/15/2023, Until Mon 12/25/2023, Normal      metroNIDAZOLE (FLAGYL) 500 mg tablet Take 1 tablet (500 mg total) by mouth every 8 (eight) hours for 10 days, Starting Fri 12/15/2023, Until Mon 12/25/2023, Normal           CONTINUE these medications which have CHANGED    Details   fluorouracil 4,440 mg in CADD/Elastomeric Infusion Device Infuse 4,440 mg (1,200 mg/m2/day x 1.85 m2) into a catheter in a vein via infusion device over 46 hours for 2 days  Infusion planned for December 15, 2023., Starting Fri 12/15/2023, Until Sun 12/17/2023, Normal      metFORMIN (GLUCOPHAGE) 1000 MG tablet Take 1 tablet (1,000 mg total) by mouth 2 (two) times a day with meals Twice daily Do not start before December 18, 2023., Starting Mon 12/18/2023, No Print           CONTINUE these medications which have NOT CHANGED    Details   amphetamine-dextroamphetamine (ADDERALL XR) 20 MG 24 hr capsule Take 20 mg by mouth every morning, Starting Sun 10/29/2023, Historical Med      aspirin 81 MG tablet 1 tab(s), Historical Med      carvedilol (COREG) 6.25 mg tablet Take 6.25 mg by mouth 2 (two) times a day, Historical Med      Continuous Blood Gluc Sensor (FreeStyle Geovanny 3 Sensor) MISC use as directed TO MONITOR GLUCOSE DAILY, Historical Med      desvenlafaxine succinate (PRISTIQ) 50 mg 24 hr tablet Take 50 mg by mouth daily, Starting u 9/28/2023, Historical Med      famotidine (PEPCID) 40 MG tablet Take 40 mg by mouth  daily at bedtime, Starting Fri 11/24/2023, Historical Med      Jardiance 25 MG TABS Take 1 tablet by mouth every morning, Starting Sat 5/27/2023, Historical Med      lisinopril (ZESTRIL) 5 mg tablet Take 1 tablet by mouth daily, Starting Tue 2/13/2018, Historical Med      omeprazole (PriLOSEC) 40 MG capsule take 1 capsule by mouth every morning 1 hour before FIRST MEAL OF THE DAY, Historical Med      !! ondansetron (ZOFRAN) 4 mg tablet Take 1 tablet (4 mg total) by mouth every 8 (eight) hours as needed for nausea or vomiting, Starting Tue 11/28/2023, Normal      !! ondansetron (ZOFRAN) 8 mg tablet Take 1 tablet (8 mg total) by mouth every 8 (eight) hours as needed for nausea or vomiting for up to 20 days, Starting Tue 11/28/2023, Until Mon 12/18/2023 at 2359, Normal      oxyCODONE (Roxicodone) 5 immediate release tablet Take 1 tablet (5 mg total) by mouth every 4 (four) hours as needed for moderate pain Max Daily Amount: 30 mg, Starting Thu 11/16/2023, Normal      pancrelipase, Lip-Prot-Amyl, (CREON) 12,000 units capsule Take 12,000 units of lipase by mouth 3 (three) times a day with meals, Starting Wed 11/8/2023, Until Sun 1/7/2024, Normal      rosuvastatin (CRESTOR) 10 MG tablet Take 1 tablet by mouth daily, Starting Tue 2/13/2018, Historical Med      Tresiba FlexTouch 100 units/mL injection pen 43u, Historical Med      Zolpidem Tartrate (AMBIEN PO) Take by mouth, Historical Med       !! - Potential duplicate medications found. Please discuss with provider.          Outpatient Discharge Orders   Call provider for:  severe uncontrolled pain     Call provider for:  redness, tenderness, or signs of infection (pain, swelling, redness, odor or green/yellow discharge around incision site)     Call provider for:  difficulty breathing, headache or visual disturbances       PDMP Review         Value Time User    PDMP Reviewed  Yes 11/16/2023 10:24 AM Samir Cervantes MD            ED Provider  Electronically Signed  by             Tova Joseph, DO  12/18/23 1506

## 2023-12-18 NOTE — TELEPHONE ENCOUNTER
Attempted to call wife, Suma, no answer. I left my direct number for her to return my call at earliest convenience.

## 2023-12-19 ENCOUNTER — TELEPHONE (OUTPATIENT)
Dept: ENDOCRINOLOGY | Facility: CLINIC | Age: 58
End: 2023-12-19

## 2023-12-20 ENCOUNTER — PATIENT OUTREACH (OUTPATIENT)
Dept: CASE MANAGEMENT | Facility: HOSPITAL | Age: 58
End: 2023-12-20

## 2023-12-20 DIAGNOSIS — R10.11 RIGHT UPPER QUADRANT PAIN: Primary | ICD-10-CM

## 2023-12-20 LAB
BACTERIA BLD CULT: NORMAL
BACTERIA BLD CULT: NORMAL

## 2023-12-20 NOTE — PROGRESS NOTES
GARLAND s/w pt by phone this afternoon, we last spoke last month and I wanted to check in since he's had more treatment.  He was seen in the ED last week and says that he has noticed how much better he feels on chemo weeks when he's had extra hydration.  He is hoping to get scheduled for a bag of fluids on his disconnect days.  I do not see this set up just yet in his appointments and will reach out to see if we can get this ordered for him.  He says that he wants to avoid any other ED visits if at all possible.  His Rad onc consult is rescheduled for January and he will see endocrinology tomorrow.  He otherwise says that he is doing well and has no other needs.  He did appreciate the check in and I encouraged him to reach out as needed moving forward.  MSW will remain available to him moving forward, no other needs or concerns noted at this time.

## 2023-12-21 ENCOUNTER — TELEMEDICINE (OUTPATIENT)
Dept: FAMILY MEDICINE CLINIC | Facility: CLINIC | Age: 58
End: 2023-12-21
Payer: COMMERCIAL

## 2023-12-21 ENCOUNTER — CONSULT (OUTPATIENT)
Dept: ENDOCRINOLOGY | Facility: CLINIC | Age: 58
End: 2023-12-21
Payer: COMMERCIAL

## 2023-12-21 VITALS
HEART RATE: 93 BPM | WEIGHT: 157 LBS | RESPIRATION RATE: 18 BRPM | BODY MASS INDEX: 21.98 KG/M2 | OXYGEN SATURATION: 98 % | DIASTOLIC BLOOD PRESSURE: 98 MMHG | TEMPERATURE: 98.6 F | SYSTOLIC BLOOD PRESSURE: 126 MMHG | HEIGHT: 71 IN

## 2023-12-21 DIAGNOSIS — E11.00 TYPE 2 DIABETES MELLITUS WITH HYPEROSMOLARITY WITHOUT COMA, WITHOUT LONG-TERM CURRENT USE OF INSULIN (HCC): Primary | ICD-10-CM

## 2023-12-21 DIAGNOSIS — Z09 HOSPITAL DISCHARGE FOLLOW-UP: ICD-10-CM

## 2023-12-21 DIAGNOSIS — E78.2 MIXED HYPERLIPIDEMIA: ICD-10-CM

## 2023-12-21 DIAGNOSIS — R65.10 SIRS (SYSTEMIC INFLAMMATORY RESPONSE SYNDROME) (HCC): ICD-10-CM

## 2023-12-21 DIAGNOSIS — I10 PRIMARY HYPERTENSION: ICD-10-CM

## 2023-12-21 DIAGNOSIS — Z79.4 TYPE 2 DIABETES MELLITUS WITH HYPERGLYCEMIA, WITH LONG-TERM CURRENT USE OF INSULIN (HCC): ICD-10-CM

## 2023-12-21 DIAGNOSIS — C25.3 PRIMARY ADENOCARCINOMA OF PANCREATIC DUCT (HCC): ICD-10-CM

## 2023-12-21 DIAGNOSIS — E11.65 TYPE 2 DIABETES MELLITUS WITH HYPERGLYCEMIA, WITH LONG-TERM CURRENT USE OF INSULIN (HCC): ICD-10-CM

## 2023-12-21 DIAGNOSIS — R10.11 RIGHT UPPER QUADRANT PAIN: Primary | ICD-10-CM

## 2023-12-21 PROCEDURE — 95251 CONT GLUC MNTR ANALYSIS I&R: CPT | Performed by: STUDENT IN AN ORGANIZED HEALTH CARE EDUCATION/TRAINING PROGRAM

## 2023-12-21 PROCEDURE — 99214 OFFICE O/P EST MOD 30 MIN: CPT | Performed by: STUDENT IN AN ORGANIZED HEALTH CARE EDUCATION/TRAINING PROGRAM

## 2023-12-21 PROCEDURE — 99244 OFF/OP CNSLTJ NEW/EST MOD 40: CPT | Performed by: STUDENT IN AN ORGANIZED HEALTH CARE EDUCATION/TRAINING PROGRAM

## 2023-12-21 RX ORDER — INSULIN LISPRO 100 [IU]/ML
8 INJECTION, SOLUTION INTRAVENOUS; SUBCUTANEOUS
Qty: 21.6 ML | Refills: 0 | Status: SHIPPED | OUTPATIENT
Start: 2023-12-21 | End: 2024-03-20

## 2023-12-21 RX ORDER — INSULIN DEGLUDEC INJECTION 100 U/ML
30 INJECTION, SOLUTION SUBCUTANEOUS
Qty: 27 ML | Refills: 0 | Status: SHIPPED | OUTPATIENT
Start: 2023-12-21 | End: 2024-03-20

## 2023-12-21 NOTE — PATIENT INSTRUCTIONS
We started Humalog 8 units 10 to 15 minutes before meals,  Continue Tresiba at 30 units at bedtime,  Continue metformin and jardiance

## 2023-12-21 NOTE — PROGRESS NOTES
Assessment & Plan     1. Right upper quadrant pain    2. SIRS (systemic inflammatory response syndrome) (HCC)    3. Type 2 diabetes mellitus with hyperglycemia, with long-term current use of insulin (HCC)    4. Primary adenocarcinoma of pancreatic duct (HCC)    5. Hospital discharge follow-up       Call back if symptoms recur.  Reviewed endocrinology notes from today as well as hospital notes, labs and imaging  Subjective     Transitional Care Management Review:   Derick Richey is a 58 y.o. male here for TCM follow up.     Taking antibiotics, feeling much better. Seen in ER and admitted, left ama prior to further workup due to frustrations and delays in care.  Denies any pain today some constipation but otherwise feeling much better    Saw endocrinology today, reviewed notes    During the TCM phone call patient stated:  TCM Call     Date and time call was made  12/18/2023 10:45 AM    Hospital care reviewed  Records reviewed    Patient was hospitialized at  [facility]    Date of Admission  12/15/23    Date of discharge  12/15/23    Diagnosis  Right upper quadrant pain    Disposition  Home    Were the patients medications reviewed and updated  Yes    Current Symptoms  None      TCM Call     Post hospital issues  None    Should patient be enrolled in anticoag monitoring?  No    Scheduled for follow up?  Yes    Did you obtain your prescribed medications  No    Why were you unable to obtain your medications  Patient was not given any medication.    Do you need help managing your prescriptions or medications  No    Is transportation to your appointment needed  No    I have advised the patient to call PCP with any new or worsening symptoms  Sepideh Martinez/MA    Living Arrangements  Spouse or Significiant other    Support System  Family    The type of support provided  Emotional    Do you have social support  Yes, as much as I need    Are you recieving any outpatient services  No    Are you recieving home care  services  No    Are you using any community resources  No    Current waiver services  No    Have you fallen in the last 12 months  No    Interperter language line needed  No    Counseling  Patient    Counseling topics  Activities of daily living; patient and family education    Comments  Schuduled appt for 12/21/2023        HPI  Review of Systems   Constitutional:  Negative for activity change, appetite change, fatigue and fever.   HENT:  Negative for congestion, rhinorrhea and sore throat.    Eyes:  Negative for visual disturbance.   Respiratory:  Negative for cough and shortness of breath.    Cardiovascular:  Negative for chest pain.   Gastrointestinal:  Negative for abdominal pain, nausea and vomiting.       Objective     There were no vitals taken for this visit.     Physical Exam  HENT:      Head: Normocephalic and atraumatic.   Pulmonary:      Effort: Pulmonary effort is normal. No respiratory distress.   Neurological:      Mental Status: He is alert.       Medications have been reviewed by provider in current encounter    Elo Modi MD

## 2023-12-21 NOTE — PROGRESS NOTES
New Patient Progress Note      Cc: diabetes    Referring Provider  Elo Modi Md  [address] [place] [address]  [address]  [place] [address]     History of Present Illness:   Derick Richey is a 58 y.o. male with a history of type 2 diabetes with long term use of insulin who is referred by PCP for uncontrolled diabetes.    He newly diagnosed with adenocarcinoma of the pancreas, stage Ib, following with heme-onc, currently on chemotherapy,    He has history of type 2 diabetes for more than 10 years,   Metformin 1000 mg bid  Jardiance 25 mg daily  Tresiba 50 units before bedtime    He is using CGM,     Derick Richey   Device used,Geovanny 2  Home use       Indication   Type 2 Diabetes      More than 72 hours of data was reviewed. Report to be scanned to chart.     Date Range: December 8 - 21    Analysis of data:   Average Glucose: 260 mg/dl  Coefficient of Variation: 38.1%   SD : x   Time in Target Range: 25%   Time Above Range: 75%   Time Below Range: 0       Hypoglycemic episodes:   H/o of hypoglycemia causing hospitalization or Intervention such as glucagon injection  or ambulance call : no   Hypoglycemia symptoms: has awareness       Diabetes education: YES  Diet: 3-4  meals per day, Timing of meals, expected,       Opthamology: UTD  Podiatry: UTD      Has hypertension: followed by PCP; on lisinopril  Has hyperlipidemia: followed by PCP; on crestor 10 mg daily - tolerating well, no myalgias.    Thyroid disorders: no      Patient Active Problem List   Diagnosis    Right upper quadrant pain    Type 2 diabetes mellitus with hyperosmolarity without coma, without long-term current use of insulin (HCC)    Mixed hyperlipidemia    Coronary artery disease involving native coronary artery of native heart without angina pectoris    Gastroesophageal reflux disease    Primary insomnia    Dehydration    Attention deficit hyperactivity disorder (ADHD), combined type    Depression with anxiety    Right upper quadrant abdominal  pain    Primary adenocarcinoma of pancreatic duct (HCC)    SIRS (systemic inflammatory response syndrome) (HCC)      Past Medical History:   Diagnosis Date    Cancer (HCC)     pancreatic    Coronary artery disease     Diabetes mellitus (HCC)     Hyperlipidemia     Hypertension       Past Surgical History:   Procedure Laterality Date    CORONARY ANGIOPLASTY WITH STENT PLACEMENT      ERCP W/ PLASTIC STENT PLACEMENT      HERNIA REPAIR      IR PORT PLACEMENT  11/24/2023      Family History   Problem Relation Age of Onset    Heart attack Father     Skin cancer Father      Social History     Tobacco Use    Smoking status: Every Day     Current packs/day: 1.50     Average packs/day: 1.5 packs/day for 35.0 years (52.5 ttl pk-yrs)     Types: Cigarettes    Smokeless tobacco: Not on file   Substance Use Topics    Alcohol use: Never     No Known Allergies      Current Outpatient Medications:     amphetamine-dextroamphetamine (ADDERALL XR) 20 MG 24 hr capsule, Take 20 mg by mouth every morning, Disp: , Rfl:     aspirin 81 MG tablet, 1 tab(s), Disp: , Rfl:     carvedilol (COREG) 6.25 mg tablet, Take 6.25 mg by mouth 2 (two) times a day, Disp: , Rfl:     ciprofloxacin (CIPRO) 500 mg tablet, Take 1 tablet (500 mg total) by mouth every 12 (twelve) hours for 10 days, Disp: 20 tablet, Rfl: 0    Continuous Blood Gluc Sensor (FreeStyle Geovanny 3 Sensor) MISC, use as directed TO MONITOR GLUCOSE DAILY, Disp: , Rfl:     desvenlafaxine succinate (PRISTIQ) 50 mg 24 hr tablet, Take 50 mg by mouth daily, Disp: , Rfl:     famotidine (PEPCID) 40 MG tablet, Take 40 mg by mouth daily at bedtime, Disp: , Rfl:     [START ON 12/27/2023] fluorouracil 4,440 mg in CADD/Elastomeric Infusion Device, Infuse 4,440 mg (1,200 mg/m2/day x 1.85 m2) into a catheter in a vein via infusion device over 46 hours for 2 days  Infusion planned for December 27, 2023., Disp: 1 each, Rfl: 0    Jardiance 25 MG TABS, Take 1 tablet by mouth every morning, Disp: , Rfl:      lisinopril (ZESTRIL) 5 mg tablet, Take 1 tablet by mouth daily, Disp: , Rfl:     metFORMIN (GLUCOPHAGE) 1000 MG tablet, Take 1 tablet (1,000 mg total) by mouth 2 (two) times a day with meals Twice daily Do not start before December 18, 2023., Disp: , Rfl:     metroNIDAZOLE (FLAGYL) 500 mg tablet, Take 1 tablet (500 mg total) by mouth every 8 (eight) hours for 10 days, Disp: 30 tablet, Rfl: 0    omeprazole (PriLOSEC) 40 MG capsule, take 1 capsule by mouth every morning 1 hour before FIRST MEAL OF THE DAY, Disp: , Rfl:     ondansetron (ZOFRAN) 4 mg tablet, Take 1 tablet (4 mg total) by mouth every 8 (eight) hours as needed for nausea or vomiting, Disp: 20 tablet, Rfl: 0    ondansetron (ZOFRAN) 8 mg tablet, Take 1 tablet (8 mg total) by mouth every 8 (eight) hours as needed for nausea or vomiting for up to 20 days, Disp: 20 tablet, Rfl: 2    oxyCODONE (Roxicodone) 5 immediate release tablet, Take 1 tablet (5 mg total) by mouth every 4 (four) hours as needed for moderate pain Max Daily Amount: 30 mg, Disp: 10 tablet, Rfl: 0    pancrelipase, Lip-Prot-Amyl, (CREON) 12,000 units capsule, Take 12,000 units of lipase by mouth 3 (three) times a day with meals, Disp: 180 capsule, Rfl: 0    rosuvastatin (CRESTOR) 10 MG tablet, Take 1 tablet by mouth daily, Disp: , Rfl:     Tresiba FlexTouch 100 units/mL injection pen, 43u, Disp: , Rfl:     Zolpidem Tartrate (AMBIEN PO), Take by mouth, Disp: , Rfl:   Review of Systems   Constitutional:  Negative for appetite change, fatigue and unexpected weight change.   HENT:  Negative for trouble swallowing and voice change.    Eyes:  Negative for visual disturbance.   Respiratory:  Negative for cough, shortness of breath and wheezing.    Cardiovascular:  Negative for palpitations and leg swelling.   Gastrointestinal:  Negative for abdominal pain, constipation, diarrhea, nausea and vomiting.   Endocrine: Negative for cold intolerance, heat intolerance, polyphagia and polyuria.  "  Musculoskeletal:  Negative for arthralgias.   Skin:  Negative for color change, rash and wound.   Neurological:  Negative for dizziness, tremors, weakness, light-headedness, numbness and headaches.   Psychiatric/Behavioral:  Negative for agitation and sleep disturbance. The patient is not nervous/anxious.        Physical Exam:  There is no height or weight on file to calculate BMI.  There were no vitals taken for this visit.   Wt Readings from Last 3 Encounters:   12/12/23 71.2 kg (157 lb)   12/07/23 70.8 kg (156 lb)   12/06/23 70.8 kg (156 lb)       GEN: NAD  E/n/m nl facies,   Eyes: no stare or proptosis,   Neck: trachea midline, thyroid NT to palpation, nl in size, no nodules  CV; heart reg rate s1s2 nl, no m/r/g appreciated, no TREVOR  Resp: CTAB, good effort  Ab+BS  Neuro: no tremor, 2+ DTRs in BUE  MS: no c/c in digits, moves all 4 ext, nl muscle bulk, gait nl  Skin: warm and dry, no palmar erythema  Ext: no edema bilaterally, 2+ DP and PT pulses bilat,   Psych: nl mood and affect, no gross lapses in memory      Labs:   No components found for: \"HA1C\"  No components found for: \"GLU\"    Lab Results   Component Value Date    CREATININE 0.76 12/15/2023    CREATININE 0.95 12/11/2023    CREATININE 0.73 12/04/2023    BUN 16 12/15/2023    K 4.5 12/15/2023     12/15/2023    CO2 28 12/15/2023     eGFR   Date Value Ref Range Status   12/15/2023 100 ml/min/1.73sq m Final     No components found for: \"MALBCRER\"    No results found for: \"CHOL\", \"HDL\", \"TRIG\", \"CHOLHDL\"    Lab Results   Component Value Date    ALT 13 12/15/2023    AST 13 12/15/2023    ALKPHOS 90 12/15/2023       No results found for: \"TSH\", \"FREET4\", \"TSI\"    Impression:  1. Type 2 diabetes mellitus with hyperosmolarity without coma, without long-term current use of insulin (HCC)    2. Mixed hyperlipidemia    3. Primary hypertension    4. Type 2 diabetes mellitus with hyperglycemia, with long-term current use of insulin (HCC)     "       Plan:    Diagnoses and all orders for this visit:    Type 2 diabetes mellitus with hyperosmolarity without coma, without long-term current use of insulin (HCC):  Glycemic control has worsened likely secondary to steroid-induced hyperglycemia, he recently was started on chemotherapy for pancreatic cancer.  His CGM showed time in range of 25%.  I made following changes:  We started Humalog 8 units 10 to 15 minutes before meals,  Decreased Tresiba at 30 units at bedtime,  Continue metformin and jardiance.  Call the office in 2 weeks to download CGM report.  Return back in 2 months.      -     Ambulatory Referral to Endocrinology  -     insulin lispro (HumaLOG KwikPen) 100 units/mL injection pen; Inject 8 Units under the skin 3 (three) times a day with meals  -     Tresiba FlexTouch 100 units/mL injection pen; Inject 30 Units under the skin daily at bedtime    Mixed hyperlipidemia:  Continue Crestor.    Primary hypertension:  Controlled.      Discussed with the patient and all questioned fully answered. He will call me if any problems arise.    Counseled patient on diagnostic results, prognosis, risk and benefit of treatment options, instruction for management, importance of treatment compliance, Risk  factor reduction and impressions      Antoinette Veloz MD

## 2023-12-26 ENCOUNTER — APPOINTMENT (OUTPATIENT)
Dept: LAB | Facility: CLINIC | Age: 58
End: 2023-12-26
Payer: COMMERCIAL

## 2023-12-26 DIAGNOSIS — R10.11 RIGHT UPPER QUADRANT PAIN: ICD-10-CM

## 2023-12-26 LAB
ALBUMIN SERPL BCP-MCNC: 4.2 G/DL (ref 3.5–5)
ALP SERPL-CCNC: 87 U/L (ref 34–104)
ALT SERPL W P-5'-P-CCNC: 20 U/L (ref 7–52)
ANION GAP SERPL CALCULATED.3IONS-SCNC: 10 MMOL/L
AST SERPL W P-5'-P-CCNC: 20 U/L (ref 13–39)
BASOPHILS # BLD AUTO: 0.08 THOUSANDS/ÂΜL (ref 0–0.1)
BASOPHILS NFR BLD AUTO: 1 % (ref 0–1)
BILIRUB SERPL-MCNC: 0.47 MG/DL (ref 0.2–1)
BUN SERPL-MCNC: 13 MG/DL (ref 5–25)
CALCIUM SERPL-MCNC: 9.9 MG/DL (ref 8.4–10.2)
CHLORIDE SERPL-SCNC: 104 MMOL/L (ref 96–108)
CO2 SERPL-SCNC: 27 MMOL/L (ref 21–32)
CREAT SERPL-MCNC: 0.84 MG/DL (ref 0.6–1.3)
EOSINOPHIL # BLD AUTO: 0.44 THOUSAND/ÂΜL (ref 0–0.61)
EOSINOPHIL NFR BLD AUTO: 4 % (ref 0–6)
ERYTHROCYTE [DISTWIDTH] IN BLOOD BY AUTOMATED COUNT: 14 % (ref 11.6–15.1)
GFR SERPL CREATININE-BSD FRML MDRD: 96 ML/MIN/1.73SQ M
GLUCOSE P FAST SERPL-MCNC: 120 MG/DL (ref 65–99)
HCT VFR BLD AUTO: 56 % (ref 36.5–49.3)
HGB BLD-MCNC: 18.2 G/DL (ref 12–17)
IMM GRANULOCYTES # BLD AUTO: 0.06 THOUSAND/UL (ref 0–0.2)
IMM GRANULOCYTES NFR BLD AUTO: 1 % (ref 0–2)
LYMPHOCYTES # BLD AUTO: 2.41 THOUSANDS/ÂΜL (ref 0.6–4.47)
LYMPHOCYTES NFR BLD AUTO: 22 % (ref 14–44)
MCH RBC QN AUTO: 31.8 PG (ref 26.8–34.3)
MCHC RBC AUTO-ENTMCNC: 32.5 G/DL (ref 31.4–37.4)
MCV RBC AUTO: 98 FL (ref 82–98)
MONOCYTES # BLD AUTO: 1.01 THOUSAND/ÂΜL (ref 0.17–1.22)
MONOCYTES NFR BLD AUTO: 9 % (ref 4–12)
NEUTROPHILS # BLD AUTO: 7.01 THOUSANDS/ÂΜL (ref 1.85–7.62)
NEUTS SEG NFR BLD AUTO: 63 % (ref 43–75)
NRBC BLD AUTO-RTO: 0 /100 WBCS
PLATELET # BLD AUTO: 254 THOUSANDS/UL (ref 149–390)
PMV BLD AUTO: 10.2 FL (ref 8.9–12.7)
POTASSIUM SERPL-SCNC: 5.2 MMOL/L (ref 3.5–5.3)
PROT SERPL-MCNC: 7.3 G/DL (ref 6.4–8.4)
RBC # BLD AUTO: 5.72 MILLION/UL (ref 3.88–5.62)
SODIUM SERPL-SCNC: 141 MMOL/L (ref 135–147)
WBC # BLD AUTO: 11.01 THOUSAND/UL (ref 4.31–10.16)

## 2023-12-26 PROCEDURE — 36415 COLL VENOUS BLD VENIPUNCTURE: CPT

## 2023-12-26 PROCEDURE — 85025 COMPLETE CBC W/AUTO DIFF WBC: CPT

## 2023-12-26 PROCEDURE — 80053 COMPREHEN METABOLIC PANEL: CPT

## 2023-12-27 ENCOUNTER — TELEPHONE (OUTPATIENT)
Dept: ENDOCRINOLOGY | Facility: CLINIC | Age: 58
End: 2023-12-27

## 2023-12-27 ENCOUNTER — HOSPITAL ENCOUNTER (OUTPATIENT)
Dept: INFUSION CENTER | Facility: CLINIC | Age: 58
Discharge: HOME | End: 2023-12-27
Payer: COMMERCIAL

## 2023-12-27 ENCOUNTER — CONSULT (OUTPATIENT)
Dept: PALLIATIVE MEDICINE | Facility: CLINIC | Age: 58
End: 2023-12-27
Payer: COMMERCIAL

## 2023-12-27 VITALS
DIASTOLIC BLOOD PRESSURE: 79 MMHG | TEMPERATURE: 96.6 F | BODY MASS INDEX: 23.61 KG/M2 | WEIGHT: 159.4 LBS | HEIGHT: 69 IN | HEART RATE: 86 BPM | RESPIRATION RATE: 18 BRPM | SYSTOLIC BLOOD PRESSURE: 134 MMHG

## 2023-12-27 VITALS
HEIGHT: 70 IN | WEIGHT: 161.4 LBS | BODY MASS INDEX: 23.11 KG/M2 | OXYGEN SATURATION: 96 % | DIASTOLIC BLOOD PRESSURE: 78 MMHG | TEMPERATURE: 97.9 F | RESPIRATION RATE: 18 BRPM | HEART RATE: 92 BPM | SYSTOLIC BLOOD PRESSURE: 124 MMHG

## 2023-12-27 DIAGNOSIS — E86.0 DEHYDRATION: ICD-10-CM

## 2023-12-27 DIAGNOSIS — C25.9 PANCREATIC ADENOCARCINOMA (HCC): ICD-10-CM

## 2023-12-27 DIAGNOSIS — C25.3 PRIMARY ADENOCARCINOMA OF PANCREATIC DUCT (HCC): Primary | ICD-10-CM

## 2023-12-27 DIAGNOSIS — R10.11 RIGHT UPPER QUADRANT PAIN: Primary | ICD-10-CM

## 2023-12-27 DIAGNOSIS — Z51.5 PALLIATIVE CARE PATIENT: ICD-10-CM

## 2023-12-27 PROCEDURE — 96413 CHEMO IV INFUSION 1 HR: CPT

## 2023-12-27 PROCEDURE — 96367 TX/PROPH/DG ADDL SEQ IV INF: CPT

## 2023-12-27 PROCEDURE — 96417 CHEMO IV INFUS EACH ADDL SEQ: CPT

## 2023-12-27 PROCEDURE — 96415 CHEMO IV INFUSION ADDL HR: CPT

## 2023-12-27 PROCEDURE — 96375 TX/PRO/DX INJ NEW DRUG ADDON: CPT

## 2023-12-27 PROCEDURE — 99205 OFFICE O/P NEW HI 60 MIN: CPT | Performed by: STUDENT IN AN ORGANIZED HEALTH CARE EDUCATION/TRAINING PROGRAM

## 2023-12-27 PROCEDURE — G0498 CHEMO EXTEND IV INFUS W/PUMP: HCPCS

## 2023-12-27 RX ORDER — ATROPINE SULFATE 1 MG/ML
0.25 INJECTION, SOLUTION INTRAVENOUS ONCE AS NEEDED
Status: DISCONTINUED | OUTPATIENT
Start: 2023-12-27 | End: 2023-12-30 | Stop reason: HOSPADM

## 2023-12-27 RX ORDER — DEXTROSE MONOHYDRATE 50 MG/ML
20 INJECTION, SOLUTION INTRAVENOUS ONCE
Status: COMPLETED | OUTPATIENT
Start: 2023-12-27 | End: 2023-12-27

## 2023-12-27 RX ORDER — SODIUM CHLORIDE 9 MG/ML
20 INJECTION, SOLUTION INTRAVENOUS ONCE AS NEEDED
Status: CANCELLED | OUTPATIENT
Start: 2023-12-27

## 2023-12-27 RX ORDER — SODIUM CHLORIDE 9 MG/ML
20 INJECTION, SOLUTION INTRAVENOUS ONCE AS NEEDED
Status: DISCONTINUED | OUTPATIENT
Start: 2023-12-27 | End: 2023-12-30 | Stop reason: HOSPADM

## 2023-12-27 RX ORDER — ATROPINE SULFATE 1 MG/ML
0.25 INJECTION, SOLUTION INTRAVENOUS ONCE
Status: CANCELLED | OUTPATIENT
Start: 2023-12-27

## 2023-12-27 RX ORDER — ATROPINE SULFATE 1 MG/ML
0.25 INJECTION, SOLUTION INTRAVENOUS ONCE
Status: COMPLETED | OUTPATIENT
Start: 2023-12-27 | End: 2023-12-27

## 2023-12-27 RX ORDER — DEXTROSE MONOHYDRATE 50 MG/ML
20 INJECTION, SOLUTION INTRAVENOUS ONCE
Status: CANCELLED | OUTPATIENT
Start: 2023-12-27

## 2023-12-27 RX ORDER — ATROPINE SULFATE 1 MG/ML
0.25 INJECTION, SOLUTION INTRAVENOUS ONCE AS NEEDED
Status: CANCELLED | OUTPATIENT
Start: 2023-12-27

## 2023-12-27 RX ADMIN — DEXAMETHASONE SODIUM PHOSPHATE: 10 INJECTION, SOLUTION INTRAMUSCULAR; INTRAVENOUS at 12:40

## 2023-12-27 RX ADMIN — DEXTROSE 20 ML/HR: 5 SOLUTION INTRAVENOUS at 14:10

## 2023-12-27 RX ADMIN — SODIUM CHLORIDE 20 ML/HR: 0.9 INJECTION, SOLUTION INTRAVENOUS at 12:40

## 2023-12-27 RX ADMIN — FAMOTIDINE 20 MG: 10 INJECTION, SOLUTION INTRAVENOUS at 13:34

## 2023-12-27 RX ADMIN — DIPHENHYDRAMINE HYDROCHLORIDE 25 MG: 50 INJECTION, SOLUTION INTRAMUSCULAR; INTRAVENOUS at 13:13

## 2023-12-27 RX ADMIN — IRINOTECAN HYDROCHLORIDE 231 MG: 20 INJECTION, SOLUTION INTRAVENOUS at 16:36

## 2023-12-27 RX ADMIN — ATROPINE SULFATE 0.25 MG: 1 INJECTION, SOLUTION INTRAVENOUS at 16:32

## 2023-12-27 RX ADMIN — OXALIPLATIN 120.25 MG: 5 INJECTION, SOLUTION INTRAVENOUS at 14:20

## 2023-12-27 NOTE — PROGRESS NOTES
Pt presents for FOLFIRINOX offering no compliants. Pt tolerated treatment without incident. Chemo ball connected with clamps open. Patient aware of when to return for chemo ball removal. AVS declined. Next appointment reviewed on 12/29 at 1630.

## 2023-12-27 NOTE — TELEPHONE ENCOUNTER
Started prior authorization with covermymeds on 12/27/23. For medication HumaLog Kwik Pen 100 unit ml pen-injectors. Key code :[id number]

## 2023-12-28 PROBLEM — C25.9 PANCREATIC ADENOCARCINOMA (HCC): Status: ACTIVE | Noted: 2023-12-28

## 2023-12-29 ENCOUNTER — TELEPHONE (OUTPATIENT)
Dept: GENETICS | Facility: CLINIC | Age: 58
End: 2023-12-29

## 2023-12-29 ENCOUNTER — HOSPITAL ENCOUNTER (OUTPATIENT)
Dept: INFUSION CENTER | Facility: CLINIC | Age: 58
End: 2023-12-29
Payer: COMMERCIAL

## 2023-12-29 DIAGNOSIS — E86.0 DEHYDRATION: ICD-10-CM

## 2023-12-29 DIAGNOSIS — Z79.899 HIGH RISK MEDICATION USE: ICD-10-CM

## 2023-12-29 DIAGNOSIS — K13.79 MOUTH SORES: Primary | ICD-10-CM

## 2023-12-29 DIAGNOSIS — R10.11 RIGHT UPPER QUADRANT PAIN: Primary | ICD-10-CM

## 2023-12-29 PROCEDURE — 96360 HYDRATION IV INFUSION INIT: CPT

## 2023-12-29 RX ORDER — DIPHENHYDRAMINE HYDROCHLORIDE AND LIDOCAINE HYDROCHLORIDE AND ALUMINUM HYDROXIDE AND MAGNESIUM HYDRO
10 KIT EVERY 4 HOURS PRN
Qty: 237 ML | Refills: 2 | Status: SHIPPED | OUTPATIENT
Start: 2023-12-29 | End: 2024-01-02 | Stop reason: SDUPTHER

## 2023-12-29 RX ADMIN — SODIUM CHLORIDE 1000 ML: 0.9 INJECTION, SOLUTION INTRAVENOUS at 16:40

## 2023-12-29 NOTE — PROGRESS NOTES
Pt here for Chemo ball disconnect.  Ball appears deflated. And ran for 46+ hours.  Pt ostates that they were supposed to order him IVF on disconnect.  We gave him 1 L NSS over 1 hour.  Message sent to office to add that to his plan and adjust appt time   Port flushed and de-accessed.  AVS declined.  Next appt 1/10 12:30.  Walked out in stable condition.

## 2023-12-29 NOTE — TELEPHONE ENCOUNTER
Post-Test Genetic Counseling Consult Note    Today I spoke with Derick over the phone to review the results of his genetic test for hereditary cancer. Derick met previously with Kiara Solis on 11/8/23 for pre-test counseling.  A copy of this consult note and genetic test result will be shared with the patient.      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 The significance of the  NTHL1  variant is currently not known and therefore this test result cannot be used to help determine Derick cancer risks.      It is possible that the variant was seen in only a handful of individuals, or there may be conflicting or incomplete information in the medical literature about the variant and its association with hereditary cancer.     The laboratory will continue to accumulate information on this variant and will reclassify it as either a positive or negative genetic test result when they are confident that they have adequate information. We will notify Derick as updated information is obtained. It is important to note that the majority of variants of uncertain significance are reclassified as likely benign or benign as additional information about the variant  becomes available.     Risk Based on Family History:  The significance of this variant is currently not known and therefore this test result cannot be used to help determine Derick cancer risks. Rather his personal medical history and family history of cancer are the most important factors used to estimate his risk for developing certain cancers and to direct his medical management.     Risks and Testing for Family Members:  Genetic testing for this variant is not recommended for relatives who wish to determine their cancer risks for purposes of determining medical management. The presence or absence of this variant in a relative is not clinically meaningful unless the variant is reclassified in the future.     Despite this result, Derick's first-degree relatives may be at increased risk for the cancers based on the family history. We recommend they discuss screening and management recommendations with their healthcare providers.    If Derick has any affected family members with a cancer diagnosis, especially at a young age, they may still consider genetic testing. Relatives who wish to pursue genetic testing can reach out to the [facility] [phone] to schedule an appointment or visit [email or web address] to identify a local genetic counselor.     Plan:   There are no additional recommendations based on Derick's result. he should continue cancer screening and medical management as clinically indicated and as determined appropriate by his healthcare providers.    VUS Result: Derick was strongly encouraged to contact us regarding any changes in his personal or family history of cancer as these changes could alter our recommendation regarding genetic testing and/or cancer screening. Derick was also encouraged to follow up with us on an annual basis as variant classifications are subject to change.

## 2024-01-01 ENCOUNTER — TELEPHONE (OUTPATIENT)
Age: 59
End: 2024-01-01

## 2024-01-01 ENCOUNTER — TRANSITIONAL CARE MANAGEMENT (OUTPATIENT)
Dept: FAMILY MEDICINE CLINIC | Facility: CLINIC | Age: 59
End: 2024-01-01

## 2024-01-01 ENCOUNTER — HOSPITAL ENCOUNTER (INPATIENT)
Facility: HOSPITAL | Age: 59
LOS: 1 days | Discharge: HOSPICE | DRG: 377 | End: 2024-09-15
Attending: EMERGENCY MEDICINE | Admitting: STUDENT IN AN ORGANIZED HEALTH CARE EDUCATION/TRAINING PROGRAM
Payer: COMMERCIAL

## 2024-01-01 ENCOUNTER — NURSE TRIAGE (OUTPATIENT)
Age: 59
End: 2024-01-01

## 2024-01-01 ENCOUNTER — TELEPHONE (OUTPATIENT)
Dept: HEMATOLOGY ONCOLOGY | Facility: CLINIC | Age: 59
End: 2024-01-01

## 2024-01-01 ENCOUNTER — APPOINTMENT (EMERGENCY)
Dept: RADIOLOGY | Facility: HOSPITAL | Age: 59
DRG: 377 | End: 2024-01-01
Payer: COMMERCIAL

## 2024-01-01 ENCOUNTER — TELEPHONE (OUTPATIENT)
Dept: OTHER | Facility: OTHER | Age: 59
End: 2024-01-01

## 2024-01-01 ENCOUNTER — DOCUMENTATION (OUTPATIENT)
Dept: OTHER | Facility: HOSPITAL | Age: 59
End: 2024-01-01

## 2024-01-01 VITALS
SYSTOLIC BLOOD PRESSURE: 83 MMHG | WEIGHT: 156.97 LBS | TEMPERATURE: 98.1 F | DIASTOLIC BLOOD PRESSURE: 50 MMHG | BODY MASS INDEX: 24.64 KG/M2 | RESPIRATION RATE: 19 BRPM | HEIGHT: 67 IN | HEART RATE: 111 BPM | OXYGEN SATURATION: 92 %

## 2024-01-01 DIAGNOSIS — Z51.5 COMFORT MEASURES ONLY STATUS: ICD-10-CM

## 2024-01-01 DIAGNOSIS — R57.8 HEMORRHAGIC SHOCK (HCC): Primary | ICD-10-CM

## 2024-01-01 DIAGNOSIS — K59.03 DRUG-INDUCED CONSTIPATION: ICD-10-CM

## 2024-01-01 DIAGNOSIS — C25.3 PRIMARY ADENOCARCINOMA OF PANCREATIC DUCT (HCC): ICD-10-CM

## 2024-01-01 DIAGNOSIS — G89.3 CANCER RELATED PAIN: ICD-10-CM

## 2024-01-01 DIAGNOSIS — T68.XXXA HYPOTHERMIA, INITIAL ENCOUNTER: ICD-10-CM

## 2024-01-01 DIAGNOSIS — R11.2 NAUSEA AND VOMITING, UNSPECIFIED VOMITING TYPE: Primary | ICD-10-CM

## 2024-01-01 DIAGNOSIS — D62 ACUTE BLOOD LOSS ANEMIA: ICD-10-CM

## 2024-01-01 DIAGNOSIS — R55 SYNCOPE: ICD-10-CM

## 2024-01-01 DIAGNOSIS — K92.2 GI BLEED: ICD-10-CM

## 2024-01-01 LAB
ABO GROUP BLD BPU: NORMAL
ABO GROUP BLD: NORMAL
ALBUMIN SERPL BCG-MCNC: 2.3 G/DL (ref 3.5–5)
ALP SERPL-CCNC: 73 U/L (ref 34–104)
ALT SERPL W P-5'-P-CCNC: 10 U/L (ref 7–52)
ANION GAP SERPL CALCULATED.3IONS-SCNC: 9 MMOL/L (ref 4–13)
APTT PPP: 33 SECONDS (ref 23–34)
AST SERPL W P-5'-P-CCNC: 13 U/L (ref 13–39)
ATRIAL RATE: 88 BPM
BASE EX.OXY STD BLDV CALC-SCNC: 52.1 % (ref 60–80)
BASE EXCESS BLDA CALC-SCNC: -5 MMOL/L (ref -2–3)
BASE EXCESS BLDV CALC-SCNC: -3.6 MMOL/L
BASOPHILS # BLD AUTO: 0.03 THOUSANDS/ΜL (ref 0–0.1)
BASOPHILS NFR BLD AUTO: 0 % (ref 0–1)
BILIRUB DIRECT SERPL-MCNC: 0.11 MG/DL (ref 0–0.2)
BILIRUB SERPL-MCNC: 0.42 MG/DL (ref 0.2–1)
BLD GP AB SCN SERPL QL: NEGATIVE
BPU ID: NORMAL
BUN SERPL-MCNC: 12 MG/DL (ref 5–25)
CA-I BLD-SCNC: 1.04 MMOL/L (ref 1.12–1.32)
CALCIUM SERPL-MCNC: 6.9 MG/DL (ref 8.4–10.2)
CARDIAC TROPONIN I PNL SERPL HS: 4 NG/L
CHLORIDE SERPL-SCNC: 109 MMOL/L (ref 96–108)
CO2 SERPL-SCNC: 22 MMOL/L (ref 21–32)
CREAT SERPL-MCNC: 0.68 MG/DL (ref 0.6–1.3)
CROSSMATCH: NORMAL
EOSINOPHIL # BLD AUTO: 0.04 THOUSAND/ΜL (ref 0–0.61)
EOSINOPHIL NFR BLD AUTO: 1 % (ref 0–6)
ERYTHROCYTE [DISTWIDTH] IN BLOOD BY AUTOMATED COUNT: 14.6 % (ref 11.6–15.1)
GFR SERPL CREATININE-BSD FRML MDRD: 105 ML/MIN/1.73SQ M
GLUCOSE SERPL-MCNC: 202 MG/DL (ref 65–140)
GLUCOSE SERPL-MCNC: 214 MG/DL (ref 65–140)
GLUCOSE SERPL-MCNC: 216 MG/DL (ref 65–140)
HCO3 BLDA-SCNC: 20.8 MMOL/L (ref 24–30)
HCO3 BLDV-SCNC: 22.8 MMOL/L (ref 24–30)
HCT VFR BLD AUTO: 25.6 % (ref 36.5–49.3)
HCT VFR BLD CALC: 22 % (ref 36.5–49.3)
HGB BLD-MCNC: 8.3 G/DL (ref 12–17)
HGB BLDA-MCNC: 7.5 G/DL (ref 12–17)
IMM GRANULOCYTES # BLD AUTO: 0.09 THOUSAND/UL (ref 0–0.2)
IMM GRANULOCYTES NFR BLD AUTO: 1 % (ref 0–2)
INR PPP: 1.31 (ref 0.85–1.19)
LACTATE SERPL-SCNC: 4.4 MMOL/L (ref 0.5–2)
LIPASE SERPL-CCNC: <6 U/L (ref 11–82)
LYMPHOCYTES # BLD AUTO: 0.41 THOUSANDS/ΜL (ref 0.6–4.47)
LYMPHOCYTES NFR BLD AUTO: 5 % (ref 14–44)
MAGNESIUM SERPL-MCNC: 2 MG/DL (ref 1.9–2.7)
MCH RBC QN AUTO: 31.7 PG (ref 26.8–34.3)
MCHC RBC AUTO-ENTMCNC: 32.4 G/DL (ref 31.4–37.4)
MCV RBC AUTO: 98 FL (ref 82–98)
MONOCYTES # BLD AUTO: 0.55 THOUSAND/ΜL (ref 0.17–1.22)
MONOCYTES NFR BLD AUTO: 7 % (ref 4–12)
NEUTROPHILS # BLD AUTO: 7.32 THOUSANDS/ΜL (ref 1.85–7.62)
NEUTS SEG NFR BLD AUTO: 86 % (ref 43–75)
NRBC BLD AUTO-RTO: 0 /100 WBCS
O2 CT BLDV-SCNC: 7.1 ML/DL
P AXIS: 71 DEGREES
PCO2 BLD: 22 MMOL/L (ref 21–32)
PCO2 BLD: 41.6 MM HG (ref 42–50)
PCO2 BLDV: 47.7 MM HG (ref 42–50)
PH BLD: 7.31 [PH] (ref 7.3–7.4)
PH BLDV: 7.3 [PH] (ref 7.3–7.4)
PLATELET # BLD AUTO: 209 THOUSANDS/UL (ref 149–390)
PMV BLD AUTO: 9.7 FL (ref 8.9–12.7)
PO2 BLD: 19 MM HG (ref 35–45)
PO2 BLDV: 30.3 MM HG (ref 35–45)
POTASSIUM BLD-SCNC: 3.5 MMOL/L (ref 3.5–5.3)
POTASSIUM SERPL-SCNC: 3.6 MMOL/L (ref 3.5–5.3)
PR INTERVAL: 128 MS
PROCALCITONIN SERPL-MCNC: 0.12 NG/ML
PROT SERPL-MCNC: 4.3 G/DL (ref 6.4–8.4)
PROTHROMBIN TIME: 17 SECONDS (ref 12.3–15)
QRS AXIS: 76 DEGREES
QRSD INTERVAL: 86 MS
QT INTERVAL: 464 MS
QTC INTERVAL: 561 MS
RBC # BLD AUTO: 2.62 MILLION/UL (ref 3.88–5.62)
RH BLD: POSITIVE
SAO2 % BLD FROM PO2: 26 % (ref 60–85)
SODIUM BLD-SCNC: 141 MMOL/L (ref 136–145)
SODIUM SERPL-SCNC: 140 MMOL/L (ref 135–147)
SPECIMEN EXPIRATION DATE: NORMAL
SPECIMEN SOURCE: ABNORMAL
T WAVE AXIS: 71 DEGREES
UNIT DISPENSE STATUS: NORMAL
UNIT PRODUCT CODE: NORMAL
UNIT PRODUCT VOLUME: 300 ML
UNIT PRODUCT VOLUME: 300 ML
UNIT PRODUCT VOLUME: 350 ML
UNIT PRODUCT VOLUME: 350 ML
UNIT RH: NORMAL
VENTRICULAR RATE: 88 BPM
WBC # BLD AUTO: 8.44 THOUSAND/UL (ref 4.31–10.16)

## 2024-01-01 PROCEDURE — 87040 BLOOD CULTURE FOR BACTERIA: CPT | Performed by: EMERGENCY MEDICINE

## 2024-01-01 PROCEDURE — NC001 PR NO CHARGE: Performed by: NURSE PRACTITIONER

## 2024-01-01 PROCEDURE — 82330 ASSAY OF CALCIUM: CPT

## 2024-01-01 PROCEDURE — 36430 TRANSFUSION BLD/BLD COMPNT: CPT

## 2024-01-01 PROCEDURE — 83690 ASSAY OF LIPASE: CPT | Performed by: EMERGENCY MEDICINE

## 2024-01-01 PROCEDURE — 82948 REAGENT STRIP/BLOOD GLUCOSE: CPT

## 2024-01-01 PROCEDURE — 80048 BASIC METABOLIC PNL TOTAL CA: CPT | Performed by: EMERGENCY MEDICINE

## 2024-01-01 PROCEDURE — 84295 ASSAY OF SERUM SODIUM: CPT

## 2024-01-01 PROCEDURE — 99291 CRITICAL CARE FIRST HOUR: CPT | Performed by: EMERGENCY MEDICINE

## 2024-01-01 PROCEDURE — 86850 RBC ANTIBODY SCREEN: CPT | Performed by: EMERGENCY MEDICINE

## 2024-01-01 PROCEDURE — 84132 ASSAY OF SERUM POTASSIUM: CPT

## 2024-01-01 PROCEDURE — 96375 TX/PRO/DX INJ NEW DRUG ADDON: CPT

## 2024-01-01 PROCEDURE — 85014 HEMATOCRIT: CPT

## 2024-01-01 PROCEDURE — 71045 X-RAY EXAM CHEST 1 VIEW: CPT

## 2024-01-01 PROCEDURE — 85730 THROMBOPLASTIN TIME PARTIAL: CPT | Performed by: EMERGENCY MEDICINE

## 2024-01-01 PROCEDURE — 99223 1ST HOSP IP/OBS HIGH 75: CPT | Performed by: STUDENT IN AN ORGANIZED HEALTH CARE EDUCATION/TRAINING PROGRAM

## 2024-01-01 PROCEDURE — 82805 BLOOD GASES W/O2 SATURATION: CPT | Performed by: EMERGENCY MEDICINE

## 2024-01-01 PROCEDURE — 82803 BLOOD GASES ANY COMBINATION: CPT

## 2024-01-01 PROCEDURE — 86920 COMPATIBILITY TEST SPIN: CPT

## 2024-01-01 PROCEDURE — 36415 COLL VENOUS BLD VENIPUNCTURE: CPT | Performed by: EMERGENCY MEDICINE

## 2024-01-01 PROCEDURE — 93010 ELECTROCARDIOGRAM REPORT: CPT | Performed by: INTERNAL MEDICINE

## 2024-01-01 PROCEDURE — 86901 BLOOD TYPING SEROLOGIC RH(D): CPT | Performed by: EMERGENCY MEDICINE

## 2024-01-01 PROCEDURE — P9016 RBC LEUKOCYTES REDUCED: HCPCS

## 2024-01-01 PROCEDURE — 84145 PROCALCITONIN (PCT): CPT | Performed by: EMERGENCY MEDICINE

## 2024-01-01 PROCEDURE — 83605 ASSAY OF LACTIC ACID: CPT | Performed by: EMERGENCY MEDICINE

## 2024-01-01 PROCEDURE — 86900 BLOOD TYPING SEROLOGIC ABO: CPT | Performed by: EMERGENCY MEDICINE

## 2024-01-01 PROCEDURE — 96365 THER/PROPH/DIAG IV INF INIT: CPT

## 2024-01-01 PROCEDURE — 99291 CRITICAL CARE FIRST HOUR: CPT

## 2024-01-01 PROCEDURE — 80076 HEPATIC FUNCTION PANEL: CPT | Performed by: EMERGENCY MEDICINE

## 2024-01-01 PROCEDURE — 83735 ASSAY OF MAGNESIUM: CPT | Performed by: EMERGENCY MEDICINE

## 2024-01-01 PROCEDURE — 84484 ASSAY OF TROPONIN QUANT: CPT | Performed by: EMERGENCY MEDICINE

## 2024-01-01 PROCEDURE — 93005 ELECTROCARDIOGRAM TRACING: CPT

## 2024-01-01 PROCEDURE — 82947 ASSAY GLUCOSE BLOOD QUANT: CPT

## 2024-01-01 PROCEDURE — 85025 COMPLETE CBC W/AUTO DIFF WBC: CPT | Performed by: EMERGENCY MEDICINE

## 2024-01-01 PROCEDURE — 85610 PROTHROMBIN TIME: CPT | Performed by: EMERGENCY MEDICINE

## 2024-01-01 RX ORDER — SENNOSIDES 8.6 MG
8.6 TABLET ORAL
Qty: 14 TABLET | Refills: 0 | Status: SHIPPED | OUTPATIENT
Start: 2024-01-01 | End: 2024-01-01

## 2024-01-01 RX ORDER — MORPHINE SULFATE 100 MG/5ML
5 SOLUTION, CONCENTRATE ORAL EVERY 2 HOUR PRN
Qty: 3 ML | Refills: 0 | Status: SHIPPED | OUTPATIENT
Start: 2024-01-01 | End: 2024-01-01

## 2024-01-01 RX ORDER — NOREPINEPHRINE BITARTRATE 1 MG/ML
INJECTION, SOLUTION INTRAVENOUS
Status: DISCONTINUED
Start: 2024-01-01 | End: 2024-01-01 | Stop reason: WASHOUT

## 2024-01-01 RX ORDER — PROCHLORPERAZINE MALEATE 10 MG
5 TABLET ORAL EVERY 6 HOURS PRN
Qty: 20 TABLET | Refills: 0 | Status: SHIPPED | OUTPATIENT
Start: 2024-01-01 | End: 2024-09-20

## 2024-01-01 RX ORDER — MORPHINE SULFATE 100 MG/5ML
5 SOLUTION, CONCENTRATE ORAL EVERY 2 HOUR PRN
Qty: 15 ML | Refills: 0 | Status: SHIPPED | OUTPATIENT
Start: 2024-01-01 | End: 2024-09-20

## 2024-01-01 RX ORDER — METOCLOPRAMIDE HYDROCHLORIDE 5 MG/ML
10 INJECTION INTRAMUSCULAR; INTRAVENOUS ONCE
Status: COMPLETED | OUTPATIENT
Start: 2024-01-01 | End: 2024-01-01

## 2024-01-01 RX ORDER — ACETAMINOPHEN 325 MG/1
650 TABLET ORAL EVERY 6 HOURS PRN
Qty: 60 TABLET | Refills: 0 | Status: SHIPPED | OUTPATIENT
Start: 2024-01-01 | End: 2024-09-20

## 2024-01-01 RX ORDER — LORAZEPAM 2 MG/ML
1 INJECTION INTRAMUSCULAR
Status: DISCONTINUED | OUTPATIENT
Start: 2024-01-01 | End: 2024-01-01 | Stop reason: HOSPADM

## 2024-01-01 RX ORDER — ACETAMINOPHEN 325 MG/1
650 TABLET ORAL EVERY 6 HOURS PRN
Status: DISCONTINUED | OUTPATIENT
Start: 2024-01-01 | End: 2024-01-01 | Stop reason: HOSPADM

## 2024-01-01 RX ORDER — ONDANSETRON 2 MG/ML
4 INJECTION INTRAMUSCULAR; INTRAVENOUS EVERY 4 HOURS PRN
Status: DISCONTINUED | OUTPATIENT
Start: 2024-01-01 | End: 2024-01-01 | Stop reason: HOSPADM

## 2024-01-01 RX ORDER — GLYCOPYRROLATE 0.2 MG/ML
0.1 INJECTION INTRAMUSCULAR; INTRAVENOUS EVERY 4 HOURS PRN
Status: DISCONTINUED | OUTPATIENT
Start: 2024-01-01 | End: 2024-01-01 | Stop reason: HOSPADM

## 2024-01-01 RX ORDER — ONDANSETRON 2 MG/ML
2 INJECTION INTRAMUSCULAR; INTRAVENOUS ONCE
Status: COMPLETED | OUTPATIENT
Start: 2024-01-01 | End: 2024-01-01

## 2024-01-01 RX ORDER — EPINEPHRINE 0.1 MG/ML
1 SYRINGE (ML) INJECTION ONCE
Status: COMPLETED | OUTPATIENT
Start: 2024-01-01 | End: 2024-01-01

## 2024-01-01 RX ORDER — CHLORHEXIDINE GLUCONATE ORAL RINSE 1.2 MG/ML
15 SOLUTION DENTAL EVERY 12 HOURS SCHEDULED
Status: DISCONTINUED | OUTPATIENT
Start: 2024-01-01 | End: 2024-01-01

## 2024-01-01 RX ORDER — HALOPERIDOL 5 MG/ML
0.5 INJECTION INTRAMUSCULAR EVERY 2 HOUR PRN
Status: DISCONTINUED | OUTPATIENT
Start: 2024-01-01 | End: 2024-01-01 | Stop reason: HOSPADM

## 2024-01-01 RX ORDER — MORPHINE SULFATE 4 MG/ML
4 INJECTION, SOLUTION INTRAMUSCULAR; INTRAVENOUS
Status: DISCONTINUED | OUTPATIENT
Start: 2024-01-01 | End: 2024-01-01 | Stop reason: HOSPADM

## 2024-01-01 RX ADMIN — LORAZEPAM 1 MG: 2 INJECTION INTRAMUSCULAR; INTRAVENOUS at 01:22

## 2024-01-01 RX ADMIN — SODIUM CHLORIDE, SODIUM LACTATE, POTASSIUM CHLORIDE, AND CALCIUM CHLORIDE 1000 ML: .6; .31; .03; .02 INJECTION, SOLUTION INTRAVENOUS at 23:18

## 2024-01-01 RX ADMIN — MORPHINE SULFATE 4 MG: 4 INJECTION INTRAVENOUS at 01:22

## 2024-01-01 RX ADMIN — ACETAMINOPHEN 650 MG: 325 TABLET, FILM COATED ORAL at 07:38

## 2024-01-01 RX ADMIN — MORPHINE SULFATE 4 MG: 4 INJECTION INTRAVENOUS at 07:24

## 2024-01-01 RX ADMIN — SODIUM CHLORIDE 1000 ML: 0.9 INJECTION, SOLUTION INTRAVENOUS at 23:17

## 2024-01-01 RX ADMIN — METOCLOPRAMIDE 10 MG: 5 INJECTION, SOLUTION INTRAMUSCULAR; INTRAVENOUS at 01:33

## 2024-01-01 RX ADMIN — MORPHINE SULFATE 4 MG: 4 INJECTION INTRAVENOUS at 05:21

## 2024-01-01 RX ADMIN — SODIUM CHLORIDE 80 MG: 9 INJECTION, SOLUTION INTRAVENOUS at 23:14

## 2024-01-01 RX ADMIN — DESMOPRESSIN ACETATE 21.2 MCG: 4 INJECTION, SOLUTION INTRAVENOUS; SUBCUTANEOUS at 23:32

## 2024-01-02 DIAGNOSIS — R10.11 RIGHT UPPER QUADRANT PAIN: Primary | ICD-10-CM

## 2024-01-02 DIAGNOSIS — Z79.899 HIGH RISK MEDICATION USE: ICD-10-CM

## 2024-01-02 DIAGNOSIS — K13.79 MOUTH SORES: ICD-10-CM

## 2024-01-03 ENCOUNTER — TELEPHONE (OUTPATIENT)
Dept: HEMATOLOGY ONCOLOGY | Facility: CLINIC | Age: 59
End: 2024-01-03

## 2024-01-03 ENCOUNTER — OFFICE VISIT (OUTPATIENT)
Dept: HEMATOLOGY ONCOLOGY | Facility: CLINIC | Age: 59
End: 2024-01-03
Payer: COMMERCIAL

## 2024-01-03 VITALS
DIASTOLIC BLOOD PRESSURE: 82 MMHG | TEMPERATURE: 98.2 F | RESPIRATION RATE: 16 BRPM | BODY MASS INDEX: 23.7 KG/M2 | OXYGEN SATURATION: 95 % | WEIGHT: 160 LBS | HEIGHT: 69 IN | HEART RATE: 68 BPM | SYSTOLIC BLOOD PRESSURE: 126 MMHG

## 2024-01-03 DIAGNOSIS — K13.79 MOUTH SORES: ICD-10-CM

## 2024-01-03 DIAGNOSIS — C25.9 PANCREATIC ADENOCARCINOMA (HCC): ICD-10-CM

## 2024-01-03 DIAGNOSIS — E11.00 TYPE 2 DIABETES MELLITUS WITH HYPEROSMOLARITY WITHOUT COMA, WITHOUT LONG-TERM CURRENT USE OF INSULIN (HCC): ICD-10-CM

## 2024-01-03 DIAGNOSIS — Z79.899 HIGH RISK MEDICATION USE: ICD-10-CM

## 2024-01-03 DIAGNOSIS — R10.11 RIGHT UPPER QUADRANT PAIN: Primary | ICD-10-CM

## 2024-01-03 PROCEDURE — 99214 OFFICE O/P EST MOD 30 MIN: CPT | Performed by: INTERNAL MEDICINE

## 2024-01-03 RX ORDER — DIPHENHYDRAMINE HYDROCHLORIDE AND LIDOCAINE HYDROCHLORIDE AND ALUMINUM HYDROXIDE AND MAGNESIUM HYDRO
10 KIT EVERY 4 HOURS PRN
Qty: 237 ML | Refills: 2 | Status: SHIPPED | OUTPATIENT
Start: 2024-01-03

## 2024-01-03 RX ORDER — DIPHENHYDRAMINE HYDROCHLORIDE AND LIDOCAINE HYDROCHLORIDE AND ALUMINUM HYDROXIDE AND MAGNESIUM HYDRO
10 KIT EVERY 4 HOURS PRN
Qty: 237 ML | Refills: 2 | Status: SHIPPED | OUTPATIENT
Start: 2024-01-03 | End: 2024-01-03 | Stop reason: SDUPTHER

## 2024-01-03 NOTE — PROGRESS NOTES
[facility] HEMATOLOGY ONCOLOGY SPECIALISTS [place]  [address] [address]    Derick Rossi  1965      PRIMARY HEMATOLOGIC/ONCOLOGIC DIAGNOSIS:  Adenocarcinoma of the pancreas. CA 19-9 of 932 at time of diagnosis.     PATHOLOGY:   Final Diagnosis   A.B. Pancreas, Neck mass (Cell Block and smear Preparations):  Malignant  Adenocarcinoma.     Satisfactory for evaluation.        Note: As reported in the “WHO Reporting System for Pancreaticobiliary Cytopathology *” the diagnostic category of “malignant” carries a % risk of malignancy being found in subsequent FNAB or resection.  The usual management following an initial diagnosis of “malignant” is per clinical stage. Ultimately clinical and radiologic correlation is needed for this patient in arriving at the actual management plan.         STAGING: Cancer Staging   No matching staging information was found for the patient.       Oncology History Overview Note   Who was diagnosed with  locally advanced pancreas cancer. He presented to the ED with new onset jaundice and CT revealed an ill-defined hypoattenuating mass within the neck of the pancreas with subsequent dilatation and obstruction of the biliary tree and pancreatic duct. He underwent EUS & ERCP with biopsy. He was imitated on neoadjuvant chemotherapy. He is being referred by Dr. Dumas and presents today for consult.      10/23/23 ED- new onset jaundice    10/23/23 CT A/P w contrast  Ill-defined hypoattenuating mass within the neck of the pancreas with subsequent dilatation and obstruction of the biliary tree and pancreatic duct. Highly likely to represent pancreatic neoplasm. See above for further details. Partially encasing the   main portal vein at the confluence with splenic vein and SMV with approximately 50% narrowing of the caliber at this level and dilatation of the SMV. No evidence for distant metastatic disease evident.   Several small  "nodules within the left adrenal with features suggesting benign etiology as described above.      10/30/23 ERCP   The common bile duct was deeply cannulated after 1 attempt using a traction sphincterotome with 260 cm x 0.035\" guidewire. Cannulation was not difficult. Contrast was injected  Extrinsic and severe stricture was visualized in the mid common bile duct. The stricture was traversable by wire  Dilation was observed in the proximal common bile duct  Medium-sized biliary sphincterotomy was performed using a sphincterotome. No bleeding was noted at the procedure site.  One 8 mm x 6 cm fully covered metal stent was placed in the common bile duct. There was good bile and contrast drainage seen.  The fluoroscopy images for the , cholangiogram and post procedure were personally reviewed and interpreted during the procedure.      10/30/23 EUS (upper)  Limited endoscopic evaluation using a side-viewing endoscope was unremarkable.  The pancreas appeared atrophic. The pancreatic parenchyma was visualized in the body of the pancreas and tail of the pancreas. . The parenchyma had hyperechoic foci. The pancreatic duct measured 3 mm at the tail. The duct in the body and tail of the pancreas was dilated.  Irregular mass measuring 32 mm x 24 mm with well-defined margins was visualized in the neck of the pancreas. Apparent abutment of the portal vein and splenic vein; 4 successful fine needle biopsy passes were taken with a 25 gauge needle using a transduodenal approach guided by Doppler, sample found to be adequate and sent sample for histology analysis. Small lymph nodes were seen in the area adjacent to the tumor.  The proximal bile duct was dilated. The mid bile duct was strictured. The bile duct measured 16 mm at the distal end and 4 mm at the proximal end.  The parenchyma of the liver appeared normal. The hepatic ducts appeared normal.  The gallbladder appeared distended. The gallbladder contained biliary " sludge.  Normal celiac axis. No lymphadenopathy was seen    23 CA 19-9       932      23 CT chest w contrast  6 mm and smaller bilateral pulmonary nodules.       23  Dr. Dumas  Will obtain PET/CT. The patient has a follow-up with surgical oncology. Path will be sent for Immanuel TIDWELL. Discussed the utility of neoadjuvant chemotherapy.        11/15/23 PET CT- [facility]  There is mild heterogeneous activity of the midportion of the pancreas near the stent placement with maximum SUV measurement of 7.8 suggestive of malignant  2. Rest of the whole body scan otherwise is unremarkable      23 Dr. Cervantes  staging work-up is negative for metastasis    He is a smoker, making the spiculated lung nodule concerning also for primary lung cancer.  But this was negative on his PET/CT.  This will continue to be observed with serial imaging.   recommend neoadjuvant chemotherapy.   Follow up 3 months to reassess whether he will continue chemotherapy versus undergo surgical resection at that time.    ideally if he could undergo 5 to 6 months of neoadjuvant chemotherapy, surgical literature suggest that this will give him the best long-term outcome.  He is agreeable to this plan.       23 IR port placement      23 Dr. Dumas  Discussed the utility of neoadjuvant chemotherapy--mFOLFIRINOX. S/p port placement on 23.     23- 24 12 cycles of FolFirinox Q 14 days      23 Dr. Dumas  Will dose reduce irinotecan to 125mg/m2. Will add pre-medication with benadryl and pepcid.          23 Dr. Hanson, Palliative Care    Upcomin/3/24 Dr. Dumas  24 Dr. Cervantes       Right upper quadrant pain   10/30/2023 Biopsy    Endoscopic ultrasound:  Pancreas, Neck mass:  Malignant  Adenocarcinoma.        2023 -  Cancer Staged    Staging form: Pancreas, AJCC 8th Edition  - Clinical: Stage IB (cT2, cN0, cM0) - Signed by Samir Cervantes MD on 2023  Total positive nodes:  0       11/28/2023 -  Chemotherapy    alteplase (CATHFLO), 2 mg, Intracatheter, Every 1 Minute as needed, 3 of 12 cycles  irinotecan (CAMPTOSAR) chemo infusion, 150 mg/m2 = 278 mg, Intravenous, Once, 3 of 12 cycles  Dose modification: 125 mg/m2 (100 % of original dose 150 mg/m2, Cycle 9, Reason: Dose Not Tolerated), 125.1 mg/m2 (83.4 % of original dose 125 mg/m2, Cycle 2, Reason: Dose Not Tolerated), 125 mg/m2 (100 % of original dose 125 mg/m2, Cycle 2, Reason: Dose Not Tolerated)  Administration: 278 mg (11/28/2023), 231 mg (12/12/2023), 231 mg (12/27/2023)  oxaliplatin (ELOXATIN) chemo infusion, 65 mg/m2 = 120.25 mg, Intravenous, Once, 3 of 12 cycles  Administration: 120.25 mg (11/28/2023), 120.25 mg (12/12/2023), 120.25 mg (12/27/2023)  fluorouracil (ADRUCIL) ambulatory infusion Soln, 1,200 mg/m2/day = 4,440 mg, Intravenous, Over 46 hours, 3 of 12 cycles     Primary adenocarcinoma of pancreatic duct (HCC)   10/30/2023 Biopsy    Final Diagnosis   A.B. Pancreas, Neck mass (Cell Block and smear Preparations):  Malignant  Adenocarcinoma.     Satisfactory for evaluation.        12/15/2023 Initial Diagnosis    Primary adenocarcinoma of pancreatic duct (HCC)       HISTORY OF PRESENT ILLNESS:  Derick Richey is a 57yo male who presents for the evaluation and management of newly diagnosed pancreatic cancer. The patient presented to the ED on 10/23/23 with new onset jaundice. He reported ayse colored stools and left sided discomfort. CT A/P showed ill-defined hypoattenuating mass within the neck of the pancreas with subsequent dilatation and obstruction of the biliary tree and pancreatic duct. Highly likely to represent pancreatic neoplasm.Partially encasing the main portal vein at the confluence with splenic vein and SMV with approximately 50% narrowing of the caliber at this level and dilatation of the SMV. No evidence for distant metastatic disease was evident. ERCP was performed 10/30/23. The common bile duct was  "deeply cannulated after 1 attempt using a traction sphincterotome with 260 cm x 0.035\" guidewire. Extrinsic and severe stricture was visualized in the mid common bile duct. The stricture was traversable by wire. Dilation was observed in the proximal common bile duct  Medium-sized biliary sphincterotomy was performed using a sphincterotome. One 8 mm x 6 cm fully covered metal stent was placed in the common bile duct.   EUS was also performed. The pancreas appeared atrophic. The pancreatic parenchyma was visualized in the body of the pancreas and tail of the pancreas. The parenchyma had hyperechoic foci. The pancreatic duct measured 3 mm at the tail. The duct in the body and tail of the pancreas was dilated.  Irregular mass measuring 32 mm x 24 mm with well-defined margins was visualized in the neck of the pancreas. Apparent abutment of the portal vein and splenic vein; 4 successful fine needle biopsy passes were taken with a 25 gauge needle using a transduodenal approach guided by Doppler, sample found to be adequate and sent sample for histology analysis. Small lymph nodes were seen in the area adjacent to the tumor. The proximal bile duct was dilated. The mid bile duct was strictured. The bile duct measured 16 mm at the distal end and 4 mm at the proximal end. The parenchyma of the liver appeared normal. The hepatic ducts appeared normal.The gallbladder appeared distended. The gallbladder contained biliary sludge. Normal celiac axis. No lymphadenopathy was seen.  Pathology was c/w malignant adenocarcinoma.     INTERIM HISTORY:  The patient presents for a follow-up. While undergoing C1 of treatment with 9 minutes left of irinotecan infusion the patient started feeling hot, sweaty and having arm spasms. He reported having similar symptoms when he gets dehydrated.   Today he reports neuropathy w/ anything that is below room temperature. Neuropathy is constant. He was last treated on 12/17/23.    PAST MEDICAL,PAST " SURGICAL, FAMILY AND SOCIAL HISTORY:    Patient Active Problem List   Diagnosis    Right upper quadrant pain    Type 2 diabetes mellitus with hyperglycemia, with long-term current use of insulin (HCC)    Mixed hyperlipidemia    Coronary artery disease involving native coronary artery of native heart without angina pectoris    Gastroesophageal reflux disease    Primary insomnia    Dehydration    Attention deficit hyperactivity disorder (ADHD), combined type    Depression with anxiety    Right upper quadrant abdominal pain    Primary adenocarcinoma of pancreatic duct (HCC)    SIRS (systemic inflammatory response syndrome) (HCC)    Pancreatic adenocarcinoma (HCC)     Past Medical History:   Diagnosis Date    Cancer (HCC)     pancreatic    Coronary artery disease     Diabetes mellitus (HCC)     Hyperlipidemia     Hypertension      Past Surgical History:   Procedure Laterality Date    CORONARY ANGIOPLASTY WITH STENT PLACEMENT      ERCP W/ PLASTIC STENT PLACEMENT      HERNIA REPAIR      IR PORT PLACEMENT  11/24/2023     Family History   Problem Relation Age of Onset    Heart attack Father     Skin cancer Father      Social History     Socioeconomic History    Marital status: /Civil Union     Spouse name: Not on file    Number of children: Not on file    Years of education: Not on file    Highest education level: Not on file   Occupational History    Not on file   Tobacco Use    Smoking status: Every Day     Current packs/day: 1.50     Average packs/day: 1.5 packs/day for 35.0 years (52.5 ttl pk-yrs)     Types: Cigarettes    Smokeless tobacco: Not on file   Vaping Use    Vaping status: Never Used   Substance and Sexual Activity    Alcohol use: Not Currently    Drug use: Never    Sexual activity: Yes     Partners: Male   Other Topics Concern    Not on file   Social History Narrative    Not on file     Social Determinants of Health     Financial Resource Strain: Not on file   Food Insecurity: No Food Insecurity  (12/15/2023)    Hunger Vital Sign     Worried About Running Out of Food in the Last Year: Never true     Ran Out of Food in the Last Year: Never true   Transportation Needs: No Transportation Needs (12/15/2023)    PRAPARE - Transportation     Lack of Transportation (Medical): No     Lack of Transportation (Non-Medical): No   Physical Activity: Not on file   Stress: Not on file   Social Connections: Not on file   Intimate Partner Violence: Not on file   Housing Stability: Low Risk  (12/15/2023)    Housing Stability Vital Sign     Unable to Pay for Housing in the Last Year: No     Number of Places Lived in the Last Year: 1     Unstable Housing in the Last Year: No       Current Outpatient Medications:     amphetamine-dextroamphetamine (ADDERALL XR) 20 MG 24 hr capsule, Take 20 mg by mouth every morning, Disp: , Rfl:     aspirin 81 MG tablet, 1 tab(s), Disp: , Rfl:     carvedilol (COREG) 6.25 mg tablet, Take 6.25 mg by mouth 2 (two) times a day, Disp: , Rfl:     Continuous Blood Gluc Sensor (FreeStyle Geovanny 3 Sensor) MISC, use as directed TO MONITOR GLUCOSE DAILY, Disp: , Rfl:     desvenlafaxine succinate (PRISTIQ) 50 mg 24 hr tablet, Take 50 mg by mouth daily, Disp: , Rfl:     diphenhydramine, lidocaine, Al/Mg hydroxide, simethicone (Magic Mouthwash) SUSP, Swish and spit 10 mL every 4 (four) hours as needed for mouth pain or discomfort, Disp: 237 mL, Rfl: 2    famotidine (PEPCID) 40 MG tablet, Take 40 mg by mouth daily at bedtime, Disp: , Rfl:     Jardiance 25 MG TABS, Take 1 tablet by mouth every morning, Disp: , Rfl:     lisinopril (ZESTRIL) 5 mg tablet, Take 1 tablet by mouth daily, Disp: , Rfl:     metFORMIN (GLUCOPHAGE) 1000 MG tablet, Take 1 tablet (1,000 mg total) by mouth 2 (two) times a day with meals Twice daily Do not start before December 18, 2023., Disp: , Rfl:     pancrelipase, Lip-Prot-Amyl, (CREON) 12,000 units capsule, Take 12,000 units of lipase by mouth 3 (three) times a day with meals, Disp: 180  capsule, Rfl: 0    rosuvastatin (CRESTOR) 10 MG tablet, Take 1 tablet by mouth daily, Disp: , Rfl:     Tresiba FlexTouch 100 units/mL injection pen, Inject 30 Units under the skin daily at bedtime, Disp: 27 mL, Rfl: 0    Zolpidem Tartrate (AMBIEN PO), Take by mouth, Disp: , Rfl:     insulin lispro (HumaLOG KwikPen) 100 units/mL injection pen, Inject 8 Units under the skin 3 (three) times a day with meals (Patient not taking: Reported on 12/27/2023), Disp: 21.6 mL, Rfl: 0    omeprazole (PriLOSEC) 40 MG capsule, take 1 capsule by mouth every morning 1 hour before FIRST MEAL OF THE DAY (Patient not taking: Reported on 12/27/2023), Disp: , Rfl:     ondansetron (ZOFRAN) 4 mg tablet, Take 1 tablet (4 mg total) by mouth every 8 (eight) hours as needed for nausea or vomiting (Patient not taking: Reported on 12/27/2023), Disp: 20 tablet, Rfl: 0    ondansetron (ZOFRAN) 8 mg tablet, Take 1 tablet (8 mg total) by mouth every 8 (eight) hours as needed for nausea or vomiting for up to 20 days, Disp: 20 tablet, Rfl: 2    oxyCODONE (Roxicodone) 5 immediate release tablet, Take 1 tablet (5 mg total) by mouth every 4 (four) hours as needed for moderate pain Max Daily Amount: 30 mg (Patient not taking: Reported on 12/27/2023), Disp: 10 tablet, Rfl: 0  No Known Allergies  Vitals:    01/03/24 1518   BP: 126/82   Pulse: 68   Resp: 16   Temp: 98.2 °F (36.8 °C)   SpO2: 95%       ROS:  CONSTITUTIONAL:  No fever. No chills. No dizziness. No weakness.  EYES:  No pain, erythema, or discharge. No blurring of vision.  ENT:  No sore throat, URI symptoms. No epistaxis. No tinnitus.  CARDIOVASCULAR:  No chest pain. No palpitations. No lower extremity edema.  RESPIRATORY:  No shortness of breath, cough, pain with respiration, pleuritic chest pain. No hemoptysis. No dyspnea. No paroxysmal nocturnal dyspnea.  GASTROINTESTINAL:  Normal appetite. No nausea, vomiting, diarrhea. No pain. No bloating. No melena.  GENITOURINARY:  No frequency, urgency,  "nocturia. No hematuria or dysuria.  MUSCULOSKELETAL:  No arthralgias or myalgias.  INTEGUMENTARY:  No swelling. No bruising. No contusions. No abrasions. No lymphangitis.  NEUROLOGIC:  No headache. No neck pain. No numbness or tingling of the extremities. No weakness.  PSYCHIATRIC:  No confusion.  ENDOCRINE:  No fatigue. No weakness. No history of thyroid, diabetes or adrenal problems.  HEMATOLOGICAL:  No bleeding. No petechiae. No bruising.    PHYSICAL EXAM:    GENERAL: AAO x 3  HEENT: AT,NC  CVS: S1S2 RRR  LUNGS: CTA b/l  ABD: NT,ND, +BS  EXTR: no edema  NEURO: CN II-XII grossly intact    LABS:  I have reviewed pertinent labs:  CBC:   Lab Results   Component Value Date    WBC 11.01 (H) 12/26/2023    RBC 5.72 (H) 12/26/2023    HGB 18.2 (H) 12/26/2023    HCT 56.0 (H) 12/26/2023    MCV 98 12/26/2023     12/26/2023    MCH 31.8 12/26/2023    MCHC 32.5 12/26/2023    RDW 14.0 12/26/2023    MPV 10.2 12/26/2023    NEUTROABS 7.01 12/26/2023     CMP:   Lab Results   Component Value Date    SODIUM 141 12/26/2023    K 5.2 12/26/2023     12/26/2023    CO2 27 12/26/2023    AGAP 10 12/26/2023    BUN 13 12/26/2023    CREATININE 0.84 12/26/2023    GLUC 100 12/15/2023    GLUF 120 (H) 12/26/2023    CALCIUM 9.9 12/26/2023    AST 20 12/26/2023    ALT 20 12/26/2023    ALKPHOS 87 12/26/2023    TP 7.3 12/26/2023    ALB 4.2 12/26/2023    TBILI 0.47 12/26/2023    EGFR 96 12/26/2023     Liver Enzymes:   Lab Results   Component Value Date    AST 20 12/26/2023    ALT 20 12/26/2023    ALKPHOS 87 12/26/2023    TP 7.3 12/26/2023    ALB 4.2 12/26/2023    TBILI 0.47 12/26/2023     Vitamin B12 No results found for: \"PEULAQLU54\"  Iron Study No results found for: \"RETIC\", \"RETICCTPCT\", \"FERRITIN\", \"CONCFE\", \"TIBC\", \"PRIMIDONE\", \"FOLATEHEMO\", \"IRON\"  Folate No results found for: \"FOLATE\"  Magnesium No results found for: \"MG\"  Phosphorus No results found for: \"PHOS\"  Coagulation Panel No results found for: \"DDIMER\", \"PROTIME\", \"INR\", " "\"PTT\"    IMAGING:  FL ERCP biliary and pancreatic    Result Date: 11/1/2023  Narrative: ERCP INDICATION: K86.9: Disease of pancreas, unspecified. COMPARISON: October 23, 2023 IMAGES:  5 FLUOROSCOPY TIME:   62.3 seconds CONTRAST: 4 mL of iohexol (OMNIPAQUE) FINDINGS: Fluoroscopic guidance was provided for performance of ERCP. BILIARY: Limited contrast opacified static submitted images were obtained as part of fluoroscopic guidance. Stricture of distal common duct. Enlargement of proximal common duct demonstrated. Final image depicts the presence of a metallic distal common bile duct stent. PANCREATIC:  None of the submitted static fluoroscopic images demonstrate contrast opacification of the pancreatic duct.     Impression: Fluoroscopic guidance for ERCP.  Please see procedure report for full details. Workstation performed: [id number]     ERCP    Result Date: 10/30/2023  Narrative: Table formatting from the original result was not included. [facility] Endoscopy [address] [facility] [address] [phone] DATE OF SERVICE: 10/30/23 PHYSICIAN(S): Attending: Cali Astudillo MD Fellow: Ankit Murry DO INDICATION: Biliary obstruction POST-OP DIAGNOSIS: See the impression below. PREPROCEDURE: Informed consent was obtained for the procedure, including sedation.  Risks of perforation, hemorrhage, adverse drug reaction and aspiration were discussed. The patient was placed in the left lateral decubitus position. Patient was explained about the risks and benefits of the procedure. Risks including but not limited to bleeding, infection, and perforation were explained in detail. Also explained about less than 100% sensitivity with the exam and other alternatives. PROCEDURE: ERCP DETAILS OF PROCEDURE: Patient was taken to the procedure room where a time out was performed to confirm correct patient and correct procedure. The patient underwent general anesthesia, which was administered by an anesthesia " "professional. The patient's blood pressure, heart rate, level of consciousness, respirations, oxygen, ECG and ETCO2 were monitored throughout the procedure. The scope was advanced to the duodenum. Clinical intention was achieved. The patient experienced no blood loss. The procedure was not difficult. The patient tolerated the procedure well. There were no apparent adverse events. ANESTHESIA INFORMATION: ASA: II Anesthesia Type: General MEDICATIONS: indomethacin (INDOCIN) rectal suppository 100 mg 100 mg (Totals for administrations occurring from 1356 to 1534 on 10/30/23) FINDINGS: The common bile duct was deeply cannulated after 1 attempt using a traction sphincterotome with 260 cm x 0.035\" guidewire. Cannulation was not difficult. Contrast was injected Extrinsic and severe stricture was visualized in the mid common bile duct. The stricture was traversable by wire Dilation was observed in the proximal common bile duct Medium-sized biliary sphincterotomy was performed using a sphincterotome. No bleeding was noted at the procedure site. One 8 mm x 6 cm fully covered metal stent was placed in the common bile duct. There was good bile and contrast drainage seen. The fluoroscopy images for the , cholangiogram and post procedure were personally reviewed and interpreted during the procedure. SPECIMENS: ID Type Source Tests Collected by Time Destination 1 : pancreatic neck mass FNA Pancreas NON-GYNECOLOGIC CYTOLOGY, FINE NEEDLE ASPIRATION Cali Astudillo MD 10/30/2023 1419       Impression: Extrinsic and severe stricture was visualized in the mid common bile duct with upstream dilation Biliary sphincterotomy was performed. One 8 mm x 6 cm fully covered stent was placed in the common bile duct RECOMMENDATION: Follow labs in one week. Follow up with oncology. Follow up with biopsies.  Cali Astudillo MD     Endoscopic ultrasonography, GI (Upper)    Result Date: 10/30/2023  Narrative: Table formatting from the original result was " not included. [facility] Endoscopy [address]  [place] [address] [phone] DATE OF SERVICE: 10/30/23 PHYSICIAN(S): Attending: Cali Astudillo MD Fellow: Ankit Murry DO INDICATION: Pancreatic lesion POST-OP DIAGNOSIS: See the impression below. PREPROCEDURE: Informed consent was obtained for the procedure, including sedation.  Risks of perforation, hemorrhage, adverse drug reaction and aspiration were discussed. The patient was placed in the left lateral decubitus position. Patient was explained about the risks and benefits of the procedure. Risks including but not limited to bleeding, infection, and perforation were explained in detail. Also explained about less than 100% sensitivity with the exam and other alternatives. PROCEDURE: EUS UPPER DETAILS OF PROCEDURE: Patient was taken to the procedure room where a time out was performed to confirm correct patient and correct procedure. The patient underwent monitored anesthesia care, which was administered by an anesthesia professional. The patient's blood pressure, heart rate, oxygen, respirations, level of consciousness and ECG were monitored throughout the procedure. The endoscope was advanced to the duodenum. The patient experienced no blood loss. The procedure was not difficult. The patient tolerated the procedure well. There were no apparent adverse events. ANESTHESIA INFORMATION: ASA: II Anesthesia Type: General MEDICATIONS: indomethacin (INDOCIN) rectal suppository 100 mg 100 mg (Totals for administrations occurring from 1356 to 1534 on 10/30/23) FINDINGS: Limited endoscopic evaluation using a side-viewing endoscope was unremarkable. The pancreas appeared atrophic. The pancreatic parenchyma was visualized in the body of the pancreas and tail of the pancreas. . The parenchyma had hyperechoic foci. The pancreatic duct measured 3 mm at the tail. The duct in the body and tail of the pancreas was dilated. Irregular mass measuring 32 mm x  24 mm with well-defined margins was visualized in the neck of the pancreas. Apparent abutment of the portal vein and splenic vein; 4 successful fine needle biopsy passes were taken with a 25 gauge needle using a transduodenal approach guided by Doppler, sample found to be adequate and sent sample for histology analysis. Small lymph nodes were seen in the area adjacent to the tumor. The proximal bile duct was dilated. The mid bile duct was strictured. The bile duct measured 16 mm at the distal end and 4 mm at the proximal end. The parenchyma of the liver appeared normal. The hepatic ducts appeared normal. The gallbladder appeared distended. The gallbladder contained biliary sludge. Normal celiac axis. No lymphadenopathy was seen. SPECIMENS: ID Type Source Tests Collected by Time Destination 1 : pancreatic neck mass FNA Pancreas NON-GYNECOLOGIC CYTOLOGY, FINE NEEDLE ASPIRATION Cali Astudillo MD 10/30/2023 1419       Impression: The pancreas appeared atrophic. The duct was normal in the head, not seen in the neck and dilated in the body and tail of the pancreas. Irregular mass measuring 32 mm x 24 mm with well-defined margins was visualized in the neck of the pancreas. Apparent abutment of the portal vein and splenic vein (confluence); 4 fine needle biopsy passes were taken, sample found to be adequate and sent sample for histology analysis The proximal bile duct was dilated. The mid bile duct was strictured. RECOMMENDATION:  Await pathology results ERCP today. CT chest Follow up with oncology.   Cali Astudillo MD     CT abdomen pelvis with contrast    Result Date: 10/23/2023  Narrative: CT ABDOMEN AND PELVIS WITH IV CONTRAST INDICATION:   jaundice, ruq tenderness. COMPARISON:  None. TECHNIQUE:  CT examination of the abdomen and pelvis was performed. Multiplanar 2D reformatted images were created from the source data. This examination, like all CT scans performed in the [facility], was performed utilizing  techniques to minimize radiation dose exposure, including the use of iterative reconstruction and automated exposure control. Radiation dose length product (DLP) for this visit:  678 mGy-cm IV Contrast:  100 mL of iohexol (OMNIPAQUE) Enteric Contrast:  Enteric contrast was not administered. FINDINGS: ABDOMEN LOWER CHEST:  No clinically significant abnormality identified in the visualized lower chest. LIVER/BILIARY TREE: Moderate intra and extrahepatic biliary ductal dilatation. GALLBLADDER: Abnormally distended. Mild thickened wall. No calcified stones. SPLEEN:  Unremarkable. PANCREAS: There is a lesion suspicious for pancreatic cancer, which will be described based on Society of Abdominal Radiologists and American Pancreatic Association recommendations: MORPHOLOGIC EVALUATION: Appearance: Hypoattenuating. Size: Ill-defined margins, approximately 3.0 cm. Location: Tumor involves the pancreatic neck, straddling the plane of the SMV. Pancreatic duct narrowing/abrupt cut off with or without upstream dilatation: Moderate pancreatic ductal dilatation with abrupt cut off at the level of the mass. Pancreatic duct measuring up to 8 mm. Biliary tree abrupt cut off with or without upstream dilatation: Abrupt cut off of the common bile duct as it enters the pancreatic head, caliber dilated up to 19 mm. ARTERIAL EVALUATION: Superior Mesenteric Artery Involvement: Presence: The mass directly abuts less than 180 degrees of the SMA at its confluence with the length of vein/portal vein. Celiac Axis Involvement: Presence: Absent. Common Hepatic Artery Involvement: Presence: Present. Degree of solid soft-tissue contact: Traverses along the margin of the mass but remains patent. Less than 180 degrees. Degree of increased hazy attenuation/stranding contact: </= 180 degrees. Focal vessel narrowing or contour irregularity: Absent. Extension to celiac axis: Absent. Extension to the bifurcation of right/left hepatic artery: Absent.  Arterial Variant: Type: There are no arterial variants. VENOUS EVALUATION: Main Portal Vein Involvement: Present. Degree of solid soft-tissue contact: REPORT AS LESS THAN OR EQUAL  DEGREES OR GREATER THAN 180 DEGREES. Degree of increased hazy attenuation/stranding contact: REPORT AS LESS THAN OR EQUAL  DEGREES OR GREATER THAN 180 DEGREES. Focal vessel narrowing or contour irregularity (tethering or teardrop): Absent. Superior Mesenteric Vein Involvement: Present. Degree of solid soft-tissue contact: </= 180 degrees. Focal vessel narrowing or contour irregularity (tethering or teardrop): Present. Extension to first draining vein: Absent. Thrombus within vein: Absent. Venous collaterals: Increased caliber of the splenic vein and SMV. But without significant varix formation. FOR EVALUATION FOR POTENTIAL EXTRAPANCREATIC TUMOR, PLEASE SEE THE REST OF THE BODY OF THIS REPORT. ADRENAL GLANDS: Right adrenal within normal limits. 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 A smaller few additional nodules are also noted with similar features. These do not have an  appearance suggesting metastatic disease. KIDNEYS/URETERS: A few small bilateral renal cortical cysts are noted. No suspicious findings. STOMACH AND BOWEL: There is colonic diverticulosis without evidence of acute diverticulitis. APPENDIX:  No findings to suggest appendicitis. ABDOMINOPELVIC CAVITY:  No ascites.  No pneumoperitoneum. A few shotty subcentimeter lymph nodes in the efren hepatis and gastrohepatic region remaining subcentimeter.. VESSELS: Atherosclerotic changes are present.  No evidence of aneurysm. PELVIS REPRODUCTIVE ORGANS:  Unremarkable for patient's age. URINARY BLADDER:  Unremarkable. ABDOMINAL WALL/INGUINAL REGIONS: Lower abdominal wall hernia mesh repair noted. OSSEOUS STRUCTURES:  No acute fracture or destructive osseous lesion.     Impression: Ill-defined  hypoattenuating mass within the neck of the pancreas with subsequent dilatation and obstruction of the biliary tree and pancreatic duct. Highly likely to represent pancreatic neoplasm. See above for further details. Partially encasing the main portal vein at the confluence with splenic vein and SMV with approximately 50% narrowing of the caliber at this level and dilatation of the SMV. No evidence for distant metastatic disease evident. Several small nodules within the left adrenal with features suggesting benign etiology as described above. The study was marked in EPIC for immediate notification. Workstation performed: [id number]     I reviewed the above laboratory and imaging data.    ASSESSMENT/PLAN:  Adenocarcinoma of the pancreas. CA 19-9 of 932 at time of diagnosis. PET/CT--no evidence of metastatic disease. The patient followed-up with surgical oncology. Path was sent for [facility] [employer] NGS-- KRAS Exon 2, SMAD4 Exon 11. On neoadjuvant chemotherapy--mFOLFIRINOX. S/p C1 D1 on 11/28/23. With 9 minutes left of irinotecan infusion the patient started feeling hot, sweaty and having arm spasms. Medication stopped and hypersensitivity protocol initiated. 25mg of benadryl given, 10mg of solumderol and 20mg of pepcid given. Irinotecan was dose reduced to 125mg/m2 for subsequent treatments. Pre-medication with benadryl and pepcid was added to the plan. 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 Further recommendations upon reassessment. Will consider adding the drug with dose reduction if his neuropathy improves.

## 2024-01-05 ENCOUNTER — TELEPHONE (OUTPATIENT)
Dept: RADIATION ONCOLOGY | Facility: CLINIC | Age: 59
End: 2024-01-05

## 2024-01-05 ENCOUNTER — TELEPHONE (OUTPATIENT)
Dept: HEMATOLOGY ONCOLOGY | Facility: CLINIC | Age: 59
End: 2024-01-05

## 2024-01-05 NOTE — TELEPHONE ENCOUNTER
Called patient with new appt scheduled . Left message with all info and with [facility] line tel number

## 2024-01-05 NOTE — TELEPHONE ENCOUNTER
"RAD ONC - patient was scheduled for a Consultation this morning with [facility] Radiation Oncologist Dr. Malgorzata Madden at the [facility].  Patient was a \"No Show\".  FD:  LM on VM for patient and sent letter for patient to call and reschedule.  He was last seen by Dr. Dumas on 1/3/24 and is scheduled for a follow-up with Dr. Calderon 1/16/24...KD  "

## 2024-01-05 NOTE — TELEPHONE ENCOUNTER
Noted, THEMA message also sent to patient with radiation phone number to call and reschedule appointment

## 2024-01-08 ENCOUNTER — APPOINTMENT (OUTPATIENT)
Dept: LAB | Facility: CLINIC | Age: 59
End: 2024-01-08
Payer: COMMERCIAL

## 2024-01-08 DIAGNOSIS — R10.11 RIGHT UPPER QUADRANT PAIN: ICD-10-CM

## 2024-01-08 PROBLEM — Z51.5 PALLIATIVE CARE PATIENT: Status: ACTIVE | Noted: 2024-01-08

## 2024-01-08 LAB
BASOPHILS # BLD AUTO: 0.07 THOUSANDS/ÂΜL (ref 0–0.1)
BASOPHILS NFR BLD AUTO: 1 % (ref 0–1)
EOSINOPHIL # BLD AUTO: 0.55 THOUSAND/ÂΜL (ref 0–0.61)
EOSINOPHIL NFR BLD AUTO: 5 % (ref 0–6)
ERYTHROCYTE [DISTWIDTH] IN BLOOD BY AUTOMATED COUNT: 14.6 % (ref 11.6–15.1)
HCT VFR BLD AUTO: 54.6 % (ref 36.5–49.3)
HGB BLD-MCNC: 18.3 G/DL (ref 12–17)
IMM GRANULOCYTES # BLD AUTO: 0.04 THOUSAND/UL (ref 0–0.2)
IMM GRANULOCYTES NFR BLD AUTO: 0 % (ref 0–2)
LYMPHOCYTES # BLD AUTO: 2.55 THOUSANDS/ÂΜL (ref 0.6–4.47)
LYMPHOCYTES NFR BLD AUTO: 25 % (ref 14–44)
MCH RBC QN AUTO: 32.6 PG (ref 26.8–34.3)
MCHC RBC AUTO-ENTMCNC: 33.5 G/DL (ref 31.4–37.4)
MCV RBC AUTO: 97 FL (ref 82–98)
MONOCYTES # BLD AUTO: 0.75 THOUSAND/ÂΜL (ref 0.17–1.22)
MONOCYTES NFR BLD AUTO: 7 % (ref 4–12)
NEUTROPHILS # BLD AUTO: 6.29 THOUSANDS/ÂΜL (ref 1.85–7.62)
NEUTS SEG NFR BLD AUTO: 62 % (ref 43–75)
NRBC BLD AUTO-RTO: 0 /100 WBCS
PLATELET # BLD AUTO: 249 THOUSANDS/UL (ref 149–390)
PMV BLD AUTO: 9.5 FL (ref 8.9–12.7)
RBC # BLD AUTO: 5.62 MILLION/UL (ref 3.88–5.62)
WBC # BLD AUTO: 10.25 THOUSAND/UL (ref 4.31–10.16)

## 2024-01-08 PROCEDURE — 80053 COMPREHEN METABOLIC PANEL: CPT

## 2024-01-08 PROCEDURE — 36415 COLL VENOUS BLD VENIPUNCTURE: CPT

## 2024-01-08 PROCEDURE — 85025 COMPLETE CBC W/AUTO DIFF WBC: CPT

## 2024-01-08 RX ORDER — DEXTROSE MONOHYDRATE 50 MG/ML
20 INJECTION, SOLUTION INTRAVENOUS ONCE
Status: CANCELLED | OUTPATIENT
Start: 2024-01-10

## 2024-01-08 RX ORDER — ATROPINE SULFATE 1 MG/ML
0.25 INJECTION, SOLUTION INTRAVENOUS ONCE
Status: CANCELLED | OUTPATIENT
Start: 2024-01-10

## 2024-01-08 RX ORDER — ATROPINE SULFATE 1 MG/ML
0.25 INJECTION, SOLUTION INTRAVENOUS ONCE AS NEEDED
Status: CANCELLED | OUTPATIENT
Start: 2024-01-10

## 2024-01-08 RX ORDER — SODIUM CHLORIDE 9 MG/ML
20 INJECTION, SOLUTION INTRAVENOUS ONCE AS NEEDED
Status: CANCELLED | OUTPATIENT
Start: 2024-01-10

## 2024-01-09 LAB
ALBUMIN SERPL BCP-MCNC: 4 G/DL (ref 3.5–5)
ALP SERPL-CCNC: 77 U/L (ref 34–104)
ALT SERPL W P-5'-P-CCNC: 13 U/L (ref 7–52)
ANION GAP SERPL CALCULATED.3IONS-SCNC: 9 MMOL/L
AST SERPL W P-5'-P-CCNC: 15 U/L (ref 13–39)
BILIRUB SERPL-MCNC: 0.56 MG/DL (ref 0.2–1)
BUN SERPL-MCNC: 10 MG/DL (ref 5–25)
CALCIUM SERPL-MCNC: 9.5 MG/DL (ref 8.4–10.2)
CHLORIDE SERPL-SCNC: 104 MMOL/L (ref 96–108)
CO2 SERPL-SCNC: 28 MMOL/L (ref 21–32)
CREAT SERPL-MCNC: 0.81 MG/DL (ref 0.6–1.3)
GFR SERPL CREATININE-BSD FRML MDRD: 97 ML/MIN/1.73SQ M
GLUCOSE P FAST SERPL-MCNC: 153 MG/DL (ref 65–99)
POTASSIUM SERPL-SCNC: 4.6 MMOL/L (ref 3.5–5.3)
PROT SERPL-MCNC: 6.7 G/DL (ref 6.4–8.4)
SODIUM SERPL-SCNC: 141 MMOL/L (ref 135–147)

## 2024-01-09 NOTE — PROGRESS NOTES
Outpatient Consultation - Palliative and Supportive Care   Derick Richey 58 y.o. male [id number]    Assessment & Plan  Problem List Items Addressed This Visit          Digestive    Primary adenocarcinoma of pancreatic duct (HCC) - Primary    Pancreatic adenocarcinoma (HCC)       Other    Dehydration    Palliative care patient     #symptom management  No current reported symptoms of distress at this time requiring change in plan of care    Overall goal to minimize reliance on medication, however, open to discussion to minimize symptoms of distress if/when they occur.    QTc 476 on EKG 12/15/2023  eGFR 96 on labs 12/26/2023    #IVF Hydration   - reports minimal PO fluid/water intake, prior h/o dehydration   - still voiding regularly   - IVF Hydration plan x 1 time placed per [facility], upcoming plan for chemotherapy infusions    #goals of care   - met with patient and daughter in office   - introduced Palliative and Supportive Care   - patient communicated insight into current medical condition with current medical plan, all questions/concerns addressed   - upcoming PET/CT, ordered, not currently scheduled   - Port placed 11/24   - patient requesting information regarding Medical Aid in Dying information, with consideration to eventually move to [place]. [facility] provider discussed no program exists in [place], with [place] non-participatory in supports for MAID. Recommended that patient contact [place] provider for more information. States overall future-oriented plans with no suicidal ideation. Wishes to live at an adequate QOL, as defined by independent living with no reliance of cares from family/loved ones.    #ACP Documentation   - expanded discussion regarding ACP [5 Wishes and POLST, both provided for review] Documentation.   - per patient, would likely be DNAR/DNI, given if catastrophic event occurred, likelihood of independent living is low, with transition to natural death at that time to minimize the potential for suffering.   -  would identify Betty [accompanied to visit today] as HCA, given trusted relationship, and previously communicated support to honor patient's wishes.    #MMJ  Patient may benefit from MMJ; counseling and literature provided, patient will consider registering. Recommended he ask a trusted friend or family member to register as an MMJ caregiver, should he register.    #hospice introduction   - briefly introduced hospice model of care with all questions/concerns addressed    #psychosocial support   - emotional support provided   - Suma [spouse,  33 years] [phone]   - Five adult children [2 biological, 3 stepchildren]   - Betty [stepdaughter] [phone]   - Remberto [son, biological]   - Derick [son, biological]   - Xiang [stepson]   - four grandchildren   -  service, [employer]   - no spiritual needs    No prior h/o EtOH or illicit drug use    Defines suffering as loss of independence +/- intolerable symptoms.      Next [facility] follow up in 4 weeks.      Controlled Substance Review    [place] PDMP or [place]  reviewed: No red flags were identified; safe to proceed with prescription..    PDMP Review         Value Time User    PDMP Reviewed  Yes 12/27/2023  8:16 AM Silvestre Hanson MD            Medications adjusted this encounter:  Requested Prescriptions      No prescriptions requested or ordered in this encounter     No orders of the defined types were placed in this encounter.      Medications Discontinued During This Encounter   Medication Reason    oxyCODONE (Roxicodone) 5 immediate release tablet Therapy completed       PPS: 100%      Derick Richey was seen today for symptoms and planning cares related to above illnesses.  Above orders were sent electronically, or provided in hardcopy in clinic.  I have reviewed the patient's controlled substance dispensing history in the Prescription Drug Monitoring Program in compliance with the [place] regulations before prescribing any controlled  substances.    We appreciate the referral, and wish for him to continue to follow with us.  If there are questions or concerns, please contact us through our clinic/answering service 24 hours a day, seven days a week.    Silvestre Hanson MD  [facility] Palliative and Supportive Care  [phone]        Visit Information    Accompanied By: Daughter    Source of History: Self, Family member, Medical record    History Limitations: None      History of Present Illness    Derick Richey is a 58 y.o. male who presents as a referral from Dr. Astudillo of Gastroenterology for primary palliative diagnosis of locally advanced pancreatic adenocarcinoma. Consultation is requested for: symptom management, goal of care assessment and decisional support, disease process education and discussion of prognosis, advance care planning, emotional support in the setting of serious illness.    Patient reports intermittent abdominal discomfort/pain, no current use of medication to manage per patient. Non-radiating, no identified trigger, associates with known pancreatic cancer. Intermittent nausea, with 3-4 episodes in the last 2 weeks, ondansetron use effective, no vomiting. Appetite intact, weight stable, however, notes decreased PO fluid/water intake. Constipation, intermittent, use of MoM PRN, effective. Increased fatigue, limits activity. Increased sleep during chemotherapy cycles, otherwise, fragmented/variable sleep pattern at baseline.      Oncology History Overview Note   Who was diagnosed with  locally advanced pancreas cancer. He presented to the ED with new onset jaundice and CT revealed an ill-defined hypoattenuating mass within the neck of the pancreas with subsequent dilatation and obstruction of the biliary tree and pancreatic duct. He underwent EUS & ERCP with biopsy. He was imitated on neoadjuvant chemotherapy. He is being referred by Dr. Dumas and presents today for consult.      10/23/23 ED- new onset  "jaundice    10/23/23 CT A/P w contrast  Ill-defined hypoattenuating mass within the neck of the pancreas with subsequent dilatation and obstruction of the biliary tree and pancreatic duct. Highly likely to represent pancreatic neoplasm. See above for further details. Partially encasing the   main portal vein at the confluence with splenic vein and SMV with approximately 50% narrowing of the caliber at this level and dilatation of the SMV. No evidence for distant metastatic disease evident.   Several small nodules within the left adrenal with features suggesting benign etiology as described above.      10/30/23 ERCP   The common bile duct was deeply cannulated after 1 attempt using a traction sphincterotome with 260 cm x 0.035\" guidewire. Cannulation was not difficult. Contrast was injected  Extrinsic and severe stricture was visualized in the mid common bile duct. The stricture was traversable by wire  Dilation was observed in the proximal common bile duct  Medium-sized biliary sphincterotomy was performed using a sphincterotome. No bleeding was noted at the procedure site.  One 8 mm x 6 cm fully covered metal stent was placed in the common bile duct. There was good bile and contrast drainage seen.  The fluoroscopy images for the , cholangiogram and post procedure were personally reviewed and interpreted during the procedure.      10/30/23 EUS (upper)  Limited endoscopic evaluation using a side-viewing endoscope was unremarkable.  The pancreas appeared atrophic. The pancreatic parenchyma was visualized in the body of the pancreas and tail of the pancreas. . The parenchyma had hyperechoic foci. The pancreatic duct measured 3 mm at the tail. The duct in the body and tail of the pancreas was dilated.  Irregular mass measuring 32 mm x 24 mm with well-defined margins was visualized in the neck of the pancreas. Apparent abutment of the portal vein and splenic vein; 4 successful fine needle biopsy passes were taken " with a 25 gauge needle using a transduodenal approach guided by Doppler, sample found to be adequate and sent sample for histology analysis. Small lymph nodes were seen in the area adjacent to the tumor.  The proximal bile duct was dilated. The mid bile duct was strictured. The bile duct measured 16 mm at the distal end and 4 mm at the proximal end.  The parenchyma of the liver appeared normal. The hepatic ducts appeared normal.  The gallbladder appeared distended. The gallbladder contained biliary sludge.  Normal celiac axis. No lymphadenopathy was seen    11/1/23 CA 19-9       932      11/6/23 CT chest w contrast  6 mm and smaller bilateral pulmonary nodules.       11/8/23  Dr. Dumas  Will obtain PET/CT. The patient has a follow-up with surgical oncology. Path will be sent for [employer]. Discussed the utility of neoadjuvant chemotherapy.        11/15/23 PET CT- [facility]  There is mild heterogeneous activity of the midportion of the pancreas near the stent placement with maximum SUV measurement of 7.8 suggestive of malignant  2. Rest of the whole body scan otherwise is unremarkable      11/16/23 Dr. Cervantes  staging work-up is negative for metastasis    He is a smoker, making the spiculated lung nodule concerning also for primary lung cancer.  But this was negative on his PET/CT.  This will continue to be observed with serial imaging.   recommend neoadjuvant chemotherapy.   Follow up 3 months to reassess whether he will continue chemotherapy versus undergo surgical resection at that time.    ideally if he could undergo 5 to 6 months of neoadjuvant chemotherapy, surgical literature suggest that this will give him the best long-term outcome.  He is agreeable to this plan.       11/24/23 IR port placement      11/27/23 Dr. Dumas  Discussed the utility of neoadjuvant chemotherapy--mFOLFIRINOX. S/p port placement on 11/24/23.     11/28/23- 5/2/24 12 cycles of FolFirinox Q 14 days      12/6/23   Piter  Will dose reduce irinotecan to 125mg/m2. Will add pre-medication with benadryl and pepcid.          23 Dr. Hanson, Palliative Care    Upcomin/3/24 Dr. Dumas  24 Dr. Cervantes       Right upper quadrant pain   10/30/2023 Biopsy    Endoscopic ultrasound:  Pancreas, Neck mass:  Malignant  Adenocarcinoma.        2023 -  Cancer Staged    Staging form: Pancreas, AJCC 8th Edition  - Clinical: Stage IB (cT2, cN0, cM0) - Signed by Samir Cervantes MD on 2023  Total positive nodes: 0       2023 -  Chemotherapy    alteplase (CATHFLO), 2 mg, Intracatheter, Every 1 Minute as needed, 3 of 12 cycles  irinotecan (CAMPTOSAR) chemo infusion, 150 mg/m2 = 278 mg, Intravenous, Once, 3 of 12 cycles  Dose modification: 125 mg/m2 (100 % of original dose 150 mg/m2, Cycle 9, Reason: Dose Not Tolerated), 125.1 mg/m2 (83.4 % of original dose 125 mg/m2, Cycle 2, Reason: Dose Not Tolerated), 125 mg/m2 (100 % of original dose 125 mg/m2, Cycle 2, Reason: Dose Not Tolerated)  Administration: 278 mg (2023), 231 mg (2023), 231 mg (2023)  oxaliplatin (ELOXATIN) chemo infusion, 65 mg/m2 = 120.25 mg, Intravenous, Once, 3 of 11 cycles  Administration: 120.25 mg (2023), 120.25 mg (2023), 120.25 mg (2023)  fluorouracil (ADRUCIL) ambulatory infusion Soln, 1,200 mg/m2/day = 4,440 mg, Intravenous, Over 46 hours, 3 of 12 cycles     Primary adenocarcinoma of pancreatic duct (HCC)   10/30/2023 Biopsy    Final Diagnosis   A.B. Pancreas, Neck mass (Cell Block and smear Preparations):  Malignant  Adenocarcinoma.     Satisfactory for evaluation.        12/15/2023 Initial Diagnosis    Primary adenocarcinoma of pancreatic duct (HCC)         Pertinent Palliative Care Domains    Physical Symptoms: ambulates    Psychological Symptoms: no anxiety, good insight, coping well    Social Aspects: support system strong family support      Advance Directive and Living Will:  Not on File, not  previously completed  Power of :  n/a  POLST:  n/a    Historical Data  Past Medical History:   Diagnosis Date    Cancer (HCC)     pancreatic    Coronary artery disease     Diabetes mellitus (HCC)     Hyperlipidemia     Hypertension      Past Surgical History:   Procedure Laterality Date    CORONARY ANGIOPLASTY WITH STENT PLACEMENT      ERCP W/ PLASTIC STENT PLACEMENT      HERNIA REPAIR      IR PORT PLACEMENT  11/24/2023     Social History     Socioeconomic History    Marital status: /Civil Union     Spouse name: Not on file    Number of children: Not on file    Years of education: Not on file    Highest education level: Not on file   Occupational History    Not on file   Tobacco Use    Smoking status: Every Day     Current packs/day: 1.50     Average packs/day: 1.5 packs/day for 35.0 years (52.5 ttl pk-yrs)     Types: Cigarettes    Smokeless tobacco: Not on file   Vaping Use    Vaping status: Never Used   Substance and Sexual Activity    Alcohol use: Not Currently    Drug use: Never    Sexual activity: Yes     Partners: Male   Other Topics Concern    Not on file   Social History Narrative    Not on file     Social Determinants of Health     Financial Resource Strain: Not on file   Food Insecurity: No Food Insecurity (12/15/2023)    Hunger Vital Sign     Worried About Running Out of Food in the Last Year: Never true     Ran Out of Food in the Last Year: Never true   Transportation Needs: No Transportation Needs (12/15/2023)    PRAPARE - Transportation     Lack of Transportation (Medical): No     Lack of Transportation (Non-Medical): No   Physical Activity: Not on file   Stress: Not on file   Social Connections: Not on file   Intimate Partner Violence: Not on file   Housing Stability: Low Risk  (12/15/2023)    Housing Stability Vital Sign     Unable to Pay for Housing in the Last Year: No     Number of Places Lived in the Last Year: 1     Unstable Housing in the Last Year: No     Family History    Problem Relation Age of Onset    Heart attack Father     Skin cancer Father      No Known Allergies  Current Outpatient Medications   Medication Sig Dispense Refill    amphetamine-dextroamphetamine (ADDERALL XR) 20 MG 24 hr capsule Take 20 mg by mouth every morning      aspirin 81 MG tablet 1 tab(s)      carvedilol (COREG) 6.25 mg tablet Take 6.25 mg by mouth 2 (two) times a day      Continuous Blood Gluc Sensor (FreeStyle Geovanny 3 Sensor) MISC use as directed TO MONITOR GLUCOSE DAILY      desvenlafaxine succinate (PRISTIQ) 50 mg 24 hr tablet Take 50 mg by mouth daily      famotidine (PEPCID) 40 MG tablet Take 40 mg by mouth daily at bedtime      Jardiance 25 MG TABS Take 1 tablet by mouth every morning      lisinopril (ZESTRIL) 5 mg tablet Take 1 tablet by mouth daily      metFORMIN (GLUCOPHAGE) 1000 MG tablet Take 1 tablet (1,000 mg total) by mouth 2 (two) times a day with meals Twice daily Do not start before December 18, 2023.      pancrelipase, Lip-Prot-Amyl, (CREON) 12,000 units capsule Take 12,000 units of lipase by mouth 3 (three) times a day with meals 180 capsule 0    rosuvastatin (CRESTOR) 10 MG tablet Take 1 tablet by mouth daily      Tresiba FlexTouch 100 units/mL injection pen Inject 30 Units under the skin daily at bedtime 27 mL 0    Zolpidem Tartrate (AMBIEN PO) Take by mouth      diphenhydramine, lidocaine, Al/Mg hydroxide, simethicone (Magic Mouthwash) SUSP Swish and spit 10 mL every 4 (four) hours as needed for mouth pain or discomfort 237 mL 2    [START ON 1/10/2024] fluorouracil 4,440 mg in CADD/Elastomeric Infusion Device Infuse 4,440 mg (1,200 mg/m2/day x 1.85 m2) into a catheter in a vein via infusion device over 46 hours for 2 days  Infusion planned for January 10, 2024. 1 each 0    insulin lispro (HumaLOG KwikPen) 100 units/mL injection pen Inject 8 Units under the skin 3 (three) times a day with meals (Patient not taking: Reported on 12/27/2023) 21.6 mL 0    omeprazole (PriLOSEC) 40 MG  "capsule take 1 capsule by mouth every morning 1 hour before FIRST MEAL OF THE DAY (Patient not taking: Reported on 12/27/2023)      ondansetron (ZOFRAN) 4 mg tablet Take 1 tablet (4 mg total) by mouth every 8 (eight) hours as needed for nausea or vomiting (Patient not taking: Reported on 12/27/2023) 20 tablet 0    ondansetron (ZOFRAN) 8 mg tablet Take 1 tablet (8 mg total) by mouth every 8 (eight) hours as needed for nausea or vomiting for up to 20 days 20 tablet 2     No current facility-administered medications for this visit.       Review of Systems   Constitutional: Positive for fever. Negative for chills, decreased appetite, malaise/fatigue and weight loss.   HENT:  Negative for congestion.    Eyes:  Negative for visual disturbance.   Cardiovascular:  Negative for chest pain.   Respiratory:  Negative for shortness of breath.    Musculoskeletal:  Negative for falls and neck pain.   Gastrointestinal:  Positive for constipation and nausea. Negative for abdominal pain and vomiting.   Genitourinary:  Negative for frequency.   Neurological:  Negative for headaches.   Psychiatric/Behavioral:  The patient does not have insomnia.    All other systems reviewed and are negative.        Vital Signs    /78 (BP Location: Right arm, Patient Position: Sitting, Cuff Size: Standard)   Pulse 92   Temp 97.9 °F (36.6 °C) (Tympanic)   Resp 18   Ht 5' 10\" (1.778 m)   Wt 73.2 kg (161 lb 6.4 oz)   SpO2 96%   BMI 23.16 kg/m²     Physical Exam and Objective Data  Physical Exam  Vitals and nursing note reviewed.   Constitutional:       General: He is awake.      Appearance: He is not diaphoretic.      Comments: Sitting up comfortably in NAD  BMI 23.2  Non-toxic appearing   HENT:      Head: Normocephalic and atraumatic.      Right Ear: External ear normal.      Left Ear: External ear normal.   Eyes:      Comments: No gaze preference   Cardiovascular:      Rate and Rhythm: Normal rate.   Pulmonary:      Effort: No tachypnea, " "accessory muscle usage or respiratory distress.      Comments: Completes full sentences without difficulty  Musculoskeletal:      Cervical back: Normal range of motion.   Neurological:      General: No focal deficit present.      Mental Status: He is alert and oriented to person, place, and time.   Psychiatric:         Attention and Perception: Attention normal.         Mood and Affect: Mood and affect normal.         Speech: Speech normal.         Cognition and Memory: Cognition and memory normal.           Radiology and Laboratory:  I personally reviewed and interpreted the following results:    Most Recent COVID-19 Results:  No results found for: \"SARSCOV2\", \"SARCOV2\", \"[id number]\"    Most Recent Lab Work:  Lab Results   Component Value Date/Time    SODIUM 141 12/26/2023 09:53 AM    SODIUM 140 11/01/2023 12:54 PM    K 5.2 12/26/2023 09:53 AM    K 4.2 11/01/2023 12:54 PM    BUN 13 12/26/2023 09:53 AM    BUN 18 11/01/2023 12:54 PM    CREATININE 0.84 12/26/2023 09:53 AM    GLUC 100 12/15/2023 01:13 AM    GLUC 146 (H) 11/01/2023 12:54 PM     Lab Results   Component Value Date/Time    AST 20 12/26/2023 09:53 AM    AST 26 11/01/2023 12:54 PM    ALT 20 12/26/2023 09:53 AM    ALT 78 (H) 11/01/2023 12:54 PM    ALB 4.2 12/26/2023 09:53 AM     Lab Results   Component Value Date/Time    HGB 18.3 (H) 01/08/2024 11:53 AM    WBC 10.25 (H) 01/08/2024 11:53 AM     01/08/2024 11:53 AM       Most Recent Imaging [last 30 days]:  Procedure: US right upper quadrant    Result Date: 12/15/2023  Narrative: RIGHT UPPER QUADRANT ULTRASOUND INDICATION:     Abdominal pain x1 day, right upper quadrant, r/o cholecystitis.   History of pancreatic CA COMPARISON: CT chest from 12/15/2023; CT abdomen from 10/23/2023 TECHNIQUE:   Real-time ultrasound of the right upper quadrant was performed with a curvilinear transducer with both volumetric sweeps and still imaging techniques. FINDINGS: PANCREAS: 4.5 x 3.8 x 4.1 cm mass appears to be in " the head of the pancreas. Echogenic structure appears to represent patient's stent seen on the prior CT. AORTA AND IVC:  Visualized portions are normal for patient age. LIVER: Size: Borderline in size.  The liver measures 17.8 cm in the midclavicular line. Contour:  Surface contour is smooth. Parenchyma:  Echogenicity and echotexture are within normal limits. No liver mass identified. Limited imaging of the main portal vein shows it to be patent and hepatopetal. BILIARY: The gallbladder is mildly distended measuring 4.5 cm transversely similar to today's chest CT and the CT from October. No gallstone could be appreciated. Some sludge is seen with focal thickening near the fundus which may represent a polyp measuring 4 mm. This measures up to 4 mm in thickness. This could also be related to a phrygian cap. Some focal enhancement is noted on the prior CTs in this area. No sonographic Kee sign. Minimal pericholecystic fluid. No intrahepatic biliary dilatation. CBD measures 6.0 mm. No choledocholithiasis. KIDNEY: Right kidney measures 12.6 x 8.7 x 6.3 cm. Volume 362.3 mL Small right renal cyst measures 1 cm and appears simple.. ASCITES:   None.     Impression: Pancreatic mass. The common bile duct distal to the pancreatic mass appears normal in caliber. No evidence of intrahepatic ductal dilatation. Gallbladder sludge with wall thickening and possible polyp versus phrygian cap at the fundus. No sonographic Kee sign or gallstone. Minimal pericholecystic fluid. Hepatobiliary study may be useful if there is high index of suspicion for acute cholecystitis. The study was marked in EPIC for immediate notification. Workstation performed: [id number]     Procedure: PE Study with CT abdomen & pelvis with contrast    Result Date: 12/15/2023  Narrative: CT PULMONARY ANGIOGRAM OF THE CHEST AND CT ABDOMEN AND PELVIS WITH INTRAVENOUS CONTRAST INDICATION:   abd pain hx of cancer. COMPARISON: 11/6/2023 and 10/23/2023. TECHNIQUE:   CT examination of the chest, abdomen and pelvis was performed.  Thin section CT angiographic technique was used in the chest in order to evaluate for pulmonary embolus and coronal 3D MIP postprocessing was performed on the acquisition scanner. Multiplanar 2D reformatted images were created from the source data. This examination, like all CT scans performed in the [facility], was performed utilizing techniques to minimize radiation dose exposure, including the use of iterative reconstruction and automated exposure control. Radiation dose length product (DLP) for this visit:  954 mGy-cm IV Contrast:  100 mL of iohexol (OMNIPAQUE) Enteric Contrast:  Enteric contrast was not administered. FINDINGS: CHEST PULMONARY ARTERIAL TREE:  No pulmonary embolus is seen. LUNGS: Stable background centrilobular emphysematous changes. Stable 6 mm spiculated nodule peripheral left upper lobe (3:68). Stable 4 mm left upper lobe pulmonary nodule (3:90). Additional 2 to 3 mm pulmonary nodules in the upper lobes not significantly changed compared to prior recent study. Mild dependent/basilar atelectatic changes there is no tracheal or endobronchial lesion. PLEURA:  Unremarkable. HEART/AORTA:  Heavy atherosclerotic coronary artery calcification is noted.  Heart is otherwise unremarkable.  No thoracic aortic aneurysm. MEDIASTINUM AND ORA:  Unremarkable. CHEST WALL AND LOWER NECK:  Unremarkable. ABDOMEN LIVER/BILIARY TREE: Reidentified biliary stent in situ with stable trace pneumobilia. Liver is otherwise unremarkable. GALLBLADDER: Similar distended appearance. No calcified gallstones. No pericholecystic inflammatory change. SPLEEN:  Unremarkable. PANCREAS: Redemonstrated ill-defined hypoattenuating mass at the pancreatic neck with probable encasement of the main portal vein at the confluence with the splenic vein and SMV compatible with patient's history of known neoplasm not significantly changed in appearance. There is  similar dilatation of the main pancreatic duct up to 7 mm. There is some haziness around the pancreatic head and uncinate process. ADRENAL GLANDS: Stable left adrenal probable myolipoma(s). KIDNEYS/URETERS: Symmetric enhancement. Stable cysts and subcentimeter hypodensities too small to characterize, statistically benign. No hydronephrosis. STOMACH AND BOWEL: No bowel obstruction. Colonic diverticulosis without evidence for focal acute diverticulitis. APPENDIX:  A normal appendix was visualized. ABDOMINOPELVIC CAVITY: There is some trace free fluid tracking inferior to the right hepatic lobe, new from prior.  No pneumoperitoneum.  No lymphadenopathy. VESSELS:  Atherosclerotic changes are present.  No evidence of aneurysm. PELVIS REPRODUCTIVE ORGANS:  Unremarkable for patient's age. URINARY BLADDER:  Unremarkable. ABDOMINAL WALL/INGUINAL REGIONS: Prior lower ventral abdominal pelvic wall hernia repair changes again noted. OSSEOUS STRUCTURES:  No acute fracture or destructive osseous lesion.     Impression: No evidence for pulmonary embolus. No consolidation or effusion. Stable pulmonary nodules as above. Distended gallbladder with questionable mild wall thickening although with similar in appearance to prior study. Correlate clinically and consider further evaluation with right upper quadrant ultrasound. Reidentified biliary stent in situ with stable trace pneumobilia. Similar appearance of pancreatic neck mass with probable encasement of the main portal vein at the confluence with the splenic vein and SMV and associated pancreatic ductal dilatation compatible with patient's known neoplasm. Some new haziness noted around the pancreatic head and uncinate process, nonspecific, possibly inflammatory in etiology and/or related to prior recent intervention. Recommend clinical correlation and with laboratory values. No acute bowel pathology. Colonic diverticulosis. Study was marked in EPIC for immediate notification.  Workstation performed: [id number]        65 minutes was spent face to face with Derick Richey and his daughter with greater than 50% of the time spent in counseling or coordination of care including discussions of provided medical updates, discussed palliative care, determined goals of care, determined social/family support, discussed plans of care, discussed symptom management, provided psychosocial support. IVF Hydration Management plan. 5 Wishes and POLST discussion, all questions/concerns regarding ACP Documentation. Hospice introduction. MMJ introduction. PDMP Reviewed. All of the patient's questions were answered during this discussion

## 2024-01-10 ENCOUNTER — NUTRITION (OUTPATIENT)
Dept: NUTRITION | Facility: CLINIC | Age: 59
End: 2024-01-10

## 2024-01-10 ENCOUNTER — HOSPITAL ENCOUNTER (OUTPATIENT)
Dept: INFUSION CENTER | Facility: CLINIC | Age: 59
Discharge: HOME | End: 2024-01-10
Payer: COMMERCIAL

## 2024-01-10 VITALS
RESPIRATION RATE: 18 BRPM | BODY MASS INDEX: 23.49 KG/M2 | WEIGHT: 158.6 LBS | HEIGHT: 69 IN | TEMPERATURE: 97.7 F | HEART RATE: 88 BPM | SYSTOLIC BLOOD PRESSURE: 147 MMHG | DIASTOLIC BLOOD PRESSURE: 80 MMHG

## 2024-01-10 DIAGNOSIS — R10.11 RIGHT UPPER QUADRANT PAIN: Primary | ICD-10-CM

## 2024-01-10 DIAGNOSIS — Z71.3 NUTRITIONAL COUNSELING: Primary | ICD-10-CM

## 2024-01-10 PROCEDURE — 96367 TX/PROPH/DG ADDL SEQ IV INF: CPT

## 2024-01-10 PROCEDURE — G0498 CHEMO EXTEND IV INFUS W/PUMP: HCPCS

## 2024-01-10 PROCEDURE — 96413 CHEMO IV INFUSION 1 HR: CPT

## 2024-01-10 PROCEDURE — 96375 TX/PRO/DX INJ NEW DRUG ADDON: CPT

## 2024-01-10 RX ORDER — ATROPINE SULFATE 1 MG/ML
0.25 INJECTION, SOLUTION INTRAVENOUS ONCE AS NEEDED
Status: DISCONTINUED | OUTPATIENT
Start: 2024-01-10 | End: 2024-01-13 | Stop reason: HOSPADM

## 2024-01-10 RX ORDER — ATROPINE SULFATE 1 MG/ML
0.25 INJECTION, SOLUTION INTRAVENOUS ONCE
Status: COMPLETED | OUTPATIENT
Start: 2024-01-10 | End: 2024-01-10

## 2024-01-10 RX ORDER — DEXTROSE MONOHYDRATE 50 MG/ML
20 INJECTION, SOLUTION INTRAVENOUS ONCE
Status: COMPLETED | OUTPATIENT
Start: 2024-01-10 | End: 2024-01-10

## 2024-01-10 RX ORDER — SODIUM CHLORIDE 9 MG/ML
20 INJECTION, SOLUTION INTRAVENOUS ONCE AS NEEDED
Status: DISCONTINUED | OUTPATIENT
Start: 2024-01-10 | End: 2024-01-13 | Stop reason: HOSPADM

## 2024-01-10 RX ADMIN — DEXAMETHASONE SODIUM PHOSPHATE: 10 INJECTION, SOLUTION INTRAMUSCULAR; INTRAVENOUS at 12:59

## 2024-01-10 RX ADMIN — DIPHENHYDRAMINE HYDROCHLORIDE 25 MG: 50 INJECTION, SOLUTION INTRAMUSCULAR; INTRAVENOUS at 13:25

## 2024-01-10 RX ADMIN — IRINOTECAN HYDROCHLORIDE 231 MG: 20 INJECTION, SOLUTION INTRAVENOUS at 14:39

## 2024-01-10 RX ADMIN — FAMOTIDINE 20 MG: 10 INJECTION, SOLUTION INTRAVENOUS at 13:57

## 2024-01-10 RX ADMIN — DEXTROSE 20 ML/HR: 5 SOLUTION INTRAVENOUS at 12:59

## 2024-01-10 RX ADMIN — ATROPINE SULFATE 0.25 MG: 1 INJECTION, SOLUTION INTRAVENOUS at 14:37

## 2024-01-10 NOTE — PROGRESS NOTES
"Outpatient Oncology Nutrition Consultation   Type of Consult: Follow Up  Care Location: [facility]    Reason for referral: notification by Elo Modi MD on 11/21/23 (reason for referral: Ambulatory referral for T2DM, pancreatic cancer ).      Nutrition Assessment:   Oncology Diagnosis & Treatments:   Diagnosed with pancreatic cancer 10/30/2023.   Started chemo (camptosar, oxaliplatin, adrucil) 11/28/23.  Oncology History Overview Note   Who was diagnosed with  locally advanced pancreas cancer. He presented to the ED with new onset jaundice and CT revealed an ill-defined hypoattenuating mass within the neck of the pancreas with subsequent dilatation and obstruction of the biliary tree and pancreatic duct. He underwent EUS & ERCP with biopsy. He was imitated on neoadjuvant chemotherapy. He is being referred by Dr. Dumas and presents today for consult.      10/23/23 ED- new onset jaundice    10/23/23 CT A/P w contrast  Ill-defined hypoattenuating mass within the neck of the pancreas with subsequent dilatation and obstruction of the biliary tree and pancreatic duct. Highly likely to represent pancreatic neoplasm. See above for further details. Partially encasing the   main portal vein at the confluence with splenic vein and SMV with approximately 50% narrowing of the caliber at this level and dilatation of the SMV. No evidence for distant metastatic disease evident.   Several small nodules within the left adrenal with features suggesting benign etiology as described above.      10/30/23 ERCP   The common bile duct was deeply cannulated after 1 attempt using a traction sphincterotome with 260 cm x 0.035\" guidewire. Cannulation was not difficult. Contrast was injected  Extrinsic and severe stricture was visualized in the mid common bile duct. The stricture was traversable by wire  Dilation was observed in the proximal common bile duct  Medium-sized biliary sphincterotomy was performed using a sphincterotome. " No bleeding was noted at the procedure site.  One 8 mm x 6 cm fully covered metal stent was placed in the common bile duct. There was good bile and contrast drainage seen.  The fluoroscopy images for the , cholangiogram and post procedure were personally reviewed and interpreted during the procedure.      10/30/23 EUS (upper)  Limited endoscopic evaluation using a side-viewing endoscope was unremarkable.  The pancreas appeared atrophic. The pancreatic parenchyma was visualized in the body of the pancreas and tail of the pancreas. . The parenchyma had hyperechoic foci. The pancreatic duct measured 3 mm at the tail. The duct in the body and tail of the pancreas was dilated.  Irregular mass measuring 32 mm x 24 mm with well-defined margins was visualized in the neck of the pancreas. Apparent abutment of the portal vein and splenic vein; 4 successful fine needle biopsy passes were taken with a 25 gauge needle using a transduodenal approach guided by Doppler, sample found to be adequate and sent sample for histology analysis. Small lymph nodes were seen in the area adjacent to the tumor.  The proximal bile duct was dilated. The mid bile duct was strictured. The bile duct measured 16 mm at the distal end and 4 mm at the proximal end.  The parenchyma of the liver appeared normal. The hepatic ducts appeared normal.  The gallbladder appeared distended. The gallbladder contained biliary sludge.  Normal celiac axis. No lymphadenopathy was seen    11/1/23 CA 19-9       932      11/6/23 CT chest w contrast  6 mm and smaller bilateral pulmonary nodules.       11/8/23  Dr. Dumas  Will obtain PET/CT. The patient has a follow-up with surgical oncology. Path will be sent for Immanuel TIDWELL. Discussed the utility of neoadjuvant chemotherapy.        11/15/23 PET CT- [facility]  There is mild heterogeneous activity of the midportion of the pancreas near the stent placement with maximum SUV measurement of 7.8 suggestive of  malignant  2. Rest of the whole body scan otherwise is unremarkable      23 Dr. Cervantes  staging work-up is negative for metastasis    He is a smoker, making the spiculated lung nodule concerning also for primary lung cancer.  But this was negative on his PET/CT.  This will continue to be observed with serial imaging.   recommend neoadjuvant chemotherapy.   Follow up 3 months to reassess whether he will continue chemotherapy versus undergo surgical resection at that time.    ideally if he could undergo 5 to 6 months of neoadjuvant chemotherapy, surgical literature suggest that this will give him the best long-term outcome.  He is agreeable to this plan.       23 IR port placement      23 Dr. Dumas  Discussed the utility of neoadjuvant chemotherapy--mFOLFIRINOX. S/p port placement on 23.     23- 24 12 cycles of FolFirinox Q 14 days      23 Dr. Dumas  Will dose reduce irinotecan to 125mg/m2. Will add pre-medication with benadryl and pepcid.          23 Dr. Hanson, Palliative Care    Upcomin/3/24 Dr. Dumas  24 Dr. Cervantes       Right upper quadrant pain   10/30/2023 Biopsy    Endoscopic ultrasound:  Pancreas, Neck mass:  Malignant  Adenocarcinoma.        2023 -  Cancer Staged    Staging form: Pancreas, AJCC 8th Edition  - Clinical: Stage IB (cT2, cN0, cM0) - Signed by Samir Cervantes MD on 2023  Total positive nodes: 0       2023 -  Chemotherapy    alteplase (CATHFLO), 2 mg, Intracatheter, Every 1 Minute as needed, 4 of 12 cycles  irinotecan (CAMPTOSAR) chemo infusion, 150 mg/m2 = 278 mg, Intravenous, Once, 4 of 12 cycles  Dose modification: 125 mg/m2 (100 % of original dose 150 mg/m2, Cycle 9, Reason: Dose Not Tolerated), 125.1 mg/m2 (83.4 % of original dose 125 mg/m2, Cycle 2, Reason: Dose Not Tolerated), 125 mg/m2 (100 % of original dose 125 mg/m2, Cycle 2, Reason: Dose Not Tolerated)  Administration: 278 mg (2023), 231 mg  (12/12/2023), 231 mg (12/27/2023)  oxaliplatin (ELOXATIN) chemo infusion, 65 mg/m2 = 120.25 mg, Intravenous, Once, 3 of 11 cycles  Administration: 120.25 mg (11/28/2023), 120.25 mg (12/12/2023), 120.25 mg (12/27/2023)  fluorouracil (ADRUCIL) ambulatory infusion Soln, 1,200 mg/m2/day = 4,440 mg, Intravenous, Over 46 hours, 4 of 12 cycles     Primary adenocarcinoma of pancreatic duct (HCC)   10/30/2023 Biopsy    Final Diagnosis   A.B. Pancreas, Neck mass (Cell Block and smear Preparations):  Malignant  Adenocarcinoma.     Satisfactory for evaluation.        12/15/2023 Initial Diagnosis    Primary adenocarcinoma of pancreatic duct (HCC)       Past Medical & Surgical Hx:   Patient Active Problem List   Diagnosis    Right upper quadrant pain    Type 2 diabetes mellitus with hyperglycemia, with long-term current use of insulin (HCC)    Mixed hyperlipidemia    Coronary artery disease involving native coronary artery of native heart without angina pectoris    Gastroesophageal reflux disease    Primary insomnia    Dehydration    Attention deficit hyperactivity disorder (ADHD), combined type    Depression with anxiety    Right upper quadrant abdominal pain    Primary adenocarcinoma of pancreatic duct (HCC)    SIRS (systemic inflammatory response syndrome) (HCC)    Pancreatic adenocarcinoma (HCC)    Palliative care patient     Past Medical History:   Diagnosis Date    Cancer (HCC)     pancreatic    Coronary artery disease     Diabetes mellitus (HCC)     Hyperlipidemia     Hypertension      Past Surgical History:   Procedure Laterality Date    CORONARY ANGIOPLASTY WITH STENT PLACEMENT      ERCP W/ PLASTIC STENT PLACEMENT      HERNIA REPAIR      IR PORT PLACEMENT  11/24/2023       Review of Medications:   Vitamins, Supplements and Herbals: No, pt denies taking supplements    Current Outpatient Medications:     amphetamine-dextroamphetamine (ADDERALL XR) 20 MG 24 hr capsule, Take 20 mg by mouth every morning, Disp: , Rfl:      aspirin 81 MG tablet, 1 tab(s), Disp: , Rfl:     carvedilol (COREG) 6.25 mg tablet, Take 6.25 mg by mouth 2 (two) times a day, Disp: , Rfl:     Continuous Blood Gluc Sensor (FreeStyle Geovanny 3 Sensor) MISC, use as directed TO MONITOR GLUCOSE DAILY, Disp: , Rfl:     desvenlafaxine succinate (PRISTIQ) 50 mg 24 hr tablet, Take 50 mg by mouth daily, Disp: , Rfl:     diphenhydramine, lidocaine, Al/Mg hydroxide, simethicone (Magic Mouthwash) SUSP, Swish and spit 10 mL every 4 (four) hours as needed for mouth pain or discomfort, Disp: 237 mL, Rfl: 2    famotidine (PEPCID) 40 MG tablet, Take 40 mg by mouth daily at bedtime, Disp: , Rfl:     fluorouracil 4,440 mg in CADD/Elastomeric Infusion Device, Infuse 4,440 mg (1,200 mg/m2/day x 1.85 m2) into a catheter in a vein via infusion device over 46 hours for 2 days  Infusion planned for January 10, 2024., Disp: 1 each, Rfl: 0    insulin lispro (HumaLOG KwikPen) 100 units/mL injection pen, Inject 8 Units under the skin 3 (three) times a day with meals (Patient not taking: Reported on 12/27/2023), Disp: 21.6 mL, Rfl: 0    Jardiance 25 MG TABS, Take 1 tablet by mouth every morning, Disp: , Rfl:     lisinopril (ZESTRIL) 5 mg tablet, Take 1 tablet by mouth daily, Disp: , Rfl:     metFORMIN (GLUCOPHAGE) 1000 MG tablet, Take 1 tablet (1,000 mg total) by mouth 2 (two) times a day with meals Twice daily Do not start before December 18, 2023., Disp: , Rfl:     omeprazole (PriLOSEC) 40 MG capsule, take 1 capsule by mouth every morning 1 hour before FIRST MEAL OF THE DAY (Patient not taking: Reported on 12/27/2023), Disp: , Rfl:     ondansetron (ZOFRAN) 4 mg tablet, Take 1 tablet (4 mg total) by mouth every 8 (eight) hours as needed for nausea or vomiting (Patient not taking: Reported on 12/27/2023), Disp: 20 tablet, Rfl: 0    ondansetron (ZOFRAN) 8 mg tablet, Take 1 tablet (8 mg total) by mouth every 8 (eight) hours as needed for nausea or vomiting for up to 20 days, Disp: 20 tablet, Rfl:  "2    pancrelipase, Lip-Prot-Amyl, (CREON) 12,000 units capsule, Take 12,000 units of lipase by mouth 3 (three) times a day with meals, Disp: 180 capsule, Rfl: 0    rosuvastatin (CRESTOR) 10 MG tablet, Take 1 tablet by mouth daily, Disp: , Rfl:     Tresiba FlexTouch 100 units/mL injection pen, Inject 30 Units under the skin daily at bedtime, Disp: 27 mL, Rfl: 0    Zolpidem Tartrate (AMBIEN PO), Take by mouth, Disp: , Rfl:   No current facility-administered medications for this visit.    Facility-Administered Medications Ordered in Other Visits:     alteplase (CATHFLO) injection 2 mg, 2 mg, Intracatheter, Q1MIN PRN, Idris Calderon MD    Atropine Sulfate injection 0.25 mg, 0.25 mg, Intravenous, Once, Idris Calderon MD    Atropine Sulfate injection 0.25 mg, 0.25 mg, Intravenous, Once PRN, Idris Calderon MD    Famotidine (PF) (PEPCID) 20 mg in sodium chloride 0.9 % 50 mL IVPB, 20 mg, Intravenous, Once, Idris Calderon MD    irinotecan (CAMPTOSAR) 231 mg in dextrose 5 % 500 mL chemo infusion, 125 mg/m2 (Treatment Plan Recorded), Intravenous, Once, Idris Calderon MD    sodium chloride 0.9 % infusion, 20 mL/hr, Intravenous, Once PRN, Idris Calderon MD    Most Recent Lab Results:   Lab Results   Component Value Date    WBC 10.25 (H) 01/08/2024    NEUTROABS 6.29 01/08/2024    ALT 13 01/08/2024    AST 15 01/08/2024    ALB 4.0 01/08/2024    SODIUM 141 01/08/2024    SODIUM 141 12/26/2023    K 4.6 01/08/2024    K 5.2 12/26/2023     01/08/2024    BUN 10 01/08/2024    BUN 13 12/26/2023    CREATININE 0.81 01/08/2024    CREATININE 0.84 12/26/2023    EGFR 97 01/08/2024    POCGLU 134 12/15/2023    GLUF 153 (H) 01/08/2024    GLUF 120 (H) 12/26/2023    GLUC 100 12/15/2023    HGBA1C 7.8 (A) 11/21/2023    HGBA1C 8.5 (H) 09/06/2023    HGBA1C 6.6 (H) 06/01/2023    CALCIUM 9.5 01/08/2024       Anthropometric Measurements:   Height: 69\"  Ht Readings from Last 1 Encounters:   01/10/24 5' 9.29\" (1.76 m)     Wt Readings from Last 20 " Encounters:   01/10/24 71.9 kg (158 lb 9.6 oz)   01/03/24 72.6 kg (160 lb)   12/27/23 72.3 kg (159 lb 6.4 oz)   12/27/23 73.2 kg (161 lb 6.4 oz)   12/21/23 71.2 kg (157 lb)   12/12/23 71.2 kg (157 lb)   12/07/23 70.8 kg (156 lb)   12/06/23 70.8 kg (156 lb)   11/28/23 71.7 kg (158 lb 1.1 oz)   11/27/23 71.7 kg (158 lb)   11/24/23 71.1 kg (156 lb 12 oz)   11/21/23 70.3 kg (155 lb)   11/16/23 69.9 kg (154 lb)   11/08/23 71 kg (156 lb 8 oz)   10/30/23 67.6 kg (149 lb)   03/22/19 93.4 kg (206 lb)   02/14/18 97.1 kg (214 lb)       Weight History:   Usual Weight: 155#  Varian: n/a  Home Scale: none    Oncology Nutrition-Anthropometrics      Flowsheet Row Nutrition from 1/10/2024 in [facility] Oncology Dietitian [place] Nutrition from 11/28/2023 in [facility] Diet[facility] [place]   Patient age (years): 58 years 58 years   Patient (male) height (in): 69 in 69 in   Current weight (lbs): 158.6 lbs 158.1 lbs   Current weight to be used for anthropometric calculations (kg) 72.1 kg 71.9 kg   BMI: 23.4 23.3   IBW male 160 lb 160 lb   IBW (kg) male 72.7 kg 72.7 kg   IBW % (male) 99.1 % 98.8 %   Adjusted BW (male): 159.7 lbs 159.5 lbs   Adjusted BW in kg (male): 72.6 kg 72.5 kg   % weight change after 1 week: -0.9 % 2 %   Weight change after 1 week (lbs) -1.4 lbs 3.1 lbs   % weight change after 1 month: 1 % 6.1 %   Weight change after 1 month (lbs) 1.6 lbs 9.1 lbs   % weight change after 3 months: 6.4 % --   Weight change after 3 months (lbs) 9.6 lbs --            Nutrition-Focused Physical Findings: none observed    Food/Nutrition-Related History & Client/Social History:    Current Nutrition Impact Symptoms:  [] Nausea  [] Reduced Appetite  [] Acid Reflux    [] Vomiting  [] Unintended Wt Loss  [] Malabsorption    [] Diarrhea  [] Unintended Wt Gain  [] Dumping Syndrome    [] Constipation -will take MOM as needed  [] Thick Mucous/Secretions  [] Abdominal Pain    [] Dysgeusia (Altered Taste)  [] Xerostomia (Dry Mouth)   [] Gas    [] Dysosmia (Altered Smell)  [] Thrush  [] Difficulty Chewing    [] Oral Mucositis (Sore Mouth)  [] Fatigue  [x] Hyperglycemia ; fasting BG 153mg/dL on 24   -taking insulin with meals   Lab Results   Component Value Date    HGBA1C 7.8 (A) 2023      [] Odynophagia  [] Esophagitis  [] Other: hyperkalemia 23    [] Dysphagia  [] Early Satiety  [x] No Problems Eating      Food Allergies & Intolerances: no    Current Diet: CHO-Controlled  Current Nutrition Intake: Unchanged from last visit  Appetite: Good  Nutrition Route: PO  Oral Care: brushes QD  Activity level: ADL, ambulates independently.     24 Hr Diet Recall:   Breakfast: 3 eggs, griffin or ham, toast  Lunch: salad or sandwich   Dinner: fish or chicken, potatoes, vegetable like broccoli   Snack: sometimes ice cream     Beverages: coffee with sugar (12oz x1), Low sugar gatorade- G2 (12oz x2-3)  Supplements:   None      Oncology Nutrition-Estimated Needs      Flowsheet Row Nutrition from 1/10/2024 in [facility] Diet[facility] [place] Nutrition from 2023 in [facility] Diet[facility] [place]   Weight type used Actual weight Actual weight   Weight in kilograms (kg) used for estimated needs 72.1 kg 71.9 kg   Energy needs formula:  30-35 kcal/kg 30-35 kcal/kg   Energy needs based on 30 kcal/k kcal 2156 kcal   Energy needs based on 35 kcal/k kcal 2515 kcal   Protein needs formula: 1.2-1.5 g/kg 1.2-1.5 g/kg   Protein needs based on 1.2 g/k g 86 g   Protein needs based on 1.5 g/kg 108 g 108 g   Fluid needs formula: 30-35 mL/kg 30-35 mL/kg   Fluid needs based on 30 mL/kg 2169 mL 2154 mL   Fluid needs in ounces 73 oz 73 oz   Fluid needs based on 35 mL/kg 2531 mL 2513 mL   Fluid needs in ounces 86 oz 85 oz             Discussion & Intervention:   Derick was evaluated today for an RD follow up regarding  diet guidance through pancreatic cancer treatment .  Derick is currently undergoing tx for pancreatic cancer .   He explains today that he is doing well. His appetite is good and PO intakes are unchanged. His weight is relatively stable. He explains that he saw an endocrinologist and is now taking insulin with meals for better BG management.    Based on today's assessment, discussion included: MNT for: weight management, pancreatic cancer, T2DM, a diabetic diet & food choices to include at all meals & snacks, spreading carbohydrate intake throughout the day & counting carbohydrates for adequate glycemic control, adequate hydration & fluid choices, and mindful eating concepts.   Moving forward, Dreick will continue current nutrition plan of care. He would like to follow up prn.   Materials Provided: not applicable  All questions and concerns addressed during today’s visit.  Derick has RD contact information.    Nutrition Diagnosis:   Increased Nutrient Needs (kcal & pro) related to increased demand for nutrients and disease state as evidenced by cancer dx and pt undergoing tx for cancer.  Altered Nutrition-Related Lab Values related to endocrine dysfunction as evidenced by hyperglycemia   Monitoring & Evaluation:   Goals:  weight maintenance/stabilization  adequate nutrition impact symptom management  pt to meet >/=75% estimated nutrition needs daily  adequate glycemic control  increase fluid intake    Progress Towards Goals: Progressing    Nutrition Rx & Recommendations:  Diet: High calorie, high protein, carbohydrate controlled   Include a variety of foods (as tolerated/allowed by diet).  Incorporate physical activity as able/allowed.  Stay hydrated by sipping fluids of choice/tolerance throughout the day.  Liquid nutrition may be better tolerated than solids at times.  Alter food choices and eating patterns to accommodate changing needs.  Refer to Eating Hints book for other meal/snack ideas and symptom management.  Weigh yourself regularly. If you notice weight loss, make an effort to increase your daily food/calorie intake.  If you continue to notice loss after these efforts, reach out to your dietitian to establish a plan to stabilize weight.  Caution with high fat/greasy/fried foods, caution with high sugar foods (ex. >10g sugar per serving).   Eat slowly and chew food thoroughly     Ways to increase calorie and protein intake should appetite/weight decrease: (refer to pages 49-53 of Eating Hints Book for ways to add protein and calories)  Eat when feeling most hungry.   Choose foods that are easy to eat: yogurt, oatmeal, eggs, soup, cereal, muffins, granola bars.   Keep non perishable snacks nearby (pretzels, crackers, trail mix).  5-6 small meals/snacks daily  Protein at all meals/snacks: eggs (scrambled, hard boiled, pan fried, egg whites), fish (salmon, tuna steal, swordfish, cm, shrimp, cod, angel luis), beans (chickpeas, black beans, lentils), nuts/nut butters, quinoa, peas, oatmeal, seeds (lane, flax, pumpkin), soy milk, cheese, Greek yogurt, cottage cheese, chicken, lean ground beef, lean cuts of beef (loin, round, flank), turkey breast.   Add high calorie foods to meals: cheese, milk, olive oil, avocado, peanut butter, extra sauces/gravies, nuts  Choose liquids with calories: milk, Fairlife milk (higher protein/lactose-free milk), chocolate milk, 100% fruit juice, diluted juice, bone broth (higher protein broth), creamy soups, sports drinks (Gatorade, Poweraide, Pedialyte, etc.), Italian ice, popsicles, milkshakes, smoothies, oral nutrition supplements (Ensure, Boost, etc.), gelatin/Jello, etc.     Follow Up Plan: PRN per patient request  Recommend Referral to Other Providers: none at this time

## 2024-01-10 NOTE — PROGRESS NOTES
Pt to clinic for Irinotecan and Fluorouracil pump. Pt offers no complaints today. Tolerated infusion without complications. Chemo pump connected to pt port with all clamps open to run. Aware of next appointment on 1/12/24 at 1430. AVS declined.

## 2024-01-10 NOTE — PATIENT INSTRUCTIONS
Nutrition Rx & Recommendations:  Diet: High calorie, high protein, carbohydrate controlled   Include a variety of foods (as tolerated/allowed by diet).  Incorporate physical activity as able/allowed.  Stay hydrated by sipping fluids of choice/tolerance throughout the day.  Liquid nutrition may be better tolerated than solids at times.  Alter food choices and eating patterns to accommodate changing needs.  Refer to Eating Hints book for other meal/snack ideas and symptom management.  Weigh yourself regularly. If you notice weight loss, make an effort to increase your daily food/calorie intake. If you continue to notice loss after these efforts, reach out to your dietitian to establish a plan to stabilize weight.  Caution with high fat/greasy/fried foods, caution with high sugar foods (ex. >10g sugar per serving).   Eat slowly and chew food thoroughly     Ways to increase calorie and protein intake should appetite/weight decrease: (refer to pages 49-53 of Eating Hints Book for ways to add protein and calories)  Eat when feeling most hungry.   Choose foods that are easy to eat: yogurt, oatmeal, eggs, soup, cereal, muffins, granola bars.   Keep non perishable snacks nearby (pretzels, crackers, trail mix).  5-6 small meals/snacks daily  Protein at all meals/snacks: eggs (scrambled, hard boiled, pan fried, egg whites), fish (salmon, tuna steal, swordfish, cm, shrimp, cod, angel luis), beans (chickpeas, black beans, lentils), nuts/nut butters, quinoa, peas, oatmeal, seeds (lane, flax, pumpkin), soy milk, cheese, Greek yogurt, cottage cheese, chicken, lean ground beef, lean cuts of beef (loin, round, flank), turkey breast.   Add high calorie foods to meals: cheese, milk, olive oil, avocado, peanut butter, extra sauces/gravies, nuts  Choose liquids with calories: milk, Fairlife milk (higher protein/lactose-free milk), chocolate milk, 100% fruit juice, diluted juice, bone broth (higher protein broth), creamy soups, sports  drinks (Gatorade, Poweraide, Pedialyte, etc.), Italian ice, popsicles, milkshakes, smoothies, oral nutrition supplements (Ensure, Boost, etc.), gelatin/Jello, etc.     Follow Up Plan: PRN per patient request  Recommend Referral to Other Providers: none at this time

## 2024-01-12 ENCOUNTER — HOSPITAL ENCOUNTER (OUTPATIENT)
Dept: INFUSION CENTER | Facility: CLINIC | Age: 59
Discharge: HOME | End: 2024-01-12

## 2024-01-12 DIAGNOSIS — R10.11 RIGHT UPPER QUADRANT PAIN: Primary | ICD-10-CM

## 2024-01-12 NOTE — PROGRESS NOTES
Pt presents for CADD d/c and hydration, pt offers no complaints, pt declines hydration, CADD flat and deflated, port flushed with good return, AVS declined aware of next appt 1/24 @1230. D/c from unit stable

## 2024-01-16 ENCOUNTER — TELEPHONE (OUTPATIENT)
Dept: HEMATOLOGY ONCOLOGY | Facility: CLINIC | Age: 59
End: 2024-01-16

## 2024-01-16 RX ORDER — DEXTROSE MONOHYDRATE 50 MG/ML
20 INJECTION, SOLUTION INTRAVENOUS ONCE
Status: CANCELLED | OUTPATIENT
Start: 2024-01-24

## 2024-01-16 RX ORDER — ATROPINE SULFATE 1 MG/ML
0.25 INJECTION, SOLUTION INTRAVENOUS ONCE
Status: CANCELLED | OUTPATIENT
Start: 2024-01-24

## 2024-01-16 RX ORDER — ATROPINE SULFATE 1 MG/ML
0.25 INJECTION, SOLUTION INTRAVENOUS ONCE AS NEEDED
Status: CANCELLED | OUTPATIENT
Start: 2024-01-24

## 2024-01-16 RX ORDER — SODIUM CHLORIDE 9 MG/ML
20 INJECTION, SOLUTION INTRAVENOUS ONCE AS NEEDED
Status: CANCELLED | OUTPATIENT
Start: 2024-01-24

## 2024-01-17 ENCOUNTER — OFFICE VISIT (OUTPATIENT)
Dept: FAMILY MEDICINE CLINIC | Facility: CLINIC | Age: 59
End: 2024-01-17
Payer: COMMERCIAL

## 2024-01-17 VITALS
WEIGHT: 160 LBS | TEMPERATURE: 97.6 F | DIASTOLIC BLOOD PRESSURE: 68 MMHG | HEART RATE: 87 BPM | HEIGHT: 70 IN | BODY MASS INDEX: 22.9 KG/M2 | OXYGEN SATURATION: 97 % | SYSTOLIC BLOOD PRESSURE: 124 MMHG

## 2024-01-17 DIAGNOSIS — K13.79 MOUTH SORES: Primary | ICD-10-CM

## 2024-01-17 DIAGNOSIS — K13.0 SORE LIP: ICD-10-CM

## 2024-01-17 DIAGNOSIS — R10.11 RIGHT UPPER QUADRANT PAIN: Primary | ICD-10-CM

## 2024-01-17 PROCEDURE — 99213 OFFICE O/P EST LOW 20 MIN: CPT | Performed by: STUDENT IN AN ORGANIZED HEALTH CARE EDUCATION/TRAINING PROGRAM

## 2024-01-17 RX ORDER — VALACYCLOVIR HYDROCHLORIDE 1 G/1
1000 TABLET, FILM COATED ORAL 2 TIMES DAILY
Qty: 10 TABLET | Refills: 0 | Status: SHIPPED | OUTPATIENT
Start: 2024-01-17 | End: 2024-01-22

## 2024-01-17 NOTE — PROGRESS NOTES
Assessment/Plan:         Problem List Items Addressed This Visit    None  Visit Diagnoses     Mouth sores    -  Primary    Sore lip        Relevant Medications    valACYclovir (VALTREX) 1,000 mg tablet          Valtrex sent in, magic mouth at home to use.    Subjective:      Patient ID: Derick Richey is a 58 y.o. male.    Rash  This is a recurrent problem. The current episode started in the past 7 days. The problem is unchanged. The affected locations include the lips. The rash is characterized by burning. It is unknown if there was an exposure to a precipitant. Associated symptoms include rhinorrhea. Pertinent negatives include no anorexia, congestion, cough, diarrhea, facial edema, fatigue, fever, joint pain, shortness of breath, sore throat or vomiting.       Answers submitted by the patient for this visit:  Rash Questionnaire (Submitted on 1/17/2024)  Chief Complaint: Rash  nail changes: No    The following portions of the patient's history were reviewed and updated as appropriate:   Past Medical History:  He has a past medical history of Cancer (HCC), Coronary artery disease, Diabetes mellitus (HCC), Hyperlipidemia, and Hypertension.,  _______________________________________________________________________  Medical Problems:  does not have any pertinent problems on file.,  _______________________________________________________________________  Past Surgical History:   has a past surgical history that includes Hernia repair; Coronary angioplasty with stent; ERCP w/ plastic stent placement; and IR port placement (11/24/2023).,  _______________________________________________________________________  Family History:  family history includes Heart attack in his father; Skin cancer in his father.,  _______________________________________________________________________  Social History:   reports that he has been smoking cigarettes. He has a 52.5 pack-year smoking history. He does not have any smokeless tobacco  history on file. He reports that he does not currently use alcohol. He reports that he does not use drugs.,  _______________________________________________________________________  Allergies:  has No Known Allergies..  _______________________________________________________________________  Current Outpatient Medications   Medication Sig Dispense Refill   • amphetamine-dextroamphetamine (ADDERALL XR) 20 MG 24 hr capsule Take 20 mg by mouth every morning     • aspirin 81 MG tablet 1 tab(s)     • carvedilol (COREG) 6.25 mg tablet Take 6.25 mg by mouth 2 (two) times a day     • Continuous Blood Gluc Sensor ([employer]Style Geovanny 3 Sensor) MISC use as directed TO MONITOR GLUCOSE DAILY     • desvenlafaxine succinate (PRISTIQ) 50 mg 24 hr tablet Take 50 mg by mouth daily     • diphenhydramine, lidocaine, Al/Mg hydroxide, simethicone (Magic Mouthwash) SUSP Swish and spit 10 mL every 4 (four) hours as needed for mouth pain or discomfort 237 mL 2   • famotidine (PEPCID) 40 MG tablet Take 40 mg by mouth daily at bedtime     • [START ON 1/24/2024] fluorouracil 4,440 mg in CADD/Elastomeric Infusion Device Infuse 4,440 mg (1,200 mg/m2/day x 1.85 m2) into a catheter in a vein via infusion device over 46 hours for 2 days  Infusion planned for January 24, 2024. 1 each 0   • insulin lispro (HumaLOG KwikPen) 100 units/mL injection pen Inject 8 Units under the skin 3 (three) times a day with meals 21.6 mL 0   • Jardiance 25 MG TABS Take 1 tablet by mouth every morning     • lisinopril (ZESTRIL) 5 mg tablet Take 1 tablet by mouth daily     • metFORMIN (GLUCOPHAGE) 1000 MG tablet Take 1 tablet (1,000 mg total) by mouth 2 (two) times a day with meals Twice daily Do not start before December 18, 2023.     • omeprazole (PriLOSEC) 40 MG capsule      • rosuvastatin (CRESTOR) 10 MG tablet Take 1 tablet by mouth daily     • Tresiba FlexTouch 100 units/mL injection pen Inject 30 Units under the skin daily at bedtime 27 mL 0   • valACYclovir  "(VALTREX) 1,000 mg tablet Take 1 tablet (1,000 mg total) by mouth 2 (two) times a day for 5 days 10 tablet 0   • Zolpidem Tartrate (AMBIEN PO) Take by mouth     • ondansetron (ZOFRAN) 8 mg tablet Take 1 tablet (8 mg total) by mouth every 8 (eight) hours as needed for nausea or vomiting for up to 20 days 20 tablet 2   • pancrelipase, Lip-Prot-Amyl, (CREON) 12,000 units capsule Take 12,000 units of lipase by mouth 3 (three) times a day with meals 180 capsule 0     No current facility-administered medications for this visit.     _______________________________________________________________________  Review of Systems   Constitutional:  Negative for fatigue and fever.   HENT:  Positive for rhinorrhea. Negative for congestion and sore throat.    Eyes:  Negative for pain.   Respiratory:  Negative for cough and shortness of breath.    Gastrointestinal:  Negative for anorexia, diarrhea and vomiting.   Musculoskeletal:  Negative for joint pain.   Skin:  Positive for rash.         Objective:  Vitals:    01/17/24 1324   BP: 124/68   Pulse: 87   Temp: 97.6 °F (36.4 °C)   SpO2: 97%   Weight: 72.6 kg (160 lb)   Height: 5' 9.5\" (1.765 m)     Body mass index is 23.29 kg/m².     Physical Exam  Constitutional:       General: He is not in acute distress.     Appearance: Normal appearance.   HENT:      Head: Normocephalic and atraumatic.      Mouth/Throat:      Comments: Herpetic appearing l esion at bridge  Pulmonary:      Effort: Pulmonary effort is normal. No respiratory distress.   Neurological:      Mental Status: He is alert and oriented to person, place, and time. Mental status is at baseline.   Psychiatric:         Mood and Affect: Mood normal.         Behavior: Behavior normal.         "

## 2024-01-18 DIAGNOSIS — C25.9 PANCREATIC ADENOCARCINOMA (HCC): ICD-10-CM

## 2024-01-18 RX ORDER — PANCRELIPASE 60000; 12000; 38000 [USP'U]/1; [USP'U]/1; [USP'U]/1
12000 CAPSULE, DELAYED RELEASE PELLETS ORAL
Qty: 180 CAPSULE | Refills: 0 | Status: SHIPPED | OUTPATIENT
Start: 2024-01-18

## 2024-01-23 ENCOUNTER — TELEPHONE (OUTPATIENT)
Dept: HEMATOLOGY ONCOLOGY | Facility: CLINIC | Age: 59
End: 2024-01-23

## 2024-01-23 NOTE — TELEPHONE ENCOUNTER
Called patient to assess symptoms of neuropathy with oxali removed from last cycle, he reports that it has resolved, but that he does not wish to experience that again, reporting that it was intolerable for him.    I consulted with Dr. Calderon, he instructed me to remove oxali from patient's plan- oxali removed.    Called to make patient aware. He verbalized understanding and has no other questions or concerns at this moment.

## 2024-01-24 ENCOUNTER — APPOINTMENT (OUTPATIENT)
Dept: LAB | Facility: CLINIC | Age: 59
End: 2024-01-24
Payer: COMMERCIAL

## 2024-01-24 ENCOUNTER — TELEPHONE (OUTPATIENT)
Dept: INFUSION CENTER | Facility: CLINIC | Age: 59
End: 2024-01-24

## 2024-01-24 ENCOUNTER — HOSPITAL ENCOUNTER (OUTPATIENT)
Dept: INFUSION CENTER | Facility: CLINIC | Age: 59
Discharge: HOME | End: 2024-01-24
Payer: COMMERCIAL

## 2024-01-24 ENCOUNTER — APPOINTMENT (OUTPATIENT)
Dept: LAB | Facility: HOSPITAL | Age: 59
End: 2024-01-24
Payer: COMMERCIAL

## 2024-01-24 VITALS
SYSTOLIC BLOOD PRESSURE: 118 MMHG | TEMPERATURE: 96.5 F | HEART RATE: 82 BPM | BODY MASS INDEX: 23.4 KG/M2 | RESPIRATION RATE: 18 BRPM | HEIGHT: 69 IN | WEIGHT: 158 LBS | DIASTOLIC BLOOD PRESSURE: 72 MMHG

## 2024-01-24 DIAGNOSIS — R10.11 RIGHT UPPER QUADRANT PAIN: Primary | ICD-10-CM

## 2024-01-24 DIAGNOSIS — R10.11 RIGHT UPPER QUADRANT PAIN: ICD-10-CM

## 2024-01-24 LAB
ALBUMIN SERPL BCP-MCNC: 4.1 G/DL (ref 3.5–5)
ALP SERPL-CCNC: 96 U/L (ref 34–104)
ALT SERPL W P-5'-P-CCNC: 8 U/L (ref 7–52)
ANION GAP SERPL CALCULATED.3IONS-SCNC: 6 MMOL/L
AST SERPL W P-5'-P-CCNC: 8 U/L (ref 13–39)
BASOPHILS # BLD AUTO: 0.07 THOUSANDS/ÂΜL (ref 0–0.1)
BASOPHILS NFR BLD AUTO: 1 % (ref 0–1)
BILIRUB SERPL-MCNC: 0.69 MG/DL (ref 0.2–1)
BUN SERPL-MCNC: 12 MG/DL (ref 5–25)
CALCIUM SERPL-MCNC: 9.3 MG/DL (ref 8.4–10.2)
CHLORIDE SERPL-SCNC: 104 MMOL/L (ref 96–108)
CO2 SERPL-SCNC: 30 MMOL/L (ref 21–32)
CREAT SERPL-MCNC: 0.81 MG/DL (ref 0.6–1.3)
EOSINOPHIL # BLD AUTO: 0.59 THOUSAND/ÂΜL (ref 0–0.61)
EOSINOPHIL NFR BLD AUTO: 8 % (ref 0–6)
ERYTHROCYTE [DISTWIDTH] IN BLOOD BY AUTOMATED COUNT: 14.6 % (ref 11.6–15.1)
GFR SERPL CREATININE-BSD FRML MDRD: 97 ML/MIN/1.73SQ M
GLUCOSE SERPL-MCNC: 266 MG/DL (ref 65–140)
HCT VFR BLD AUTO: 49.3 % (ref 36.5–49.3)
HGB BLD-MCNC: 16.9 G/DL (ref 12–17)
IMM GRANULOCYTES # BLD AUTO: 0.02 THOUSAND/UL (ref 0–0.2)
IMM GRANULOCYTES NFR BLD AUTO: 0 % (ref 0–2)
LYMPHOCYTES # BLD AUTO: 1.92 THOUSANDS/ÂΜL (ref 0.6–4.47)
LYMPHOCYTES NFR BLD AUTO: 26 % (ref 14–44)
MCH RBC QN AUTO: 33.1 PG (ref 26.8–34.3)
MCHC RBC AUTO-ENTMCNC: 34.3 G/DL (ref 31.4–37.4)
MCV RBC AUTO: 97 FL (ref 82–98)
MONOCYTES # BLD AUTO: 0.81 THOUSAND/ÂΜL (ref 0.17–1.22)
MONOCYTES NFR BLD AUTO: 11 % (ref 4–12)
NEUTROPHILS # BLD AUTO: 4.02 THOUSANDS/ÂΜL (ref 1.85–7.62)
NEUTS SEG NFR BLD AUTO: 54 % (ref 43–75)
NRBC BLD AUTO-RTO: 0 /100 WBCS
PLATELET # BLD AUTO: 174 THOUSANDS/UL (ref 149–390)
PMV BLD AUTO: 9.2 FL (ref 8.9–12.7)
POTASSIUM SERPL-SCNC: 3.7 MMOL/L (ref 3.5–5.3)
PROT SERPL-MCNC: 7 G/DL (ref 6.4–8.4)
RBC # BLD AUTO: 5.11 MILLION/UL (ref 3.88–5.62)
SODIUM SERPL-SCNC: 140 MMOL/L (ref 135–147)
WBC # BLD AUTO: 7.43 THOUSAND/UL (ref 4.31–10.16)

## 2024-01-24 PROCEDURE — 85025 COMPLETE CBC W/AUTO DIFF WBC: CPT

## 2024-01-24 PROCEDURE — 36415 COLL VENOUS BLD VENIPUNCTURE: CPT

## 2024-01-24 PROCEDURE — 80053 COMPREHEN METABOLIC PANEL: CPT

## 2024-01-24 RX ORDER — DEXTROSE MONOHYDRATE 50 MG/ML
20 INJECTION, SOLUTION INTRAVENOUS ONCE
Status: COMPLETED | OUTPATIENT
Start: 2024-01-24 | End: 2024-01-24

## 2024-01-24 RX ORDER — SODIUM CHLORIDE 9 MG/ML
20 INJECTION, SOLUTION INTRAVENOUS ONCE AS NEEDED
Status: DISCONTINUED | OUTPATIENT
Start: 2024-01-24 | End: 2024-01-27 | Stop reason: HOSPADM

## 2024-01-24 RX ORDER — ATROPINE SULFATE 1 MG/ML
0.25 INJECTION, SOLUTION INTRAVENOUS ONCE
Status: COMPLETED | OUTPATIENT
Start: 2024-01-24 | End: 2024-01-24

## 2024-01-24 RX ORDER — ATROPINE SULFATE 1 MG/ML
0.25 INJECTION, SOLUTION INTRAVENOUS ONCE AS NEEDED
Status: DISCONTINUED | OUTPATIENT
Start: 2024-01-24 | End: 2024-01-27 | Stop reason: HOSPADM

## 2024-01-24 RX ADMIN — ATROPINE SULFATE 0.25 MG: 1 INJECTION, SOLUTION INTRAVENOUS at 15:22

## 2024-01-24 RX ADMIN — DIPHENHYDRAMINE HYDROCHLORIDE 25 MG: 50 INJECTION, SOLUTION INTRAMUSCULAR; INTRAVENOUS at 14:03

## 2024-01-24 RX ADMIN — SODIUM CHLORIDE 20 ML/HR: 9 INJECTION, SOLUTION INTRAVENOUS at 14:02

## 2024-01-24 RX ADMIN — IRINOTECAN HYDROCHLORIDE 231 MG: 20 INJECTION, SOLUTION INTRAVENOUS at 15:28

## 2024-01-24 RX ADMIN — DEXTROSE 20 ML/HR: 5 SOLUTION INTRAVENOUS at 15:21

## 2024-01-24 RX ADMIN — DEXAMETHASONE SODIUM PHOSPHATE: 10 INJECTION, SOLUTION INTRAMUSCULAR; INTRAVENOUS at 13:30

## 2024-01-24 RX ADMIN — FAMOTIDINE 20 MG: 10 INJECTION, SOLUTION INTRAVENOUS at 14:51

## 2024-01-24 NOTE — PROGRESS NOTES
Patient arrives to [facility] for Folfirinox chemotherapy. Patient offers no complaints, finished antiviral medication yesterday for mouth sores. Per Jessica Trevizo RN at hem/onc office, okay to proceed with tx today. Clarification given that patient is on a modified Folfirinox plan without leucovorin or IV push of fluorouracil.     Port accessed, patient tolerating infusion at this time. Care handoff given to Yadira Mendieta RN      Next appointment: 1/26/24 @ 8322

## 2024-01-24 NOTE — PROGRESS NOTES
Pt tolerated remainder of treatment well. Home infusion  pump connected, sensor taped to abdomen and clamps open. AVS declined, next appointment 12/26 3:30pm.

## 2024-01-25 ENCOUNTER — TELEMEDICINE (OUTPATIENT)
Dept: PALLIATIVE MEDICINE | Facility: CLINIC | Age: 59
End: 2024-01-25
Payer: COMMERCIAL

## 2024-01-25 DIAGNOSIS — F41.8 DEPRESSION WITH ANXIETY: ICD-10-CM

## 2024-01-25 DIAGNOSIS — Z51.5 PALLIATIVE CARE PATIENT: ICD-10-CM

## 2024-01-25 DIAGNOSIS — C25.9 PANCREATIC ADENOCARCINOMA (HCC): Primary | ICD-10-CM

## 2024-01-25 PROCEDURE — 99214 OFFICE O/P EST MOD 30 MIN: CPT | Performed by: STUDENT IN AN ORGANIZED HEALTH CARE EDUCATION/TRAINING PROGRAM

## 2024-01-25 RX ORDER — ZOLPIDEM TARTRATE 10 MG/1
10 TABLET ORAL
COMMUNITY
Start: 2024-01-24

## 2024-01-25 NOTE — PROGRESS NOTES
Outpatient Virtual Regular Visit - Follow-up with Palliative and Supportive Care  Derick Richey 58 y.o. male [id number]    Intake:    After connecting through [employer], the patient was identified by name and date of birth. Derick Richey or their agent was informed that this was a telemedicine visit and that the visit is being conducted through the Epic Embedded platform. He agrees to proceed. My office door was closed. No one else was in the room. They acknowledged consent and understanding of privacy and security of the video platform. The patient or agent has agreed to participate and understands they can discontinue the visit at any time.       Assessment & Plan  Problem List Items Addressed This Visit          Digestive    Pancreatic adenocarcinoma (HCC) - Primary       Other    Depression with anxiety    Relevant Medications    zolpidem (AMBIEN) 10 mg tablet    Palliative care patient       #symptom management  #insomnia   - trial zolpidem 10 mg PO QHS PRN   - discussed short course trial, given long-term use of sleep medication known to alter sleep architecture with overall worsening of insomnia   - consider MMJ registration    #mucositis   - PCP visit on 01/17   - completed course of valacyclovir   - PRN use of magic mouthwash    #MMJ   - consider registration, notify [facility] office if registered to complete certification process    #goals of care   - treatment focused care with no limitations at this time    #psychosocial support   - emotional support provided   - Suma [spouse,  33 years] [phone]   - five adult children [2 biological, 3 stepchildren]              - Betty [stepdaughter] [phone]              - Remberto [son, biological]              - Derick [son, biological]              - Xiang [stepson]   - four grandchildren   -  service, [employer]   - no spiritual needs      Controlled Substance Review    [place]MP or [place]  reviewed: No red flags were identified; safe to proceed  with prescription..    PDMP Review         Value Time User    PDMP Reviewed  Yes (P)  2/19/2024  7:55 AM Silvestre Hanson MD            Medications adjusted this encounter:  Requested Prescriptions      No prescriptions requested or ordered in this encounter     No orders of the defined types were placed in this encounter.      Medications Discontinued During This Encounter   Medication Reason    Zolpidem Tartrate (AMBIEN PO) Duplicate order         Derick Richey was seen today for symptoms and planning cares related to above illnesses.  I have reviewed the patient's controlled substance dispensing history in the Prescription Drug Monitoring Program in compliance with the [facility] regulations before prescribing any controlled substances.      They are invited to continue to follow with us.  If there are questions or concerns, please contact us through our clinic/answering service 24 hours a day, seven days a week.    Silvestre Hanson MD  [facility]  [phone]        Visit Information    Accompanied By: No one    Source of History: Self    History Limitations: None      History of Present Illness    Derick Richey is a 58 y.o. male who presents in follow up of symptoms related to locally advanced pancreatic adenocarcinoma. Pertinent issues include: symptom management, pain, neoplasm related, depression or anxiety, assessment of goals of care, disease process education and discussion of prognosis.    Patient reports worsening insomnia, able to initiate sleep, however, fragmented with difficult maintenance and at times, inability to return to sleep. No reported pain. Denies nausea, vomiting. Appetite intact, weight stable. BM daily, regular.    Discontinued oxaliplatin component given neuropathy, since resolved.      Past Medical History:   Diagnosis Date    Cancer (HCC)     pancreatic    Coronary artery disease     Diabetes mellitus (HCC)     Hyperlipidemia     Hypertension       Past Surgical History:   Procedure Laterality Date    CORONARY ANGIOPLASTY WITH STENT PLACEMENT      ERCP W/ PLASTIC STENT PLACEMENT      HERNIA REPAIR      IR PORT PLACEMENT  11/24/2023     Current Outpatient Medications   Medication Sig Dispense Refill    zolpidem (AMBIEN) 10 mg tablet Take 10 mg by mouth daily at bedtime as needed      amphetamine-dextroamphetamine (ADDERALL XR) 20 MG 24 hr capsule Take 20 mg by mouth every morning      aspirin 81 MG tablet 1 tab(s)      carvedilol (COREG) 6.25 mg tablet Take 6.25 mg by mouth 2 (two) times a day      Continuous Blood Gluc Sensor ([employer] Geovanny 3 Sensor) MISC use as directed TO MONITOR GLUCOSE DAILY      Creon [address]23-64763 units capsule take 1 capsule by mouth three times a day with meals 180 capsule 0    desvenlafaxine succinate (PRISTIQ) 50 mg 24 hr tablet Take 50 mg by mouth daily      diphenhydramine, lidocaine, Al/Mg hydroxide, simethicone (Magic Mouthwash) SUSP Swish and spit 10 mL every 4 (four) hours as needed for mouth pain or discomfort 237 mL 2    famotidine (PEPCID) 40 MG tablet Take 40 mg by mouth daily at bedtime      [START ON 2/21/2024] fluorouracil 4,440 mg in CADD/Elastomeric Infusion Device Infuse 4,440 mg (1,200 mg/m2/day x 1.85 m2) into a catheter in a vein via infusion device over 46 hours for 2 days  Infusion planned for February 21, 2024. 1 each 0    insulin aspart, w/niacinamide, (Fiasp FlexTouch) 100 Units/mL injection pen Inject 8 Units under the skin 3 (three) times a day with meals 24 mL 0    Jardiance 25 MG TABS Take 1 tablet by mouth every morning      lisinopril (ZESTRIL) 5 mg tablet Take 1 tablet by mouth daily      metFORMIN (GLUCOPHAGE) 1000 MG tablet Take 1 tablet (1,000 mg total) by mouth 2 (two) times a day with meals Twice daily Do not start before December 18, 2023.      omeprazole (PriLOSEC) 40 MG capsule       ondansetron (ZOFRAN) 8 mg tablet Take 1 tablet (8 mg total) by mouth every 8 (eight) hours as needed for  nausea or vomiting for up to 20 days 20 tablet 2    rosuvastatin (CRESTOR) 10 MG tablet Take 1 tablet by mouth daily      Tresiba FlexTouch 100 units/mL injection pen Inject 30 Units under the skin daily at bedtime 27 mL 0    valACYclovir (VALTREX) 1,000 mg tablet Take 1 tablet (1,000 mg total) by mouth 2 (two) times a day for 10 days 20 tablet 3     No current facility-administered medications for this visit.      No Known Allergies    Review of Systems   Constitutional:  Positive for fatigue. Negative for activity change, appetite change, chills, fever and unexpected weight change.   HENT:  Negative for congestion.    Respiratory:  Negative for shortness of breath.    Cardiovascular:  Negative for chest pain.   Gastrointestinal:  Negative for abdominal pain, constipation, nausea and vomiting.   Genitourinary:  Negative for frequency.   Musculoskeletal:  Negative for back pain.   Neurological:  Negative for syncope.   Psychiatric/Behavioral:  Positive for sleep disturbance.    All other systems reviewed and are negative.        Video Exam  There were no vitals filed for this visit.  Physical Exam  Vitals and nursing note reviewed.   Constitutional:       General: He is awake.      Appearance: He is not diaphoretic.      Comments: Sitting up in NAD  Non-toxic appearing   HENT:      Head: Normocephalic and atraumatic.      Right Ear: External ear normal.      Left Ear: External ear normal.   Eyes:      Comments: No gaze preference   Pulmonary:      Effort: No tachypnea, accessory muscle usage or respiratory distress.      Comments: Completes full sentences without difficulty  Musculoskeletal:      Cervical back: Normal range of motion.   Neurological:      General: No focal deficit present.      Mental Status: He is alert and oriented to person, place, and time.   Psychiatric:         Attention and Perception: Attention normal.         Mood and Affect: Mood and affect normal.         Speech: Speech normal.          Cognition and Memory: Cognition and memory normal.           I spent 35+ minutes was spent during this virtual visit by VIDEO TELECOMMUNICATIONS, face to face with Derick Richey with greater than 50% of the time spent in counseling or coordination of care including discussions of provided medical updates, discussed palliative care, determined goals of care, determined social/family support, discussed plans of care, discussed symptom management, provided psychosocial support. Trial zolpidem. All of the patient's or agent's questions were answered during this discussion.      Encounter provider Silvestre Hanson MD, located at:  [facility] [place] [address]  [address]  [facility] [id number] [phone]    Recent Visits  No visits were found meeting these conditions.  Showing recent visits within past 7 days and meeting all other requirements  Future Appointments  No visits were found meeting these conditions.  Showing future appointments within next 150 days and meeting all other requirements         VIRTUAL VISIT DISCLAIMER    Derick Richey or their agent has consented to an online visit or consultation. They understands that the online visit is based solely on information provided by the patient or agent, and that, in the absence of a face-to-face physical evaluation by the physician, the diagnosis they receive is both limited and provisional in terms of accuracy and completeness. This is not intended to replace a full medical face-to-face evaluation by the physician. Derick Richey or their agent understands and accepts these terms. The patient or their agent was informed this is a billable service.

## 2024-01-26 ENCOUNTER — HOSPITAL ENCOUNTER (OUTPATIENT)
Dept: INFUSION CENTER | Facility: CLINIC | Age: 59
Discharge: HOME | End: 2024-01-26

## 2024-01-26 ENCOUNTER — OFFICE VISIT (OUTPATIENT)
Dept: HEMATOLOGY ONCOLOGY | Facility: CLINIC | Age: 59
End: 2024-01-26
Payer: COMMERCIAL

## 2024-01-26 VITALS
RESPIRATION RATE: 16 BRPM | SYSTOLIC BLOOD PRESSURE: 118 MMHG | TEMPERATURE: 98.2 F | DIASTOLIC BLOOD PRESSURE: 74 MMHG | WEIGHT: 162 LBS | BODY MASS INDEX: 23.99 KG/M2 | OXYGEN SATURATION: 98 % | HEIGHT: 69 IN | HEART RATE: 82 BPM

## 2024-01-26 DIAGNOSIS — R10.11 RIGHT UPPER QUADRANT PAIN: Primary | ICD-10-CM

## 2024-01-26 DIAGNOSIS — E11.65 TYPE 2 DIABETES MELLITUS WITH HYPERGLYCEMIA, WITH LONG-TERM CURRENT USE OF INSULIN (HCC): ICD-10-CM

## 2024-01-26 DIAGNOSIS — Z79.4 TYPE 2 DIABETES MELLITUS WITH HYPERGLYCEMIA, WITH LONG-TERM CURRENT USE OF INSULIN (HCC): ICD-10-CM

## 2024-01-26 DIAGNOSIS — C25.3 PRIMARY ADENOCARCINOMA OF PANCREATIC DUCT (HCC): Primary | ICD-10-CM

## 2024-01-26 DIAGNOSIS — R10.11 RIGHT UPPER QUADRANT PAIN: ICD-10-CM

## 2024-01-26 PROCEDURE — 99214 OFFICE O/P EST MOD 30 MIN: CPT | Performed by: INTERNAL MEDICINE

## 2024-01-26 NOTE — PROGRESS NOTES
Pt here for Chemo ball disconnect.  Ball appears deflated and ran for 46 + hours. Pt offers no complaints at this time.  Port flushed and de-accessed.  Pt stated that he doesn't want the hydration anymore that is on disconnect today.  Message sent to felix orourke rn. AVS declined.  Next appt is 2/7 12:30.  Walked out in stable condition.

## 2024-01-26 NOTE — PROGRESS NOTES
Hematology/Oncology Outpatient Follow- up Note  Derick Richey 58 y.o. male MRN: @ Encounter: [id number]        Date:  1/26/2024        Assessment / Plan:    1 pancreatic cancer clinical T2 N0 M0.  Plan as below.  He has surgical reevaluation 2/29/2024.  He will continue cycle 6 and 7 of treatment prior to that.  He will get a CT scan chest abdomen pelvis on 2/26/2024 as well as a CA 19-9.  Prognosis is guarded.  Await forward those results and surgical evaluation he will be getting cycle 6 and 7 of chemo before.      HPI: 58-year-old gentleman with pancreatic cancer completing cycle 5 of FOLFIRINOX therapy.  He has not had oxaliplatin since cycle #3.  He developed significant problems with neuropathy and in top of which she also developed anaphylactic-like reaction.  He is unwilling to consider going ahead with the treatment with the oxaliplatin will continue to rest.  He has had intermittent scans for other reasons last at the mid the December.  There was no major changes in the abdominal cavity.  He otherwise is doing fairly well tolerating the diarrhea fairly well.  He will continue his therapy.  We will plan to complete cycle 6 and cycle 7.  He will get a CAT scan done on 2/26/2024.  He will get a CEA 19-9 as well.  He has an appointment with surgical oncology on 2/29/2024 we will see him the following week as well.    Interval History:  Derick Richey is a 57yo male who presents for the evaluation and management of newly diagnosed pancreatic cancer. The patient presented to the ED on 10/23/23 with new onset jaundice. He reported ayse colored stools and left sided discomfort. CT A/P showed ill-defined hypoattenuating mass within the neck of the pancreas with subsequent dilatation and obstruction of the biliary tree and pancreatic duct. Highly likely to represent pancreatic neoplasm.Partially encasing the main portal vein at the confluence with splenic vein and SMV with approximately 50% narrowing of the  "caliber at this level and dilatation of the SMV. No evidence for distant metastatic disease was evident. ERCP was performed 10/30/23. The common bile duct was deeply cannulated after 1 attempt using a traction sphincterotome with 260 cm x 0.035\" guidewire. Extrinsic and severe stricture was visualized in the mid common bile duct. The stricture was traversable by wire. Dilation was observed in the proximal common bile duct  Medium-sized biliary sphincterotomy was performed using a sphincterotome. One 8 mm x 6 cm fully covered metal stent was placed in the common bile duct.   EUS was also performed. The pancreas appeared atrophic. The pancreatic parenchyma was visualized in the body of the pancreas and tail of the pancreas. The parenchyma had hyperechoic foci. The pancreatic duct measured 3 mm at the tail. The duct in the body and tail of the pancreas was dilated.  Irregular mass measuring 32 mm x 24 mm with well-defined margins was visualized in the neck of the pancreas. Apparent abutment of the portal vein and splenic vein; 4 successful fine needle biopsy passes were taken with a 25 gauge needle using a transduodenal approach guided by Doppler, sample found to be adequate and sent sample for histology analysis. Small lymph nodes were seen in the area adjacent to the tumor. The proximal bile duct was dilated. The mid bile duct was strictured. The bile duct measured 16 mm at the distal end and 4 mm at the proximal end. The parenchyma of the liver appeared normal. The hepatic ducts appeared normal.The gallbladder appeared distended. The gallbladder contained biliary sludge. Normal celiac axis. No lymphadenopathy was seen.  Pathology was c/w malignant adenocarcinoma.         Cancer Staging:  Cancer Staging   Right upper quadrant pain  Staging form: Pancreas, AJCC 8th Edition  - Clinical: Stage IB (cT2, cN0, cM0) - Signed by Samir Cervantes MD on 11/16/2023  Total positive nodes: 0      Molecular Testing:     Previous " "Hematologic/ Oncologic History:    Oncology History Overview Note   Who was diagnosed with  locally advanced pancreas cancer. He presented to the ED with new onset jaundice and CT revealed an ill-defined hypoattenuating mass within the neck of the pancreas with subsequent dilatation and obstruction of the biliary tree and pancreatic duct. He underwent EUS & ERCP with biopsy. He was imitated on neoadjuvant chemotherapy. He is being referred by Dr. Dumas and presents today for consult.      10/23/23 ED- new onset jaundice    10/23/23 CT A/P w contrast  Ill-defined hypoattenuating mass within the neck of the pancreas with subsequent dilatation and obstruction of the biliary tree and pancreatic duct. Highly likely to represent pancreatic neoplasm. See above for further details. Partially encasing the   main portal vein at the confluence with splenic vein and SMV with approximately 50% narrowing of the caliber at this level and dilatation of the SMV. No evidence for distant metastatic disease evident.   Several small nodules within the left adrenal with features suggesting benign etiology as described above.      10/30/23 ERCP   The common bile duct was deeply cannulated after 1 attempt using a traction sphincterotome with 260 cm x 0.035\" guidewire. Cannulation was not difficult. Contrast was injected  Extrinsic and severe stricture was visualized in the mid common bile duct. The stricture was traversable by wire  Dilation was observed in the proximal common bile duct  Medium-sized biliary sphincterotomy was performed using a sphincterotome. No bleeding was noted at the procedure site.  One 8 mm x 6 cm fully covered metal stent was placed in the common bile duct. There was good bile and contrast drainage seen.  The fluoroscopy images for the , cholangiogram and post procedure were personally reviewed and interpreted during the procedure.      10/30/23 EUS (upper)  Limited endoscopic evaluation using a " side-viewing endoscope was unremarkable.  The pancreas appeared atrophic. The pancreatic parenchyma was visualized in the body of the pancreas and tail of the pancreas. . The parenchyma had hyperechoic foci. The pancreatic duct measured 3 mm at the tail. The duct in the body and tail of the pancreas was dilated.  Irregular mass measuring 32 mm x 24 mm with well-defined margins was visualized in the neck of the pancreas. Apparent abutment of the portal vein and splenic vein; 4 successful fine needle biopsy passes were taken with a 25 gauge needle using a transduodenal approach guided by Doppler, sample found to be adequate and sent sample for histology analysis. Small lymph nodes were seen in the area adjacent to the tumor.  The proximal bile duct was dilated. The mid bile duct was strictured. The bile duct measured 16 mm at the distal end and 4 mm at the proximal end.  The parenchyma of the liver appeared normal. The hepatic ducts appeared normal.  The gallbladder appeared distended. The gallbladder contained biliary sludge.  Normal celiac axis. No lymphadenopathy was seen    11/1/23 CA 19-9       932      11/6/23 CT chest w contrast  6 mm and smaller bilateral pulmonary nodules.       11/8/23  Dr. Dumas  Will obtain PET/CT. The patient has a follow-up with surgical oncology. Path will be sent for [employer]. Discussed the utility of neoadjuvant chemotherapy.        11/15/23 PET CT- [facility]  There is mild heterogeneous activity of the midportion of the pancreas near the stent placement with maximum SUV measurement of 7.8 suggestive of malignant  2. Rest of the whole body scan otherwise is unremarkable      11/16/23 Dr. Cervantes  staging work-up is negative for metastasis    He is a smoker, making the spiculated lung nodule concerning also for primary lung cancer.  But this was negative on his PET/CT.  This will continue to be observed with serial imaging.   recommend neoadjuvant chemotherapy.   Follow up 3  months to reassess whether he will continue chemotherapy versus undergo surgical resection at that time.    ideally if he could undergo 5 to 6 months of neoadjuvant chemotherapy, surgical literature suggest that this will give him the best long-term outcome.  He is agreeable to this plan.       23 IR port placement      23 Dr. Dumas  Discussed the utility of neoadjuvant chemotherapy--mFOLFIRINOX. S/p port placement on 23.     23- 24 12 cycles of FolFirinox Q 14 days      23 Dr. Dumas  Will dose reduce irinotecan to 125mg/m2. Will add pre-medication with benadryl and pepcid.          23 Dr. Hanson, Palliative Care    Upcomin/3/24 Dr. Dumas  24 Dr. Cervantes       Right upper quadrant pain   10/30/2023 Biopsy    Endoscopic ultrasound:  Pancreas, Neck mass:  Malignant  Adenocarcinoma.        2023 -  Cancer Staged    Staging form: Pancreas, AJCC 8th Edition  - Clinical: Stage IB (cT2, cN0, cM0) - Signed by Samir Cervantes MD on 2023  Total positive nodes: 0       2023 -  Chemotherapy    alteplase (CATHFLO), 2 mg, Intracatheter, Every 1 Minute as needed, 5 of 12 cycles  irinotecan (CAMPTOSAR) chemo infusion, 150 mg/m2 = 278 mg, Intravenous, Once, 5 of 12 cycles  Dose modification: 125 mg/m2 (100 % of original dose 150 mg/m2, Cycle 9, Reason: Dose Not Tolerated), 125.1 mg/m2 (83.4 % of original dose 125 mg/m2, Cycle 2, Reason: Dose Not Tolerated), 125 mg/m2 (100 % of original dose 125 mg/m2, Cycle 2, Reason: Dose Not Tolerated)  Administration: 278 mg (2023), 231 mg (2023), 231 mg (2023), 231 mg (1/10/2024), 231 mg (2024)  oxaliplatin (ELOXATIN) chemo infusion, 65 mg/m2 = 120.25 mg, Intravenous, Once, 3 of 3 cycles  Administration: 120.25 mg (2023), 120.25 mg (2023), 120.25 mg (2023)  fluorouracil (ADRUCIL) ambulatory infusion Soln, 1,200 mg/m2/day = 4,440 mg, Intravenous, Over 46 hours, 5 of 12 cycles    "  Primary adenocarcinoma of pancreatic duct (HCC)   10/30/2023 Biopsy    Final Diagnosis   A.B. Pancreas, Neck mass (Cell Block and smear Preparations):  Malignant  Adenocarcinoma.     Satisfactory for evaluation.        12/15/2023 Initial Diagnosis    Primary adenocarcinoma of pancreatic duct (HCC)         Current Hematologic/ Oncologic Treatment:       Cycle 1         Test Results:    Imaging: No results found.          Labs:   Lab Results   Component Value Date    WBC 7.43 01/24/2024    HGB 16.9 01/24/2024    HCT 49.3 01/24/2024    MCV 97 01/24/2024     01/24/2024     Lab Results   Component Value Date    K 3.7 01/24/2024     01/24/2024    CO2 30 01/24/2024    BUN 12 01/24/2024    CREATININE 0.81 01/24/2024    GLUF 153 (H) 01/08/2024    CALCIUM 9.3 01/24/2024    AST 8 (L) 01/24/2024    ALT 8 01/24/2024    ALKPHOS 96 01/24/2024    EGFR 97 01/24/2024         No results found for: \"SPEP\", \"UPEP\"    No results found for: \"PSA\"    No results found for: \"CEA\"    No results found for: \"\"    No results found for: \"AFP\"    No results found for: \"IRON\", \"TIBC\", \"FERRITIN\"    No results found for: \"GDBUUZRT90\"      ROS: Review of Systems   Constitutional: Negative.    All other systems reviewed and are negative.  Abdominal complaints occasional cramps not vomiting not nauseated  Neuro complaints much improvement in neuropathy.  On anxious to resume in the oxaliplatin.    Current Medications: Reviewed  Allergies: Reviewed  PMH/FH/SH:  Reviewed      Physical Exam:    Body surface area is 1.89 meters squared.    Wt Readings from Last 3 Encounters:   01/26/24 73.5 kg (162 lb)   01/24/24 71.7 kg (158 lb)   01/17/24 72.6 kg (160 lb)        Temp Readings from Last 3 Encounters:   01/26/24 98.2 °F (36.8 °C) (Temporal)   01/24/24 (!) 96.5 °F (35.8 °C) (Temporal)   01/17/24 97.6 °F (36.4 °C)        BP Readings from Last 3 Encounters:   01/26/24 118/74   01/24/24 118/72   01/17/24 124/68         Pulse Readings from " Last 3 Encounters:   01/26/24 82   01/24/24 82   01/17/24 87     @LASTSAO2(3)@      Physical Exam  Constitutional:       Appearance: Normal appearance. He is normal weight.   HENT:      Head: Normocephalic and atraumatic.   Eyes:      Extraocular Movements: Extraocular movements intact.      Conjunctiva/sclera: Conjunctivae normal.      Pupils: Pupils are equal, round, and reactive to light.   Cardiovascular:      Rate and Rhythm: Normal rate and regular rhythm.      Heart sounds: Normal heart sounds.   Pulmonary:      Effort: Pulmonary effort is normal.      Breath sounds: Normal breath sounds.   Abdominal:      General: Abdomen is flat. Bowel sounds are normal.      Palpations: Abdomen is soft.   Musculoskeletal:         General: Normal range of motion.      Cervical back: Normal range of motion and neck supple.   Skin:     General: Skin is warm and dry.   Neurological:      General: No focal deficit present.      Mental Status: He is alert and oriented to person, place, and time. Mental status is at baseline.     No major point tenderness or pain in his abdomen.      Goals and Barriers:  Current Goal: Prolong Survival from Cancer.   Barriers: None.      Patient's Capacity to Self Care:  Patient is able to self care.    Code Status: [unfilled]  Advance Directive and Living Will:      Power of :

## 2024-01-27 DIAGNOSIS — K13.0 SORE LIP: ICD-10-CM

## 2024-01-29 ENCOUNTER — TELEPHONE (OUTPATIENT)
Dept: FAMILY MEDICINE CLINIC | Facility: CLINIC | Age: 59
End: 2024-01-29

## 2024-01-29 DIAGNOSIS — K13.0 SORE LIP: ICD-10-CM

## 2024-01-29 RX ORDER — VALACYCLOVIR HYDROCHLORIDE 1 G/1
1000 TABLET, FILM COATED ORAL 2 TIMES DAILY
Qty: 20 TABLET | Refills: 3 | Status: SHIPPED | OUTPATIENT
Start: 2024-01-29 | End: 2024-02-08

## 2024-01-29 RX ORDER — VALACYCLOVIR HYDROCHLORIDE 1 G/1
TABLET, FILM COATED ORAL
Qty: 10 TABLET | Refills: 0 | Status: SHIPPED | OUTPATIENT
Start: 2024-01-29 | End: 2024-01-29 | Stop reason: SDUPTHER

## 2024-01-29 NOTE — TELEPHONE ENCOUNTER
T/c from pt -- reports he was given valtrex for herpes simplex on his mouth -- it almost took it away, but not completely.  Once he had his chemo, the herpes came back very bad and is painful.  Would like to request another script for valtrex, but perhaps with a stronger dose or longer time frame.

## 2024-01-30 ENCOUNTER — TELEPHONE (OUTPATIENT)
Dept: ENDOCRINOLOGY | Facility: CLINIC | Age: 59
End: 2024-01-30

## 2024-01-30 DIAGNOSIS — R10.11 RIGHT UPPER QUADRANT PAIN: Primary | ICD-10-CM

## 2024-01-30 RX ORDER — ATROPINE SULFATE 1 MG/ML
0.25 INJECTION, SOLUTION INTRAVENOUS ONCE
Status: CANCELLED | OUTPATIENT
Start: 2024-02-07

## 2024-01-30 RX ORDER — DEXTROSE MONOHYDRATE 50 MG/ML
20 INJECTION, SOLUTION INTRAVENOUS ONCE
Status: CANCELLED | OUTPATIENT
Start: 2024-02-07

## 2024-01-30 RX ORDER — ATROPINE SULFATE 1 MG/ML
0.25 INJECTION, SOLUTION INTRAVENOUS ONCE AS NEEDED
Status: CANCELLED | OUTPATIENT
Start: 2024-02-07

## 2024-01-30 RX ORDER — SODIUM CHLORIDE 9 MG/ML
20 INJECTION, SOLUTION INTRAVENOUS ONCE AS NEEDED
Status: CANCELLED | OUTPATIENT
Start: 2024-02-07

## 2024-01-30 NOTE — TELEPHONE ENCOUNTER
Pt called in and informed that insurance is no longer covering the insulin he is prescribed and needs this changed. The two his insurance does cover are novalog and Fiasp.

## 2024-02-01 ENCOUNTER — TELEPHONE (OUTPATIENT)
Dept: FAMILY MEDICINE CLINIC | Facility: CLINIC | Age: 59
End: 2024-02-01

## 2024-02-01 ENCOUNTER — TELEPHONE (OUTPATIENT)
Dept: HEMATOLOGY ONCOLOGY | Facility: CLINIC | Age: 59
End: 2024-02-01

## 2024-02-01 DIAGNOSIS — E11.65 TYPE 2 DIABETES MELLITUS WITH HYPERGLYCEMIA, WITH LONG-TERM CURRENT USE OF INSULIN (HCC): Primary | ICD-10-CM

## 2024-02-01 DIAGNOSIS — Z79.4 TYPE 2 DIABETES MELLITUS WITH HYPERGLYCEMIA, WITH LONG-TERM CURRENT USE OF INSULIN (HCC): Primary | ICD-10-CM

## 2024-02-01 RX ORDER — INSULIN ASPART INJECTION 100 [IU]/ML
8 INJECTION, SOLUTION SUBCUTANEOUS
Qty: 24 ML | Refills: 0 | Status: SHIPPED | OUTPATIENT
Start: 2024-02-01 | End: 2024-05-01

## 2024-02-01 NOTE — TELEPHONE ENCOUNTER
Spoke w pt - pt has requested  to review his meds/refills and to take care of all of his medications. Pt has req a refill of his insulin Humalog (is out for over a week now). States his BP and sugar are over the roof now and he feels it now and is c/o. States he contacted his Endo's office - no response yet. Pt advised to reach out to 's office through  [employer] directly and to attach a refill request of his meds. Pt notes insulin Humalog 100/100 [employer] requires a prior auth, pharmacy filled an alternative for him (Novolog), pt wishes to straighten up all of his meds. Notes his requested prior auth has not been initiated yet. Upon pts request reviewed [employer]ts [employer] - printed RA letter for Covered formulary alternatives for review. Pt notes he haven't  heard from his insurance / office yet as well.   Also pt wishes to discuss / review his meds with  at his next appt.  Also pt wishes to have script for Creon sent to a mail in [place] pharmacy online d/t price ($300 vs $38.00) per months.   Pt will reach out to 's and Dr. Avalos's office to discuss further. If needed pt will call our office for further assistance. Pt very appreciative.

## 2024-02-01 NOTE — TELEPHONE ENCOUNTER
Appointment Change  Cancel, Reschedule, Change to Virtual      Who are you speaking with? Patient   If it is not the patient, is the caller listed on the communication consent form? N/A   Which provider is the appointment scheduled with? Dr. Cervantes   When was the original appointment scheduled?    Please list date and time 02/29/2024 @ 11:30AM    At which location is the appointment scheduled to take place? [facility] ([facility] [address])   Was the appointment rescheduled?     Was the appointment changed from an in person visit to a virtual visit?    If so, please list the details of the change. 02/29/202024 @2:30PM    What is the reason for the appointment change? Provider

## 2024-02-07 ENCOUNTER — APPOINTMENT (OUTPATIENT)
Dept: LAB | Facility: HOSPITAL | Age: 59
End: 2024-02-07
Payer: COMMERCIAL

## 2024-02-07 ENCOUNTER — HOSPITAL ENCOUNTER (OUTPATIENT)
Dept: INFUSION CENTER | Facility: CLINIC | Age: 59
Discharge: HOME | End: 2024-02-07
Payer: COMMERCIAL

## 2024-02-07 VITALS — WEIGHT: 162.4 LBS | HEIGHT: 69 IN | BODY MASS INDEX: 24.05 KG/M2

## 2024-02-07 DIAGNOSIS — R10.11 RIGHT UPPER QUADRANT PAIN: ICD-10-CM

## 2024-02-07 DIAGNOSIS — R10.11 RIGHT UPPER QUADRANT PAIN: Primary | ICD-10-CM

## 2024-02-07 LAB
ALBUMIN SERPL BCP-MCNC: 4.2 G/DL (ref 3.5–5)
ALP SERPL-CCNC: 99 U/L (ref 34–104)
ALT SERPL W P-5'-P-CCNC: 8 U/L (ref 7–52)
ANION GAP SERPL CALCULATED.3IONS-SCNC: 6 MMOL/L
AST SERPL W P-5'-P-CCNC: 9 U/L (ref 13–39)
BASOPHILS # BLD AUTO: 0.05 THOUSANDS/ÂΜL (ref 0–0.1)
BASOPHILS NFR BLD AUTO: 1 % (ref 0–1)
BILIRUB SERPL-MCNC: 0.61 MG/DL (ref 0.2–1)
BUN SERPL-MCNC: 15 MG/DL (ref 5–25)
CALCIUM SERPL-MCNC: 10.1 MG/DL (ref 8.4–10.2)
CHLORIDE SERPL-SCNC: 106 MMOL/L (ref 96–108)
CO2 SERPL-SCNC: 30 MMOL/L (ref 21–32)
CREAT SERPL-MCNC: 0.9 MG/DL (ref 0.6–1.3)
EOSINOPHIL # BLD AUTO: 0.44 THOUSAND/ÂΜL (ref 0–0.61)
EOSINOPHIL NFR BLD AUTO: 5 % (ref 0–6)
ERYTHROCYTE [DISTWIDTH] IN BLOOD BY AUTOMATED COUNT: 14.7 % (ref 11.6–15.1)
GFR SERPL CREATININE-BSD FRML MDRD: 93 ML/MIN/1.73SQ M
GLUCOSE P FAST SERPL-MCNC: 118 MG/DL (ref 65–99)
HCT VFR BLD AUTO: 53.7 % (ref 36.5–49.3)
HGB BLD-MCNC: 18 G/DL (ref 12–17)
IMM GRANULOCYTES # BLD AUTO: 0.06 THOUSAND/UL (ref 0–0.2)
IMM GRANULOCYTES NFR BLD AUTO: 1 % (ref 0–2)
LYMPHOCYTES # BLD AUTO: 2.48 THOUSANDS/ÂΜL (ref 0.6–4.47)
LYMPHOCYTES NFR BLD AUTO: 28 % (ref 14–44)
MCH RBC QN AUTO: 32.8 PG (ref 26.8–34.3)
MCHC RBC AUTO-ENTMCNC: 33.5 G/DL (ref 31.4–37.4)
MCV RBC AUTO: 98 FL (ref 82–98)
MONOCYTES # BLD AUTO: 0.84 THOUSAND/ÂΜL (ref 0.17–1.22)
MONOCYTES NFR BLD AUTO: 9 % (ref 4–12)
NEUTROPHILS # BLD AUTO: 5.15 THOUSANDS/ÂΜL (ref 1.85–7.62)
NEUTS SEG NFR BLD AUTO: 56 % (ref 43–75)
NRBC BLD AUTO-RTO: 0 /100 WBCS
PLATELET # BLD AUTO: 234 THOUSANDS/UL (ref 149–390)
PMV BLD AUTO: 9.5 FL (ref 8.9–12.7)
POTASSIUM SERPL-SCNC: 4.9 MMOL/L (ref 3.5–5.3)
PROT SERPL-MCNC: 7.2 G/DL (ref 6.4–8.4)
RBC # BLD AUTO: 5.48 MILLION/UL (ref 3.88–5.62)
SODIUM SERPL-SCNC: 142 MMOL/L (ref 135–147)
WBC # BLD AUTO: 9.02 THOUSAND/UL (ref 4.31–10.16)

## 2024-02-07 PROCEDURE — G0498 CHEMO EXTEND IV INFUS W/PUMP: HCPCS

## 2024-02-07 PROCEDURE — 96413 CHEMO IV INFUSION 1 HR: CPT

## 2024-02-07 PROCEDURE — 85025 COMPLETE CBC W/AUTO DIFF WBC: CPT

## 2024-02-07 PROCEDURE — 96375 TX/PRO/DX INJ NEW DRUG ADDON: CPT

## 2024-02-07 PROCEDURE — 36415 COLL VENOUS BLD VENIPUNCTURE: CPT

## 2024-02-07 PROCEDURE — 96367 TX/PROPH/DG ADDL SEQ IV INF: CPT

## 2024-02-07 PROCEDURE — 80053 COMPREHEN METABOLIC PANEL: CPT

## 2024-02-07 RX ORDER — DEXTROSE MONOHYDRATE 50 MG/ML
20 INJECTION, SOLUTION INTRAVENOUS ONCE
Status: COMPLETED | OUTPATIENT
Start: 2024-02-07 | End: 2024-02-07

## 2024-02-07 RX ORDER — ATROPINE SULFATE 1 MG/ML
0.25 INJECTION, SOLUTION INTRAVENOUS ONCE
Status: COMPLETED | OUTPATIENT
Start: 2024-02-07 | End: 2024-02-07

## 2024-02-07 RX ORDER — SODIUM CHLORIDE 9 MG/ML
20 INJECTION, SOLUTION INTRAVENOUS ONCE AS NEEDED
Status: DISCONTINUED | OUTPATIENT
Start: 2024-02-07 | End: 2024-02-10 | Stop reason: HOSPADM

## 2024-02-07 RX ORDER — ATROPINE SULFATE 1 MG/ML
0.25 INJECTION, SOLUTION INTRAVENOUS ONCE AS NEEDED
Status: DISCONTINUED | OUTPATIENT
Start: 2024-02-07 | End: 2024-02-10 | Stop reason: HOSPADM

## 2024-02-07 RX ADMIN — FAMOTIDINE 20 MG: 10 INJECTION, SOLUTION INTRAVENOUS at 14:09

## 2024-02-07 RX ADMIN — DEXAMETHASONE SODIUM PHOSPHATE: 10 INJECTION, SOLUTION INTRAMUSCULAR; INTRAVENOUS at 13:02

## 2024-02-07 RX ADMIN — DEXTROSE 20 ML/HR: 5 SOLUTION INTRAVENOUS at 13:02

## 2024-02-07 RX ADMIN — ATROPINE SULFATE 0.25 MG: 1 INJECTION, SOLUTION INTRAVENOUS at 15:16

## 2024-02-07 RX ADMIN — DIPHENHYDRAMINE HYDROCHLORIDE 25 MG: 50 INJECTION, SOLUTION INTRAMUSCULAR; INTRAVENOUS at 13:26

## 2024-02-07 RX ADMIN — IRINOTECAN HYDROCHLORIDE 231 MG: 20 INJECTION, SOLUTION INTRAVENOUS at 15:19

## 2024-02-07 NOTE — PROGRESS NOTES
Patient presents for Folfirinox offering complaints of fatigue, patient tolerating treatment well at this time. Report given to Shabbir MENDEZ RN. AVS declined, next appt 2/9/24 at 1500 for disconnect.

## 2024-02-07 NOTE — PROGRESS NOTES
Patient tolerated remainder of infusion without incident.  Chemo ball connected and double checked with Daisy PORTILLO RN.   AVS declined.  Aware to come back 2/9 @ 3pm.  Walked out in stable condition.

## 2024-02-09 ENCOUNTER — HOSPITAL ENCOUNTER (OUTPATIENT)
Dept: INFUSION CENTER | Facility: CLINIC | Age: 59
Discharge: HOME | End: 2024-02-09

## 2024-02-09 DIAGNOSIS — R10.11 RIGHT UPPER QUADRANT PAIN: Primary | ICD-10-CM

## 2024-02-09 NOTE — PROGRESS NOTES
Pt to clinic for cadd d/c. Pt offers no complaints today. tolerated infusion without complications. Pt port flushed and de-accessed with positive blood returned. AVS was offered, pt declined. Pt aware of next appt on 02/21 at 12:30. Pt ambulated out of clinic safely.

## 2024-02-14 DIAGNOSIS — R10.11 RIGHT UPPER QUADRANT PAIN: Primary | ICD-10-CM

## 2024-02-14 RX ORDER — ATROPINE SULFATE 1 MG/ML
0.25 INJECTION, SOLUTION INTRAVENOUS ONCE
Status: CANCELLED | OUTPATIENT
Start: 2024-02-21

## 2024-02-14 RX ORDER — SODIUM CHLORIDE 9 MG/ML
20 INJECTION, SOLUTION INTRAVENOUS ONCE AS NEEDED
Status: CANCELLED | OUTPATIENT
Start: 2024-02-21

## 2024-02-14 RX ORDER — DEXTROSE MONOHYDRATE 50 MG/ML
20 INJECTION, SOLUTION INTRAVENOUS ONCE
Status: CANCELLED | OUTPATIENT
Start: 2024-02-21

## 2024-02-14 RX ORDER — ATROPINE SULFATE 1 MG/ML
0.25 INJECTION, SOLUTION INTRAVENOUS ONCE AS NEEDED
Status: CANCELLED | OUTPATIENT
Start: 2024-02-21

## 2024-02-19 ENCOUNTER — RADIATION ONCOLOGY CONSULT (OUTPATIENT)
Dept: RADIATION ONCOLOGY | Facility: CLINIC | Age: 59
End: 2024-02-19
Payer: COMMERCIAL

## 2024-02-19 VITALS
HEART RATE: 99 BPM | HEIGHT: 69 IN | OXYGEN SATURATION: 95 % | RESPIRATION RATE: 18 BRPM | BODY MASS INDEX: 24.44 KG/M2 | TEMPERATURE: 98.1 F | DIASTOLIC BLOOD PRESSURE: 68 MMHG | WEIGHT: 165 LBS | SYSTOLIC BLOOD PRESSURE: 102 MMHG

## 2024-02-19 DIAGNOSIS — C25.3 PRIMARY ADENOCARCINOMA OF PANCREATIC DUCT (HCC): Primary | ICD-10-CM

## 2024-02-19 DIAGNOSIS — C25.9 PANCREATIC ADENOCARCINOMA (HCC): ICD-10-CM

## 2024-02-19 PROCEDURE — 99244 OFF/OP CNSLTJ NEW/EST MOD 40: CPT | Performed by: RADIOLOGY

## 2024-02-19 NOTE — PROGRESS NOTES
"Derick Richey 1965 is a 58 y.o. male Who was diagnosed with  locally advanced pancreas cancer. He presented to the ED with new onset jaundice and CT revealed an ill-defined hypoattenuating mass within the neck of the pancreas with subsequent dilatation and obstruction of the biliary tree and pancreatic duct. He underwent EUS & ERCP with biopsy. He was imitated on neoadjuvant chemotherapy. He is being referred by Dr. Dumas and presents today for consult.    10/23/23 ED- new onset jaundice    10/23/23 CT A/P w contrast  Ill-defined hypoattenuating mass within the neck of the pancreas with subsequent dilatation and obstruction of the biliary tree and pancreatic duct. Highly likely to represent pancreatic neoplasm. See above for further details. Partially encasing the main portal vein at the confluence with splenic vein and SMV with approximately 50% narrowing of the caliber at this level and dilatation of the SMV. No evidence for distant metastatic disease evident.  Several small nodules within the left adrenal with features suggesting benign etiology as described above.      10/30/23 ERCP   The common bile duct was deeply cannulated after 1 attempt using a traction sphincterotome with 260 cm x 0.035\" guidewire. Cannulation was not difficult. Contrast was injected  Extrinsic and severe stricture was visualized in the mid common bile duct. The stricture was traversable by wire  Dilation was observed in the proximal common bile duct  Medium-sized biliary sphincterotomy was performed using a sphincterotome. No bleeding was noted at the procedure site.  One 8 mm x 6 cm fully covered metal stent was placed in the common bile duct. There was good bile and contrast drainage seen.  The fluoroscopy images for the , cholangiogram and post procedure were personally reviewed and interpreted during the procedure.      10/30/23 EUS (upper)  Limited endoscopic evaluation using a side-viewing endoscope was " unremarkable.  The pancreas appeared atrophic. The pancreatic parenchyma was visualized in the body of the pancreas and tail of the pancreas. . The parenchyma had hyperechoic foci. The pancreatic duct measured 3 mm at the tail. The duct in the body and tail of the pancreas was dilated.  Irregular mass measuring 32 mm x 24 mm with well-defined margins was visualized in the neck of the pancreas. Apparent abutment of the portal vein and splenic vein; 4 successful fine needle biopsy passes were taken with a 25 gauge needle using a transduodenal approach guided by Doppler, sample found to be adequate and sent sample for histology analysis. Small lymph nodes were seen in the area adjacent to the tumor.  The proximal bile duct was dilated. The mid bile duct was strictured. The bile duct measured 16 mm at the distal end and 4 mm at the proximal end.  The parenchyma of the liver appeared normal. The hepatic ducts appeared normal.  The gallbladder appeared distended. The gallbladder contained biliary sludge.  Normal celiac axis. No lymphadenopathy was seen    11/1/23 CA 19-9       932      11/6/23 CT chest w contrast  6 mm and smaller bilateral pulmonary nodules.       11/8/23  Dr. Dumas  Will obtain PET/CT. The patient has a follow-up with surgical oncology. Path will be sent for [employer]. Discussed the utility of neoadjuvant chemotherapy.        11/15/23 PET CT- [facility]  There is mild heterogeneous activity of the midportion of the pancreas near the stent placement with maximum SUV measurement of 7.8 suggestive of malignant  2. Rest of the whole body scan otherwise is unremarkable      11/16/23 Dr. Cervantes  staging work-up is negative for metastasis   He is a smoker, making the spiculated lung nodule concerning also for primary lung cancer.  But this was negative on his PET/CT.  This will continue to be observed with serial imaging.   Recommend neoadjuvant chemotherapy.   Follow up 3 months to reassess whether he  will continue chemotherapy versus undergo surgical resection at that time.   Ideally if he could undergo 5 to 6 months of neoadjuvant chemotherapy, surgical literature suggest that this will give him the best long-term outcome.  He is agreeable to this plan.     23 IR port placement    23 Dr. Dumas  Discussed the utility of neoadjuvant chemotherapy--mFOLFIRINOX. S/p port placement on 23.     23- 24 12 cycles of FolFirinox Q 14 days    23 Dr. Dumas  Will dose reduce irinotecan to 125mg/m2. Will add pre-medication with benadryl and pepcid.      23 Dr. Hanson, Palliative Care    1/3/24 Dr. Dumas  On neoadjuvant chemotherapy--mFOLFIRINOX. S/p C1 D1 on 23.   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     24 Day 1, Cycle 6 MODIFIED FOLFIRINOX Q 14 DAYS (12 cycles ordered and scheduled to end 5/3/24)      Upcomin24 CT CAP  24 Dr. Cervantes  3/7/24 Dr. Calderon      Oncology History   Right upper quadrant pain   10/30/2023 Biopsy    Endoscopic ultrasound:  Pancreas, Neck mass:  Malignant  Adenocarcinoma.        2023 -  Cancer Staged    Staging form: Pancreas, AJCC 8th Edition  - Clinical: Stage IB (cT2, cN0, cM0) - Signed by Samir Cervantes MD on 2023  Total positive nodes: 0       2023 -  Chemotherapy    alteplase (CATHFLO), 2 mg, Intracatheter, Every 1 Minute as needed, 6 of 12 cycles  irinotecan (CAMPTOSAR) chemo infusion, 150 mg/m2 = 278 mg, Intravenous, Once, 6 of 12 cycles  Dose modification: 125 mg/m2 (100 % of original dose 150 mg/m2, Cycle 9, Reason: Dose Not Tolerated), 125.1 mg/m2 (83.4 % of original dose 125 mg/m2, Cycle 2, Reason: Dose Not Tolerated), 125 mg/m2 (100 % of original dose 125 mg/m2, Cycle 2, Reason: Dose Not Tolerated)  Administration: 278 mg (2023), 231 mg (2023), 231 mg (2023), 231 mg (1/10/2024), 231 mg (2024), 231 mg (2024)  oxaliplatin  (ELOXATIN) chemo infusion, 65 mg/m2 = 120.25 mg, Intravenous, Once, 3 of 3 cycles  Administration: 120.25 mg (11/28/2023), 120.25 mg (12/12/2023), 120.25 mg (12/27/2023)  fluorouracil (ADRUCIL) ambulatory infusion Soln, 1,200 mg/m2/day = 4,440 mg, Intravenous, Over 46 hours, 6 of 12 cycles     Primary adenocarcinoma of pancreatic duct (HCC)   10/30/2023 Initial Diagnosis    Primary adenocarcinoma of pancreatic duct (HCC)     10/30/2023 Biopsy    Final Diagnosis   A.B. Pancreas, Neck mass (Cell Block and smear Preparations):  Malignant  Adenocarcinoma.     Satisfactory for evaluation.            Review of Systems:  Review of Systems   Constitutional:  Positive for fatigue. Negative for appetite change and unexpected weight change.   HENT:  Positive for mouth sores (using magic mouthwash prn) and postnasal drip.    Eyes: Negative.    Respiratory:  Positive for cough (occasional, occasional yellow/white mucous production). Negative for shortness of breath.         Smoking 1.5 ppd   Gastrointestinal:  Positive for abdominal distention (bloating with constipation/gas), abdominal pain (constant mild LUQ dull ache) and constipation (occasional, uses MOM prn).   Endocrine: Positive for cold intolerance.   Genitourinary: Negative.  Negative for difficulty urinating.   Musculoskeletal: Negative.    Skin:  Positive for rash (around mouth).   Allergic/Immunologic: Negative.  Negative for environmental allergies and food allergies.   Neurological:  Negative for dizziness, weakness and light-headedness.   Hematological: Negative.    Psychiatric/Behavioral:  Positive for sleep disturbance.        Clinical Trial: no    Pain assessment: 0    PFT: n/a    Prior Radiation: No    Teaching: NCI RT packet with RT for pancreatic cancer provided    MST: Already referred to oncology dietician    Implantable Devices (Port, pacemaker, pain stimulator): Right chest PAC    Hip Replacement: n/a    Health Maintenance   Topic Date Due    Hepatitis  C Screening  Never done    HIV Screening  Never done    Hepatitis A Vaccine (1 of 2 - Risk 2-dose series) Never done    Zoster Vaccine (1 of 2) Never done    Pneumococcal Vaccine: Pediatrics (0 to 5 Years) and At-Risk Patients (6 to 64 Years) (2 of 2 - PCV) 12/11/2020    COVID-19 Vaccine (4 - 2023-24 season) 09/01/2023    HEMOGLOBIN A1C  05/21/2024    Colorectal Cancer Screening  09/10/2024    Depression Screening  11/21/2024    Annual Physical  11/21/2024    Diabetic Foot Exam  11/21/2024    Lung Cancer Screening  12/15/2024    BMI: Adult  02/07/2025    DM Eye Exam  06/01/2025    DTaP,Tdap,and Td Vaccines (2 - Td or Tdap) 12/11/2029    Influenza Vaccine  Completed    HIB Vaccine  Aged Out    IPV Vaccine  Aged Out    Meningococcal ACWY Vaccine  Aged Out    HPV Vaccine  Aged Out       Past Medical History:   Diagnosis Date    Cancer (HCC)     pancreatic    Coronary artery disease     Diabetes mellitus (HCC)     Hyperlipidemia     Hypertension        Past Surgical History:   Procedure Laterality Date    CORONARY ANGIOPLASTY WITH STENT PLACEMENT      ERCP W/ PLASTIC STENT PLACEMENT      HERNIA REPAIR      IR PORT PLACEMENT  11/24/2023       Family History   Problem Relation Age of Onset    Heart attack Father     Skin cancer Father        Social History     Tobacco Use    Smoking status: Every Day     Current packs/day: 1.50     Average packs/day: 1.5 packs/day for 35.0 years (52.5 ttl pk-yrs)     Types: Cigarettes   Vaping Use    Vaping status: Never Used   Substance Use Topics    Alcohol use: Not Currently    Drug use: Never          Current Outpatient Medications:     amphetamine-dextroamphetamine (ADDERALL XR) 20 MG 24 hr capsule, Take 20 mg by mouth every morning, Disp: , Rfl:     aspirin 81 MG tablet, 1 tab(s), Disp: , Rfl:     carvedilol (COREG) 6.25 mg tablet, Take 6.25 mg by mouth 2 (two) times a day, Disp: , Rfl:     Continuous Blood Gluc Sensor (FreeStyle Geovanny 3 Sensor) MISC, use as directed TO MONITOR  GLUCOSE DAILY, Disp: , Rfl:     Creon 02448-24482 units capsule, take 1 capsule by mouth three times a day with meals, Disp: 180 capsule, Rfl: 0    desvenlafaxine succinate (PRISTIQ) 50 mg 24 hr tablet, Take 50 mg by mouth daily, Disp: , Rfl:     diphenhydramine, lidocaine, Al/Mg hydroxide, simethicone (Magic Mouthwash) SUSP, Swish and spit 10 mL every 4 (four) hours as needed for mouth pain or discomfort, Disp: 237 mL, Rfl: 2    famotidine (PEPCID) 40 MG tablet, Take 40 mg by mouth daily at bedtime, Disp: , Rfl:     [START ON 2/21/2024] fluorouracil 4,440 mg in CADD/Elastomeric Infusion Device, Infuse 4,440 mg (1,200 mg/m2/day x 1.85 m2) into a catheter in a vein via infusion device over 46 hours for 2 days  Infusion planned for February 21, 2024., Disp: 1 each, Rfl: 0    insulin aspart, w/niacinamide, (Fiasp FlexTouch) 100 Units/mL injection pen, Inject 8 Units under the skin 3 (three) times a day with meals, Disp: 24 mL, Rfl: 0    Jardiance 25 MG TABS, Take 1 tablet by mouth every morning, Disp: , Rfl:     lisinopril (ZESTRIL) 5 mg tablet, Take 1 tablet by mouth daily, Disp: , Rfl:     metFORMIN (GLUCOPHAGE) 1000 MG tablet, Take 1 tablet (1,000 mg total) by mouth 2 (two) times a day with meals Twice daily Do not start before December 18, 2023., Disp: , Rfl:     omeprazole (PriLOSEC) 40 MG capsule, , Disp: , Rfl:     ondansetron (ZOFRAN) 8 mg tablet, Take 1 tablet (8 mg total) by mouth every 8 (eight) hours as needed for nausea or vomiting for up to 20 days, Disp: 20 tablet, Rfl: 2    rosuvastatin (CRESTOR) 10 MG tablet, Take 1 tablet by mouth daily, Disp: , Rfl:     Tresiba FlexTouch 100 units/mL injection pen, Inject 30 Units under the skin daily at bedtime, Disp: 27 mL, Rfl: 0    valACYclovir (VALTREX) 1,000 mg tablet, Take 1 tablet (1,000 mg total) by mouth 2 (two) times a day for 10 days, Disp: 20 tablet, Rfl: 3    zolpidem (AMBIEN) 10 mg tablet, Take 10 mg by mouth daily at bedtime as needed, Disp: , Rfl:  "    No Known Allergies     Vitals:    02/19/24 1002   Height: 5' 9\" (1.753 m)          "

## 2024-02-19 NOTE — LETTER
2024     Samir Cervantes MD  [address] [facility] [address]  2nd Floor  [facility] [address]    Patient: Derick Richey   YOB: 1965   Date of Visit: 2024       Dear Dr. Cervantes:    Thank you for referring Derick Richey to me for evaluation. Below are my notes for this consultation.    If you have questions, please do not hesitate to call me. I look forward to following your patient along with you.         Sincerely,        Malgorzata Madden MD        CC: MD Malgorzata Carroll MD  2024 12:52 PM  Sign when Signing Visit  Consultation - Radiation Oncology      MRN:[id number] : 1965  Encounter: [id number]  Patient Information: Derick Richey      CHIEF COMPLAINT  Chief Complaint   Patient presents with   • Consult     Cancer Staging   Right upper quadrant pain  Staging form: Pancreas, AJCC 8th Edition  - Clinical: Stage IB (cT2, cN0, cM0) - Signed by Samir Cervantes MD on 2023  Total positive nodes: 0           History of Present Illness   Derick Richey is a 58 y.o. 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  He is undergoing neoadjuvant chemotherapy and has been referred by Dr. Dumas and presents today for consult.     Briefly, the patient presented to ED 10/23/23 with new onset jaundice.     10/23/23 CT A/P w contrast  Ill-defined hypoattenuating mass within the neck of the pancreas with subsequent dilatation and obstruction of the biliary tree and pancreatic duct. Highly likely to represent pancreatic neoplasm. See above for further details. Partially encasing the main portal vein at the confluence with splenic vein and SMV with approximately 50% narrowing of the caliber at this level and dilatation of the SMV. No evidence for distant metastatic disease evident. Several small nodules within the left adrenal with features suggesting benign etiology as described above.        10/30/23 ERCP   The common bile duct was  "deeply cannulated after 1 attempt using a traction sphincterotome with 260 cm x 0.035\" guidewire. Cannulation was not difficult. Contrast was injected  Extrinsic and severe stricture was visualized in the mid common bile duct. The stricture was traversable by wire  Dilation was observed in the proximal common bile duct  Medium-sized biliary sphincterotomy was performed using a sphincterotome. No bleeding was noted at the procedure site.  One 8 mm x 6 cm fully covered metal stent was placed in the common bile duct. There was good bile and contrast drainage seen.        10/30/23 EUS (upper)  Limited endoscopic evaluation using a side-viewing endoscope was unremarkable.  The pancreas appeared atrophic. The pancreatic parenchyma was visualized in the body of the pancreas and tail of the pancreas. The parenchyma had hyperechoic foci. The pancreatic duct measured 3 mm at the tail. The duct in the body and tail of the pancreas was dilated.  Irregular mass measuring 32 mm x 24 mm with well-defined margins was visualized in the neck of the pancreas. Apparent abutment of the portal vein and splenic vein; 4 successful fine needle biopsy passes were taken with a 25 gauge needle using a transduodenal approach guided by Doppler, sample found to be adequate and sent sample for histology analysis. Small lymph nodes were seen in the area adjacent to the tumor.  The proximal bile duct was dilated. The mid bile duct was strictured. The bile duct measured 16 mm at the distal end and 4 mm at the proximal end.  The parenchyma of the liver appeared normal. The hepatic ducts appeared normal.  The gallbladder appeared distended. The gallbladder contained biliary sludge.  Normal celiac axis. No lymphadenopathy was seen     11/1/23 CA 19-9       932     11/6/23 CT chest w contrast  Spiculated 6 mm left upper lobe subpleural lung nodule and smaller bilateral pulmonary nodules.   Partially imaged biliary stent. Reidentified pancreatic mass with " ill-defined margins. 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      11/8/23  Dr. Dumas  Will obtain PET/CT. The patient has a follow-up with surgical oncology. Path will be sent for Immanuel TIDWELL. Discussed the utility of neoadjuvant chemotherapy.       11/15/23 PET CT- [facility]  There is mild heterogeneous activity of the midportion of the pancreas near the stent placement with maximum SUV measurement of 7.8 suggestive of malignant  Rest of the whole body scan otherwise is unremarkable  Imaging is not available.     11/16/23 Dr. Cervantes  staging work-up is negative for metastasis   He is a smoker, making the spiculated lung nodule concerning also for primary lung cancer.  But this was negative on his PET/CT.  This will continue to be observed with serial imaging.   Recommend neoadjuvant chemotherapy.   Follow up 3 months to reassess whether he will continue chemotherapy versus undergo surgical resection at that time. Ideally if he could undergo 5 to 6 months of neoadjuvant chemotherapy, surgical literature suggest that this will give him the best long-term outcome.  He is agreeable to this plan.      11/24/23 IR port placement     11/28/23- 5/2/24 12 cycles of FolFirinox Q 14 days     12/6/23 Dr. Dumas  Will dose reduce irinotecan to 125mg/m2. Will add pre-medication with benadryl and pepcid.       12/27/23 Dr. Hanson, Palliative Care     1/3/24 Dr. Dumas  On neoadjuvant chemotherapy--mFOLFIRINOX. S/p C1 D1 on 11/28/23.   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      2/7/24 Day 1, Cycle 6 MODIFIED FOLFIRINOX Q 14 DAYS (12 cycles ordered and scheduled to end 5/3/24)    The patient has chronic mild discomfort in the right upper abdominal region.  He does not take medications for this as it is episodic and he feels it is relatively mild.  He denies nausea, vomiting, diarrhea, significant abdominal or pelvic pain.  He denies jaundice, ayse colored stool or  darkened urine.  Weight has been stable.     He is smoking 1.5ppd.     Upcomin24 CT CAP  24 Dr. Cervantes  3/7/24 Dr. Calderon    Historical Information   Oncology History   Right upper quadrant pain   10/30/2023 Biopsy    Endoscopic ultrasound:  Pancreas, Neck mass:  Malignant  Adenocarcinoma.        2023 -  Cancer Staged    Staging form: Pancreas, AJCC 8th Edition  - Clinical: Stage IB (cT2, cN0, cM0) - Signed by Samir Cervantes MD on 2023  Total positive nodes: 0       2023 -  Chemotherapy    alteplase (CATHFLO), 2 mg, Intracatheter, Every 1 Minute as needed, 6 of 12 cycles  irinotecan (CAMPTOSAR) chemo infusion, 150 mg/m2 = 278 mg, Intravenous, Once, 6 of 12 cycles  Dose modification: 125 mg/m2 (100 % of original dose 150 mg/m2, Cycle 9, Reason: Dose Not Tolerated), 125.1 mg/m2 (83.4 % of original dose 125 mg/m2, Cycle 2, Reason: Dose Not Tolerated), 125 mg/m2 (100 % of original dose 125 mg/m2, Cycle 2, Reason: Dose Not Tolerated)  Administration: 278 mg (2023), 231 mg (2023), 231 mg (2023), 231 mg (1/10/2024), 231 mg (2024), 231 mg (2024)  oxaliplatin (ELOXATIN) chemo infusion, 65 mg/m2 = 120.25 mg, Intravenous, Once, 3 of 3 cycles  Administration: 120.25 mg (2023), 120.25 mg (2023), 120.25 mg (2023)  fluorouracil (ADRUCIL) ambulatory infusion Soln, 1,200 mg/m2/day = 4,440 mg, Intravenous, Over 46 hours, 6 of 12 cycles     Primary adenocarcinoma of pancreatic duct (HCC)   10/30/2023 Initial Diagnosis    Primary adenocarcinoma of pancreatic duct (HCC)     10/30/2023 Biopsy    Final Diagnosis   A.B. Pancreas, Neck mass (Cell Block and smear Preparations):  Malignant  Adenocarcinoma.     Satisfactory for evaluation.              Past Medical History:   Diagnosis Date   • Cancer (HCC)     pancreatic   • Coronary artery disease    • Diabetes mellitus (HCC)    • Hyperlipidemia    • Hypertension    • Pancreatic cancer (HCC)      Past Surgical History:    Procedure Laterality Date   • CORONARY ANGIOPLASTY WITH STENT PLACEMENT     • ERCP W/ PLASTIC STENT PLACEMENT     • HERNIA REPAIR     • IR PORT PLACEMENT  11/24/2023       Family History   Problem Relation Age of Onset   • Heart attack Father    • Skin cancer Father        Social History   Social History     Substance and Sexual Activity   Alcohol Use Not Currently     Social History     Substance and Sexual Activity   Drug Use Never     Social History     Tobacco Use   Smoking Status Every Day   • Current packs/day: 1.50   • Average packs/day: 1.5 packs/day for 35.0 years (52.5 ttl pk-yrs)   • Types: Cigarettes   Smokeless Tobacco Not on file         Meds/Allergies     Current Outpatient Medications:   •  amphetamine-dextroamphetamine (ADDERALL XR) 20 MG 24 hr capsule, Take 20 mg by mouth every morning, Disp: , Rfl:   •  aspirin 81 MG tablet, daily, Disp: , Rfl:   •  carvedilol (COREG) 6.25 mg tablet, Take 6.25 mg by mouth 2 (two) times a day, Disp: , Rfl:   •  Continuous Blood Gluc Sensor (FreeStyle Geovanny 3 Sensor) MISC, use as directed TO MONITOR GLUCOSE DAILY, Disp: , Rfl:   •  Creon 62864-37185 units capsule, take 1 capsule by mouth three times a day with meals, Disp: 180 capsule, Rfl: 0  •  desvenlafaxine succinate (PRISTIQ) 50 mg 24 hr tablet, Take 50 mg by mouth daily, Disp: , Rfl:   •  diphenhydramine, lidocaine, Al/Mg hydroxide, simethicone (Magic Mouthwash) SUSP, Swish and spit 10 mL every 4 (four) hours as needed for mouth pain or discomfort, Disp: 237 mL, Rfl: 2  •  famotidine (PEPCID) 40 MG tablet, Take 40 mg by mouth daily at bedtime, Disp: , Rfl:   •  [START ON 2/21/2024] fluorouracil 4,440 mg in CADD/Elastomeric Infusion Device, Infuse 4,440 mg (1,200 mg/m2/day x 1.85 m2) into a catheter in a vein via infusion device over 46 hours for 2 days  Infusion planned for February 21, 2024., Disp: 1 each, Rfl: 0  •  insulin aspart, w/niacinamide, (Fiasp FlexTouch) 100 Units/mL injection pen, Inject 8 Units  under the skin 3 (three) times a day with meals, Disp: 24 mL, Rfl: 0  •  Jardiance 25 MG TABS, Take 1 tablet by mouth every morning, Disp: , Rfl:   •  lisinopril (ZESTRIL) 5 mg tablet, Take 1 tablet by mouth daily, Disp: , Rfl:   •  metFORMIN (GLUCOPHAGE) 1000 MG tablet, Take 1 tablet (1,000 mg total) by mouth 2 (two) times a day with meals Twice daily Do not start before December 18, 2023., Disp: , Rfl:   •  omeprazole (PriLOSEC) 40 MG capsule, , Disp: , Rfl:   •  ondansetron (ZOFRAN) 8 mg tablet, Take 1 tablet (8 mg total) by mouth every 8 (eight) hours as needed for nausea or vomiting for up to 20 days, Disp: 20 tablet, Rfl: 2  •  rosuvastatin (CRESTOR) 10 MG tablet, Take 1 tablet by mouth daily, Disp: , Rfl:   •  Tresiba FlexTouch 100 units/mL injection pen, Inject 30 Units under the skin daily at bedtime, Disp: 27 mL, Rfl: 0  •  valACYclovir (VALTREX) 1,000 mg tablet, Take 1 tablet (1,000 mg total) by mouth 2 (two) times a day for 10 days, Disp: 20 tablet, Rfl: 3  •  zolpidem (AMBIEN) 10 mg tablet, Take 10 mg by mouth daily at bedtime as needed, Disp: , Rfl:   No Known Allergies      Review of Systems   Constitutional:  Positive for fatigue. Negative for appetite change and unexpected weight change.   HENT:  Positive for mouth sores (using magic mouthwash prn) and postnasal drip.    Eyes: Negative.    Respiratory:  Positive for cough (occasional, occasional yellow/white mucous production). Negative for shortness of breath.         Smoking 1.5 ppd   Gastrointestinal:  Positive for abdominal distention (bloating with constipation/gas), abdominal pain (constant mild LUQ dull ache) and constipation (occasional, uses MOM prn).   Endocrine: Positive for cold intolerance.   Genitourinary: Negative.  Negative for difficulty urinating.   Musculoskeletal: Negative.    Skin:  Positive for rash (around mouth).   Allergic/Immunologic: Negative.  Negative for environmental allergies and food allergies.   Neurological:   "Negative for dizziness, weakness and light-headedness.   Hematological: Negative.    Psychiatric/Behavioral:  Positive for sleep disturbance.          OBJECTIVE:   /68 (BP Location: Left arm, Patient Position: Sitting)   Pulse 99   Temp 98.1 °F (36.7 °C) (Temporal)   Resp 18   Ht 5' 9\" (1.753 m)   Wt 74.8 kg (165 lb)   SpO2 95%   BMI 24.37 kg/m²   Performance Status: Karnofsky: 80 - Normal activity with effort; some signs or symptoms of disease    Physical Exam  Vitals and nursing note reviewed.   Constitutional:       General: He is not in acute distress.     Appearance: He is well-developed.      Comments: Thin appearing   Cardiovascular:      Rate and Rhythm: Normal rate and regular rhythm.   Pulmonary:      Breath sounds: No wheezing, rhonchi or rales.   Abdominal:      General: There is no distension.      Palpations: Abdomen is soft. There is no mass.      Tenderness: There is no abdominal tenderness.   Musculoskeletal:      Right lower leg: No edema.      Left lower leg: No edema.   Lymphadenopathy:      Cervical: No cervical adenopathy.      Upper Body:      Right upper body: No supraclavicular adenopathy.      Left upper body: No supraclavicular adenopathy.   Neurological:      Mental Status: He is alert and oriented to person, place, and time.      Gait: Gait normal.            RESULTS  Lab Results    Chemistry        Component Value Date/Time    K 4.9 02/07/2024 1019    K 4.2 11/01/2023 1254     02/07/2024 1019     11/01/2023 1254    CO2 30 02/07/2024 1019    CO2 23 11/01/2023 1254    BUN 15 02/07/2024 1019    BUN 18 11/01/2023 1254    CREATININE 0.90 02/07/2024 1019        Component Value Date/Time    CALCIUM 10.1 02/07/2024 1019    ALKPHOS 99 02/07/2024 1019    AST 9 (L) 02/07/2024 1019    AST 26 11/01/2023 1254    ALT 8 02/07/2024 1019    ALT 78 (H) 11/01/2023 1254            Lab Results   Component Value Date    WBC 9.02 02/07/2024    HGB 18.0 (H) 02/07/2024    HCT 53.7 (H) " 02/07/2024    MCV 98 02/07/2024     02/07/2024         Imaging Studies  CT abdomen pelvis with contrast  Status: Final result     PACS Images     Show images for CT abdomen pelvis with contrast  Study Result    Narrative & Impression   CT ABDOMEN AND PELVIS WITH IV CONTRAST     INDICATION:   jaundice, ruq tenderness.     COMPARISON:  None.     TECHNIQUE:  CT examination of the abdomen and pelvis was performed. Multiplanar 2D reformatted images were created from the source data.     This examination, like all CT scans performed in the [facility], was performed utilizing techniques to minimize radiation dose exposure, including the use of iterative reconstruction and automated exposure control. Radiation dose length   product (DLP) for this visit:  678 mGy-cm     IV Contrast:  100 mL of iohexol (OMNIPAQUE)  Enteric Contrast:  Enteric contrast was not administered.     FINDINGS:     ABDOMEN     LOWER CHEST:  No clinically significant abnormality identified in the visualized lower chest.     LIVER/BILIARY TREE: Moderate intra and extrahepatic biliary ductal dilatation.     GALLBLADDER: Abnormally distended. Mild thickened wall. No calcified stones.     SPLEEN:  Unremarkable.     PANCREAS: There is a lesion suspicious for pancreatic cancer, which will be described based on Society of Abdominal Radiologists and American Pancreatic Association recommendations:     MORPHOLOGIC EVALUATION:  Appearance: Hypoattenuating.  Size: Ill-defined margins, approximately 3.0 cm.  Location: Tumor involves the pancreatic neck, straddling the plane of the SMV.  Pancreatic duct narrowing/abrupt cut off with or without upstream dilatation: Moderate pancreatic ductal dilatation with abrupt cut off at the level of the mass. Pancreatic duct measuring up to 8 mm.  Biliary tree abrupt cut off with or without upstream dilatation: Abrupt cut off of the common bile duct as it enters the pancreatic head, caliber dilated up to  19 mm.     ARTERIAL EVALUATION:  Superior Mesenteric Artery Involvement:  Presence: The mass directly abuts less than 180 degrees of the SMA at its confluence with the length of vein/portal vein.  Celiac Axis Involvement:  Presence: Absent.     Common Hepatic Artery Involvement:  Presence: Present.  Degree of solid soft-tissue contact: Traverses along the margin of the mass but remains patent. Less than 180 degrees.  Degree of increased hazy attenuation/stranding contact: </= 180 degrees.  Focal vessel narrowing or contour irregularity: Absent.  Extension to celiac axis: Absent.  Extension to the bifurcation of right/left hepatic artery: Absent.     Arterial Variant:  Type: There are no arterial variants.     VENOUS EVALUATION:  Main Portal Vein Involvement: Present.  Degree of solid soft-tissue contact: REPORT AS LESS THAN OR EQUAL  DEGREES OR GREATER THAN 180 DEGREES.  Degree of increased hazy attenuation/stranding contact: REPORT AS LESS THAN OR EQUAL  DEGREES OR GREATER THAN 180 DEGREES.  Focal vessel narrowing or contour irregularity (tethering or teardrop): Absent.     Superior Mesenteric Vein Involvement: Present.  Degree of solid soft-tissue contact: </= 180 degrees.  Focal vessel narrowing or contour irregularity (tethering or teardrop): Present.  Extension to first draining vein: Absent.  Thrombus within vein: Absent.  Venous collaterals: Increased caliber of the splenic vein and SMV. But without significant varix formation.     FOR EVALUATION FOR POTENTIAL EXTRAPANCREATIC TUMOR, PLEASE SEE THE REST OF THE BODY OF THIS REPORT.     ADRENAL GLANDS: Right adrenal within normal limits. 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 A smaller few additional nodules are also noted with similar features. These do not have an   appearance suggesting metastatic disease.     KIDNEYS/URETERS: A few small bilateral renal cortical cysts are  noted. No suspicious findings.  STOMACH AND BOWEL: There is colonic diverticulosis without evidence of acute diverticulitis.     APPENDIX:  No findings to suggest appendicitis.     ABDOMINOPELVIC CAVITY:  No ascites.  No pneumoperitoneum. A few shotty subcentimeter lymph nodes in the efren hepatis and gastrohepatic region remaining subcentimeter..     VESSELS: Atherosclerotic changes are present.  No evidence of aneurysm.     PELVIS     REPRODUCTIVE ORGANS:  Unremarkable for patient's age.     URINARY BLADDER:  Unremarkable.     ABDOMINAL WALL/INGUINAL REGIONS: Lower abdominal wall hernia mesh repair noted.     OSSEOUS STRUCTURES:  No acute fracture or destructive osseous lesion.     IMPRESSION:     Ill-defined hypoattenuating mass within the neck of the pancreas with subsequent dilatation and obstruction of the biliary tree and pancreatic duct. Highly likely to represent pancreatic neoplasm. See above for further details. Partially encasing the   main portal vein at the confluence with splenic vein and SMV with approximately 50% narrowing of the caliber at this level and dilatation of the SMV. No evidence for distant metastatic disease evident.     Several small nodules within the left adrenal with features suggesting benign etiology as described above.     The study was marked in EPIC for immediate notification.     CT chest w contrast  Status: Final result     PACS Images     Show images for CT chest w contrast    CT chest w contrast: Result Notes     Neida Mckeon   11/13/2023 12:59 PM EST       Patient reviewed results in [facility], Thank you     Written by Ankit Murry DO on 11/13/2023 12:43 PM EST  Seen by patient Derick Richey on 11/13/2023 12:48 PM    Ankit Murry DO   11/13/2023 12:43 PM EST       [employer] message sent.            Study Result    Narrative & Impression   CT CHEST WITH IV CONTRAST     INDICATION:   K86.9: Disease of pancreas, unspecified  K83.1: Obstruction of bile duct. Newly  diagnosed pancreatic cancer.     COMPARISON:  None.     TECHNIQUE: CT examination of the chest was performed. Multiplanar 2D reformatted images were created from the source data.     This examination, like all CT scans performed [facility], was performed utilizing techniques to minimize radiation dose exposure, including the use of iterative reconstruction and automated exposure control. Radiation dose length   product (DLP) for this visit:  338.99 mGy-cm     IV Contrast:  85 mL of iohexol (OMNIPAQUE)     FINDINGS:     LUNGS: Spiculated 6 mm left upper lobe subpleural nodule #606/83. 3 mm left upper lobe nodule #606/88. 3 mm left upper lobe nodule #606/96. 3 mm left upper lobe nodule #606/106. 3 mm right upper lobe nodule #606/94. Numerous other 2 mm and smaller   subpleural right upper lobe nodules. No endotracheal or endobronchial lesion.     PLEURA:  Unremarkable.     HEART/GREAT VESSELS: Normal heart size. Coronary artery calcifications. No pericardial effusion. No thoracic aortic aneurysm.     MEDIASTINUM AND ORA:  Unremarkable.     CHEST WALL AND LOWER NECK:  Unremarkable.     VISUALIZED STRUCTURES IN THE UPPER ABDOMEN: Partially imaged biliary stent. Reidentified pancreatic mass with ill-defined margins. 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     OSSEOUS STRUCTURES:  No acute fracture or destructive osseous lesion.     IMPRESSION:     6 mm and smaller bilateral pulmonary nodules.          Pathology:Collected 10/30/2023 14:19     Status: Final result     Visible to patient: Yes (seen)     Dx: Biliary obstruction; Pancreatic lesion     2 Result Notes      Component    Case Report   Non-gynecologic Cytology                          Case: [id number]                                   Authorizing Provider:  Cali Astudillo MD             Collected:           10/30/2023 1419               Ordering Location:     [facility]      Received:            10/30/2023 3780                                       [facility]                                                            Pathologist:           Madhu Mondragon MD                                                            Specimens:   A) - Pancreas, Cell Block, neck mass                                                                 B) - Pancreas, Neck Mass                                                                   Final Diagnosis   A.B. Pancreas, Neck mass (Cell Block and smear Preparations):  Malignant  Adenocarcinoma.     Satisfactory for evaluation.        Note: As reported in the “WHO Reporting System for Pancreaticobiliary Cytopathology *” the diagnostic category of “malignant” carries a % risk of malignancy being found in subsequent FNAB or resection.  The usual management following an initial diagnosis of “malignant” is per clinical stage. Ultimately clinical and radiologic correlation is needed for this patient in arriving at the actual management plan.      *Karen CHOUDHARY, Field JERONIMO, Mechelle MARTI (Eds). (2022). WHO Reporting System for Pancreaticobiliary Cytopathology. [place].  FNAB - fine needle aspiration/biopsy               Electronically signed by Madhu Mondragon MD on 11/2/2023 at 0846   Note    Immunohistochemistry was performed on cell block with adequate controls. CK19 is positive in tumor cells. DPC-4 staining is lost within tumor cells. P53 stain demonstrates null-type. P16 staining shows patchy staining, not suggestive of mutation.      Intradepartmental consultation in agreement (LX).  Dr. Astudillo notified by Dr. Mondragon via [employer] on 11/2/2023 at 8:44am.                 ASSESSMENT  1. Primary adenocarcinoma of pancreatic duct (HCC)        2. Pancreatic adenocarcinoma (HCC)  Ambulatory referral to Radiation Oncology        Cancer Staging   Right upper quadrant pain  Staging form: Pancreas, AJCC 8th Edition  - Clinical: Stage IB (cT2, cN0, cM0) - Signed by Samir Cervantes MD on 11/16/2023  Total positive nodes:  0      PLAN/DISCUSSION  Derick Richey is a 58 y.o. 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  He has subcentimeter pulmonary nodules noted on staging CT, in particular a 6mm spiculated left lung lesion that are under observation.  He has completed 6 cycles modified FOLFIRINOX with dose reductions required.  Repeat staging CT imaging is scheduled 2/26/24 with follow-up discussing for surgical resection with Dr. Cervantes 2/29/24.    At this time, radiation is not indicated.  Based on medical record review, he is currently planned for proceeding to surgical resection assuming no evidence of metastases and he is considered medically operable.      We discussed the role that radiation can play in treatment for pancreatic cancer.  We discussed it's role in neoadjuvant, adjuvant, and definitive settings.    We discussed that if the tumor is borderline resectable post neoadjuvant chemotherapy, then he could be considered for neoadjuvant chemoradiation in an effort to convert the tumor to resectable status.  We would plan a dose of 45-50Gy in 25-28 fractions in this scenario with concurrent chemotherapy.    If there is no distant metastases and he is medically inoperable or possibly unresectable, then definitive radiation could be offered.  This can be done with conventional fractionation to dose of 50-54Gy with concurrent chemotherapy.  SBRT could also be considered.    In the postoperative setting, radiation is controversial, but typically offered in patients with close/positive margins and/or lymph node involvement as there is local control benefit in these patients.  Dose would be similar to preoperative regimen.    We briefly reviewed the risks and acute and late side effects and potential toxicities of radiation. This included fatigue, pancreatitis, injury to pancreas, nausea, vomiting, gastritis, and diarrhea.  Detailed review would follow if radiation is indicated and based  "on the goals of care and technique chosen.      The patient was given the opportunity to ask questions and all questions were answered to their satisfaction.  Smoking cessation counseling offered.  Patient has no interest in quitting at this time.    We have tentative follow-up visit scheduled 3/8/24, but if radiation is not indicated at that time then this can be cancelled.      Total Time Spent  48 minutes spent reviewing EMR in preparation for visit, with the patient, coordination with other providers, and documentation. Greater than 50% of total time was spent with the patient and/or family for counseling and/or coordination of care.       Malgorzata Madden MD  2/19/2024,11:34 AM      Portions of the record may have been created with voice recognition software.  Occasional wrong word or \"sound a like\" substitutions may have occurred due to the inherent limitations of voice recognition software.  Read the chart carefully and recognize, using context, where substitutions have occurred.             "

## 2024-02-20 ENCOUNTER — APPOINTMENT (OUTPATIENT)
Dept: LAB | Facility: CLINIC | Age: 59
End: 2024-02-20
Payer: COMMERCIAL

## 2024-02-20 DIAGNOSIS — Z79.4 TYPE 2 DIABETES MELLITUS WITH HYPERGLYCEMIA, WITH LONG-TERM CURRENT USE OF INSULIN (HCC): ICD-10-CM

## 2024-02-20 DIAGNOSIS — E11.65 TYPE 2 DIABETES MELLITUS WITH HYPERGLYCEMIA, WITH LONG-TERM CURRENT USE OF INSULIN (HCC): ICD-10-CM

## 2024-02-20 DIAGNOSIS — R10.11 RIGHT UPPER QUADRANT PAIN: ICD-10-CM

## 2024-02-20 DIAGNOSIS — C25.3 PRIMARY ADENOCARCINOMA OF PANCREATIC DUCT (HCC): ICD-10-CM

## 2024-02-20 LAB
ALBUMIN SERPL BCP-MCNC: 4.1 G/DL (ref 3.5–5)
ALP SERPL-CCNC: 100 U/L (ref 34–104)
ALT SERPL W P-5'-P-CCNC: 12 U/L (ref 7–52)
ANION GAP SERPL CALCULATED.3IONS-SCNC: 10 MMOL/L
AST SERPL W P-5'-P-CCNC: 12 U/L (ref 13–39)
BASOPHILS # BLD AUTO: 0.05 THOUSANDS/ÂΜL (ref 0–0.1)
BASOPHILS NFR BLD AUTO: 1 % (ref 0–1)
BILIRUB SERPL-MCNC: 0.38 MG/DL (ref 0.2–1)
BUN SERPL-MCNC: 14 MG/DL (ref 5–25)
CALCIUM SERPL-MCNC: 9.7 MG/DL (ref 8.4–10.2)
CHLORIDE SERPL-SCNC: 103 MMOL/L (ref 96–108)
CO2 SERPL-SCNC: 29 MMOL/L (ref 21–32)
CREAT SERPL-MCNC: 0.9 MG/DL (ref 0.6–1.3)
EOSINOPHIL # BLD AUTO: 0.29 THOUSAND/ÂΜL (ref 0–0.61)
EOSINOPHIL NFR BLD AUTO: 3 % (ref 0–6)
ERYTHROCYTE [DISTWIDTH] IN BLOOD BY AUTOMATED COUNT: 15.3 % (ref 11.6–15.1)
GFR SERPL CREATININE-BSD FRML MDRD: 93 ML/MIN/1.73SQ M
GLUCOSE P FAST SERPL-MCNC: 142 MG/DL (ref 65–99)
HCT VFR BLD AUTO: 54 % (ref 36.5–49.3)
HGB BLD-MCNC: 17.8 G/DL (ref 12–17)
IMM GRANULOCYTES # BLD AUTO: 0.06 THOUSAND/UL (ref 0–0.2)
IMM GRANULOCYTES NFR BLD AUTO: 1 % (ref 0–2)
LYMPHOCYTES # BLD AUTO: 2.58 THOUSANDS/ÂΜL (ref 0.6–4.47)
LYMPHOCYTES NFR BLD AUTO: 25 % (ref 14–44)
MCH RBC QN AUTO: 33.3 PG (ref 26.8–34.3)
MCHC RBC AUTO-ENTMCNC: 33 G/DL (ref 31.4–37.4)
MCV RBC AUTO: 101 FL (ref 82–98)
MONOCYTES # BLD AUTO: 0.83 THOUSAND/ÂΜL (ref 0.17–1.22)
MONOCYTES NFR BLD AUTO: 8 % (ref 4–12)
NEUTROPHILS # BLD AUTO: 6.39 THOUSANDS/ÂΜL (ref 1.85–7.62)
NEUTS SEG NFR BLD AUTO: 62 % (ref 43–75)
NRBC BLD AUTO-RTO: 0 /100 WBCS
PLATELET # BLD AUTO: 237 THOUSANDS/UL (ref 149–390)
PMV BLD AUTO: 9.6 FL (ref 8.9–12.7)
POTASSIUM SERPL-SCNC: 4.2 MMOL/L (ref 3.5–5.3)
PROT SERPL-MCNC: 6.8 G/DL (ref 6.4–8.4)
RBC # BLD AUTO: 5.34 MILLION/UL (ref 3.88–5.62)
SODIUM SERPL-SCNC: 142 MMOL/L (ref 135–147)
WBC # BLD AUTO: 10.2 THOUSAND/UL (ref 4.31–10.16)

## 2024-02-20 PROCEDURE — 85025 COMPLETE CBC W/AUTO DIFF WBC: CPT

## 2024-02-20 PROCEDURE — 80053 COMPREHEN METABOLIC PANEL: CPT

## 2024-02-20 PROCEDURE — 36415 COLL VENOUS BLD VENIPUNCTURE: CPT

## 2024-02-20 PROCEDURE — 86301 IMMUNOASSAY TUMOR CA 19-9: CPT

## 2024-02-21 ENCOUNTER — HOSPITAL ENCOUNTER (OUTPATIENT)
Dept: INFUSION CENTER | Facility: CLINIC | Age: 59
Discharge: HOME | End: 2024-02-21
Payer: COMMERCIAL

## 2024-02-21 VITALS
RESPIRATION RATE: 17 BRPM | SYSTOLIC BLOOD PRESSURE: 121 MMHG | DIASTOLIC BLOOD PRESSURE: 83 MMHG | BODY MASS INDEX: 24.29 KG/M2 | HEIGHT: 69 IN | HEART RATE: 81 BPM | TEMPERATURE: 97.8 F | WEIGHT: 164 LBS

## 2024-02-21 DIAGNOSIS — R10.11 RIGHT UPPER QUADRANT PAIN: Primary | ICD-10-CM

## 2024-02-21 LAB — CANCER AG19-9 SERPL-ACNC: 583 U/ML (ref 0–35)

## 2024-02-21 PROCEDURE — 96375 TX/PRO/DX INJ NEW DRUG ADDON: CPT

## 2024-02-21 PROCEDURE — 96413 CHEMO IV INFUSION 1 HR: CPT

## 2024-02-21 PROCEDURE — 96367 TX/PROPH/DG ADDL SEQ IV INF: CPT

## 2024-02-21 PROCEDURE — G0498 CHEMO EXTEND IV INFUS W/PUMP: HCPCS

## 2024-02-21 RX ORDER — ATROPINE SULFATE 1 MG/ML
0.25 INJECTION, SOLUTION INTRAVENOUS ONCE
Status: COMPLETED | OUTPATIENT
Start: 2024-02-21 | End: 2024-02-21

## 2024-02-21 RX ORDER — ATROPINE SULFATE 1 MG/ML
0.25 INJECTION, SOLUTION INTRAVENOUS ONCE AS NEEDED
Status: DISCONTINUED | OUTPATIENT
Start: 2024-02-21 | End: 2024-02-24 | Stop reason: HOSPADM

## 2024-02-21 RX ORDER — DEXTROSE MONOHYDRATE 50 MG/ML
20 INJECTION, SOLUTION INTRAVENOUS ONCE
Status: COMPLETED | OUTPATIENT
Start: 2024-02-21 | End: 2024-02-21

## 2024-02-21 RX ORDER — SODIUM CHLORIDE 9 MG/ML
20 INJECTION, SOLUTION INTRAVENOUS ONCE AS NEEDED
Status: DISCONTINUED | OUTPATIENT
Start: 2024-02-21 | End: 2024-02-24 | Stop reason: HOSPADM

## 2024-02-21 RX ADMIN — FAMOTIDINE 20 MG: 10 INJECTION, SOLUTION INTRAVENOUS at 14:06

## 2024-02-21 RX ADMIN — ATROPINE SULFATE 0.25 MG: 1 INJECTION, SOLUTION INTRAVENOUS at 14:37

## 2024-02-21 RX ADMIN — IRINOTECAN HYDROCHLORIDE 231 MG: 20 INJECTION, SOLUTION INTRAVENOUS at 14:43

## 2024-02-21 RX ADMIN — DEXAMETHASONE SODIUM PHOSPHATE: 10 INJECTION, SOLUTION INTRAMUSCULAR; INTRAVENOUS at 13:43

## 2024-02-21 RX ADMIN — DEXTROSE 20 ML/HR: 5 SOLUTION INTRAVENOUS at 14:38

## 2024-02-21 NOTE — PROGRESS NOTES
Pt to clinic for chemotherapy. Offers no complaints at this time. Port accessed with positive blood flow throughout treatment.  Cadd pump connected and clamps open. Patient aware of disconnect date and time on 2/23/24 at 14:36 . AVS declined.

## 2024-02-21 NOTE — PROGRESS NOTES
Reached out to Jessica Trevizo RN per patient request to discontinue benadryl from [place] plan. Per office, okay to refuse Benadryl, however it will remain in the plan.

## 2024-02-22 ENCOUNTER — TELEPHONE (OUTPATIENT)
Dept: FAMILY MEDICINE CLINIC | Facility: CLINIC | Age: 59
End: 2024-02-22

## 2024-02-22 ENCOUNTER — TELEMEDICINE (OUTPATIENT)
Dept: FAMILY MEDICINE CLINIC | Facility: CLINIC | Age: 59
End: 2024-02-22
Payer: COMMERCIAL

## 2024-02-22 DIAGNOSIS — R53.83 CHEMOTHERAPY-INDUCED FATIGUE: ICD-10-CM

## 2024-02-22 DIAGNOSIS — R11.0 CHEMOTHERAPY-INDUCED NAUSEA: ICD-10-CM

## 2024-02-22 DIAGNOSIS — C25.9 PANCREATIC ADENOCARCINOMA (HCC): Primary | ICD-10-CM

## 2024-02-22 DIAGNOSIS — T45.1X5A CHEMOTHERAPY-INDUCED FATIGUE: ICD-10-CM

## 2024-02-22 DIAGNOSIS — T45.1X5A CHEMOTHERAPY-INDUCED NAUSEA: ICD-10-CM

## 2024-02-22 DIAGNOSIS — Z71.89 MEDICATION CARE PLAN DISCUSSED WITH PATIENT: ICD-10-CM

## 2024-02-22 PROCEDURE — 99214 OFFICE O/P EST MOD 30 MIN: CPT | Performed by: STUDENT IN AN ORGANIZED HEALTH CARE EDUCATION/TRAINING PROGRAM

## 2024-02-22 NOTE — PROGRESS NOTES
Virtual Regular Visit    Verification of patient location:    Patient is located at Home in the following state in which I hold an active license [place]      Assessment/Plan:    Problem List Items Addressed This Visit        Digestive    Pancreatic adenocarcinoma (HCC) - Primary   Other Visit Diagnoses     Medication care plan discussed with patient        Chemotherapy-induced nausea        Chemotherapy-induced fatigue              Stop pepcid, take Ppi only. Zofran as needed. Paperwork completed, reviewed oncology and palliative care notes       Reason for visit is   Chief Complaint   Patient presents with   • Virtual Regular Visit          Encounter provider Elo Modi MD    Provider located at [address] [place] [address]  [address] [place] [address] [place] [id number] [phone]      Recent Visits  No visits were found meeting these conditions.  Showing recent visits within past 7 days and meeting all other requirements  Today's Visits  Date Type Provider Dept   02/22/24 Telephone Diann Nelson [facility] [address] [place] [address]   02/22/24 Telemedicine Elo Modi MD [facility] [address] [place] [address]   Showing today's visits and meeting all other requirements  Future Appointments  No visits were found meeting these conditions.  Showing future appointments within next 150 days and meeting all other requirements       The patient was identified by name and date of birth. Derick Richey was informed that this is a telemedicine visit and that the visit is being conducted through the Epic Embedded platform. He agrees to proceed..  My office door was closed. No one else was in the room.  He acknowledged consent and understanding of privacy and security of the video platform. The patient has agreed to participate and understands they can discontinue the visit at any time.    Patient is aware this is a billable service.     Subjective  Derick  Rossi is a 58 y.o. male  .      Work accomodation    Discuss medications:pepcid and prilosec. Zofran if he needs to stay on it    Work from home to get to chemo. 4-6 days post chemotherapy being off from wokr vs work from home given the side effects. Has severe fatigue after and abd pain that he finds it difficult. Trying to keep on a flexible schedule as his symtpoms are variable after chemo- getting every 2 weeks         Past Medical History:   Diagnosis Date   • Cancer (HCC)     pancreatic   • Coronary artery disease    • Diabetes mellitus (HCC)    • Hyperlipidemia    • Hypertension    • Pancreatic cancer (HCC)        Past Surgical History:   Procedure Laterality Date   • CORONARY ANGIOPLASTY WITH STENT PLACEMENT     • ERCP W/ PLASTIC STENT PLACEMENT     • HERNIA REPAIR     • IR PORT PLACEMENT  11/24/2023       Current Outpatient Medications   Medication Sig Dispense Refill   • amphetamine-dextroamphetamine (ADDERALL XR) 20 MG 24 hr capsule Take 20 mg by mouth every morning     • aspirin 81 MG tablet daily     • carvedilol (COREG) 6.25 mg tablet Take 6.25 mg by mouth 2 (two) times a day     • Continuous Blood Gluc Sensor (MontnetsStyle Geovanny 3 Sensor) MISC use as directed TO MONITOR GLUCOSE DAILY     • Creon 49963-54455 units capsule take 1 capsule by mouth three times a day with meals 180 capsule 0   • desvenlafaxine succinate (PRISTIQ) 50 mg 24 hr tablet Take 50 mg by mouth daily     • diphenhydramine, lidocaine, Al/Mg hydroxide, simethicone (Magic Mouthwash) SUSP Swish and spit 10 mL every 4 (four) hours as needed for mouth pain or discomfort 237 mL 2   • fluorouracil 4,440 mg in CADD/Elastomeric Infusion Device Infuse 4,440 mg (1,200 mg/m2/day x 1.85 m2) into a catheter in a vein via infusion device over 46 hours for 2 days  Infusion planned for February 21, 2024. 1 each 0   • insulin aspart, w/niacinamide, (Fiasp FlexTouch) 100 Units/mL injection pen Inject 8 Units under the skin 3 (three) times a day with  meals 24 mL 0   • Jardiance 25 MG TABS Take 1 tablet by mouth every morning     • lisinopril (ZESTRIL) 5 mg tablet Take 1 tablet by mouth daily     • metFORMIN (GLUCOPHAGE) 1000 MG tablet Take 1 tablet (1,000 mg total) by mouth 2 (two) times a day with meals Twice daily Do not start before December 18, 2023.     • omeprazole (PriLOSEC) 40 MG capsule      • ondansetron (ZOFRAN) 8 mg tablet Take 1 tablet (8 mg total) by mouth every 8 (eight) hours as needed for nausea or vomiting for up to 20 days 20 tablet 2   • rosuvastatin (CRESTOR) 10 MG tablet Take 1 tablet by mouth daily     • Tresiba FlexTouch 100 units/mL injection pen Inject 30 Units under the skin daily at bedtime 27 mL 0   • valACYclovir (VALTREX) 1,000 mg tablet Take 1 tablet (1,000 mg total) by mouth 2 (two) times a day for 10 days 20 tablet 3   • zolpidem (AMBIEN) 10 mg tablet Take 10 mg by mouth daily at bedtime as needed       No current facility-administered medications for this visit.     Facility-Administered Medications Ordered in Other Visits   Medication Dose Route Frequency Provider Last Rate Last Admin   • alteplase (CATHFLO) injection 2 mg  2 mg Intracatheter Q1MIN PRN Idris Calderon MD       • Atropine Sulfate injection 0.25 mg  0.25 mg Intravenous Once PRN Idris Calderon MD       • diphenhydrAMINE (BENADRYL) 25 mg in sodium chloride 0.9 % 50 mL IVPB  25 mg Intravenous Once Idris Calderon MD       • sodium chloride 0.9 % infusion  20 mL/hr Intravenous Once PRN Idris Calderon MD            No Known Allergies    Review of Systems   Constitutional:  Positive for activity change and fatigue. Negative for appetite change and fever.   HENT:  Negative for congestion, rhinorrhea and sore throat.    Eyes:  Negative for visual disturbance.   Respiratory:  Negative for cough and shortness of breath.    Cardiovascular:  Negative for chest pain.   Gastrointestinal:  Negative for nausea and vomiting.       Video Exam    There were no vitals filed for  this visit.    Physical Exam  Constitutional:       General: He is not in acute distress.     Appearance: Normal appearance.   HENT:      Head: Normocephalic and atraumatic.   Pulmonary:      Effort: Pulmonary effort is normal. No respiratory distress.   Neurological:      Mental Status: He is alert and oriented to person, place, and time. Mental status is at baseline.   Psychiatric:         Mood and Affect: Mood normal.         Behavior: Behavior normal.          Visit Time  Total Visit Duration: 15

## 2024-02-22 NOTE — TELEPHONE ENCOUNTER
Patient left message on clinical line for his 11:40 appointment to be virtual. Called patient and left message to call back that Dr. Modi said that is okay.

## 2024-02-23 ENCOUNTER — HOSPITAL ENCOUNTER (OUTPATIENT)
Dept: INFUSION CENTER | Facility: CLINIC | Age: 59
Discharge: HOME | End: 2024-02-23

## 2024-02-23 DIAGNOSIS — R10.11 RIGHT UPPER QUADRANT PAIN: Primary | ICD-10-CM

## 2024-02-23 NOTE — PROGRESS NOTES
Pt to clinic for elastometric pump disconnect. Offers no complaints today. Pump has been running for over 46 hours and appears empty and deflated. Pt tolerated pump disconnect without complications. Port de-accessed. Pt aware of next appointment on 3/6/24 at 1330. AVS declined.

## 2024-02-26 ENCOUNTER — HOSPITAL ENCOUNTER (OUTPATIENT)
Dept: CT IMAGING | Facility: HOSPITAL | Age: 59
Discharge: HOME | End: 2024-02-26
Attending: INTERNAL MEDICINE
Payer: COMMERCIAL

## 2024-02-26 DIAGNOSIS — Z79.4 TYPE 2 DIABETES MELLITUS WITH HYPERGLYCEMIA, WITH LONG-TERM CURRENT USE OF INSULIN (HCC): ICD-10-CM

## 2024-02-26 DIAGNOSIS — E11.65 TYPE 2 DIABETES MELLITUS WITH HYPERGLYCEMIA, WITH LONG-TERM CURRENT USE OF INSULIN (HCC): ICD-10-CM

## 2024-02-26 DIAGNOSIS — C25.3 PRIMARY ADENOCARCINOMA OF PANCREATIC DUCT (HCC): ICD-10-CM

## 2024-02-26 DIAGNOSIS — R10.11 RIGHT UPPER QUADRANT PAIN: ICD-10-CM

## 2024-02-26 PROCEDURE — 74178 CT ABD&PLV WO CNTR FLWD CNTR: CPT

## 2024-02-26 PROCEDURE — 71260 CT THORAX DX C+: CPT

## 2024-02-26 PROCEDURE — G1004 CDSM NDSC: HCPCS

## 2024-02-26 RX ADMIN — IOHEXOL 100 ML: 350 INJECTION, SOLUTION INTRAVENOUS at 11:48

## 2024-02-27 DIAGNOSIS — R10.11 RIGHT UPPER QUADRANT PAIN: Primary | ICD-10-CM

## 2024-02-27 RX ORDER — SODIUM CHLORIDE 9 MG/ML
20 INJECTION, SOLUTION INTRAVENOUS ONCE AS NEEDED
OUTPATIENT
Start: 2024-03-06

## 2024-02-27 RX ORDER — DEXTROSE MONOHYDRATE 50 MG/ML
20 INJECTION, SOLUTION INTRAVENOUS ONCE
OUTPATIENT
Start: 2024-03-06

## 2024-02-27 RX ORDER — ATROPINE SULFATE 1 MG/ML
0.25 INJECTION, SOLUTION INTRAVENOUS ONCE AS NEEDED
OUTPATIENT
Start: 2024-03-06

## 2024-02-27 RX ORDER — ATROPINE SULFATE 1 MG/ML
0.25 INJECTION, SOLUTION INTRAVENOUS ONCE
OUTPATIENT
Start: 2024-03-06

## 2024-02-29 ENCOUNTER — TELEPHONE (OUTPATIENT)
Dept: FAMILY MEDICINE CLINIC | Facility: CLINIC | Age: 59
End: 2024-02-29

## 2024-02-29 ENCOUNTER — OFFICE VISIT (OUTPATIENT)
Dept: SURGICAL ONCOLOGY | Facility: CLINIC | Age: 59
End: 2024-02-29
Payer: COMMERCIAL

## 2024-02-29 VITALS
SYSTOLIC BLOOD PRESSURE: 126 MMHG | HEART RATE: 97 BPM | HEIGHT: 69 IN | OXYGEN SATURATION: 95 % | DIASTOLIC BLOOD PRESSURE: 90 MMHG | WEIGHT: 162 LBS | BODY MASS INDEX: 23.99 KG/M2

## 2024-02-29 DIAGNOSIS — C25.3 PRIMARY ADENOCARCINOMA OF PANCREATIC DUCT (HCC): Primary | ICD-10-CM

## 2024-02-29 DIAGNOSIS — C25.9 PANCREATIC ADENOCARCINOMA (HCC): ICD-10-CM

## 2024-02-29 DIAGNOSIS — G89.3 CANCER-RELATED PAIN: ICD-10-CM

## 2024-02-29 PROCEDURE — 99215 OFFICE O/P EST HI 40 MIN: CPT | Performed by: SURGERY

## 2024-02-29 RX ORDER — ACETAMINOPHEN AND CODEINE PHOSPHATE 300; 30 MG/1; MG/1
1 TABLET ORAL DAILY PRN
Qty: 10 TABLET | Refills: 0 | Status: SHIPPED | OUTPATIENT
Start: 2024-02-29

## 2024-02-29 NOTE — PROGRESS NOTES
Surgical Oncology Follow Up       [facility]  [facility]  [address]  [address]  [place] [address] [phone]    Derick Richey  1965  [id number]  [facility]  [facility]  [address]  [address]  [place] [address]  [phone]    Diagnoses and all orders for this visit:    Primary adenocarcinoma of pancreatic duct (HCC)    Pancreatic adenocarcinoma (HCC)        Chief Complaint   Patient presents with    Follow-up     3 mo f/u [facility] pancreatic neck       Return in about 3 months (around 5/29/2024) for Office Visit.      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  He has completed 6 cycles modified FOLFIRINOX with dose reductions required.  He presents today for follow up after CT imaging.      2/26/24 CT C/A/P         2/29/24 Dr. Cervantes          3/7/24 Dr. Calderon     Right upper quadrant pain   10/30/2023 Biopsy    Endoscopic ultrasound:  Pancreas, Neck mass:  Malignant  Adenocarcinoma.        11/16/2023 -  Cancer Staged    Staging form: Pancreas, AJCC 8th Edition  - Clinical: Stage IB (cT2, cN0, cM0) - Signed by Samir Cervantes MD on 11/16/2023  Total positive nodes: 0       11/28/2023 -  Chemotherapy    alteplase (CATHFLO), 2 mg, Intracatheter, Every 1 Minute as needed, 7 of 12 cycles  irinotecan (CAMPTOSAR) chemo infusion, 150 mg/m2 = 278 mg, Intravenous, Once, 7 of 12 cycles  Dose modification: 125 mg/m2 (100 % of original dose 150 mg/m2, Cycle 9, Reason: Dose Not Tolerated), 125.1 mg/m2 (83.4 % of original dose 125 mg/m2, Cycle 2, Reason: Dose Not Tolerated), 125 mg/m2 (100 % of original dose 125 mg/m2, Cycle 2, Reason: Dose Not Tolerated)  Administration: 278 mg (11/28/2023), 231 mg (12/12/2023), 231 mg (12/27/2023), 231 mg (1/10/2024), 231 mg (1/24/2024), 231 mg  (2/7/2024), 231 mg (2/21/2024)  oxaliplatin (ELOXATIN) chemo infusion, 65 mg/m2 = 120.25 mg, Intravenous, Once, 3 of 3 cycles  Administration: 120.25 mg (11/28/2023), 120.25 mg (12/12/2023), 120.25 mg (12/27/2023)  fluorouracil (ADRUCIL) ambulatory infusion Soln, 1,200 mg/m2/day = 4,440 mg, Intravenous, Over 46 hours, 7 of 12 cycles     Primary adenocarcinoma of pancreatic duct (HCC)   10/30/2023 Initial Diagnosis    Primary adenocarcinoma of pancreatic duct (HCC)     10/30/2023 Biopsy    Final Diagnosis   A.B. Pancreas, Neck mass (Cell Block and smear Preparations):  Malignant  Adenocarcinoma.     Satisfactory for evaluation.        10/30/2023 -  Cancer Staged    Staging form: Pancreas, AJCC 8th Edition  - Clinical stage from 10/30/2023: Stage IB (cT2, cN0, cM0) - Signed by Malgorzata Madden MD on 2/19/2024  Stage prefix: Initial diagnosis  Total positive nodes: 0           Staging: Locally advanced pancreas cancer  Treatment history: FOLFIRINOX  Current treatment: FOLFIRINOX  Disease status: Stable    History of Present Illness: Patient returns for follow-up.  He is receiving neoadjuvant chemotherapy.  He is tolerating this fairly well, but still has some abdominal pain.  He is noticing that his stool is becoming lighter as well as having more bloating.  No change in appetite or unintentional weight loss.  CT from February 26, 2024 reveals ill-defined mass in the pancreatic head and body measuring 6.4 x 2.7 cm.  This appears to be involving his portal vein/SMV.  There is also possible involvement of the hepatic artery and GDA.  No evidence of distant disease.  I personally reviewed the films.  CA 19-9 is now 583    Review of Systems  Complete ROS Surg Onc:   Complete ROS Surg Onc:   Constitutional: The patient denies new or recent history of general fatigue, no recent weight loss, no change in appetite.   Eyes: No complaints of visual problems, no scleral icterus.   ENT: no complaints of ear pain, no hoarseness, no  difficulty swallowing,  no tinnitus and no new masses in head, oral cavity, or neck.   Cardiovascular: No complaints of chest pain, no palpitations, no ankle edema.   Respiratory: No complaints of shortness of breath, no cough.   Gastrointestinal: No complaints of jaundice, no bloody stools, no pale stools.   Genitourinary: No complaints of dysuria, no hematuria, no nocturia, no frequent urination, no urethral discharge.   Musculoskeletal: No complaints of weakness, paralysis, joint stiffness or arthralgias.  Integumentary: No complaints of rash, no new lesions.   Neurological: No complaints of convulsions, no seizures, no dizziness.   Hematologic/Lymphatic: No complaints of easy bruising.   Endocrine:  No hot or cold intolerance.  No polydipsia, polyphagia, or polyuria.  Allergy/immunology:  No environmental allergies.  No food allergies.  Not immunocompromised.  Skin:  No pallor or rash.  No wound.        Patient Active Problem List   Diagnosis    Right upper quadrant pain    Type 2 diabetes mellitus with hyperglycemia, with long-term current use of insulin (HCC)    Mixed hyperlipidemia    Coronary artery disease involving native coronary artery of native heart without angina pectoris    Gastroesophageal reflux disease    Primary insomnia    Dehydration    Attention deficit hyperactivity disorder (ADHD), combined type    Depression with anxiety    Right upper quadrant abdominal pain    Primary adenocarcinoma of pancreatic duct (HCC)    SIRS (systemic inflammatory response syndrome) (HCC)    Pancreatic adenocarcinoma (HCC)    Palliative care patient     Past Medical History:   Diagnosis Date    Cancer (HCC)     pancreatic    Coronary artery disease     Diabetes mellitus (HCC)     Hyperlipidemia     Hypertension     Pancreatic cancer (HCC)      Past Surgical History:   Procedure Laterality Date    CORONARY ANGIOPLASTY WITH STENT PLACEMENT      ERCP W/ PLASTIC STENT PLACEMENT      HERNIA REPAIR      IR PORT PLACEMENT   11/24/2023     Family History   Problem Relation Age of Onset    Heart attack Father     Skin cancer Father      Social History     Socioeconomic History    Marital status: /Civil Union     Spouse name: Not on file    Number of children: Not on file    Years of education: Not on file    Highest education level: Not on file   Occupational History    Not on file   Tobacco Use    Smoking status: Every Day     Current packs/day: 1.50     Average packs/day: 1.5 packs/day for 35.0 years (52.5 ttl pk-yrs)     Types: Cigarettes    Smokeless tobacco: Not on file   Vaping Use    Vaping status: Never Used   Substance and Sexual Activity    Alcohol use: Not Currently    Drug use: Never    Sexual activity: Yes     Partners: Male   Other Topics Concern    Not on file   Social History Narrative    Not on file     Social Determinants of Health     Financial Resource Strain: Not on file   Food Insecurity: No Food Insecurity (12/15/2023)    Hunger Vital Sign     Worried About Running Out of Food in the Last Year: Never true     Ran Out of Food in the Last Year: Never true   Transportation Needs: No Transportation Needs (12/15/2023)    PRAPARE - Transportation     Lack of Transportation (Medical): No     Lack of Transportation (Non-Medical): No   Physical Activity: Not on file   Stress: Not on file   Social Connections: Not on file   Intimate Partner Violence: Not on file   Housing Stability: Low Risk  (12/15/2023)    Housing Stability Vital Sign     Unable to Pay for Housing in the Last Year: No     Number of Places Lived in the Last Year: 1     Unstable Housing in the Last Year: No       Current Outpatient Medications:     acetaminophen-codeine (TYLENOL with CODEINE #3) 300-30 MG per tablet, Take 1 tablet by mouth daily as needed for moderate pain, Disp: 10 tablet, Rfl: 0    amphetamine-dextroamphetamine (ADDERALL XR) 20 MG 24 hr capsule, Take 20 mg by mouth every morning, Disp: , Rfl:     aspirin 81 MG tablet, daily, Disp:  , Rfl:     carvedilol (COREG) 6.25 mg tablet, Take 6.25 mg by mouth 2 (two) times a day, Disp: , Rfl:     Continuous Blood Gluc Sensor (FreeStyle Geovanny 3 Sensor) MISC, use as directed TO MONITOR GLUCOSE DAILY, Disp: , Rfl:     Creon 46213-11546 units capsule, take 1 capsule by mouth three times a day with meals, Disp: 180 capsule, Rfl: 0    desvenlafaxine succinate (PRISTIQ) 50 mg 24 hr tablet, Take 50 mg by mouth daily, Disp: , Rfl:     diphenhydramine, lidocaine, Al/Mg hydroxide, simethicone (Magic Mouthwash) SUSP, Swish and spit 10 mL every 4 (four) hours as needed for mouth pain or discomfort, Disp: 237 mL, Rfl: 2    insulin aspart, w/niacinamide, (Fiasp FlexTouch) 100 Units/mL injection pen, Inject 8 Units under the skin 3 (three) times a day with meals, Disp: 24 mL, Rfl: 0    Jardiance 25 MG TABS, Take 1 tablet by mouth every morning, Disp: , Rfl:     lisinopril (ZESTRIL) 5 mg tablet, Take 1 tablet by mouth daily, Disp: , Rfl:     metFORMIN (GLUCOPHAGE) 1000 MG tablet, Take 1 tablet (1,000 mg total) by mouth 2 (two) times a day with meals Twice daily Do not start before December 18, 2023., Disp: , Rfl:     omeprazole (PriLOSEC) 40 MG capsule, , Disp: , Rfl:     ondansetron (ZOFRAN) 8 mg tablet, Take 1 tablet (8 mg total) by mouth every 8 (eight) hours as needed for nausea or vomiting for up to 20 days, Disp: 20 tablet, Rfl: 2    rosuvastatin (CRESTOR) 10 MG tablet, Take 1 tablet by mouth daily, Disp: , Rfl:     Tresiba FlexTouch 100 units/mL injection pen, Inject 30 Units under the skin daily at bedtime, Disp: 27 mL, Rfl: 0    valACYclovir (VALTREX) 1,000 mg tablet, Take 1 tablet (1,000 mg total) by mouth 2 (two) times a day for 10 days, Disp: 20 tablet, Rfl: 3    zolpidem (AMBIEN) 10 mg tablet, Take 10 mg by mouth daily at bedtime as needed, Disp: , Rfl:   No current facility-administered medications for this visit.    Facility-Administered Medications Ordered in Other Visits:     barium (READI-CAT 2)  suspension 900 mL, 900 mL, Oral, Once in imaging, Idris Calderon MD  No Known Allergies  Vitals:    02/29/24 1436   BP: 126/90   Pulse: 97   SpO2: 95%       Physical Exam  Constitutional: General appearance: The Patient is well-developed and well-nourished who appears the stated age in no acute distress. Patient is pleasant and talkative.     HEENT:  Normocephalic.  Sclerae are anicteric. Mucous membranes are moist. Neck is supple without adenopathy. No JVD.     Chest: The lungs are clear to auscultation.     Cardiac: Heart is regular rate.     Abdomen: Abdomen is soft, non-tender, non-distended and without masses.     Extremities: There is no clubbing or cyanosis. There is no edema.  Symmetric.  Neuro: Grossly nonfocal. Gait is normal.     Lymphatic: No evidence of cervical adenopathy bilaterally.   No evidence of axillary adenopathy bilaterally.   Skin: Warm, anicteric.    Psych:  Patient is pleasant and talkative.  Breasts:        Pathology:  [unfilled]    Labs:       Component  Ref Range & Units 2/20/24 10:30 AM 11/1/23 12:54 PM   CA 19-9  0 - 35 U/mL 583 High  932 High          Imaging  CT CHEST W CT ABDOMEN PELVIS W WO CONTRAST    Result Date: 2/26/2024  Narrative: CT CHEST, ABDOMEN AND PELVIS WITH AND WITHOUT IV CONTRAST INDICATION: C25.3: Malignant neoplasm of pancreatic duct E11.65: Type 2 diabetes mellitus with hyperglycemia Z79.4: Long term (current) use of insulin R10.11: Right upper quadrant pain. COMPARISON: 12/15/2023 TECHNIQUE: Initial CT of the abdomen and pelvis was performed without intravenous contrast. Subsequent dynamic CT evaluation of the chest, abdomen and pelvis was performed after the administration of intravenous contrast was performed. Finally, delayed dynamic delayed phase postcontrast CT evaluation of the abdomen and pelvis was performed. Multiplanar 2D reformatted images were created from the source data. This examination, like all CT scans performed in the [facility], was performed utilizing techniques to minimize radiation dose exposure, including the use of iterative reconstruction and automated exposure control. Radiation dose length product (DLP) for this visit: 1475 mGy-cm IV Contrast: 100 mL of iohexol (OMNIPAQUE) Enteric Contrast: Administered. FINDINGS: CHEST LUNGS: Lungs are clear. No tracheal or endobronchial lesion. PLEURA: Similar centrilobular emphysema. No the previously described pulmonary nodules has increased in size in the interval. No new pulmonary nodules. No pulmonary consolidation. No pleural effusion or pneumothorax. No suspicious airway filling defects. HEART/GREAT VESSELS: Heart is unremarkable for patient's age. No thoracic aortic aneurysm. Coronary artery calcifications. Central venous catheter tip in the superior vena cava. MEDIASTINUM AND ORA: Unremarkable. CHEST WALL AND LOWER NECK: Mild gynecomastia. Mediport catheter right chest wall. ABDOMEN RIGHT KIDNEY AND URETER: No solid renal mass or detectable urothelial mass. 3 benign cysts. No hydronephrosis or hydroureter. No urinary tract calculi. No perinephric collection. LEFT KIDNEY AND URETER: No solid renal mass or detectable urothelial mass. 2 benign cysts. No hydronephrosis or hydroureter. No urinary tract calculi. No perinephric collection. URINARY BLADDER: No bladder wall mass. No calculi. LIVER/BILIARY TREE: No liver masses or intrahepatic biliary dilatation. Common bile duct stent remains in place. Similar associated small amount of pneumobilia. GALLBLADDER: No calcified gallstones. Small amount of inflammatory change inferior to the gallbladder.. Small gallbladder diverticulum. Gallbladder phrygian cap. Gallbladder is again distended. SPLEEN: Unremarkable. PANCREAS: Hypodense ill-defined mass involving the pancreatic head and body measures 6.4 x 2.7 cm in maximal axial dimensions. On previous exam this measured 6.3 x 2.4 cm. The mass again abuts the proximal portal vein just distal  to the confluence resulting in progressive marked narrowing. Mass again encases the gastroduodenal artery. Small amount of inflammatory change about the pancreatic head, uncinate process and body has not significantly changed. Similar dilatation of the pancreatic duct in the tail. ADRENAL GLANDS: Unremarkable. STOMACH AND BOWEL: Sigmoid diverticulosis without diverticulitis. The stomach and bowel is otherwise unremarkable. APPENDIX: Normal appendix. ABDOMINOPELVIC CAVITY: No ascites. No pneumoperitoneum. No lymphadenopathy. VESSELS: No aortic aneurysm. Mild scattered aortic and iliac artery atherosclerotic calcifications. PELVIS REPRODUCTIVE ORGANS: Unremarkable for patient's age. ABDOMINAL WALL/INGUINAL REGIONS: Ventral hernia mesh in place. BONES: No acute fracture or suspicious osseous lesion. Mild hip osteoarthrosis bilaterally. Minimal lumbar spine convex left curvature.     Impression: Pancreatic neoplasm involving the head and body which although stable by RECIST criteria shows progressive marked focal portal vein narrowing. No metastatic disease to the chest, abdomen, or pelvis. Small amount of inflammatory change about the inferior aspect of the gallbladder. Correlate clinically to exclude cholecystitis. Similar small amount of inflammatory change about the pancreas. Secondary pancreatitis is again a consideration. Correlate clinically. Unchanged biliary stent with small amount of pneumobilia. The study was marked in EPIC for significant notification. Workstation performed: [id number]     I personally reviewed and interpreted the above laboratory and imaging data.    Discussion/Summary: 58-year-old male with locally advanced pancreas cancer.  There is still no evidence of metastasis.  He has had a good biochemical response and the pancreatic mass is essentially stable.  Based on his current imaging he would likely require portal vein resection, and possible hepatic artery resection.  I have recommended that  he continue his neoadjuvant chemotherapy for at least another 2 to 3 months.  I would then repeat his CT with a pancreas protocol, and at that time we can determine the need/extent of surgical resection as long as there are no metastasis.  We also discussed possible definitive chemoradiation based on his follow-up scan.  I will see him again in the next 2 to 3 months.  I will have medical oncology ordered the pancreas protocol CT once he has completed chemotherapy, and I will see him back.  He is agreeable to this plan.  All his questions were answered.

## 2024-02-29 NOTE — LETTER
February 29, 2024     Elo Modi MD  [address] [place] [address]  [address]  [place] [id number]    Patient: Derick Richey   YOB: 1965   Date of Visit: 2/29/2024       Dear Dr. Modi:    Thank you for referring Derick Ricehy to me for evaluation. Below are my notes for this consultation.    If you have questions, please do not hesitate to call me. I look forward to following your patient along with you.         Sincerely,        Samir Cervantes MD        CC: MD Samir Carroll MD  2/29/2024  3:05 PM  Sign when Signing Visit               Surgical Oncology Follow Up       [facility] [place]  [address]  [address]  [place] [address]  [phone]    Derick Richey  1965  [id number]  [facility] [place]  [address]  [address]  [place] [address]  [phone]    Diagnoses and all orders for this visit:    Primary adenocarcinoma of pancreatic duct (HCC)    Pancreatic adenocarcinoma (HCC)        Chief Complaint   Patient presents with   • Follow-up     3 mo f/u [facility] pancreatic neck       Return in about 3 months (around 5/29/2024) for Office Visit.      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  He has completed 6 cycles modified FOLFIRINOX with dose reductions required.  He presents today for follow up after CT imaging.      2/26/24 CT C/A/P         2/29/24 Dr. Cervantes          3/7/24 Dr. Calderon     Right upper quadrant pain   10/30/2023 Biopsy    Endoscopic ultrasound:  Pancreas, Neck mass:  Malignant  Adenocarcinoma.        11/16/2023 -  Cancer Staged    Staging form: Pancreas, AJCC 8th Edition  - Clinical: Stage IB (cT2, cN0, cM0) - Signed by Samir Cervantes MD on 11/16/2023  Total positive nodes: 0       11/28/2023 -  Chemotherapy    alteplase  (CATHFLO), 2 mg, Intracatheter, Every 1 Minute as needed, 7 of 12 cycles  irinotecan (CAMPTOSAR) chemo infusion, 150 mg/m2 = 278 mg, Intravenous, Once, 7 of 12 cycles  Dose modification: 125 mg/m2 (100 % of original dose 150 mg/m2, Cycle 9, Reason: Dose Not Tolerated), 125.1 mg/m2 (83.4 % of original dose 125 mg/m2, Cycle 2, Reason: Dose Not Tolerated), 125 mg/m2 (100 % of original dose 125 mg/m2, Cycle 2, Reason: Dose Not Tolerated)  Administration: 278 mg (11/28/2023), 231 mg (12/12/2023), 231 mg (12/27/2023), 231 mg (1/10/2024), 231 mg (1/24/2024), 231 mg (2/7/2024), 231 mg (2/21/2024)  oxaliplatin (ELOXATIN) chemo infusion, 65 mg/m2 = 120.25 mg, Intravenous, Once, 3 of 3 cycles  Administration: 120.25 mg (11/28/2023), 120.25 mg (12/12/2023), 120.25 mg (12/27/2023)  fluorouracil (ADRUCIL) ambulatory infusion Soln, 1,200 mg/m2/day = 4,440 mg, Intravenous, Over 46 hours, 7 of 12 cycles     Primary adenocarcinoma of pancreatic duct (HCC)   10/30/2023 Initial Diagnosis    Primary adenocarcinoma of pancreatic duct (HCC)     10/30/2023 Biopsy    Final Diagnosis   A.B. Pancreas, Neck mass (Cell Block and smear Preparations):  Malignant  Adenocarcinoma.     Satisfactory for evaluation.        10/30/2023 -  Cancer Staged    Staging form: Pancreas, AJCC 8th Edition  - Clinical stage from 10/30/2023: Stage IB (cT2, cN0, cM0) - Signed by Malgorzata Madden MD on 2/19/2024  Stage prefix: Initial diagnosis  Total positive nodes: 0           Staging: Locally advanced pancreas cancer  Treatment history: FOLFIRINOX  Current treatment: FOLFIRINOX  Disease status: Stable    History of Present Illness: Patient returns for follow-up.  He is receiving neoadjuvant chemotherapy.  He is tolerating this fairly well, but still has some abdominal pain.  He is noticing that his stool is becoming lighter as well as having more bloating.  No change in appetite or unintentional weight loss.  CT from February 26, 2024 reveals ill-defined mass in the  pancreatic head and body measuring 6.4 x 2.7 cm.  This appears to be involving his portal vein/SMV.  There is also possible involvement of the hepatic artery and GDA.  No evidence of distant disease.  I personally reviewed the films.  CA 19-9 is now 583    Review of Systems  Complete ROS Surg Onc:   Complete ROS Surg Onc:   Constitutional: The patient denies new or recent history of general fatigue, no recent weight loss, no change in appetite.   Eyes: No complaints of visual problems, no scleral icterus.   ENT: no complaints of ear pain, no hoarseness, no difficulty swallowing,  no tinnitus and no new masses in head, oral cavity, or neck.   Cardiovascular: No complaints of chest pain, no palpitations, no ankle edema.   Respiratory: No complaints of shortness of breath, no cough.   Gastrointestinal: No complaints of jaundice, no bloody stools, no pale stools.   Genitourinary: No complaints of dysuria, no hematuria, no nocturia, no frequent urination, no urethral discharge.   Musculoskeletal: No complaints of weakness, paralysis, joint stiffness or arthralgias.  Integumentary: No complaints of rash, no new lesions.   Neurological: No complaints of convulsions, no seizures, no dizziness.   Hematologic/Lymphatic: No complaints of easy bruising.   Endocrine:  No hot or cold intolerance.  No polydipsia, polyphagia, or polyuria.  Allergy/immunology:  No environmental allergies.  No food allergies.  Not immunocompromised.  Skin:  No pallor or rash.  No wound.        Patient Active Problem List   Diagnosis   • Right upper quadrant pain   • Type 2 diabetes mellitus with hyperglycemia, with long-term current use of insulin (HCC)   • Mixed hyperlipidemia   • Coronary artery disease involving native coronary artery of native heart without angina pectoris   • Gastroesophageal reflux disease   • Primary insomnia   • Dehydration   • Attention deficit hyperactivity disorder (ADHD), combined type   • Depression with anxiety   •  Right upper quadrant abdominal pain   • Primary adenocarcinoma of pancreatic duct (HCC)   • SIRS (systemic inflammatory response syndrome) (HCC)   • Pancreatic adenocarcinoma (HCC)   • Palliative care patient     Past Medical History:   Diagnosis Date   • Cancer (HCC)     pancreatic   • Coronary artery disease    • Diabetes mellitus (HCC)    • Hyperlipidemia    • Hypertension    • Pancreatic cancer (HCC)      Past Surgical History:   Procedure Laterality Date   • CORONARY ANGIOPLASTY WITH STENT PLACEMENT     • ERCP W/ PLASTIC STENT PLACEMENT     • HERNIA REPAIR     • IR PORT PLACEMENT  11/24/2023     Family History   Problem Relation Age of Onset   • Heart attack Father    • Skin cancer Father      Social History     Socioeconomic History   • Marital status: /Civil Union     Spouse name: Not on file   • Number of children: Not on file   • Years of education: Not on file   • Highest education level: Not on file   Occupational History   • Not on file   Tobacco Use   • Smoking status: Every Day     Current packs/day: 1.50     Average packs/day: 1.5 packs/day for 35.0 years (52.5 ttl pk-yrs)     Types: Cigarettes   • Smokeless tobacco: Not on file   Vaping Use   • Vaping status: Never Used   Substance and Sexual Activity   • Alcohol use: Not Currently   • Drug use: Never   • Sexual activity: Yes     Partners: Male   Other Topics Concern   • Not on file   Social History Narrative   • Not on file     Social Determinants of Health     Financial Resource Strain: Not on file   Food Insecurity: No Food Insecurity (12/15/2023)    Hunger Vital Sign    • Worried About Running Out of Food in the Last Year: Never true    • Ran Out of Food in the Last Year: Never true   Transportation Needs: No Transportation Needs (12/15/2023)    PRAPARE - Transportation    • Lack of Transportation (Medical): No    • Lack of Transportation (Non-Medical): No   Physical Activity: Not on file   Stress: Not on file   Social Connections: Not on  file   Intimate Partner Violence: Not on file   Housing Stability: Low Risk  (12/15/2023)    Housing Stability Vital Sign    • Unable to Pay for Housing in the Last Year: No    • Number of Places Lived in the Last Year: 1    • Unstable Housing in the Last Year: No       Current Outpatient Medications:   •  acetaminophen-codeine (TYLENOL with CODEINE #3) 300-30 MG per tablet, Take 1 tablet by mouth daily as needed for moderate pain, Disp: 10 tablet, Rfl: 0  •  amphetamine-dextroamphetamine (ADDERALL XR) 20 MG 24 hr capsule, Take 20 mg by mouth every morning, Disp: , Rfl:   •  aspirin 81 MG tablet, daily, Disp: , Rfl:   •  carvedilol (COREG) 6.25 mg tablet, Take 6.25 mg by mouth 2 (two) times a day, Disp: , Rfl:   •  Continuous Blood Gluc Sensor (FreeStyle Geovanny 3 Sensor) MISC, use as directed TO MONITOR GLUCOSE DAILY, Disp: , Rfl:   •  Creon 78065-72934 units capsule, take 1 capsule by mouth three times a day with meals, Disp: 180 capsule, Rfl: 0  •  desvenlafaxine succinate (PRISTIQ) 50 mg 24 hr tablet, Take 50 mg by mouth daily, Disp: , Rfl:   •  diphenhydramine, lidocaine, Al/Mg hydroxide, simethicone (Magic Mouthwash) SUSP, Swish and spit 10 mL every 4 (four) hours as needed for mouth pain or discomfort, Disp: 237 mL, Rfl: 2  •  insulin aspart, w/niacinamide, (Fiasp FlexTouch) 100 Units/mL injection pen, Inject 8 Units under the skin 3 (three) times a day with meals, Disp: 24 mL, Rfl: 0  •  Jardiance 25 MG TABS, Take 1 tablet by mouth every morning, Disp: , Rfl:   •  lisinopril (ZESTRIL) 5 mg tablet, Take 1 tablet by mouth daily, Disp: , Rfl:   •  metFORMIN (GLUCOPHAGE) 1000 MG tablet, Take 1 tablet (1,000 mg total) by mouth 2 (two) times a day with meals Twice daily Do not start before December 18, 2023., Disp: , Rfl:   •  omeprazole (PriLOSEC) 40 MG capsule, , Disp: , Rfl:   •  ondansetron (ZOFRAN) 8 mg tablet, Take 1 tablet (8 mg total) by mouth every 8 (eight) hours as needed for nausea or vomiting for up to  20 days, Disp: 20 tablet, Rfl: 2  •  rosuvastatin (CRESTOR) 10 MG tablet, Take 1 tablet by mouth daily, Disp: , Rfl:   •  Tresiba FlexTouch 100 units/mL injection pen, Inject 30 Units under the skin daily at bedtime, Disp: 27 mL, Rfl: 0  •  valACYclovir (VALTREX) 1,000 mg tablet, Take 1 tablet (1,000 mg total) by mouth 2 (two) times a day for 10 days, Disp: 20 tablet, Rfl: 3  •  zolpidem (AMBIEN) 10 mg tablet, Take 10 mg by mouth daily at bedtime as needed, Disp: , Rfl:   No current facility-administered medications for this visit.    Facility-Administered Medications Ordered in Other Visits:   •  barium (READI-CAT 2) suspension 900 mL, 900 mL, Oral, Once in imaging, Idris Calderon MD  No Known Allergies  Vitals:    02/29/24 1436   BP: 126/90   Pulse: 97   SpO2: 95%       Physical Exam  Constitutional: General appearance: The Patient is well-developed and well-nourished who appears the stated age in no acute distress. Patient is pleasant and talkative.     HEENT:  Normocephalic.  Sclerae are anicteric. Mucous membranes are moist. Neck is supple without adenopathy. No JVD.     Chest: The lungs are clear to auscultation.     Cardiac: Heart is regular rate.     Abdomen: Abdomen is soft, non-tender, non-distended and without masses.     Extremities: There is no clubbing or cyanosis. There is no edema.  Symmetric.  Neuro: Grossly nonfocal. Gait is normal.     Lymphatic: No evidence of cervical adenopathy bilaterally.   No evidence of axillary adenopathy bilaterally.   Skin: Warm, anicteric.    Psych:  Patient is pleasant and talkative.  Breasts:        Pathology:  [unfilled]    Labs:       Component  Ref Range & Units 2/20/24 10:30 AM 11/1/23 12:54 PM   CA 19-9  0 - 35 U/mL 583 High  932 High          Imaging  CT CHEST W CT ABDOMEN PELVIS W WO CONTRAST    Result Date: 2/26/2024  Narrative: CT CHEST, ABDOMEN AND PELVIS WITH AND WITHOUT IV CONTRAST INDICATION: C25.3: Malignant neoplasm of pancreatic duct E11.65: Type 2  diabetes mellitus with hyperglycemia Z79.4: Long term (current) use of insulin R10.11: Right upper quadrant pain. COMPARISON: 12/15/2023 TECHNIQUE: Initial CT of the abdomen and pelvis was performed without intravenous contrast. Subsequent dynamic CT evaluation of the chest, abdomen and pelvis was performed after the administration of intravenous contrast was performed. Finally, delayed dynamic delayed phase postcontrast CT evaluation of the abdomen and pelvis was performed. Multiplanar 2D reformatted images were created from the source data. This examination, like all CT scans performed in the [facility], was performed utilizing techniques to minimize radiation dose exposure, including the use of iterative reconstruction and automated exposure control. Radiation dose length product (DLP) for this visit: 1475 mGy-cm IV Contrast: 100 mL of iohexol (OMNIPAQUE) Enteric Contrast: Administered. FINDINGS: CHEST LUNGS: Lungs are clear. No tracheal or endobronchial lesion. PLEURA: Similar centrilobular emphysema. No the previously described pulmonary nodules has increased in size in the interval. No new pulmonary nodules. No pulmonary consolidation. No pleural effusion or pneumothorax. No suspicious airway filling defects. HEART/GREAT VESSELS: Heart is unremarkable for patient's age. No thoracic aortic aneurysm. Coronary artery calcifications. Central venous catheter tip in the superior vena cava. MEDIASTINUM AND ORA: Unremarkable. CHEST WALL AND LOWER NECK: Mild gynecomastia. Mediport catheter right chest wall. ABDOMEN RIGHT KIDNEY AND URETER: No solid renal mass or detectable urothelial mass. 3 benign cysts. No hydronephrosis or hydroureter. No urinary tract calculi. No perinephric collection. LEFT KIDNEY AND URETER: No solid renal mass or detectable urothelial mass. 2 benign cysts. No hydronephrosis or hydroureter. No urinary tract calculi. No perinephric collection. URINARY BLADDER: No bladder wall  mass. No calculi. LIVER/BILIARY TREE: No liver masses or intrahepatic biliary dilatation. Common bile duct stent remains in place. Similar associated small amount of pneumobilia. GALLBLADDER: No calcified gallstones. Small amount of inflammatory change inferior to the gallbladder.. Small gallbladder diverticulum. Gallbladder phrygian cap. Gallbladder is again distended. SPLEEN: Unremarkable. PANCREAS: Hypodense ill-defined mass involving the pancreatic head and body measures 6.4 x 2.7 cm in maximal axial dimensions. On previous exam this measured 6.3 x 2.4 cm. The mass again abuts the proximal portal vein just distal to the confluence resulting in progressive marked narrowing. Mass again encases the gastroduodenal artery. Small amount of inflammatory change about the pancreatic head, uncinate process and body has not significantly changed. Similar dilatation of the pancreatic duct in the tail. ADRENAL GLANDS: Unremarkable. STOMACH AND BOWEL: Sigmoid diverticulosis without diverticulitis. The stomach and bowel is otherwise unremarkable. APPENDIX: Normal appendix. ABDOMINOPELVIC CAVITY: No ascites. No pneumoperitoneum. No lymphadenopathy. VESSELS: No aortic aneurysm. Mild scattered aortic and iliac artery atherosclerotic calcifications. PELVIS REPRODUCTIVE ORGANS: Unremarkable for patient's age. ABDOMINAL WALL/INGUINAL REGIONS: Ventral hernia mesh in place. BONES: No acute fracture or suspicious osseous lesion. Mild hip osteoarthrosis bilaterally. Minimal lumbar spine convex left curvature.     Impression: Pancreatic neoplasm involving the head and body which although stable by RECIST criteria shows progressive marked focal portal vein narrowing. No metastatic disease to the chest, abdomen, or pelvis. Small amount of inflammatory change about the inferior aspect of the gallbladder. Correlate clinically to exclude cholecystitis. Similar small amount of inflammatory change about the pancreas. Secondary pancreatitis is  again a consideration. Correlate clinically. Unchanged biliary stent with small amount of pneumobilia. The study was marked in EPIC for significant notification. Workstation performed: [id number]     I personally reviewed and interpreted the above laboratory and imaging data.    Discussion/Summary: 58-year-old male with locally advanced pancreas cancer.  There is still no evidence of metastasis.  He has had a good biochemical response and the pancreatic mass is essentially stable.  Based on his current imaging he would likely require portal vein resection, and possible hepatic artery resection.  I have recommended that he continue his neoadjuvant chemotherapy for at least another 2 to 3 months.  I would then repeat his CT with a pancreas protocol, and at that time we can determine the need/extent of surgical resection as long as there are no metastasis.  We also discussed possible definitive chemoradiation based on his follow-up scan.  I will see him again in the next 2 to 3 months.  I will have medical oncology ordered the pancreas protocol CT once he has completed chemotherapy, and I will see him back.  He is agreeable to this plan.  All his questions were answered.

## 2024-02-29 NOTE — TELEPHONE ENCOUNTER
Spoke to pt regarding appt r/s - he asked if you would be comfortable prescribing him tylenol with codeine for his chemo pain - he asked for 6-8 pills a month when it gets too much for him.  Not interested in oxy, does not want to take them.  If you are not comfortable prescribing them, he will ask palliative care.

## 2024-03-04 ENCOUNTER — APPOINTMENT (OUTPATIENT)
Dept: LAB | Facility: CLINIC | Age: 59
End: 2024-03-04
Payer: COMMERCIAL

## 2024-03-04 DIAGNOSIS — R10.11 RIGHT UPPER QUADRANT PAIN: ICD-10-CM

## 2024-03-04 LAB
ALBUMIN SERPL BCP-MCNC: 4.1 G/DL (ref 3.5–5)
ALP SERPL-CCNC: 100 U/L (ref 34–104)
ALT SERPL W P-5'-P-CCNC: 10 U/L (ref 7–52)
ANION GAP SERPL CALCULATED.3IONS-SCNC: 7 MMOL/L
AST SERPL W P-5'-P-CCNC: 12 U/L (ref 13–39)
BASOPHILS # BLD AUTO: 0.07 THOUSANDS/ÂΜL (ref 0–0.1)
BASOPHILS NFR BLD AUTO: 1 % (ref 0–1)
BILIRUB SERPL-MCNC: 0.45 MG/DL (ref 0.2–1)
BUN SERPL-MCNC: 15 MG/DL (ref 5–25)
CALCIUM SERPL-MCNC: 9.6 MG/DL (ref 8.4–10.2)
CHLORIDE SERPL-SCNC: 101 MMOL/L (ref 96–108)
CO2 SERPL-SCNC: 31 MMOL/L (ref 21–32)
CREAT SERPL-MCNC: 0.83 MG/DL (ref 0.6–1.3)
EOSINOPHIL # BLD AUTO: 0.28 THOUSAND/ÂΜL (ref 0–0.61)
EOSINOPHIL NFR BLD AUTO: 2 % (ref 0–6)
ERYTHROCYTE [DISTWIDTH] IN BLOOD BY AUTOMATED COUNT: 15.5 % (ref 11.6–15.1)
GFR SERPL CREATININE-BSD FRML MDRD: 96 ML/MIN/1.73SQ M
GLUCOSE P FAST SERPL-MCNC: 56 MG/DL (ref 65–99)
HCT VFR BLD AUTO: 55.4 % (ref 36.5–49.3)
HGB BLD-MCNC: 18.2 G/DL (ref 12–17)
IMM GRANULOCYTES # BLD AUTO: 0.07 THOUSAND/UL (ref 0–0.2)
IMM GRANULOCYTES NFR BLD AUTO: 1 % (ref 0–2)
LYMPHOCYTES # BLD AUTO: 2.98 THOUSANDS/ÂΜL (ref 0.6–4.47)
LYMPHOCYTES NFR BLD AUTO: 26 % (ref 14–44)
MCH RBC QN AUTO: 33.8 PG (ref 26.8–34.3)
MCHC RBC AUTO-ENTMCNC: 32.9 G/DL (ref 31.4–37.4)
MCV RBC AUTO: 103 FL (ref 82–98)
MONOCYTES # BLD AUTO: 1.02 THOUSAND/ÂΜL (ref 0.17–1.22)
MONOCYTES NFR BLD AUTO: 9 % (ref 4–12)
NEUTROPHILS # BLD AUTO: 7.22 THOUSANDS/ÂΜL (ref 1.85–7.62)
NEUTS SEG NFR BLD AUTO: 61 % (ref 43–75)
NRBC BLD AUTO-RTO: 0 /100 WBCS
PLATELET # BLD AUTO: 268 THOUSANDS/UL (ref 149–390)
PMV BLD AUTO: 10.1 FL (ref 8.9–12.7)
POTASSIUM SERPL-SCNC: 4.2 MMOL/L (ref 3.5–5.3)
PROT SERPL-MCNC: 6.8 G/DL (ref 6.4–8.4)
RBC # BLD AUTO: 5.39 MILLION/UL (ref 3.88–5.62)
SODIUM SERPL-SCNC: 139 MMOL/L (ref 135–147)
WBC # BLD AUTO: 11.64 THOUSAND/UL (ref 4.31–10.16)

## 2024-03-04 PROCEDURE — 85025 COMPLETE CBC W/AUTO DIFF WBC: CPT

## 2024-03-04 PROCEDURE — 36415 COLL VENOUS BLD VENIPUNCTURE: CPT

## 2024-03-04 PROCEDURE — 80053 COMPREHEN METABOLIC PANEL: CPT

## 2024-03-05 DIAGNOSIS — E11.00 TYPE 2 DIABETES MELLITUS WITH HYPEROSMOLARITY WITHOUT COMA, WITHOUT LONG-TERM CURRENT USE OF INSULIN (HCC): ICD-10-CM

## 2024-03-05 RX ORDER — INSULIN DEGLUDEC INJECTION 100 U/ML
30 INJECTION, SOLUTION SUBCUTANEOUS
Qty: 27 ML | Refills: 0 | Status: SHIPPED | OUTPATIENT
Start: 2024-03-05 | End: 2024-06-03

## 2024-03-05 NOTE — TELEPHONE ENCOUNTER
Spoke to patient he is requesting a refill of his tresiba as he has been going through more due to his chemo treatments. He has been using 45 to 50 to try and counter act the insulin.    Geovanny report has been uploaded and attached to the encounter for provider to review.

## 2024-03-06 ENCOUNTER — HOSPITAL ENCOUNTER (OUTPATIENT)
Dept: INFUSION CENTER | Facility: CLINIC | Age: 59
Discharge: HOME | End: 2024-03-06
Payer: COMMERCIAL

## 2024-03-06 VITALS
RESPIRATION RATE: 18 BRPM | TEMPERATURE: 97.2 F | BODY MASS INDEX: 24.35 KG/M2 | SYSTOLIC BLOOD PRESSURE: 126 MMHG | HEART RATE: 70 BPM | DIASTOLIC BLOOD PRESSURE: 75 MMHG | WEIGHT: 164.4 LBS | HEIGHT: 69 IN

## 2024-03-06 DIAGNOSIS — R10.11 RIGHT UPPER QUADRANT PAIN: Primary | ICD-10-CM

## 2024-03-06 RX ORDER — ATROPINE SULFATE 1 MG/ML
0.25 INJECTION, SOLUTION INTRAVENOUS ONCE
Status: DISCONTINUED | OUTPATIENT
Start: 2024-03-06 | End: 2024-03-09 | Stop reason: HOSPADM

## 2024-03-06 RX ORDER — ATROPINE SULFATE 1 MG/ML
0.25 INJECTION, SOLUTION INTRAVENOUS ONCE AS NEEDED
Status: DISCONTINUED | OUTPATIENT
Start: 2024-03-06 | End: 2024-03-09 | Stop reason: HOSPADM

## 2024-03-06 RX ORDER — SODIUM CHLORIDE 9 MG/ML
20 INJECTION, SOLUTION INTRAVENOUS ONCE AS NEEDED
Status: DISCONTINUED | OUTPATIENT
Start: 2024-03-06 | End: 2024-03-09 | Stop reason: HOSPADM

## 2024-03-06 RX ORDER — DEXTROSE MONOHYDRATE 50 MG/ML
20 INJECTION, SOLUTION INTRAVENOUS ONCE
Status: COMPLETED | OUTPATIENT
Start: 2024-03-06 | End: 2024-03-06

## 2024-03-06 RX ADMIN — IRINOTECAN HYDROCHLORIDE 238 MG: 20 INJECTION, SOLUTION INTRAVENOUS at 15:34

## 2024-03-06 RX ADMIN — DIPHENHYDRAMINE HYDROCHLORIDE 25 MG: 50 INJECTION, SOLUTION INTRAMUSCULAR; INTRAVENOUS at 14:19

## 2024-03-06 RX ADMIN — DEXAMETHASONE SODIUM PHOSPHATE: 10 INJECTION, SOLUTION INTRAMUSCULAR; INTRAVENOUS at 13:55

## 2024-03-06 RX ADMIN — FAMOTIDINE 20 MG: 10 INJECTION, SOLUTION INTRAVENOUS at 14:41

## 2024-03-06 RX ADMIN — DEXTROSE 20 ML/HR: 5 SOLUTION INTRAVENOUS at 13:56

## 2024-03-06 NOTE — PROGRESS NOTES
Pt to clinic for Irinotecan and 5FU Pump. Pt offers no complaints today. Tolerated infusion without complications. Chemo ball connected to pt port with all clamps open to run. Aware of next appointment on 3/8/24 at 1530. AVS declined.

## 2024-03-07 ENCOUNTER — OFFICE VISIT (OUTPATIENT)
Dept: HEMATOLOGY ONCOLOGY | Facility: CLINIC | Age: 59
End: 2024-03-07
Payer: COMMERCIAL

## 2024-03-07 VITALS
TEMPERATURE: 98 F | SYSTOLIC BLOOD PRESSURE: 134 MMHG | OXYGEN SATURATION: 99 % | BODY MASS INDEX: 24.62 KG/M2 | DIASTOLIC BLOOD PRESSURE: 76 MMHG | RESPIRATION RATE: 18 BRPM | HEIGHT: 69 IN | HEART RATE: 98 BPM | WEIGHT: 166.2 LBS

## 2024-03-07 DIAGNOSIS — C25.9 PANCREATIC ADENOCARCINOMA (HCC): Primary | ICD-10-CM

## 2024-03-07 PROCEDURE — 99213 OFFICE O/P EST LOW 20 MIN: CPT | Performed by: INTERNAL MEDICINE

## 2024-03-07 NOTE — PROGRESS NOTES
Hematology/Oncology Outpatient Follow- up Note  Derick Richey 58 y.o. male MRN: @ Encounter: [id number]        Date:  3/7/2024        Assessment / Plan:    1 pancreatic cancer T2 N0 M0  2 continuing chemotherapy  3 he has polycythemia and will need to be watched.  Plan patient will continue and finish his chemotherapeutic intervention with 12 cycles of treatment.  He is getting FOLFIRINOX.  He is not receiving oxaliplatin at this time.  He will come back in 4 weeks to be rechecked.  Hopefully will continue to tolerate treatment.  He will get a CEA 19-9 prior to his return.        HPI: 58-year-old gentleman comes in for follow-up.  He is doing reasonably well.  He has some neuropathy that is when more stable off oxaliplatin.  His CA 19-9 level was in the 500s.  He will get that repeated in 4 weeks.  He continues on his treatments.  Prognosis remains guarded.  Interestingly he has an elevation in hematocrit etiology not totally clear being watched.      Interval History: Note from Dr. Cervantes of surgical oncology 2/29/2024   58-year-old male with locally advanced pancreas cancer.  There is still no evidence of metastasis.  He has had a good biochemical response and the pancreatic mass is essentially stable.  Based on his current imaging he would likely require portal vein resection, and possible hepatic artery resection.  I have recommended that he continue his neoadjuvant chemotherapy for at least another 2 to 3 months.  I would then repeat his CT with a pancreas protocol, and at that time we can determine the need/extent of surgical resection as long as there are no metastasis.  We also discussed possible definitive chemoradiation based on his follow-up scan.  I will see him again in the next 2 to 3 months.  I will have medical oncology ordered the pancreas protocol CT once he has completed chemotherapy, and I will see him back.  He is agreeable to this plan.  All his questions were answered.   Note from 1/26/2024  "medical oncology Dr. Calderon: 58-year-old gentleman with pancreatic cancer completing cycle 5 of FOLFIRINOX therapy.  He has not had oxaliplatin since cycle #3.  He developed significant problems with neuropathy and in top of which she also developed anaphylactic-like reaction.  He is unwilling to consider going ahead with the treatment with the oxaliplatin will continue to rest.  He has had intermittent scans for other reasons last at the mid the December.  There was no major changes in the abdominal cavity.  He otherwise is doing fairly well tolerating the diarrhea fairly well.  He will continue his therapy.  We will plan to complete cycle 6 and cycle 7.  He will get a CAT scan done on 2/26/2024.  He will get a CEA 19-9 as well.  He has an appointment with surgical oncology on 2/29/2024 we will see him the following week as well.     Interval History:  Derick Richey is a 57yo male who presents for the evaluation and management of newly diagnosed pancreatic cancer. The patient presented to the ED on 10/23/23 with new onset jaundice. He reported ayse colored stools and left sided discomfort. CT A/P showed ill-defined hypoattenuating mass within the neck of the pancreas with subsequent dilatation and obstruction of the biliary tree and pancreatic duct. Highly likely to represent pancreatic neoplasm.Partially encasing the main portal vein at the confluence with splenic vein and SMV with approximately 50% narrowing of the caliber at this level and dilatation of the SMV. No evidence for distant metastatic disease was evident. ERCP was performed 10/30/23. The common bile duct was deeply cannulated after 1 attempt using a traction sphincterotome with 260 cm x 0.035\" guidewire. Extrinsic and severe stricture was visualized in the mid common bile duct. The stricture was traversable by wire. Dilation was observed in the proximal common bile duct  Medium-sized biliary sphincterotomy was performed using a sphincterotome. One 8 " mm x 6 cm fully covered metal stent was placed in the common bile duct.   EUS was also performed. The pancreas appeared atrophic. The pancreatic parenchyma was visualized in the body of the pancreas and tail of the pancreas. The parenchyma had hyperechoic foci. The pancreatic duct measured 3 mm at the tail. The duct in the body and tail of the pancreas was dilated.  Irregular mass measuring 32 mm x 24 mm with well-defined margins was visualized in the neck of the pancreas. Apparent abutment of the portal vein and splenic vein; 4 successful fine needle biopsy passes were taken with a 25 gauge needle using a transduodenal approach guided by Doppler, sample found to be adequate and sent sample for histology analysis. Small lymph nodes were seen in the area adjacent to the tumor. The proximal bile duct was dilated. The mid bile duct was strictured. The bile duct measured 16 mm at the distal end and 4 mm at the proximal end. The parenchyma of the liver appeared normal. The hepatic ducts appeared normal.The gallbladder appeared distended. The gallbladder contained biliary sludge. Normal celiac axis. No lymphadenopathy was seen.  Pathology was c/w malignant adenocarcinoma.     Cancer Staging:  Cancer Staging   Primary adenocarcinoma of pancreatic duct (HCC)  Staging form: Pancreas, AJCC 8th Edition  - Clinical stage from 10/30/2023: Stage IB (cT2, cN0, cM0) - Signed by Malgorzata Madden MD on 2/19/2024  Stage prefix: Initial diagnosis  Total positive nodes: 0    Right upper quadrant pain  Staging form: Pancreas, AJCC 8th Edition  - Clinical: Stage IB (cT2, cN0, cM0) - Signed by Samir Cervantes MD on 11/16/2023  Total positive nodes: 0      Molecular Testing:     Previous Hematologic/ Oncologic History:    Oncology History Overview Note   with clinical stage T2N0 pancreatic adenocarcinoma involving the pancreatic neck.  He has completed 6 cycles modified FOLFIRINOX with dose reductions required.  He presents today for follow up  after CT imaging.      2/26/24 CT C/A/P         2/29/24 Dr. Cervantes          3/7/24 Dr. Calderon     Right upper quadrant pain   10/30/2023 Biopsy    Endoscopic ultrasound:  Pancreas, Neck mass:  Malignant  Adenocarcinoma.        11/16/2023 -  Cancer Staged    Staging form: Pancreas, AJCC 8th Edition  - Clinical: Stage IB (cT2, cN0, cM0) - Signed by Samir Cervantes MD on 11/16/2023  Total positive nodes: 0       11/28/2023 -  Chemotherapy    alteplase (CATHFLO), 2 mg, Intracatheter, Every 1 Minute as needed, 8 of 12 cycles  irinotecan (CAMPTOSAR) chemo infusion, 150 mg/m2 = 278 mg, Intravenous, Once, 8 of 12 cycles  Dose modification: 125 mg/m2 (100 % of original dose 150 mg/m2, Cycle 9, Reason: Dose Not Tolerated), 125.1 mg/m2 (83.4 % of original dose 125 mg/m2, Cycle 2, Reason: Dose Not Tolerated), 125 mg/m2 (100 % of original dose 125 mg/m2, Cycle 2, Reason: Dose Not Tolerated)  Administration: 278 mg (11/28/2023), 231 mg (12/12/2023), 231 mg (12/27/2023), 231 mg (1/10/2024), 231 mg (1/24/2024), 231 mg (2/7/2024), 231 mg (2/21/2024), 238 mg (3/6/2024)  oxaliplatin (ELOXATIN) chemo infusion, 65 mg/m2 = 120.25 mg, Intravenous, Once, 3 of 3 cycles  Administration: 120.25 mg (11/28/2023), 120.25 mg (12/12/2023), 120.25 mg (12/27/2023)  fluorouracil (ADRUCIL) ambulatory infusion Soln, 1,200 mg/m2/day = 4,440 mg, Intravenous, Over 46 hours, 8 of 12 cycles     Primary adenocarcinoma of pancreatic duct (HCC)   10/30/2023 Initial Diagnosis    Primary adenocarcinoma of pancreatic duct (HCC)     10/30/2023 Biopsy    Final Diagnosis   A.B. Pancreas, Neck mass (Cell Block and smear Preparations):  Malignant  Adenocarcinoma.     Satisfactory for evaluation.        10/30/2023 -  Cancer Staged    Staging form: Pancreas, AJCC 8th Edition  - Clinical stage from 10/30/2023: Stage IB (cT2, cN0, cM0) - Signed by Malgorzata Madden MD on 2/19/2024  Stage prefix: Initial diagnosis  Total positive nodes: 0           Current Hematologic/ Oncologic  Treatment:       Cycle 1         Test Results:    Imaging: CT CHEST W CT ABDOMEN PELVIS W WO CONTRAST    Result Date: 2/26/2024  Narrative: CT CHEST, ABDOMEN AND PELVIS WITH AND WITHOUT IV CONTRAST INDICATION: C25.3: Malignant neoplasm of pancreatic duct E11.65: Type 2 diabetes mellitus with hyperglycemia Z79.4: Long term (current) use of insulin R10.11: Right upper quadrant pain. COMPARISON: 12/15/2023 TECHNIQUE: Initial CT of the abdomen and pelvis was performed without intravenous contrast. Subsequent dynamic CT evaluation of the chest, abdomen and pelvis was performed after the administration of intravenous contrast was performed. Finally, delayed dynamic delayed phase postcontrast CT evaluation of the abdomen and pelvis was performed. Multiplanar 2D reformatted images were created from the source data. This examination, like all CT scans performed in the [facility], was performed utilizing techniques to minimize radiation dose exposure, including the use of iterative reconstruction and automated exposure control. Radiation dose length product (DLP) for this visit: 1475 mGy-cm IV Contrast: 100 mL of iohexol (OMNIPAQUE) Enteric Contrast: Administered. FINDINGS: CHEST LUNGS: Lungs are clear. No tracheal or endobronchial lesion. PLEURA: Similar centrilobular emphysema. No the previously described pulmonary nodules has increased in size in the interval. No new pulmonary nodules. No pulmonary consolidation. No pleural effusion or pneumothorax. No suspicious airway filling defects. HEART/GREAT VESSELS: Heart is unremarkable for patient's age. No thoracic aortic aneurysm. Coronary artery calcifications. Central venous catheter tip in the superior vena cava. MEDIASTINUM AND ORA: Unremarkable. CHEST WALL AND LOWER NECK: Mild gynecomastia. Mediport catheter right chest wall. ABDOMEN RIGHT KIDNEY AND URETER: No solid renal mass or detectable urothelial mass. 3 benign cysts. No hydronephrosis or  hydroureter. No urinary tract calculi. No perinephric collection. LEFT KIDNEY AND URETER: No solid renal mass or detectable urothelial mass. 2 benign cysts. No hydronephrosis or hydroureter. No urinary tract calculi. No perinephric collection. URINARY BLADDER: No bladder wall mass. No calculi. LIVER/BILIARY TREE: No liver masses or intrahepatic biliary dilatation. Common bile duct stent remains in place. Similar associated small amount of pneumobilia. GALLBLADDER: No calcified gallstones. Small amount of inflammatory change inferior to the gallbladder.. Small gallbladder diverticulum. Gallbladder phrygian cap. Gallbladder is again distended. SPLEEN: Unremarkable. PANCREAS: Hypodense ill-defined mass involving the pancreatic head and body measures 6.4 x 2.7 cm in maximal axial dimensions. On previous exam this measured 6.3 x 2.4 cm. The mass again abuts the proximal portal vein just distal to the confluence resulting in progressive marked narrowing. Mass again encases the gastroduodenal artery. Small amount of inflammatory change about the pancreatic head, uncinate process and body has not significantly changed. Similar dilatation of the pancreatic duct in the tail. ADRENAL GLANDS: Unremarkable. STOMACH AND BOWEL: Sigmoid diverticulosis without diverticulitis. The stomach and bowel is otherwise unremarkable. APPENDIX: Normal appendix. ABDOMINOPELVIC CAVITY: No ascites. No pneumoperitoneum. No lymphadenopathy. VESSELS: No aortic aneurysm. Mild scattered aortic and iliac artery atherosclerotic calcifications. PELVIS REPRODUCTIVE ORGANS: Unremarkable for patient's age. ABDOMINAL WALL/INGUINAL REGIONS: Ventral hernia mesh in place. BONES: No acute fracture or suspicious osseous lesion. Mild hip osteoarthrosis bilaterally. Minimal lumbar spine convex left curvature.     Impression: Pancreatic neoplasm involving the head and body which although stable by RECIST criteria shows progressive marked focal portal vein narrowing.  "No metastatic disease to the chest, abdomen, or pelvis. Small amount of inflammatory change about the inferior aspect of the gallbladder. Correlate clinically to exclude cholecystitis. Similar small amount of inflammatory change about the pancreas. Secondary pancreatitis is again a consideration. Correlate clinically. Unchanged biliary stent with small amount of pneumobilia. The study was marked in EPIC for significant notification. Workstation performed: [id number]             Labs:   Lab Results   Component Value Date    WBC 11.64 (H) 03/04/2024    HGB 18.2 (H) 03/04/2024    HCT 55.4 (H) 03/04/2024     (H) 03/04/2024     03/04/2024     Lab Results   Component Value Date    K 4.2 03/04/2024     03/04/2024    CO2 31 03/04/2024    BUN 15 03/04/2024    CREATININE 0.83 03/04/2024    GLUF 56 (L) 03/04/2024    CALCIUM 9.6 03/04/2024    AST 12 (L) 03/04/2024    ALT 10 03/04/2024    ALKPHOS 100 03/04/2024    EGFR 96 03/04/2024         No results found for: \"SPEP\", \"UPEP\"    No results found for: \"PSA\"    No results found for: \"CEA\"    No results found for: \"\"    No results found for: \"AFP\"    No results found for: \"IRON\", \"TIBC\", \"FERRITIN\"    No results found for: \"[id number]\"      ROS: Review of Systems   Constitutional: Negative.    HENT: Negative.     Eyes: Negative.    Respiratory: Negative.     Cardiovascular: Negative.    Gastrointestinal: Negative.    Endocrine: Negative.    Genitourinary: Negative.    Musculoskeletal: Negative.    Skin: Negative.    Allergic/Immunologic: Negative.    Neurological: Negative.    Hematological: Negative.          Current Medications: Reviewed  Allergies: Reviewed  PMH/FH/SH:  Reviewed      Physical Exam:    Body surface area is 1.91 meters squared.    Wt Readings from Last 3 Encounters:   03/07/24 75.4 kg (166 lb 3.2 oz)   03/06/24 74.6 kg (164 lb 6.4 oz)   02/29/24 73.5 kg (162 lb)        Temp Readings from Last 3 Encounters:   03/07/24 98 °F (36.7 °C) " (Temporal)   03/06/24 (!) 97.2 °F (36.2 °C) (Temporal)   02/21/24 97.8 °F (36.6 °C) (Temporal)        BP Readings from Last 3 Encounters:   03/07/24 134/76   03/06/24 126/75   02/29/24 126/90         Pulse Readings from Last 3 Encounters:   03/07/24 98   03/06/24 70   02/29/24 97     @LASTSAO2(3)@      Physical Exam  Constitutional:       Appearance: Normal appearance. He is normal weight.   HENT:      Head: Normocephalic and atraumatic.   Eyes:      Extraocular Movements: Extraocular movements intact.      Conjunctiva/sclera: Conjunctivae normal.      Pupils: Pupils are equal, round, and reactive to light.   Cardiovascular:      Rate and Rhythm: Normal rate and regular rhythm.      Heart sounds: Normal heart sounds.   Pulmonary:      Effort: Pulmonary effort is normal.      Breath sounds: Normal breath sounds.   Abdominal:      General: Abdomen is flat. Bowel sounds are normal.      Palpations: Abdomen is soft.   Musculoskeletal:         General: Normal range of motion.      Cervical back: Normal range of motion and neck supple.   Skin:     General: Skin is warm and dry.   Neurological:      General: No focal deficit present.      Mental Status: He is alert and oriented to person, place, and time. Mental status is at baseline.     He has minimal tenderness right upper quadrant no rebound tenderness    Goals and Barriers:  Current Goal: Prolong Survival from Cancer.   Barriers: None.      Patient's Capacity to Self Care:  Patient is able to self care.    Code Status: [unfilled]  Advance Directive and Living Will:      Power of :

## 2024-03-08 ENCOUNTER — HOSPITAL ENCOUNTER (OUTPATIENT)
Dept: INFUSION CENTER | Facility: CLINIC | Age: 59
Discharge: HOME | End: 2024-03-08

## 2024-03-08 DIAGNOSIS — R10.11 RIGHT UPPER QUADRANT PAIN: Primary | ICD-10-CM

## 2024-03-08 NOTE — PROGRESS NOTES
Patient reports to  infusion center for elastomeric pump disconnect after 46 hour of running, pump appears empty and deflated. Patient offers complaints of some mouth sores, still good PO intake per pt. Port flushed after disconnect, positive blood return noted, port deaccessed and band-aid placed on site. Patient aware of next appointment on 3/19/24 at 1130, AVS declined.

## 2024-03-11 DIAGNOSIS — R10.11 RIGHT UPPER QUADRANT PAIN: Primary | ICD-10-CM

## 2024-03-12 RX ORDER — SODIUM CHLORIDE 9 MG/ML
20 INJECTION, SOLUTION INTRAVENOUS ONCE AS NEEDED
Status: CANCELLED | OUTPATIENT
Start: 2024-03-19

## 2024-03-12 RX ORDER — DEXTROSE MONOHYDRATE 50 MG/ML
20 INJECTION, SOLUTION INTRAVENOUS ONCE
Status: CANCELLED | OUTPATIENT
Start: 2024-03-19

## 2024-03-12 RX ORDER — ATROPINE SULFATE 1 MG/ML
0.25 INJECTION, SOLUTION INTRAVENOUS ONCE AS NEEDED
Status: CANCELLED | OUTPATIENT
Start: 2024-03-19

## 2024-03-12 RX ORDER — ATROPINE SULFATE 1 MG/ML
0.25 INJECTION, SOLUTION INTRAVENOUS ONCE
Status: CANCELLED | OUTPATIENT
Start: 2024-03-19

## 2024-03-13 ENCOUNTER — TELEPHONE (OUTPATIENT)
Dept: FAMILY MEDICINE CLINIC | Facility: CLINIC | Age: 59
End: 2024-03-13

## 2024-03-13 DIAGNOSIS — E11.00 TYPE 2 DIABETES MELLITUS WITH HYPEROSMOLARITY WITHOUT COMA, WITHOUT LONG-TERM CURRENT USE OF INSULIN (HCC): ICD-10-CM

## 2024-03-13 RX ORDER — EMPAGLIFLOZIN 25 MG/1
25 TABLET, FILM COATED ORAL EVERY MORNING
Qty: 90 TABLET | Refills: 3 | Status: SHIPPED | OUTPATIENT
Start: 2024-03-13

## 2024-03-13 NOTE — TELEPHONE ENCOUNTER
Pt has requested a refill of his metFORMIN (GLUCOPHAGE) 1000 MG tablet  and his jardiance 25 mg (1 tablet QD) - pt almost out of medications, on active med list - rx'd by former providers.     Also pt will call his health insurance plan to clarify if  he needs a 90 day supply d/t cheaper copays. Pt will call the office to notify (for further fills) pt recently switched his health insurance carrier.         Pt uses [facility] in [place]       Please advise

## 2024-03-18 ENCOUNTER — APPOINTMENT (OUTPATIENT)
Dept: LAB | Facility: CLINIC | Age: 59
End: 2024-03-18
Payer: COMMERCIAL

## 2024-03-18 DIAGNOSIS — R10.11 RIGHT UPPER QUADRANT PAIN: ICD-10-CM

## 2024-03-18 DIAGNOSIS — C25.9 PANCREATIC ADENOCARCINOMA (HCC): Primary | ICD-10-CM

## 2024-03-18 LAB
ALBUMIN SERPL BCP-MCNC: 3.9 G/DL (ref 3.5–5)
ALP SERPL-CCNC: 96 U/L (ref 34–104)
ALT SERPL W P-5'-P-CCNC: 13 U/L (ref 7–52)
ANION GAP SERPL CALCULATED.3IONS-SCNC: 8 MMOL/L (ref 4–13)
AST SERPL W P-5'-P-CCNC: 12 U/L (ref 13–39)
BASOPHILS # BLD AUTO: 0.07 THOUSANDS/ÂΜL (ref 0–0.1)
BASOPHILS NFR BLD AUTO: 1 % (ref 0–1)
BILIRUB SERPL-MCNC: 0.74 MG/DL (ref 0.2–1)
BUN SERPL-MCNC: 15 MG/DL (ref 5–25)
CALCIUM SERPL-MCNC: 9.3 MG/DL (ref 8.4–10.2)
CHLORIDE SERPL-SCNC: 100 MMOL/L (ref 96–108)
CO2 SERPL-SCNC: 28 MMOL/L (ref 21–32)
CREAT SERPL-MCNC: 1 MG/DL (ref 0.6–1.3)
EOSINOPHIL # BLD AUTO: 0.24 THOUSAND/ÂΜL (ref 0–0.61)
EOSINOPHIL NFR BLD AUTO: 2 % (ref 0–6)
ERYTHROCYTE [DISTWIDTH] IN BLOOD BY AUTOMATED COUNT: 15.3 % (ref 11.6–15.1)
GFR SERPL CREATININE-BSD FRML MDRD: 82 ML/MIN/1.73SQ M
GLUCOSE SERPL-MCNC: 357 MG/DL (ref 65–140)
HCT VFR BLD AUTO: 51.5 % (ref 36.5–49.3)
HGB BLD-MCNC: 17.2 G/DL (ref 12–17)
IMM GRANULOCYTES # BLD AUTO: 0.04 THOUSAND/UL (ref 0–0.2)
IMM GRANULOCYTES NFR BLD AUTO: 0 % (ref 0–2)
LYMPHOCYTES # BLD AUTO: 2.11 THOUSANDS/ÂΜL (ref 0.6–4.47)
LYMPHOCYTES NFR BLD AUTO: 20 % (ref 14–44)
MCH RBC QN AUTO: 34 PG (ref 26.8–34.3)
MCHC RBC AUTO-ENTMCNC: 33.4 G/DL (ref 31.4–37.4)
MCV RBC AUTO: 102 FL (ref 82–98)
MONOCYTES # BLD AUTO: 0.94 THOUSAND/ÂΜL (ref 0.17–1.22)
MONOCYTES NFR BLD AUTO: 9 % (ref 4–12)
NEUTROPHILS # BLD AUTO: 6.92 THOUSANDS/ÂΜL (ref 1.85–7.62)
NEUTS SEG NFR BLD AUTO: 68 % (ref 43–75)
NRBC BLD AUTO-RTO: 0 /100 WBCS
PLATELET # BLD AUTO: 173 THOUSANDS/UL (ref 149–390)
PMV BLD AUTO: 9.8 FL (ref 8.9–12.7)
POTASSIUM SERPL-SCNC: 4.5 MMOL/L (ref 3.5–5.3)
PROT SERPL-MCNC: 6.5 G/DL (ref 6.4–8.4)
RBC # BLD AUTO: 5.06 MILLION/UL (ref 3.88–5.62)
SODIUM SERPL-SCNC: 136 MMOL/L (ref 135–147)
WBC # BLD AUTO: 10.32 THOUSAND/UL (ref 4.31–10.16)

## 2024-03-18 PROCEDURE — 80053 COMPREHEN METABOLIC PANEL: CPT

## 2024-03-18 PROCEDURE — 85025 COMPLETE CBC W/AUTO DIFF WBC: CPT

## 2024-03-18 PROCEDURE — 36415 COLL VENOUS BLD VENIPUNCTURE: CPT

## 2024-03-19 ENCOUNTER — HOSPITAL ENCOUNTER (OUTPATIENT)
Dept: INFUSION CENTER | Facility: CLINIC | Age: 59
Discharge: HOME | End: 2024-03-19
Payer: COMMERCIAL

## 2024-03-19 VITALS — WEIGHT: 164.2 LBS | HEIGHT: 69 IN | BODY MASS INDEX: 24.32 KG/M2

## 2024-03-19 DIAGNOSIS — R10.11 RIGHT UPPER QUADRANT PAIN: Primary | ICD-10-CM

## 2024-03-19 PROCEDURE — G0498 CHEMO EXTEND IV INFUS W/PUMP: HCPCS

## 2024-03-19 PROCEDURE — 96413 CHEMO IV INFUSION 1 HR: CPT

## 2024-03-19 PROCEDURE — 96367 TX/PROPH/DG ADDL SEQ IV INF: CPT

## 2024-03-19 PROCEDURE — 96375 TX/PRO/DX INJ NEW DRUG ADDON: CPT

## 2024-03-19 RX ORDER — ATROPINE SULFATE 1 MG/ML
0.25 INJECTION, SOLUTION INTRAVENOUS ONCE AS NEEDED
Status: DISCONTINUED | OUTPATIENT
Start: 2024-03-19 | End: 2024-03-22 | Stop reason: HOSPADM

## 2024-03-19 RX ORDER — DEXTROSE MONOHYDRATE 50 MG/ML
20 INJECTION, SOLUTION INTRAVENOUS ONCE
Status: DISCONTINUED | OUTPATIENT
Start: 2024-03-19 | End: 2024-03-22 | Stop reason: HOSPADM

## 2024-03-19 RX ORDER — SODIUM CHLORIDE 9 MG/ML
20 INJECTION, SOLUTION INTRAVENOUS ONCE AS NEEDED
Status: DISCONTINUED | OUTPATIENT
Start: 2024-03-19 | End: 2024-03-22 | Stop reason: HOSPADM

## 2024-03-19 RX ORDER — ATROPINE SULFATE 1 MG/ML
0.25 INJECTION, SOLUTION INTRAVENOUS ONCE
Status: COMPLETED | OUTPATIENT
Start: 2024-03-19 | End: 2024-03-19

## 2024-03-19 RX ADMIN — FAMOTIDINE 20 MG: 10 INJECTION, SOLUTION INTRAVENOUS at 13:27

## 2024-03-19 RX ADMIN — SODIUM CHLORIDE 20 ML/HR: 0.9 INJECTION, SOLUTION INTRAVENOUS at 12:24

## 2024-03-19 RX ADMIN — DEXAMETHASONE SODIUM PHOSPHATE: 10 INJECTION, SOLUTION INTRAMUSCULAR; INTRAVENOUS at 12:24

## 2024-03-19 RX ADMIN — IRINOTECAN HYDROCHLORIDE 239 MG: 20 INJECTION, SOLUTION INTRAVENOUS at 14:09

## 2024-03-19 RX ADMIN — ATROPINE SULFATE 0.25 MG: 1 INJECTION, SOLUTION INTRAVENOUS at 14:04

## 2024-03-19 RX ADMIN — DIPHENHYDRAMINE HYDROCHLORIDE 25 MG: 50 INJECTION, SOLUTION INTRAMUSCULAR; INTRAVENOUS at 12:59

## 2024-03-19 NOTE — PROGRESS NOTES
Pt presents for Irinotecan and 5FU pump reporting mouth sores, improved with medicated mouth wash and fatigue. Confirmed with Liliya MARTINEZ RN, pt only to receive Irinotecan and 5FU pump as ordered. Pt tolerated treatment without incident. Home infusion pump connected, sensor taped to abdomen and clamps open. AVS declined. Next appointment 3/21 1400.

## 2024-03-21 ENCOUNTER — HOSPITAL ENCOUNTER (OUTPATIENT)
Dept: INFUSION CENTER | Facility: CLINIC | Age: 59
Discharge: HOME | End: 2024-03-21

## 2024-03-21 DIAGNOSIS — R10.11 RIGHT UPPER QUADRANT PAIN: Primary | ICD-10-CM

## 2024-03-21 NOTE — PROGRESS NOTES
Patient arrives to infusion center for 5FU pump disconnect. Patient offers no complaints today. Pump appears to be deflated. Patient disconnected from CADD pump, port flushed, port de-accessed. AVS declined.     Next appointment: 4/3/24 @ 0900

## 2024-03-27 DIAGNOSIS — C25.9 PANCREATIC ADENOCARCINOMA (HCC): ICD-10-CM

## 2024-03-27 DIAGNOSIS — R10.11 RIGHT UPPER QUADRANT PAIN: Primary | ICD-10-CM

## 2024-03-27 RX ORDER — DEXTROSE MONOHYDRATE 50 MG/ML
20 INJECTION, SOLUTION INTRAVENOUS ONCE
OUTPATIENT
Start: 2024-04-03

## 2024-03-27 RX ORDER — ATROPINE SULFATE 1 MG/ML
0.25 INJECTION, SOLUTION INTRAVENOUS ONCE
OUTPATIENT
Start: 2024-04-03

## 2024-03-27 RX ORDER — ATROPINE SULFATE 1 MG/ML
0.25 INJECTION, SOLUTION INTRAVENOUS ONCE AS NEEDED
OUTPATIENT
Start: 2024-04-03

## 2024-03-27 RX ORDER — SODIUM CHLORIDE 9 MG/ML
20 INJECTION, SOLUTION INTRAVENOUS ONCE AS NEEDED
OUTPATIENT
Start: 2024-04-03

## 2024-03-28 ENCOUNTER — TELEPHONE (OUTPATIENT)
Dept: FAMILY MEDICINE CLINIC | Facility: CLINIC | Age: 59
End: 2024-03-28

## 2024-03-28 NOTE — TELEPHONE ENCOUNTER
- please review and advise     Spoke w pt - pt is feeling very exhausted to drive d/t his ongoing onco treatments and would appreciate to change his appt to a virtual     Ok to change?    Thank You!       
Appt changed to Virtual per pts request and  approval.   
Notified pt.  
Pt lef a vm earlier :   Hi, this is Derick Richey. I have an appointment with Doctor Modi tomorrow. I was wondering if I could get that switched to a video conference call. My birthday is [id number]. You can reach me at [phone]. That's [phone]. Thank you.    
LACERATION

## 2024-03-29 ENCOUNTER — TELEMEDICINE (OUTPATIENT)
Dept: FAMILY MEDICINE CLINIC | Facility: CLINIC | Age: 59
End: 2024-03-29
Payer: COMMERCIAL

## 2024-03-29 VITALS — HEIGHT: 69 IN | WEIGHT: 157 LBS | BODY MASS INDEX: 23.25 KG/M2

## 2024-03-29 DIAGNOSIS — E11.65 TYPE 2 DIABETES MELLITUS WITH HYPERGLYCEMIA, WITH LONG-TERM CURRENT USE OF INSULIN (HCC): Primary | ICD-10-CM

## 2024-03-29 DIAGNOSIS — C25.3 PRIMARY ADENOCARCINOMA OF PANCREATIC DUCT (HCC): ICD-10-CM

## 2024-03-29 DIAGNOSIS — G89.3 CANCER-RELATED PAIN: ICD-10-CM

## 2024-03-29 DIAGNOSIS — Z79.4 TYPE 2 DIABETES MELLITUS WITH HYPERGLYCEMIA, WITH LONG-TERM CURRENT USE OF INSULIN (HCC): Primary | ICD-10-CM

## 2024-03-29 PROCEDURE — 99214 OFFICE O/P EST MOD 30 MIN: CPT | Performed by: STUDENT IN AN ORGANIZED HEALTH CARE EDUCATION/TRAINING PROGRAM

## 2024-03-29 RX ORDER — ACETAMINOPHEN AND CODEINE PHOSPHATE 300; 30 MG/1; MG/1
1 TABLET ORAL DAILY PRN
Qty: 30 TABLET | Refills: 0 | Status: SHIPPED | OUTPATIENT
Start: 2024-03-29

## 2024-03-29 NOTE — PROGRESS NOTES
Virtual Regular Visit    Verification of patient location:    Patient is located at Home in the following state in which I hold an active license [place]      Assessment/Plan:    Problem List Items Addressed This Visit        Digestive    Primary adenocarcinoma of pancreatic duct (HCC)    Relevant Medications    acetaminophen-codeine (TYLENOL with CODEINE #3) 300-30 MG per tablet       Endocrine    Type 2 diabetes mellitus with hyperglycemia, with long-term current use of insulin (HCC) - Primary   Other Visit Diagnoses     Cancer-related pain        Relevant Medications    acetaminophen-codeine (TYLENOL with CODEINE #3) 300-30 MG per tablet               Reason for visit is   Chief Complaint   Patient presents with   • Follow-up   • Virtual Regular Visit          Encounter provider Elo Modi MD    Provider located at [place] [address] [place] [address] [address] [place] [address]  [address] [place] [address] [place] [address]  [phone]      Recent Visits  Date Type Provider Dept   03/28/24 Telephone Kaylan Watson Pg [facility] [address] [place] [address]   Showing recent visits within past 7 days and meeting all other requirements  Today's Visits  Date Type Provider Dept   03/29/24 Telemedicine Elo Modi MD [facility] [address] [place] [address]   Showing today's visits and meeting all other requirements  Future Appointments  No visits were found meeting these conditions.  Showing future appointments within next 150 days and meeting all other requirements       The patient was identified by name and date of birth. Derick Richey was informed that this is a telemedicine visit and that the visit is being conducted through the Epic Embedded platform. He agrees to proceed..  My office door was closed. No one else was in the room.  He acknowledged consent and understanding of privacy and security of the video platform. The patient has agreed to participate and understands they can  discontinue the visit at any time.    Patient is aware this is a billable service.     Subjective  Derick Richey is a 58 y.o. male .      HPI       Follow up    No low sugars, has been eating more carbs as the chemo makes him more nausea with fat and protein, no low sugars but sometimes has readings >350 and this has reduced. Has been spreading out insulin as he is going to run out and pharmacy is not sure when they will have more (fiasp)    Past Medical History:   Diagnosis Date   • Cancer (HCC)     pancreatic   • Coronary artery disease    • Diabetes mellitus (HCC)    • Hyperlipidemia    • Hypertension    • Pancreatic cancer (HCC)        Past Surgical History:   Procedure Laterality Date   • CORONARY ANGIOPLASTY WITH STENT PLACEMENT     • ERCP W/ PLASTIC STENT PLACEMENT     • HERNIA REPAIR     • IR PORT PLACEMENT  11/24/2023       Current Outpatient Medications   Medication Sig Dispense Refill   • acetaminophen-codeine (TYLENOL with CODEINE #3) 300-30 MG per tablet Take 1 tablet by mouth daily as needed for moderate pain 30 tablet 0   • amphetamine-dextroamphetamine (ADDERALL XR) 20 MG 24 hr capsule Take 20 mg by mouth every morning     • aspirin 81 MG tablet daily     • carvedilol (COREG) 6.25 mg tablet Take 6.25 mg by mouth 2 (two) times a day     • Continuous Blood Gluc Sensor (FreeStyle Geovanny 3 Sensor) MISC use as directed TO MONITOR GLUCOSE DAILY     • Creon 20477-14990 units capsule take 1 capsule by mouth three times a day with meals 180 capsule 0   • desvenlafaxine succinate (PRISTIQ) 50 mg 24 hr tablet Take 50 mg by mouth daily     • diphenhydramine, lidocaine, Al/Mg hydroxide, simethicone (Magic Mouthwash) SUSP Swish and spit 10 mL every 4 (four) hours as needed for mouth pain or discomfort 237 mL 2   • [START ON 4/3/2024] fluorouracil 4,560 mg in CADD/Elastomeric Infusion Device Infuse 4,560 mg (1,200 mg/m2/day x 1.9 m2) into a catheter in a vein via infusion device over 46 hours for 2 days  Infusion  "planned for April 3, 2024. 1 each 0   • insulin aspart, w/niacinamide, (Fiasp FlexTouch) 100 Units/mL injection pen Inject 8 Units under the skin 3 (three) times a day with meals 24 mL 0   • Jardiance 25 MG TABS Take 1 tablet (25 mg total) by mouth every morning 90 tablet 3   • lisinopril (ZESTRIL) 5 mg tablet Take 1 tablet by mouth daily     • metFORMIN (GLUCOPHAGE) 1000 MG tablet Take 1 tablet (1,000 mg total) by mouth 2 (two) times a day with meals Twice daily 90 tablet 3   • omeprazole (PriLOSEC) 40 MG capsule      • rosuvastatin (CRESTOR) 10 MG tablet Take 1 tablet by mouth daily     • Tresiba FlexTouch 100 units/mL injection pen Inject 30 Units under the skin daily at bedtime 27 mL 0   • zolpidem (AMBIEN) 10 mg tablet Take 10 mg by mouth daily at bedtime as needed     • ondansetron (ZOFRAN) 8 mg tablet Take 1 tablet (8 mg total) by mouth every 8 (eight) hours as needed for nausea or vomiting for up to 20 days 20 tablet 2   • valACYclovir (VALTREX) 1,000 mg tablet Take 1 tablet (1,000 mg total) by mouth 2 (two) times a day for 10 days 20 tablet 3     No current facility-administered medications for this visit.        No Known Allergies    Review of Systems   Constitutional:  Negative for activity change, appetite change, fatigue and fever.   HENT:  Negative for congestion, rhinorrhea and sore throat.    Eyes:  Negative for visual disturbance.   Respiratory:  Negative for cough and shortness of breath.    Cardiovascular:  Negative for chest pain.   Gastrointestinal:  Negative for nausea and vomiting.       Video Exam    Vitals:    03/29/24 1247   Weight: 71.2 kg (157 lb)   Height: 5' 9\" (1.753 m)       Physical Exam  Constitutional:       General: He is not in acute distress.     Appearance: Normal appearance.   HENT:      Head: Normocephalic and atraumatic.   Pulmonary:      Effort: Pulmonary effort is normal. No respiratory distress.   Neurological:      Mental Status: He is alert and oriented to person, " place, and time. Mental status is at baseline.   Psychiatric:         Mood and Affect: Mood normal.         Behavior: Behavior normal.          Visit Time  Total Visit Duration: 15

## 2024-04-02 ENCOUNTER — APPOINTMENT (OUTPATIENT)
Dept: LAB | Facility: CLINIC | Age: 59
End: 2024-04-02
Payer: COMMERCIAL

## 2024-04-02 DIAGNOSIS — C25.9 PANCREATIC ADENOCARCINOMA (HCC): ICD-10-CM

## 2024-04-02 LAB
ALBUMIN SERPL BCP-MCNC: 3.8 G/DL (ref 3.5–5)
ALP SERPL-CCNC: 102 U/L (ref 34–104)
ALT SERPL W P-5'-P-CCNC: 9 U/L (ref 7–52)
ANION GAP SERPL CALCULATED.3IONS-SCNC: 7 MMOL/L (ref 4–13)
AST SERPL W P-5'-P-CCNC: 11 U/L (ref 13–39)
BASOPHILS # BLD AUTO: 0.07 THOUSANDS/ÂΜL (ref 0–0.1)
BASOPHILS NFR BLD AUTO: 1 % (ref 0–1)
BILIRUB SERPL-MCNC: 0.41 MG/DL (ref 0.2–1)
BUN SERPL-MCNC: 19 MG/DL (ref 5–25)
CALCIUM SERPL-MCNC: 9.2 MG/DL (ref 8.4–10.2)
CHLORIDE SERPL-SCNC: 103 MMOL/L (ref 96–108)
CO2 SERPL-SCNC: 30 MMOL/L (ref 21–32)
CREAT SERPL-MCNC: 0.78 MG/DL (ref 0.6–1.3)
EOSINOPHIL # BLD AUTO: 0.28 THOUSAND/ÂΜL (ref 0–0.61)
EOSINOPHIL NFR BLD AUTO: 3 % (ref 0–6)
ERYTHROCYTE [DISTWIDTH] IN BLOOD BY AUTOMATED COUNT: 14.3 % (ref 11.6–15.1)
GFR SERPL CREATININE-BSD FRML MDRD: 99 ML/MIN/1.73SQ M
GLUCOSE P FAST SERPL-MCNC: 165 MG/DL (ref 65–99)
HCT VFR BLD AUTO: 49 % (ref 36.5–49.3)
HGB BLD-MCNC: 16.1 G/DL (ref 12–17)
IMM GRANULOCYTES # BLD AUTO: 0.04 THOUSAND/UL (ref 0–0.2)
IMM GRANULOCYTES NFR BLD AUTO: 1 % (ref 0–2)
LYMPHOCYTES # BLD AUTO: 2.07 THOUSANDS/ÂΜL (ref 0.6–4.47)
LYMPHOCYTES NFR BLD AUTO: 23 % (ref 14–44)
MCH RBC QN AUTO: 34.1 PG (ref 26.8–34.3)
MCHC RBC AUTO-ENTMCNC: 32.9 G/DL (ref 31.4–37.4)
MCV RBC AUTO: 104 FL (ref 82–98)
MONOCYTES # BLD AUTO: 0.84 THOUSAND/ÂΜL (ref 0.17–1.22)
MONOCYTES NFR BLD AUTO: 10 % (ref 4–12)
NEUTROPHILS # BLD AUTO: 5.56 THOUSANDS/ÂΜL (ref 1.85–7.62)
NEUTS SEG NFR BLD AUTO: 62 % (ref 43–75)
NRBC BLD AUTO-RTO: 0 /100 WBCS
PLATELET # BLD AUTO: 212 THOUSANDS/UL (ref 149–390)
PMV BLD AUTO: 10.1 FL (ref 8.9–12.7)
POTASSIUM SERPL-SCNC: 4.6 MMOL/L (ref 3.5–5.3)
PROT SERPL-MCNC: 6.8 G/DL (ref 6.4–8.4)
RBC # BLD AUTO: 4.72 MILLION/UL (ref 3.88–5.62)
SODIUM SERPL-SCNC: 140 MMOL/L (ref 135–147)
WBC # BLD AUTO: 8.86 THOUSAND/UL (ref 4.31–10.16)

## 2024-04-02 PROCEDURE — 85025 COMPLETE CBC W/AUTO DIFF WBC: CPT

## 2024-04-02 PROCEDURE — 80053 COMPREHEN METABOLIC PANEL: CPT

## 2024-04-02 PROCEDURE — 36415 COLL VENOUS BLD VENIPUNCTURE: CPT

## 2024-04-03 ENCOUNTER — HOSPITAL ENCOUNTER (OUTPATIENT)
Dept: INFUSION CENTER | Facility: CLINIC | Age: 59
Discharge: HOME | End: 2024-04-03
Payer: COMMERCIAL

## 2024-04-03 ENCOUNTER — TELEPHONE (OUTPATIENT)
Age: 59
End: 2024-04-03

## 2024-04-03 ENCOUNTER — TELEPHONE (OUTPATIENT)
Dept: ENDOCRINOLOGY | Facility: CLINIC | Age: 59
End: 2024-04-03

## 2024-04-03 VITALS
HEIGHT: 69 IN | DIASTOLIC BLOOD PRESSURE: 79 MMHG | RESPIRATION RATE: 18 BRPM | WEIGHT: 163.4 LBS | HEART RATE: 84 BPM | SYSTOLIC BLOOD PRESSURE: 130 MMHG | TEMPERATURE: 97.2 F | BODY MASS INDEX: 24.2 KG/M2

## 2024-04-03 DIAGNOSIS — E11.00 TYPE 2 DIABETES MELLITUS WITH HYPEROSMOLARITY WITHOUT COMA, WITHOUT LONG-TERM CURRENT USE OF INSULIN (HCC): ICD-10-CM

## 2024-04-03 DIAGNOSIS — R10.11 RIGHT UPPER QUADRANT PAIN: ICD-10-CM

## 2024-04-03 DIAGNOSIS — Z79.4 TYPE 2 DIABETES MELLITUS WITH HYPERGLYCEMIA, WITH LONG-TERM CURRENT USE OF INSULIN (HCC): ICD-10-CM

## 2024-04-03 DIAGNOSIS — C25.9 PANCREATIC ADENOCARCINOMA (HCC): Primary | ICD-10-CM

## 2024-04-03 DIAGNOSIS — E11.65 TYPE 2 DIABETES MELLITUS WITH HYPERGLYCEMIA, WITH LONG-TERM CURRENT USE OF INSULIN (HCC): ICD-10-CM

## 2024-04-03 PROCEDURE — 96367 TX/PROPH/DG ADDL SEQ IV INF: CPT

## 2024-04-03 PROCEDURE — G0498 CHEMO EXTEND IV INFUS W/PUMP: HCPCS

## 2024-04-03 PROCEDURE — 96413 CHEMO IV INFUSION 1 HR: CPT

## 2024-04-03 PROCEDURE — 96375 TX/PRO/DX INJ NEW DRUG ADDON: CPT

## 2024-04-03 RX ORDER — ATROPINE SULFATE 1 MG/ML
0.25 INJECTION, SOLUTION INTRAVENOUS ONCE
Status: COMPLETED | OUTPATIENT
Start: 2024-04-03 | End: 2024-04-03

## 2024-04-03 RX ORDER — INSULIN ASPART INJECTION 100 [IU]/ML
8 INJECTION, SOLUTION SUBCUTANEOUS
Qty: 3 ML | Refills: 2 | Status: SHIPPED | OUTPATIENT
Start: 2024-04-03 | End: 2024-07-02

## 2024-04-03 RX ORDER — INSULIN DEGLUDEC 100 U/ML
40 INJECTION, SOLUTION SUBCUTANEOUS
Qty: 36 ML | Refills: 0 | Status: SHIPPED | OUTPATIENT
Start: 2024-04-03 | End: 2024-07-02

## 2024-04-03 RX ORDER — DEXTROSE MONOHYDRATE 50 MG/ML
20 INJECTION, SOLUTION INTRAVENOUS ONCE
Status: COMPLETED | OUTPATIENT
Start: 2024-04-03 | End: 2024-04-03

## 2024-04-03 RX ORDER — ATROPINE SULFATE 1 MG/ML
0.25 INJECTION, SOLUTION INTRAVENOUS ONCE AS NEEDED
Status: DISCONTINUED | OUTPATIENT
Start: 2024-04-03 | End: 2024-04-06 | Stop reason: HOSPADM

## 2024-04-03 RX ORDER — SODIUM CHLORIDE 9 MG/ML
20 INJECTION, SOLUTION INTRAVENOUS ONCE AS NEEDED
Status: DISCONTINUED | OUTPATIENT
Start: 2024-04-03 | End: 2024-04-06 | Stop reason: HOSPADM

## 2024-04-03 RX ADMIN — IRINOTECAN HYDROCHLORIDE 238 MG: 20 INJECTION, SOLUTION INTRAVENOUS at 10:57

## 2024-04-03 RX ADMIN — DEXTROSE 20 ML/HR: 5 SOLUTION INTRAVENOUS at 09:36

## 2024-04-03 RX ADMIN — FAMOTIDINE 20 MG: 10 INJECTION, SOLUTION INTRAVENOUS at 10:23

## 2024-04-03 RX ADMIN — ATROPINE SULFATE 0.25 MG: 1 INJECTION, SOLUTION INTRAVENOUS at 10:51

## 2024-04-03 RX ADMIN — DEXAMETHASONE SODIUM PHOSPHATE: 10 INJECTION, SOLUTION INTRAMUSCULAR; INTRAVENOUS at 09:38

## 2024-04-03 RX ADMIN — DIPHENHYDRAMINE HYDROCHLORIDE 25 MG: 50 INJECTION, SOLUTION INTRAMUSCULAR; INTRAVENOUS at 10:01

## 2024-04-03 NOTE — TELEPHONE ENCOUNTER
Please send alternative to fiasp for patient and per patient request do not send to [facility],     Sent request to provider, waiting on response.

## 2024-04-03 NOTE — TELEPHONE ENCOUNTER
Pt asking for a fiasp replacement . Pt is completely out and needs today  [facility]  out of his chart.   Please call  pt once done.

## 2024-04-03 NOTE — TELEPHONE ENCOUNTER
Patient called regarding his tresiba he has been using more than the 30 units because he can't get the fiasp. Patient asked if since he can't get the fiasp if we can up the units of tresiba to 50 units.     Fiasp patient wants one pen instead of the 4 pack because the pharmacy can't get it in.     [facility] [address]   [de-identified] : 66 year old male with h/o MS 1986 / BPH / elevated PSA / family history Colon cancer presents for follow up on abnormal lab results. Pt blood work revealed elevated PSA , low D. elevated Hb A1C\par Pt reports intermittent chest discomfort, + associated PEARSON, palpitation > awaiting cardio evaluation . \par He denies dizziness, N, V, abdominal pain , CP at present

## 2024-04-04 ENCOUNTER — TELEPHONE (OUTPATIENT)
Dept: HEMATOLOGY ONCOLOGY | Facility: CLINIC | Age: 59
End: 2024-04-04

## 2024-04-04 DIAGNOSIS — Z79.4 TYPE 2 DIABETES MELLITUS WITH HYPERGLYCEMIA, WITH LONG-TERM CURRENT USE OF INSULIN (HCC): Primary | ICD-10-CM

## 2024-04-04 DIAGNOSIS — E11.65 TYPE 2 DIABETES MELLITUS WITH HYPERGLYCEMIA, WITH LONG-TERM CURRENT USE OF INSULIN (HCC): Primary | ICD-10-CM

## 2024-04-04 RX ORDER — INSULIN ASPART 100 [IU]/ML
8 INJECTION, SOLUTION INTRAVENOUS; SUBCUTANEOUS
Qty: 21.6 ML | Refills: 0 | Status: SHIPPED | OUTPATIENT
Start: 2024-04-04 | End: 2024-04-06 | Stop reason: SDUPTHER

## 2024-04-04 NOTE — TELEPHONE ENCOUNTER
Pt is asking you to just call in whatever insulin to his pharmacy he will pay out right.  Not go thru insurance.

## 2024-04-04 NOTE — TELEPHONE ENCOUNTER
Called patient in regards of appt changing to virtual . Dr Calderon prefers to see patient in the office for f/u . Patient understands and reschedule appt for 4/10

## 2024-04-04 NOTE — TELEPHONE ENCOUNTER
Patient Call    Who are you speaking with? Patient    If it is not the patient, are they listed on an active communication consent form? Yes   What is the reason for this call? Can appmt be virtual?   Does this require a call back? Yes   If a call back is required, please list [facility] call back number [phone]    If a call back is required, advise that a message will be forwarded to their care team and someone will return their call as soon as possible.   Did you relay this information to the patient? Yes

## 2024-04-05 ENCOUNTER — TELEPHONE (OUTPATIENT)
Age: 59
End: 2024-04-05

## 2024-04-05 ENCOUNTER — HOSPITAL ENCOUNTER (OUTPATIENT)
Dept: INFUSION CENTER | Facility: CLINIC | Age: 59
Discharge: HOME | End: 2024-04-05

## 2024-04-05 DIAGNOSIS — R10.11 RIGHT UPPER QUADRANT PAIN: ICD-10-CM

## 2024-04-05 NOTE — PROGRESS NOTES
Patient arrives to infusion center for 5FU CADD disconnect. Patient offers no complaints. Pump appears to be deflated. CADD disconnected and discarded. Port flushed, de-accessed. AVS declined.    Next appointment: 4/17/24 @ 32

## 2024-04-06 ENCOUNTER — NURSE TRIAGE (OUTPATIENT)
Dept: OTHER | Facility: OTHER | Age: 59
End: 2024-04-06

## 2024-04-06 DIAGNOSIS — Z79.4 TYPE 2 DIABETES MELLITUS WITH HYPERGLYCEMIA, WITH LONG-TERM CURRENT USE OF INSULIN (HCC): ICD-10-CM

## 2024-04-06 DIAGNOSIS — E11.65 TYPE 2 DIABETES MELLITUS WITH HYPERGLYCEMIA, WITH LONG-TERM CURRENT USE OF INSULIN (HCC): ICD-10-CM

## 2024-04-06 RX ORDER — INSULIN ASPART 100 [IU]/ML
8 INJECTION, SOLUTION INTRAVENOUS; SUBCUTANEOUS
Qty: 21.6 ML | Refills: 0 | Status: SHIPPED | OUTPATIENT
Start: 2024-04-06 | End: 2024-07-05

## 2024-04-06 NOTE — TELEPHONE ENCOUNTER
Regarding: novolog refill  ----- Message from Char Johnson sent at 4/6/2024  9:45 AM EDT -----  Name insulin aspart (NovoLOG FlexPen) 100 UNIT/ML injection pen  Dose/Frequency 8 Units/3 times daily with meals  Quantity 21.6 mL  Verified pharmacy   [ x]  Verified ordering Provider   [ x]  Does patient have enough for the next 3 days? Yes [ ] No [x ]    [facility]  [id number], [place] [address] [phone]

## 2024-04-06 NOTE — TELEPHONE ENCOUNTER
On call provider contacted- gave verbal order to send in a full prescription refill for the patient's Novolog FlexPen. Prescription sent to the patient's designated pharmacy. Patient made aware and verbalized understanding.  Reason for Disposition  • [1] Prescription refill request for ESSENTIAL medicine (i.e., likelihood of harm to patient if not taken) AND [2] triager unable to refill per department policy    Protocols used: Medication Refill and Renewal Call-ADULT-

## 2024-04-06 NOTE — TELEPHONE ENCOUNTER
"Answer Assessment - Initial Assessment Questions  1. DRUG NAME: \"What medicine do you need to have refilled?\"      Novolog Flexpen     2. REFILLS REMAINING: \"How many refills are remaining?\" (Note: The label on the medicine or pill bottle will show how many refills are remaining. If there are no refills remaining, then a renewal may be needed.)      0    3. EXPIRATION DATE: \"What is the expiration date?\" (Note: The label states when the prescription will , and thus can no longer be refilled.)      N/a    4. PRESCRIBING HCP: \"Who prescribed it?\" Reason: If prescribed by specialist, call should be referred to that group.      Endocrinology    Protocols used: Medication Refill and Renewal Call-ADULT-    "

## 2024-04-10 ENCOUNTER — TELEPHONE (OUTPATIENT)
Dept: HEMATOLOGY ONCOLOGY | Facility: CLINIC | Age: 59
End: 2024-04-10

## 2024-04-10 DIAGNOSIS — C25.9 PANCREATIC ADENOCARCINOMA (HCC): ICD-10-CM

## 2024-04-10 DIAGNOSIS — R10.11 RIGHT UPPER QUADRANT PAIN: Primary | ICD-10-CM

## 2024-04-10 RX ORDER — ATROPINE SULFATE 1 MG/ML
0.25 INJECTION, SOLUTION INTRAVENOUS ONCE
OUTPATIENT
Start: 2024-04-17

## 2024-04-10 RX ORDER — SODIUM CHLORIDE 9 MG/ML
20 INJECTION, SOLUTION INTRAVENOUS ONCE AS NEEDED
OUTPATIENT
Start: 2024-04-17

## 2024-04-10 RX ORDER — DEXTROSE MONOHYDRATE 50 MG/ML
20 INJECTION, SOLUTION INTRAVENOUS ONCE
OUTPATIENT
Start: 2024-04-17

## 2024-04-10 RX ORDER — ATROPINE SULFATE 1 MG/ML
0.25 INJECTION, SOLUTION INTRAVENOUS ONCE AS NEEDED
OUTPATIENT
Start: 2024-04-17

## 2024-04-10 NOTE — TELEPHONE ENCOUNTER
Appointment Change  Cancel, Reschedule, Change to Virtual      Who are you speaking with? Patient   If it is not the patient, is the caller listed on the communication consent form? N/A   Which provider is the appointment scheduled with? Dr Calderon   When was the original appointment scheduled?    Please list date and time 4/10/24 11am   At which location is the appointment scheduled to take place? [facility]   Was the appointment rescheduled?     Was the appointment changed from an in person visit to a virtual visit?    If so, please list the details of the change. 4/11/24 11am   What is the reason for the appointment change? Woke up late       Was STAR transport scheduled? N/A   Does STAR transport need to be scheduled for the new visit (if applicable) N/A   Does the patient need an infusion appointment rescheduled? N/A   Does the patient have an upcoming infusion appointment scheduled? If so, when? No   Is the patient undergoing chemotherapy? N/A   For appointments cancelled with less than 24 hours:  Was the no-show policy reviewed? Yes

## 2024-04-11 ENCOUNTER — TELEPHONE (OUTPATIENT)
Age: 59
End: 2024-04-11

## 2024-04-11 ENCOUNTER — OFFICE VISIT (OUTPATIENT)
Dept: HEMATOLOGY ONCOLOGY | Facility: CLINIC | Age: 59
End: 2024-04-11
Payer: COMMERCIAL

## 2024-04-11 ENCOUNTER — APPOINTMENT (OUTPATIENT)
Dept: LAB | Facility: HOSPITAL | Age: 59
End: 2024-04-11
Payer: COMMERCIAL

## 2024-04-11 VITALS
RESPIRATION RATE: 18 BRPM | HEIGHT: 69 IN | SYSTOLIC BLOOD PRESSURE: 132 MMHG | OXYGEN SATURATION: 100 % | HEART RATE: 68 BPM | BODY MASS INDEX: 24.44 KG/M2 | DIASTOLIC BLOOD PRESSURE: 80 MMHG | TEMPERATURE: 98 F | WEIGHT: 165 LBS

## 2024-04-11 DIAGNOSIS — Z79.4 TYPE 2 DIABETES MELLITUS WITH HYPERGLYCEMIA, WITH LONG-TERM CURRENT USE OF INSULIN (HCC): ICD-10-CM

## 2024-04-11 DIAGNOSIS — I25.10 CORONARY ARTERY DISEASE INVOLVING NATIVE CORONARY ARTERY OF NATIVE HEART WITHOUT ANGINA PECTORIS: ICD-10-CM

## 2024-04-11 DIAGNOSIS — E11.65 TYPE 2 DIABETES MELLITUS WITH HYPERGLYCEMIA, WITH LONG-TERM CURRENT USE OF INSULIN (HCC): ICD-10-CM

## 2024-04-11 DIAGNOSIS — C25.3 PRIMARY ADENOCARCINOMA OF PANCREATIC DUCT (HCC): Primary | ICD-10-CM

## 2024-04-11 DIAGNOSIS — C25.3 PRIMARY ADENOCARCINOMA OF PANCREATIC DUCT (HCC): ICD-10-CM

## 2024-04-11 LAB
ALBUMIN SERPL BCP-MCNC: 4 G/DL (ref 3.5–5)
ALP SERPL-CCNC: 95 U/L (ref 34–104)
ALT SERPL W P-5'-P-CCNC: 15 U/L (ref 7–52)
ANION GAP SERPL CALCULATED.3IONS-SCNC: 4 MMOL/L (ref 4–13)
AST SERPL W P-5'-P-CCNC: 14 U/L (ref 13–39)
BASOPHILS # BLD AUTO: 0.03 THOUSANDS/ÂΜL (ref 0–0.1)
BASOPHILS NFR BLD AUTO: 0 % (ref 0–1)
BILIRUB SERPL-MCNC: 0.49 MG/DL (ref 0.2–1)
BUN SERPL-MCNC: 13 MG/DL (ref 5–25)
CALCIUM SERPL-MCNC: 9.8 MG/DL (ref 8.4–10.2)
CHLORIDE SERPL-SCNC: 106 MMOL/L (ref 96–108)
CO2 SERPL-SCNC: 30 MMOL/L (ref 21–32)
CREAT SERPL-MCNC: 0.77 MG/DL (ref 0.6–1.3)
EOSINOPHIL # BLD AUTO: 0.23 THOUSAND/ÂΜL (ref 0–0.61)
EOSINOPHIL NFR BLD AUTO: 3 % (ref 0–6)
ERYTHROCYTE [DISTWIDTH] IN BLOOD BY AUTOMATED COUNT: 13.7 % (ref 11.6–15.1)
GFR SERPL CREATININE-BSD FRML MDRD: 100 ML/MIN/1.73SQ M
GLUCOSE SERPL-MCNC: 252 MG/DL (ref 65–140)
HCT VFR BLD AUTO: 49.9 % (ref 36.5–49.3)
HGB BLD-MCNC: 17 G/DL (ref 12–17)
IMM GRANULOCYTES # BLD AUTO: 0.06 THOUSAND/UL (ref 0–0.2)
IMM GRANULOCYTES NFR BLD AUTO: 1 % (ref 0–2)
LYMPHOCYTES # BLD AUTO: 1.78 THOUSANDS/ÂΜL (ref 0.6–4.47)
LYMPHOCYTES NFR BLD AUTO: 21 % (ref 14–44)
MCH RBC QN AUTO: 34.3 PG (ref 26.8–34.3)
MCHC RBC AUTO-ENTMCNC: 34.1 G/DL (ref 31.4–37.4)
MCV RBC AUTO: 101 FL (ref 82–98)
MONOCYTES # BLD AUTO: 0.75 THOUSAND/ÂΜL (ref 0.17–1.22)
MONOCYTES NFR BLD AUTO: 9 % (ref 4–12)
NEUTROPHILS # BLD AUTO: 5.62 THOUSANDS/ÂΜL (ref 1.85–7.62)
NEUTS SEG NFR BLD AUTO: 66 % (ref 43–75)
NRBC BLD AUTO-RTO: 0 /100 WBCS
PLATELET # BLD AUTO: 193 THOUSANDS/UL (ref 149–390)
PMV BLD AUTO: 9.4 FL (ref 8.9–12.7)
POTASSIUM SERPL-SCNC: 5.1 MMOL/L (ref 3.5–5.3)
PROT SERPL-MCNC: 7.2 G/DL (ref 6.4–8.4)
RBC # BLD AUTO: 4.96 MILLION/UL (ref 3.88–5.62)
SODIUM SERPL-SCNC: 140 MMOL/L (ref 135–147)
WBC # BLD AUTO: 8.47 THOUSAND/UL (ref 4.31–10.16)

## 2024-04-11 PROCEDURE — 36415 COLL VENOUS BLD VENIPUNCTURE: CPT

## 2024-04-11 PROCEDURE — 85025 COMPLETE CBC W/AUTO DIFF WBC: CPT

## 2024-04-11 PROCEDURE — 86301 IMMUNOASSAY TUMOR CA 19-9: CPT

## 2024-04-11 PROCEDURE — 80053 COMPREHEN METABOLIC PANEL: CPT

## 2024-04-11 PROCEDURE — 99213 OFFICE O/P EST LOW 20 MIN: CPT | Performed by: INTERNAL MEDICINE

## 2024-04-11 RX ORDER — ZOLPIDEM TARTRATE 12.5 MG/1
12.5 TABLET, FILM COATED, EXTENDED RELEASE ORAL
COMMUNITY
Start: 2024-04-02

## 2024-04-11 NOTE — PROGRESS NOTES
Hematology/Oncology Outpatient Follow- up Note  Derick Richey 58 y.o. male MRN: @ Encounter: [id number]        Date:  4/11/2024        Assessment / Plan:    1 pancreatic cancer T2 N0 M0  2 to complete FOLFIRINOX cycles 11 and 12  3 stable polycythemia  Plan: To complete chemotherapy.  To be seen by surgical oncology 5/30.  Will get a CT scan abdomen pelvis along with CEA 19-9 levels.  Long-term prognosis guarded.  Hopefully will get a good response to treatment.          HPI: 58-year-old gentleman here for follow-up with pancreatic cancer T2 N0 M0.  He is due to receive cycle 11 of 12 planned cycles of chemotherapy for FOLFIRINOX.  He is doing reasonably well otherwise.  Denies complaints.  No fever chills or sweats.  Fatigue easily some intermittent abdominal discomfort no major diarrhea holding his own at this point.  Will be following up with Dr. Cervantes of surgical oncology at the end of May.  Prior to that he will get a CT scan of the abdomen pelvis with pancreatic protocol.  He will also get a CBC CMP and CA 19-9.  He is hoping for good response to treatment      Interval History: Note from 3/7/2024  Assessment / Plan:    1 pancreatic cancer T2 N0 M0  2 continuing chemotherapy  3 he has polycythemia and will need to be watched.  Plan patient will continue and finish his chemotherapeutic intervention with 12 cycles of treatment.  He is getting FOLFIRINOX.  He is not receiving oxaliplatin at this time.  He will come back in 4 weeks to be rechecked.  Hopefully will continue to tolerate treatment.  He will get a CEA 19-9 prior to his return.  HPI: 58-year-old gentleman comes in for follow-up.  He is doing reasonably well.  He has some neuropathy that is when more stable off oxaliplatin.  His CA 19-9 level was in the 500s.  He will get that repeated in 4 weeks.  He continues on his treatments.  Prognosis remains guarded.  Interestingly he has an elevation in hematocrit etiology not totally clear being  watched.  Interval History: Note from Dr. Cervantes of surgical oncology 2/29/2024   58-year-old male with locally advanced pancreas cancer.  There is still no evidence of metastasis.  He has had a good biochemical response and the pancreatic mass is essentially stable.  Based on his current imaging he would likely require portal vein resection, and possible hepatic artery resection.  I have recommended that he continue his neoadjuvant chemotherapy for at least another 2 to 3 months.  I would then repeat his CT with a pancreas protocol, and at that time we can determine the need/extent of surgical resection as long as there are no metastasis.  We also discussed possible definitive chemoradiation based on his follow-up scan.  I will see him again in the next 2 to 3 months.  I will have medical oncology ordered the pancreas protocol CT once he has completed chemotherapy, and I will see him back.  He is agreeable to this plan.  All his questions were answered.   Note from 1/26/2024 medical oncology Dr. Calderon: 58-year-old gentleman with pancreatic cancer completing cycle 5 of FOLFIRINOX therapy.  He has not had oxaliplatin since cycle #3.  He developed significant problems with neuropathy and in top of which she also developed anaphylactic-like reaction.  He is unwilling to consider going ahead with the treatment with the oxaliplatin will continue to rest.  He has had intermittent scans for other reasons last at the mid the December.  There was no major changes in the abdominal cavity.  He otherwise is doing fairly well tolerating the diarrhea fairly well.  He will continue his therapy.  We will plan to complete cycle 6 and cycle 7.  He will get a CAT scan done on 2/26/2024.  He will get a CEA 19-9 as well.  He has an appointment with surgical oncology on 2/29/2024 we will see him the following week as well.  Interval History:  Derick Richey is a 57yo male who presents for the evaluation and management of newly diagnosed  "pancreatic cancer. The patient presented to the ED on 10/23/23 with new onset jaundice. He reported ayse colored stools and left sided discomfort. CT A/P showed ill-defined hypoattenuating mass within the neck of the pancreas with subsequent dilatation and obstruction of the biliary tree and pancreatic duct. Highly likely to represent pancreatic neoplasm.Partially encasing the main portal vein at the confluence with splenic vein and SMV with approximately 50% narrowing of the caliber at this level and dilatation of the SMV. No evidence for distant metastatic disease was evident. ERCP was performed 10/30/23. The common bile duct was deeply cannulated after 1 attempt using a traction sphincterotome with 260 cm x 0.035\" guidewire. Extrinsic and severe stricture was visualized in the mid common bile duct. The stricture was traversable by wire. Dilation was observed in the proximal common bile duct  Medium-sized biliary sphincterotomy was performed using a sphincterotome. One 8 mm x 6 cm fully covered metal stent was placed in the common bile duct.   EUS was also performed. The pancreas appeared atrophic. The pancreatic parenchyma was visualized in the body of the pancreas and tail of the pancreas. The parenchyma had hyperechoic foci. The pancreatic duct measured 3 mm at the tail. The duct in the body and tail of the pancreas was dilated.  Irregular mass measuring 32 mm x 24 mm with well-defined margins was visualized in the neck of the pancreas. Apparent abutment of the portal vein and splenic vein; 4 successful fine needle biopsy passes were taken with a 25 gauge needle using a transduodenal approach guided by Doppler, sample found to be adequate and sent sample for histology analysis. Small lymph nodes were seen in the area adjacent to the tumor. The proximal bile duct was dilated. The mid bile duct was strictured. The bile duct measured 16 mm at the distal end and 4 mm at the proximal end. The parenchyma of the liver " appeared normal. The hepatic ducts appeared normal.The gallbladder appeared distended. The gallbladder contained biliary sludge. Normal celiac axis. No lymphadenopathy was seen.  Pathology was c/w malignant adenocarcinoma.       Cancer Staging:  Cancer Staging   Primary adenocarcinoma of pancreatic duct (HCC)  Staging form: Pancreas, AJCC 8th Edition  - Clinical stage from 10/30/2023: Stage IB (cT2, cN0, cM0) - Signed by Malgorzata Madden MD on 2/19/2024  Stage prefix: Initial diagnosis  Total positive nodes: 0    Right upper quadrant pain  Staging form: Pancreas, AJCC 8th Edition  - Clinical: Stage IB (cT2, cN0, cM0) - Signed by Samir Cervantes MD on 11/16/2023  Total positive nodes: 0      Molecular Testing:     Previous Hematologic/ Oncologic History:    Oncology History Overview Note   with clinical stage T2N0 pancreatic adenocarcinoma involving the pancreatic neck.  He has completed 6 cycles modified FOLFIRINOX with dose reductions required.  He presents today for follow up after CT imaging.      2/26/24 CT C/A/P         2/29/24 Dr. Cervantes          3/7/24 Dr. Calderon     Right upper quadrant pain   10/30/2023 Biopsy    Endoscopic ultrasound:  Pancreas, Neck mass:  Malignant  Adenocarcinoma.        11/16/2023 -  Cancer Staged    Staging form: Pancreas, AJCC 8th Edition  - Clinical: Stage IB (cT2, cN0, cM0) - Signed by Samir Cervantes MD on 11/16/2023  Total positive nodes: 0       11/28/2023 -  Chemotherapy    alteplase (CATHFLO), 2 mg, Intracatheter, Every 1 Minute as needed, 10 of 12 cycles  irinotecan (CAMPTOSAR) chemo infusion, 150 mg/m2 = 278 mg, Intravenous, Once, 10 of 12 cycles  Dose modification: 125 mg/m2 (100 % of original dose 150 mg/m2, Cycle 9, Reason: Dose Not Tolerated), 125.1 mg/m2 (83.4 % of original dose 125 mg/m2, Cycle 2, Reason: Dose Not Tolerated), 125 mg/m2 (100 % of original dose 125 mg/m2, Cycle 2, Reason: Dose Not Tolerated)  Administration: 239 mg (3/19/2024), 278 mg (11/28/2023), 231 mg  "(12/12/2023), 231 mg (12/27/2023), 231 mg (1/10/2024), 231 mg (1/24/2024), 231 mg (2/7/2024), 231 mg (2/21/2024), 238 mg (3/6/2024), 238 mg (4/3/2024)  oxaliplatin (ELOXATIN) chemo infusion, 65 mg/m2 = 120.25 mg, Intravenous, Once, 3 of 3 cycles  Administration: 120.25 mg (11/28/2023), 120.25 mg (12/12/2023), 120.25 mg (12/27/2023)  fluorouracil (ADRUCIL) ambulatory infusion Soln, 1,200 mg/m2/day = 4,440 mg, Intravenous, Over 46 hours, 10 of 12 cycles     Primary adenocarcinoma of pancreatic duct (HCC)   10/30/2023 Initial Diagnosis    Primary adenocarcinoma of pancreatic duct (HCC)     10/30/2023 Biopsy    Final Diagnosis   A.B. Pancreas, Neck mass (Cell Block and smear Preparations):  Malignant  Adenocarcinoma.     Satisfactory for evaluation.        10/30/2023 -  Cancer Staged    Staging form: Pancreas, AJCC 8th Edition  - Clinical stage from 10/30/2023: Stage IB (cT2, cN0, cM0) - Signed by Malgorzata Madden MD on 2/19/2024  Stage prefix: Initial diagnosis  Total positive nodes: 0           Current Hematologic/ Oncologic Treatment:       Cycle 1         Test Results:    Imaging: No results found.          Labs:   Lab Results   Component Value Date    WBC 8.47 04/11/2024    HGB 17.0 04/11/2024    HCT 49.9 (H) 04/11/2024     (H) 04/11/2024     04/11/2024     Lab Results   Component Value Date    K 5.1 04/11/2024     04/11/2024    CO2 30 04/11/2024    BUN 13 04/11/2024    CREATININE 0.77 04/11/2024    GLUF 165 (H) 04/02/2024    CALCIUM 9.8 04/11/2024    AST 14 04/11/2024    ALT 15 04/11/2024    ALKPHOS 95 04/11/2024    EGFR 100 04/11/2024         No results found for: \"SPEP\", \"UPEP\"    No results found for: \"PSA\"    No results found for: \"CEA\"    No results found for: \"\"    No results found for: \"AFP\"    No results found for: \"IRON\", \"TIBC\", \"FERRITIN\"    No results found for: \"ROMABKAK39\"      ROS: Review of Systems   Constitutional:  Positive for appetite change and fatigue.   HENT: Negative.   "   Eyes: Negative.    Respiratory: Negative.     Cardiovascular: Negative.    Gastrointestinal: Negative.    Endocrine: Negative.    Genitourinary: Negative.    Musculoskeletal: Negative.    Skin: Negative.    Allergic/Immunologic: Negative.    Neurological: Negative.    Hematological: Negative.          Current Medications: Reviewed  Allergies: Reviewed  PMH/FH/SH:  Reviewed      Physical Exam:    Body surface area is 1.9 meters squared.    Wt Readings from Last 3 Encounters:   04/11/24 74.8 kg (165 lb)   04/03/24 74.1 kg (163 lb 6.4 oz)   03/29/24 71.2 kg (157 lb)        Temp Readings from Last 3 Encounters:   04/11/24 98 °F (36.7 °C) (Temporal)   04/03/24 (!) 97.2 °F (36.2 °C) (Temporal)   03/07/24 98 °F (36.7 °C) (Temporal)        BP Readings from Last 3 Encounters:   04/11/24 132/80   04/03/24 130/79   03/07/24 134/76         Pulse Readings from Last 3 Encounters:   04/11/24 68   04/03/24 84   03/07/24 98     @LASTSAO2(3)@      Physical Exam  Vitals reviewed.   Constitutional:       Appearance: Normal appearance. He is normal weight.   HENT:      Head: Normocephalic and atraumatic.   Eyes:      Extraocular Movements: Extraocular movements intact.      Conjunctiva/sclera: Conjunctivae normal.      Pupils: Pupils are equal, round, and reactive to light.   Cardiovascular:      Rate and Rhythm: Normal rate and regular rhythm.      Heart sounds: Normal heart sounds.   Pulmonary:      Effort: Pulmonary effort is normal.      Breath sounds: Normal breath sounds.   Abdominal:      General: Abdomen is flat. Bowel sounds are normal.      Palpations: Abdomen is soft.   Musculoskeletal:         General: Normal range of motion.      Cervical back: Normal range of motion and neck supple.   Skin:     General: Skin is warm and dry.   Neurological:      General: No focal deficit present.      Mental Status: He is alert and oriented to person, place, and time. Mental status is at baseline.           Goals and Barriers:  Current  Goal: Prolong Survival from Cancer.   Barriers: None.      Patient's Capacity to Self Care:  Patient is able to self care.    Code Status: [unfilled]  Advance Directive and Living Will:      Power of :

## 2024-04-11 NOTE — TELEPHONE ENCOUNTER
PA for Aspart    Submitted via    []CMM-KEY   [x][employer]TV-Case ID # [id number]   []Faxed to plan   []Other website   []Phone call Case ID #     Office notes sent, clinical questions answered. Awaiting determination    Turnaround time for your insurance to make a decision on your Prior Authorization can take 7-21 business days.

## 2024-04-12 DIAGNOSIS — C25.3 PRIMARY ADENOCARCINOMA OF PANCREATIC DUCT (HCC): ICD-10-CM

## 2024-04-12 DIAGNOSIS — E11.00 TYPE 2 DIABETES MELLITUS WITH HYPEROSMOLARITY WITHOUT COMA, WITHOUT LONG-TERM CURRENT USE OF INSULIN (HCC): ICD-10-CM

## 2024-04-12 DIAGNOSIS — G89.3 CANCER-RELATED PAIN: ICD-10-CM

## 2024-04-12 LAB — CANCER AG19-9 SERPL-ACNC: 945 U/ML (ref 0–35)

## 2024-04-12 RX ORDER — ACETAMINOPHEN AND CODEINE PHOSPHATE 300; 30 MG/1; MG/1
1 TABLET ORAL DAILY PRN
Qty: 30 TABLET | Refills: 0 | Status: SHIPPED | OUTPATIENT
Start: 2024-04-12

## 2024-04-12 NOTE — TELEPHONE ENCOUNTER
Medication:  PDMP   1 [id number] 03/13/2024 03/13/2024 Mixed Amphetamine Salt (Capsule, Extended Release) 30.0 30 20 MG NA DOMINGO HINOJOSA [employer] 0 / 0 PA    1 [id number] 02/29/2024 02/29/2024 Codeine Phosphate 30 Mg Oral Tablet/acetaminophen 300 Mg (Tablet) 10.0 10 30.0 MG/300.0 MG 4.50 VITALIY VALVERDE [employer] Commercial Insurance 0 / 0 PA          Active agreement on file -Yes

## 2024-04-15 ENCOUNTER — TELEPHONE (OUTPATIENT)
Dept: HEMATOLOGY ONCOLOGY | Facility: CLINIC | Age: 59
End: 2024-04-15

## 2024-04-15 NOTE — TELEPHONE ENCOUNTER
Called patient with change in CT scan appt , per dr Calderon . Patient understands and confirmed appt

## 2024-04-17 ENCOUNTER — HOSPITAL ENCOUNTER (OUTPATIENT)
Dept: INFUSION CENTER | Facility: CLINIC | Age: 59
Discharge: HOME | End: 2024-04-17
Payer: COMMERCIAL

## 2024-04-17 ENCOUNTER — TELEPHONE (OUTPATIENT)
Dept: FAMILY MEDICINE CLINIC | Facility: CLINIC | Age: 59
End: 2024-04-17

## 2024-04-17 VITALS
HEART RATE: 90 BPM | SYSTOLIC BLOOD PRESSURE: 139 MMHG | TEMPERATURE: 96.9 F | RESPIRATION RATE: 16 BRPM | BODY MASS INDEX: 24.02 KG/M2 | DIASTOLIC BLOOD PRESSURE: 89 MMHG | HEIGHT: 69 IN | WEIGHT: 162.2 LBS

## 2024-04-17 DIAGNOSIS — C25.9 PANCREATIC ADENOCARCINOMA (HCC): Primary | ICD-10-CM

## 2024-04-17 DIAGNOSIS — G89.3 CANCER-RELATED PAIN: Primary | ICD-10-CM

## 2024-04-17 DIAGNOSIS — R10.11 RIGHT UPPER QUADRANT PAIN: ICD-10-CM

## 2024-04-17 PROCEDURE — 96413 CHEMO IV INFUSION 1 HR: CPT

## 2024-04-17 PROCEDURE — 96375 TX/PRO/DX INJ NEW DRUG ADDON: CPT

## 2024-04-17 PROCEDURE — 96367 TX/PROPH/DG ADDL SEQ IV INF: CPT

## 2024-04-17 PROCEDURE — G0498 CHEMO EXTEND IV INFUS W/PUMP: HCPCS

## 2024-04-17 RX ORDER — DEXTROSE MONOHYDRATE 50 MG/ML
20 INJECTION, SOLUTION INTRAVENOUS ONCE
Status: COMPLETED | OUTPATIENT
Start: 2024-04-17 | End: 2024-04-17

## 2024-04-17 RX ORDER — ATROPINE SULFATE 1 MG/ML
0.25 INJECTION, SOLUTION INTRAVENOUS ONCE AS NEEDED
Status: DISCONTINUED | OUTPATIENT
Start: 2024-04-17 | End: 2024-04-20 | Stop reason: HOSPADM

## 2024-04-17 RX ORDER — HYDROCODONE BITARTRATE AND ACETAMINOPHEN 5; 325 MG/1; MG/1
1 TABLET ORAL DAILY PRN
Qty: 30 TABLET | Refills: 0 | Status: SHIPPED | OUTPATIENT
Start: 2024-04-17

## 2024-04-17 RX ORDER — ATROPINE SULFATE 1 MG/ML
0.25 INJECTION, SOLUTION INTRAVENOUS ONCE
Status: COMPLETED | OUTPATIENT
Start: 2024-04-17 | End: 2024-04-17

## 2024-04-17 RX ORDER — SODIUM CHLORIDE 9 MG/ML
20 INJECTION, SOLUTION INTRAVENOUS ONCE AS NEEDED
Status: DISCONTINUED | OUTPATIENT
Start: 2024-04-17 | End: 2024-04-20 | Stop reason: HOSPADM

## 2024-04-17 RX ADMIN — IRINOTECAN HYDROCHLORIDE 238 MG: 20 INJECTION, SOLUTION INTRAVENOUS at 10:47

## 2024-04-17 RX ADMIN — ATROPINE SULFATE 0.25 MG: 1 INJECTION, SOLUTION INTRAVENOUS at 10:42

## 2024-04-17 RX ADMIN — DEXTROSE 20 ML/HR: 5 SOLUTION INTRAVENOUS at 10:42

## 2024-04-17 RX ADMIN — SODIUM CHLORIDE 20 ML/HR: 0.9 INJECTION, SOLUTION INTRAVENOUS at 08:58

## 2024-04-17 RX ADMIN — DEXAMETHASONE SODIUM PHOSPHATE: 10 INJECTION, SOLUTION INTRAMUSCULAR; INTRAVENOUS at 09:00

## 2024-04-17 RX ADMIN — DIPHENHYDRAMINE HYDROCHLORIDE 25 MG: 50 INJECTION, SOLUTION INTRAMUSCULAR; INTRAVENOUS at 09:25

## 2024-04-17 RX ADMIN — FAMOTIDINE 20 MG: 10 INJECTION, SOLUTION INTRAVENOUS at 09:54

## 2024-04-17 NOTE — PROGRESS NOTES
"Pt here today for cycle 11 of Irrinotecan, leucovorin and 5-FU. Port accessed w/ a 20 g 3/4\" aiken with good blood return. No SE reported at this time, mild pain in left side which is ongoing and MD aware  "

## 2024-04-17 NOTE — PROGRESS NOTES
Spoke with Liliya Mandujano RN per Dr. Calderon pt to only receive irinotecan and fluorouracil chemo ball today.

## 2024-04-17 NOTE — TELEPHONE ENCOUNTER
Received fax from [facility] requesting an alternative to acetaminophen-COD #3 tab - reason is: backorder, long term.

## 2024-04-17 NOTE — PROGRESS NOTES
Pt to clinic for irinotecan and elastomeric pump hook-up. Offers no complaints today. Pump hooked up to pt's port and clamps open to run. Pt tolerated infusions and pump hook-up without complications. Aware of when to return for pump disconnect on 4/19/24 at 10:30AM. AVS declined.

## 2024-04-18 ENCOUNTER — TELEPHONE (OUTPATIENT)
Dept: ENDOCRINOLOGY | Facility: CLINIC | Age: 59
End: 2024-04-18

## 2024-04-18 ENCOUNTER — TELEMEDICINE (OUTPATIENT)
Dept: ENDOCRINOLOGY | Facility: CLINIC | Age: 59
End: 2024-04-18
Payer: COMMERCIAL

## 2024-04-18 VITALS — BODY MASS INDEX: 23.91 KG/M2 | WEIGHT: 162 LBS

## 2024-04-18 DIAGNOSIS — Z79.4 TYPE 2 DIABETES MELLITUS WITH HYPERGLYCEMIA, WITH LONG-TERM CURRENT USE OF INSULIN (HCC): Primary | ICD-10-CM

## 2024-04-18 DIAGNOSIS — E78.2 MIXED HYPERLIPIDEMIA: ICD-10-CM

## 2024-04-18 DIAGNOSIS — E11.65 TYPE 2 DIABETES MELLITUS WITH HYPERGLYCEMIA, WITH LONG-TERM CURRENT USE OF INSULIN (HCC): Primary | ICD-10-CM

## 2024-04-18 DIAGNOSIS — I10 PRIMARY HYPERTENSION: ICD-10-CM

## 2024-04-18 PROCEDURE — 99214 OFFICE O/P EST MOD 30 MIN: CPT | Performed by: STUDENT IN AN ORGANIZED HEALTH CARE EDUCATION/TRAINING PROGRAM

## 2024-04-18 PROCEDURE — 95251 CONT GLUC MNTR ANALYSIS I&R: CPT | Performed by: STUDENT IN AN ORGANIZED HEALTH CARE EDUCATION/TRAINING PROGRAM

## 2024-04-18 NOTE — PROGRESS NOTES
Virtual Regular Visit    Verification of patient location:    Patient is located at Home in the following state in which I hold an active license [place]      Assessment/Plan:    Problem List Items Addressed This Visit          Cardiovascular and Mediastinum    Primary hypertension     Continue lisinopril 5 mg daily.            Endocrine    Type 2 diabetes mellitus with hyperglycemia, with long-term current use of insulin (HCC) - Primary       Lab Results   Component Value Date    HGBA1C 7.8 (A) 11/21/2023     Suboptimally controlled, with time in range of 19%, hyperglycemia due to chemotherapy, as well as diet which is high carb diet,  I increased Tresiba to 34 units nightly, NovoLog 10 units 3 times daily AC plus correctional insulin with ISF of 50 and blood glucose goal of 150.  Continue metformin and Jardiance.  Hypoglycemia symptoms and treatment were reviewed.  Return back in 3 months.           Relevant Orders    Hemoglobin A1C    Lipid panel       Other    Mixed hyperlipidemia     Continue Crestor 10 mg daily.                 Reason for visit is   Chief Complaint   Patient presents with    Follow-up    Virtual Regular Visit          Encounter provider Antoinette Veloz MD    Provider located at [facility] [address] [place] [address]  [phone]      Recent Visits  Date Type Provider Dept   04/17/24 Telephone Elo Modi MD [facility] [address] [place] [address]   Showing recent visits within past 7 days and meeting all other requirements  Today's Visits  Date Type Provider Dept   04/18/24 Telephone [facility] [place]   04/18/24 Telemedicine Antoinette Veloz MD [facility] [place]   Showing today's visits and meeting all other requirements  Future Appointments  No visits were found meeting these conditions.  Showing future appointments within next 150  days and meeting all other requirements       The patient was identified by name and date of birth. Derick Richey was informed that this is a telemedicine visit and that the visit is being conducted through the Epic Embedded platform. He agrees to proceed..  My office door was closed. No one else was in the room.  He acknowledged consent and understanding of privacy and security of the video platform. The patient has agreed to participate and understands they can discontinue the visit at any time.    Patient is aware this is a billable service.     Subjective  Derick Richey is a 58 y.o. male with diabetes,  .      HPI   Derick Richey is a 58-year-old male with history of type 2 diabetes, adenocarcinoma of the pancreas on chemotherapy who is seen virtually for follow-up.    Current regimen:  Tresiba 30 units at bedtime   Novolog 8 units before meals,  Jardiance 25 mg daily.  Metformin 1000 mg daily.        Derick Richey   Device used, zackary 3  Home use       Indication   Type 2 Diabetes      More than 72 hours of data was reviewed. Report to be scanned to chart.     Date Range: April 5-8    Analysis of data:   Average Glucose: 246 mg/dL  Coefficient of Variation: 30%  SD : X   Time in Target Range: 19%  Time Above Range: 81%  Time Below Range: 0       Past Medical History:   Diagnosis Date    Cancer (HCC)     pancreatic    Coronary artery disease     Diabetes mellitus (HCC)     Hyperlipidemia     Hypertension     Pancreatic cancer (HCC)        Past Surgical History:   Procedure Laterality Date    CORONARY ANGIOPLASTY WITH STENT PLACEMENT      ERCP W/ PLASTIC STENT PLACEMENT      HERNIA REPAIR      IR PORT PLACEMENT  11/24/2023       Current Outpatient Medications   Medication Sig Dispense Refill    amphetamine-dextroamphetamine (ADDERALL XR) 20 MG 24 hr capsule Take 20 mg by mouth every morning      aspirin 81 MG tablet daily      carvedilol (COREG) 6.25 mg tablet Take 6.25 mg by mouth 2 (two) times a day       Continuous Blood Gluc Sensor (FreeStyle Geovanny 3 Sensor) MISC use as directed TO MONITOR GLUCOSE DAILY      Creon 79999-53434 units capsule take 1 capsule by mouth three times a day with meals 180 capsule 0    desvenlafaxine succinate (PRISTIQ) 50 mg 24 hr tablet Take 50 mg by mouth daily      diphenhydramine, lidocaine, Al/Mg hydroxide, simethicone (Magic Mouthwash) SUSP Swish and spit 10 mL every 4 (four) hours as needed for mouth pain or discomfort 237 mL 2    fluorouracil 4,560 mg in CADD/Elastomeric Infusion Device Infuse 4,560 mg (1,200 mg/m2/day x 1.9 m2) into a catheter in a vein via infusion device over 46 hours for 2 days  Infusion planned for April 17, 2024. 1 each 0    HYDROcodone-acetaminophen (Norco) 5-325 mg per tablet Take 1 tablet by mouth daily as needed for pain Max Daily Amount: 1 tablet 30 tablet 0    insulin aspart (NovoLOG FlexPen) 100 UNIT/ML injection pen Inject 8 Units under the skin 3 (three) times a day with meals 21.6 mL 0    insulin aspart, w/niacinamide, (Fiasp FlexTouch) 100 Units/mL injection pen Inject 8 Units under the skin 3 (three) times a day with meals 3 mL 2    Jardiance 25 MG TABS Take 1 tablet (25 mg total) by mouth every morning 90 tablet 3    lisinopril (ZESTRIL) 5 mg tablet Take 1 tablet by mouth daily      metFORMIN (GLUCOPHAGE) 1000 MG tablet Take 1 tablet (1,000 mg total) by mouth daily with breakfast 90 tablet 2    omeprazole (PriLOSEC) 40 MG capsule       rosuvastatin (CRESTOR) 10 MG tablet Take 1 tablet by mouth daily      Tresiba FlexTouch 100 units/mL injection pen Inject 40 Units under the skin daily at bedtime 36 mL 0    zolpidem (AMBIEN CR) 12.5 MG CR tablet Take 12.5 mg by mouth daily at bedtime as needed      zolpidem (AMBIEN) 10 mg tablet Take 10 mg by mouth daily at bedtime as needed      ondansetron (ZOFRAN) 8 mg tablet Take 1 tablet (8 mg total) by mouth every 8 (eight) hours as needed for nausea or vomiting for up to 20 days 20 tablet 2    valACYclovir  (VALTREX) 1,000 mg tablet Take 1 tablet (1,000 mg total) by mouth 2 (two) times a day for 10 days 20 tablet 3     No current facility-administered medications for this visit.     Facility-Administered Medications Ordered in Other Visits   Medication Dose Route Frequency Provider Last Rate Last Admin    alteplase (CATHFLO) injection 2 mg  2 mg Intracatheter Q1MIN PRN Idris Calderon MD        Atropine Sulfate injection 0.25 mg  0.25 mg Intravenous Once PRN Idris Calderon MD        sodium chloride 0.9 % infusion  20 mL/hr Intravenous Once PRN Idris Calderon MD   Stopped at 04/17/24 1042        No Known Allergies    Review of Systems   Constitutional:  Positive for appetite change, fatigue and unexpected weight change.   HENT:  Negative for trouble swallowing and voice change.    Eyes:  Negative for visual disturbance.   Respiratory:  Negative for cough, shortness of breath and wheezing.    Cardiovascular:  Negative for palpitations and leg swelling.   Gastrointestinal:  Negative for abdominal pain, constipation, diarrhea, nausea and vomiting.   Endocrine: Negative for cold intolerance, heat intolerance, polyphagia and polyuria.   Musculoskeletal:  Negative for arthralgias.   Skin:  Negative for color change, rash and wound.   Neurological:  Negative for dizziness, tremors, weakness, light-headedness, numbness and headaches.   Psychiatric/Behavioral:  Negative for agitation and sleep disturbance. The patient is not nervous/anxious.        Video Exam    Vitals:    04/18/24 1009   Weight: 73.5 kg (162 lb)       Physical Exam  Constitutional:       General: He is not in acute distress.     Appearance: Normal appearance. He is not ill-appearing, toxic-appearing or diaphoretic.   HENT:      Head: Normocephalic and atraumatic.   Eyes:      Extraocular Movements: Extraocular movements intact.   Pulmonary:      Effort: Pulmonary effort is normal. No respiratory distress.   Skin:     General: Skin is warm and dry.    Neurological:      Mental Status: He is alert and oriented to person, place, and time.          Visit Time  Total Visit Duration: 20

## 2024-04-19 ENCOUNTER — HOSPITAL ENCOUNTER (OUTPATIENT)
Dept: INFUSION CENTER | Facility: CLINIC | Age: 59
Discharge: HOME | End: 2024-04-19

## 2024-04-19 DIAGNOSIS — R10.11 RIGHT UPPER QUADRANT PAIN: ICD-10-CM

## 2024-04-19 DIAGNOSIS — C25.9 PANCREATIC ADENOCARCINOMA (HCC): Primary | ICD-10-CM

## 2024-04-19 NOTE — PROGRESS NOTES
Pt here for pump D/C, c/o fatigue, emotional support given,  pump appears empty, port flushed w/ 20ml's of NS and aiken needle removed, band-aid applied, pt  aware of his next appt  on 5-1, D/C home.

## 2024-04-22 ENCOUNTER — HOSPITAL ENCOUNTER (OUTPATIENT)
Dept: CT IMAGING | Facility: CLINIC | Age: 59
Discharge: HOME | End: 2024-04-22
Attending: INTERNAL MEDICINE
Payer: COMMERCIAL

## 2024-04-22 DIAGNOSIS — C25.3 PRIMARY ADENOCARCINOMA OF PANCREATIC DUCT (HCC): ICD-10-CM

## 2024-04-22 DIAGNOSIS — I25.10 CORONARY ARTERY DISEASE INVOLVING NATIVE CORONARY ARTERY OF NATIVE HEART WITHOUT ANGINA PECTORIS: ICD-10-CM

## 2024-04-22 DIAGNOSIS — E11.65 TYPE 2 DIABETES MELLITUS WITH HYPERGLYCEMIA, WITH LONG-TERM CURRENT USE OF INSULIN (HCC): ICD-10-CM

## 2024-04-22 DIAGNOSIS — Z79.4 TYPE 2 DIABETES MELLITUS WITH HYPERGLYCEMIA, WITH LONG-TERM CURRENT USE OF INSULIN (HCC): ICD-10-CM

## 2024-04-22 PROCEDURE — 74177 CT ABD & PELVIS W/CONTRAST: CPT

## 2024-04-22 RX ADMIN — IOHEXOL 100 ML: 350 INJECTION, SOLUTION INTRAVENOUS at 12:33

## 2024-04-23 RX ORDER — ATROPINE SULFATE 1 MG/ML
0.25 INJECTION, SOLUTION INTRAVENOUS ONCE
OUTPATIENT
Start: 2024-05-01

## 2024-04-23 RX ORDER — SODIUM CHLORIDE 9 MG/ML
20 INJECTION, SOLUTION INTRAVENOUS ONCE AS NEEDED
OUTPATIENT
Start: 2024-05-01

## 2024-04-23 RX ORDER — DEXTROSE MONOHYDRATE 50 MG/ML
20 INJECTION, SOLUTION INTRAVENOUS ONCE
OUTPATIENT
Start: 2024-05-01

## 2024-04-23 RX ORDER — ATROPINE SULFATE 1 MG/ML
0.25 INJECTION, SOLUTION INTRAVENOUS ONCE AS NEEDED
OUTPATIENT
Start: 2024-05-01

## 2024-04-24 DIAGNOSIS — R10.11 RIGHT UPPER QUADRANT PAIN: Primary | ICD-10-CM

## 2024-04-30 ENCOUNTER — TELEPHONE (OUTPATIENT)
Dept: INFUSION CENTER | Facility: CLINIC | Age: 59
End: 2024-04-30

## 2024-04-30 NOTE — TELEPHONE ENCOUNTER
"Per Darren, MR at [facility]  \"As per Dr Calderon, he wants to discontinue the treatment at this time, the patient will be seeing Dr. Cervantes\"     "

## 2024-04-30 NOTE — TELEPHONE ENCOUNTER
Patient infusion appointment for 5/1/24 okay to be cancelled. Patient going to see Dr. Cervantes    Call out to [facility], left vm  Message returned to TASHI May at [facility].

## 2024-04-30 NOTE — TELEPHONE ENCOUNTER
Pt called and reported he will not be coming in tomorrow for his chemotherapy treatment. Please let us know how to proceed with his treatment schedule.

## 2024-05-01 ENCOUNTER — HOSPITAL ENCOUNTER (OUTPATIENT)
Dept: INFUSION CENTER | Facility: CLINIC | Age: 59
Discharge: HOME | End: 2024-05-01

## 2024-05-01 ENCOUNTER — TELEPHONE (OUTPATIENT)
Dept: HEMATOLOGY ONCOLOGY | Facility: CLINIC | Age: 59
End: 2024-05-01

## 2024-05-01 NOTE — TELEPHONE ENCOUNTER
Appointment Change  Cancel, Reschedule, Change to Virtual      Who are you speaking with? Patient   If it is not the patient, is the caller listed on the communication consent form? Yes   Which provider is the appointment scheduled with? Dr. Cervantes   When was the original appointment scheduled?    Please list date and time 5/30   At which location is the appointment scheduled to take place? [facility])   Was the appointment rescheduled?     Was the appointment changed from an in person visit to a virtual visit?    If so, please list the details of the change. 5/2 2:15pm   What is the reason for the appointment change? sooner       Was STAR transport scheduled? No   Does STAR transport need to be scheduled for the new visit (if applicable) No   Does the patient need an infusion appointment rescheduled? No   Does the patient have an upcoming infusion appointment scheduled? If so, when? No   Is the patient undergoing chemotherapy? No   For appointments cancelled with less than 24 hours:  Was the no-show policy reviewed? Yes

## 2024-05-02 ENCOUNTER — TELEPHONE (OUTPATIENT)
Age: 59
End: 2024-05-02

## 2024-05-02 ENCOUNTER — OFFICE VISIT (OUTPATIENT)
Dept: SURGICAL ONCOLOGY | Facility: CLINIC | Age: 59
End: 2024-05-02
Payer: COMMERCIAL

## 2024-05-02 VITALS
HEIGHT: 69 IN | WEIGHT: 164 LBS | DIASTOLIC BLOOD PRESSURE: 70 MMHG | HEART RATE: 101 BPM | BODY MASS INDEX: 24.29 KG/M2 | OXYGEN SATURATION: 95 % | SYSTOLIC BLOOD PRESSURE: 118 MMHG

## 2024-05-02 DIAGNOSIS — G89.3 CANCER-RELATED PAIN: Primary | ICD-10-CM

## 2024-05-02 DIAGNOSIS — C25.0 MALIGNANT NEOPLASM OF HEAD OF PANCREAS (HCC): Primary | ICD-10-CM

## 2024-05-02 PROCEDURE — 99215 OFFICE O/P EST HI 40 MIN: CPT | Performed by: SURGERY

## 2024-05-02 PROCEDURE — 3078F DIAST BP <80 MM HG: CPT | Performed by: SURGERY

## 2024-05-02 PROCEDURE — 3074F SYST BP LT 130 MM HG: CPT | Performed by: SURGERY

## 2024-05-02 RX ORDER — SUVOREXANT 10 MG/1
TABLET, FILM COATED ORAL
COMMUNITY
Start: 2024-05-02

## 2024-05-02 NOTE — TELEPHONE ENCOUNTER
Patient hurt his upper neck muscle moving a snow plow.  He is asking if the doctor can call in a few muscle relaxer's for him.   The med can be sent to the [facility] pharmacy on [address]   Please call patient and let him know.

## 2024-05-02 NOTE — PROGRESS NOTES
Surgical Oncology Follow Up       [facility]  [address]  [address]  [place] [id number] [phone]    Derick Richey  1965  [id number]  [facility]  [facility]  [address]  [address]  [place] [id number] [phone]    There are no diagnoses linked to this encounter.    Chief Complaint   Patient presents with    Follow-up     3 mo f/u [facility] pancreatic neck       No follow-ups on file.      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  He has completed 6 cycles modified FOLFIRINOX with dose reductions required.  He presents today for follow up after CT imaging.      2/26/24 CT C/A/P         2/29/24 Dr. Cervantes          3/7/24 Dr. Calderon     Right upper quadrant pain   10/30/2023 Biopsy    Endoscopic ultrasound:  Pancreas, Neck mass:  Malignant  Adenocarcinoma.        11/16/2023 -  Cancer Staged    Staging form: Pancreas, AJCC 8th Edition  - Clinical: Stage IB (cT2, cN0, cM0) - Signed by Samir Cervantes MD on 11/16/2023  Total positive nodes: 0       11/28/2023 -  Chemotherapy    alteplase (CATHFLO), 2 mg, Intracatheter, Every 1 Minute as needed, 11 of 12 cycles  irinotecan (CAMPTOSAR) chemo infusion, 150 mg/m2 = 278 mg, Intravenous, Once, 11 of 12 cycles  Dose modification: 125 mg/m2 (100 % of original dose 150 mg/m2, Cycle 9, Reason: Dose Not Tolerated), 125.1 mg/m2 (83.4 % of original dose 125 mg/m2, Cycle 2, Reason: Dose Not Tolerated), 125 mg/m2 (100 % of original dose 125 mg/m2, Cycle 2, Reason: Dose Not Tolerated)  Administration: 239 mg (3/19/2024), 278 mg (11/28/2023), 231 mg (12/12/2023), 231 mg (12/27/2023), 231 mg (1/10/2024), 231 mg (1/24/2024), 231 mg (2/7/2024), 231 mg (2/21/2024), 238 mg (3/6/2024), 238 mg (4/3/2024), 238 mg (4/17/2024)  oxaliplatin  (ELOXATIN) chemo infusion, 65 mg/m2 = 120.25 mg, Intravenous, Once, 3 of 3 cycles  Administration: 120.25 mg (11/28/2023), 120.25 mg (12/12/2023), 120.25 mg (12/27/2023)  fluorouracil (ADRUCIL) ambulatory infusion Soln, 1,200 mg/m2/day = 4,440 mg, Intravenous, Over 46 hours, 11 of 12 cycles     Primary adenocarcinoma of pancreatic duct (HCC)   10/30/2023 Initial Diagnosis    Primary adenocarcinoma of pancreatic duct (HCC)     10/30/2023 Biopsy    Final Diagnosis   A.B. Pancreas, Neck mass (Cell Block and smear Preparations):  Malignant  Adenocarcinoma.     Satisfactory for evaluation.        10/30/2023 -  Cancer Staged    Staging form: Pancreas, AJCC 8th Edition  - Clinical stage from 10/30/2023: Stage IB (cT2, cN0, cM0) - Signed by Malgorzata Madden MD on 2/19/2024  Stage prefix: Initial diagnosis  Total positive nodes: 0           Staging: Locally advanced pancreas cancer  Treatment history: FOLFIRINOX  Current treatment: FOLFIRINOX  Disease status: Stable    History of Present Illness: Patient returns in follow-up.  He is doing relatively well.  He did not feel he could tolerate his last dose of chemotherapy.  His appetite is good.  His weight is stable.  He still has some abdominal discomfort that comes and goes.  CT from April 22, 2024 reveals stable pancreas mass.  It measures 6 x 3 cm.  This appears to be involving the celiac axis as well as the portal vein.  I personally reviewed the films.  His CA 19-9 has risen back to 945.    Review of Systems  Complete ROS Surg Onc:   Complete ROS Surg Onc:   Constitutional: The patient denies new or recent history of general fatigue, no recent weight loss, no change in appetite.   Eyes: No complaints of visual problems, no scleral icterus.   ENT: no complaints of ear pain, no hoarseness, no difficulty swallowing,  no tinnitus and no new masses in head, oral cavity, or neck.   Cardiovascular: No complaints of chest pain, no palpitations, no ankle edema.   Respiratory: No  complaints of shortness of breath, no cough.   Gastrointestinal: No complaints of jaundice, no bloody stools, no pale stools.   Genitourinary: No complaints of dysuria, no hematuria, no nocturia, no frequent urination, no urethral discharge.   Musculoskeletal: No complaints of weakness, paralysis, joint stiffness or arthralgias.  Integumentary: No complaints of rash, no new lesions.   Neurological: No complaints of convulsions, no seizures, no dizziness.   Hematologic/Lymphatic: No complaints of easy bruising.   Endocrine:  No hot or cold intolerance.  No polydipsia, polyphagia, or polyuria.  Allergy/immunology:  No environmental allergies.  No food allergies.  Not immunocompromised.  Skin:  No pallor or rash.  No wound.        Patient Active Problem List   Diagnosis    Right upper quadrant pain    Type 2 diabetes mellitus with hyperglycemia, with long-term current use of insulin (HCC)    Mixed hyperlipidemia    Coronary artery disease involving native coronary artery of native heart without angina pectoris    Gastroesophageal reflux disease    Primary insomnia    Dehydration    Attention deficit hyperactivity disorder (ADHD), combined type    Depression with anxiety    Right upper quadrant abdominal pain    Primary adenocarcinoma of pancreatic duct (HCC)    SIRS (systemic inflammatory response syndrome) (HCC)    Pancreatic adenocarcinoma (HCC)    Palliative care patient    Primary hypertension     Past Medical History:   Diagnosis Date    Cancer (HCC)     pancreatic    Coronary artery disease     Diabetes mellitus (HCC)     Hyperlipidemia     Hypertension     Pancreatic cancer (HCC)      Past Surgical History:   Procedure Laterality Date    CORONARY ANGIOPLASTY WITH STENT PLACEMENT      ERCP W/ PLASTIC STENT PLACEMENT      HERNIA REPAIR      IR PORT PLACEMENT  11/24/2023     Family History   Problem Relation Age of Onset    Heart attack Father     Skin cancer Father      Social History     Socioeconomic History     Marital status: /Civil Union     Spouse name: Not on file    Number of children: Not on file    Years of education: Not on file    Highest education level: Not on file   Occupational History    Not on file   Tobacco Use    Smoking status: Every Day     Current packs/day: 1.50     Average packs/day: 1.5 packs/day for 35.0 years (52.5 ttl pk-yrs)     Types: Cigarettes    Smokeless tobacco: Not on file   Vaping Use    Vaping status: Never Used   Substance and Sexual Activity    Alcohol use: Not Currently    Drug use: Never    Sexual activity: Yes     Partners: Male   Other Topics Concern    Not on file   Social History Narrative    Not on file     Social Determinants of Health     Financial Resource Strain: Not on file   Food Insecurity: No Food Insecurity (12/15/2023)    Hunger Vital Sign     Worried About Running Out of Food in the Last Year: Never true     Ran Out of Food in the Last Year: Never true   Transportation Needs: No Transportation Needs (12/15/2023)    PRAPARE - Transportation     Lack of Transportation (Medical): No     Lack of Transportation (Non-Medical): No   Physical Activity: Not on file   Stress: Not on file   Social Connections: Not on file   Intimate Partner Violence: Not on file   Housing Stability: Low Risk  (12/15/2023)    Housing Stability Vital Sign     Unable to Pay for Housing in the Last Year: No     Number of Places Lived in the Last Year: 1     Unstable Housing in the Last Year: No       Current Outpatient Medications:     amphetamine-dextroamphetamine (ADDERALL XR) 20 MG 24 hr capsule, Take 20 mg by mouth every morning, Disp: , Rfl:     aspirin 81 MG tablet, daily, Disp: , Rfl:     Belsomra 10 MG TABS, , Disp: , Rfl:     carvedilol (COREG) 6.25 mg tablet, Take 6.25 mg by mouth 2 (two) times a day, Disp: , Rfl:     Continuous Blood Gluc Sensor (FreeStyle Geovanny 3 Sensor) MISC, use as directed TO MONITOR GLUCOSE DAILY, Disp: , Rfl:     Creon 66028-09008 units capsule, take 1  capsule by mouth three times a day with meals, Disp: 180 capsule, Rfl: 0    desvenlafaxine succinate (PRISTIQ) 50 mg 24 hr tablet, Take 50 mg by mouth daily, Disp: , Rfl:     diphenhydramine, lidocaine, Al/Mg hydroxide, simethicone (Magic Mouthwash) SUSP, Swish and spit 10 mL every 4 (four) hours as needed for mouth pain or discomfort, Disp: 237 mL, Rfl: 2    HYDROcodone-acetaminophen (Norco) 5-325 mg per tablet, Take 1 tablet by mouth daily as needed for pain Max Daily Amount: 1 tablet, Disp: 30 tablet, Rfl: 0    insulin aspart (NovoLOG FlexPen) 100 UNIT/ML injection pen, Inject 8 Units under the skin 3 (three) times a day with meals, Disp: 21.6 mL, Rfl: 0    insulin aspart, w/niacinamide, (Fiasp FlexTouch) 100 Units/mL injection pen, Inject 8 Units under the skin 3 (three) times a day with meals, Disp: 3 mL, Rfl: 2    Jardiance 25 MG TABS, Take 1 tablet (25 mg total) by mouth every morning, Disp: 90 tablet, Rfl: 3    lisinopril (ZESTRIL) 5 mg tablet, Take 1 tablet by mouth daily, Disp: , Rfl:     metFORMIN (GLUCOPHAGE) 1000 MG tablet, Take 1 tablet (1,000 mg total) by mouth daily with breakfast, Disp: 90 tablet, Rfl: 2    omeprazole (PriLOSEC) 40 MG capsule, , Disp: , Rfl:     ondansetron (ZOFRAN) 8 mg tablet, Take 1 tablet (8 mg total) by mouth every 8 (eight) hours as needed for nausea or vomiting for up to 20 days, Disp: 20 tablet, Rfl: 2    rosuvastatin (CRESTOR) 10 MG tablet, Take 1 tablet by mouth daily, Disp: , Rfl:     Tresiba FlexTouch 100 units/mL injection pen, Inject 40 Units under the skin daily at bedtime, Disp: 36 mL, Rfl: 0    valACYclovir (VALTREX) 1,000 mg tablet, Take 1 tablet (1,000 mg total) by mouth 2 (two) times a day for 10 days, Disp: 20 tablet, Rfl: 3    fluorouracil 4,535 mg in CADD/Elastomeric Infusion Device, Infuse 4,535 mg (1,200 mg/m2/day x 1.89 m2) into a catheter in a vein via infusion device over 46 hours for 2 days  Infusion planned for May 1, 2024. (Patient not taking: Infuse  via infusion device Reported on 5/2/2024  Infusion planned for May 1, 2024.), Disp: 1 each, Rfl: 0    zolpidem (AMBIEN CR) 12.5 MG CR tablet, Take 12.5 mg by mouth daily at bedtime as needed (Patient not taking: Reported on 5/2/2024), Disp: , Rfl:     zolpidem (AMBIEN) 10 mg tablet, Take 10 mg by mouth daily at bedtime as needed (Patient not taking: Reported on 5/2/2024), Disp: , Rfl:   No Known Allergies  Vitals:    05/02/24 1427   BP: 118/70   Pulse: 101   SpO2: 95%       Physical Exam  Constitutional: General appearance: The Patient is well-developed and well-nourished who appears the stated age in no acute distress. Patient is pleasant and talkative.     HEENT:  Normocephalic.  Sclerae are anicteric. Mucous membranes are moist. Neck is supple without adenopathy. No JVD.     Chest: The lungs are clear to auscultation.     Cardiac: Heart is regular rate.     Abdomen: Abdomen is soft, non-tender, non-distended and without masses.     Extremities: There is no clubbing or cyanosis. There is no edema.  Symmetric.  Neuro: Grossly nonfocal. Gait is normal.     Lymphatic: No evidence of cervical adenopathy bilaterally.   No evidence of axillary adenopathy bilaterally.   Skin: Warm, anicteric.    Psych:  Patient is pleasant and talkative.  Breasts:        Pathology:  [unfilled]    Labs:  Component  Ref Range & Units 4/11/24 11:32 AM 2/20/24 10:30 AM 11/1/23 12:54 PM   CA 19-9  0 - 35 U/mL 945 High  583 High   High  CM       Imaging  CT abdomen pelvis w contrast    Result Date: 4/26/2024  Narrative: CT ABDOMEN AND PELVIS WITH IV CONTRAST INDICATION: C25.3: Malignant neoplasm of pancreatic duct I25.10: Atherosclerotic heart disease of native coronary artery without angina pectoris E11.65: Type 2 diabetes mellitus with hyperglycemia Z79.4: Long term (current) use of insulin. History of adenocarcinoma of the pancreatic neck. COMPARISON: Multiple prior CT studies dating back to 10/23/2023, most recent dated 2/26/2024.  TECHNIQUE: CT examination of the abdomen and pelvis was performed. Multiplanar 2D reformatted images were created from the source data. This examination, like all CT scans performed in the [facility], was performed utilizing techniques to minimize radiation dose exposure, including the use of iterative reconstruction and automated exposure control. Radiation dose length product (DLP) for this visit: 1293 mGy-cm IV Contrast: 100 mL of iohexol (OMNIPAQUE) Enteric Contrast: Not administered. FINDINGS: ABDOMEN LOWER CHEST: Bibasilar atelectasis. Lung bases are otherwise clear. LIVER/BILIARY TREE: Common bile duct stent remains in place. Similar small associated pneumobilia. No suspicious hepatic mass. Normal hepatic contours. GALLBLADDER: The gallbladder is again noted to be distended with areas of wall thickening. Focal adenomyomatosis is possible at the fundus. No calcified gallstones. Similar mild pericholecystic inflammatory change may be related to peripancreatic inflammation. SPLEEN: Subcentimeter hypodense lesion in the superior spleen too small to characterize, stable since 10/23/2023. PANCREAS: Overall similar appearance of ill-defined hypodense heterogeneous mass involving the pancreatic neck with upstream main pancreatic ductal dilation in keeping with patient's history of pancreatic adenocarcinoma. The mass is difficult to measure discretely but measures approximately 6 x 3 cm similar to prior. Again noted encasement of the proximal portal vein just distal to the confluence with increased severe narrowing (series 302 image 62 on the current study; series 603 image 61 of the prior study). There is similar encasement of the gastroduodenal artery, which remains patent. Similar mild inflammatory change around the pancreas may represent secondary pancreatitis. ADRENAL GLANDS: Stable multiple left adrenal myolipomas measuring up to 1.3 cm (series 201 image 50). KIDNEYS/URETERS: Right upper pole  simple renal cyst. Additional subcentimeter hypoattenuating renal lesions, too small to characterize but likely benign. No hydronephrosis. STOMACH AND BOWEL: Colonic diverticulosis without findings of acute diverticulitis. APPENDIX: Appendicolith near the tip of the appendix (series 201 image 59). The appendix otherwise appears unremarkable. ABDOMINOPELVIC CAVITY: No ascites. No pneumoperitoneum. No lymphadenopathy. VESSELS: Atherosclerosis without abdominal aortic aneurysm. Accessory left hepatic artery from the left gastric artery. Gastrohepatic ligament, perigastric, and perisplenic varices. PELVIS REPRODUCTIVE ORGANS: Unremarkable for patient's age. URINARY BLADDER: Unremarkable. ABDOMINAL WALL/INGUINAL REGIONS: Prior ventral hernia repair with mesh. BONES: No acute fracture or suspicious osseous lesion. Spinal mild spinal degenerative changes.     Impression: 1. Overall similar appearance of the pancreatic neck neoplasm. Again noted encasement of the proximal portal vein with increased severe narrowing compared to 2/26/2024. Similar mild inflammatory change around the pancreas may represent secondary pancreatitis. 2. No findings of metastatic disease in the abdomen or pelvis. 3. Similar distended gallbladder with areas of wall thickening with possible adenomyomatosis. Mild pericholecystic inflammatory change may be related to peripancreatic inflammation. Resident: SHAKA Ram I, the attending radiologist, have reviewed the images and agree with the final report above. Workstation performed: [id number]     I personally reviewed and interpreted the above laboratory and imaging data.    Discussion/Summary: 58-year-old male with locally advanced pancreas cancer.  There continues to be no evidence of metastasis, but the mass is stable and his CA 19-9 has risen to over 900.  Based on his imaging it appears that he would require portal vein resection as well as possible celiac artery resection as well.  I did discuss  that we would not resect this here given the celiac involvement, but he could consider going to the few places that would consider major arterial resection including [facility] and the [facility].  We also discussed definitive chemoradiation which would be my recommendation.  He has seen Dr. Madden in the past.  He will schedule a follow-up with her.  If there are changes in the imaging I can see him back in the future.  He is agreeable to this plan.  All his questions were answered.

## 2024-05-02 NOTE — LETTER
May 2, 2024     Elo Modi MD  [address] [place] [address]  [address]  [place] [id number]    Patient: Derick Richey   YOB: 1965   Date of Visit: 5/2/2024       Dear Dr. Modi:    Thank you for referring Derick Richey to me for evaluation. Below are my notes for this consultation.    If you have questions, please do not hesitate to call me. I look forward to following your patient along with you.         Sincerely,        Samir Cervantes MD        CC: MD Samir Carroll MD  5/2/2024  4:03 PM  Sign when Signing Visit               Surgical Oncology Follow Up       [facility]  [address]  [address]  [place] [address]  [phone]    Derick Richey  1965  [id number]  [facility]  [address]  [address]  [place] [address]  [phone]    There are no diagnoses linked to this encounter.    Chief Complaint   Patient presents with   • Follow-up     3 mo f/u [facility] pancreatic neck       No follow-ups on file.      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  He has completed 6 cycles modified FOLFIRINOX with dose reductions required.  He presents today for follow up after CT imaging.      2/26/24 CT C/A/P         2/29/24 Dr. Cervantes          3/7/24 Dr. Calderon     Right upper quadrant pain   10/30/2023 Biopsy    Endoscopic ultrasound:  Pancreas, Neck mass:  Malignant  Adenocarcinoma.        11/16/2023 -  Cancer Staged    Staging form: Pancreas, AJCC 8th Edition  - Clinical: Stage IB (cT2, cN0, cM0) - Signed by Samir Cervantes MD on 11/16/2023  Total positive nodes: 0       11/28/2023 -  Chemotherapy    alteplase (CATHFLO), 2 mg, Intracatheter, Every 1 Minute as needed, 11 of 12 cycles  irinotecan (CAMPTOSAR) chemo infusion, 150 mg/m2 = 278 mg,  Intravenous, Once, 11 of 12 cycles  Dose modification: 125 mg/m2 (100 % of original dose 150 mg/m2, Cycle 9, Reason: Dose Not Tolerated), 125.1 mg/m2 (83.4 % of original dose 125 mg/m2, Cycle 2, Reason: Dose Not Tolerated), 125 mg/m2 (100 % of original dose 125 mg/m2, Cycle 2, Reason: Dose Not Tolerated)  Administration: 239 mg (3/19/2024), 278 mg (11/28/2023), 231 mg (12/12/2023), 231 mg (12/27/2023), 231 mg (1/10/2024), 231 mg (1/24/2024), 231 mg (2/7/2024), 231 mg (2/21/2024), 238 mg (3/6/2024), 238 mg (4/3/2024), 238 mg (4/17/2024)  oxaliplatin (ELOXATIN) chemo infusion, 65 mg/m2 = 120.25 mg, Intravenous, Once, 3 of 3 cycles  Administration: 120.25 mg (11/28/2023), 120.25 mg (12/12/2023), 120.25 mg (12/27/2023)  fluorouracil (ADRUCIL) ambulatory infusion Soln, 1,200 mg/m2/day = 4,440 mg, Intravenous, Over 46 hours, 11 of 12 cycles     Primary adenocarcinoma of pancreatic duct (HCC)   10/30/2023 Initial Diagnosis    Primary adenocarcinoma of pancreatic duct (HCC)     10/30/2023 Biopsy    Final Diagnosis   A.B. Pancreas, Neck mass (Cell Block and smear Preparations):  Malignant  Adenocarcinoma.     Satisfactory for evaluation.        10/30/2023 -  Cancer Staged    Staging form: Pancreas, AJCC 8th Edition  - Clinical stage from 10/30/2023: Stage IB (cT2, cN0, cM0) - Signed by Malgorzata Madden MD on 2/19/2024  Stage prefix: Initial diagnosis  Total positive nodes: 0           Staging: Locally advanced pancreas cancer  Treatment history: FOLFIRINOX  Current treatment: FOLFIRINOX  Disease status: Stable    History of Present Illness: Patient returns in follow-up.  He is doing relatively well.  He did not feel he could tolerate his last dose of chemotherapy.  His appetite is good.  His weight is stable.  He still has some abdominal discomfort that comes and goes.  CT from April 22, 2024 reveals stable pancreas mass.  It measures 6 x 3 cm.  This appears to be involving the celiac axis as well as the portal vein.  I personally  reviewed the films.  His CA 19-9 has risen back to 945.    Review of Systems  Complete ROS Surg Onc:   Complete ROS Surg Onc:   Constitutional: The patient denies new or recent history of general fatigue, no recent weight loss, no change in appetite.   Eyes: No complaints of visual problems, no scleral icterus.   ENT: no complaints of ear pain, no hoarseness, no difficulty swallowing,  no tinnitus and no new masses in head, oral cavity, or neck.   Cardiovascular: No complaints of chest pain, no palpitations, no ankle edema.   Respiratory: No complaints of shortness of breath, no cough.   Gastrointestinal: No complaints of jaundice, no bloody stools, no pale stools.   Genitourinary: No complaints of dysuria, no hematuria, no nocturia, no frequent urination, no urethral discharge.   Musculoskeletal: No complaints of weakness, paralysis, joint stiffness or arthralgias.  Integumentary: No complaints of rash, no new lesions.   Neurological: No complaints of convulsions, no seizures, no dizziness.   Hematologic/Lymphatic: No complaints of easy bruising.   Endocrine:  No hot or cold intolerance.  No polydipsia, polyphagia, or polyuria.  Allergy/immunology:  No environmental allergies.  No food allergies.  Not immunocompromised.  Skin:  No pallor or rash.  No wound.        Patient Active Problem List   Diagnosis   • Right upper quadrant pain   • Type 2 diabetes mellitus with hyperglycemia, with long-term current use of insulin (HCC)   • Mixed hyperlipidemia   • Coronary artery disease involving native coronary artery of native heart without angina pectoris   • Gastroesophageal reflux disease   • Primary insomnia   • Dehydration   • Attention deficit hyperactivity disorder (ADHD), combined type   • Depression with anxiety   • Right upper quadrant abdominal pain   • Primary adenocarcinoma of pancreatic duct (HCC)   • SIRS (systemic inflammatory response syndrome) (HCC)   • Pancreatic adenocarcinoma (HCC)   • Palliative care  patient   • Primary hypertension     Past Medical History:   Diagnosis Date   • Cancer (HCC)     pancreatic   • Coronary artery disease    • Diabetes mellitus (HCC)    • Hyperlipidemia    • Hypertension    • Pancreatic cancer (HCC)      Past Surgical History:   Procedure Laterality Date   • CORONARY ANGIOPLASTY WITH STENT PLACEMENT     • ERCP W/ PLASTIC STENT PLACEMENT     • HERNIA REPAIR     • IR PORT PLACEMENT  11/24/2023     Family History   Problem Relation Age of Onset   • Heart attack Father    • Skin cancer Father      Social History     Socioeconomic History   • Marital status: /Civil Union     Spouse name: Not on file   • Number of children: Not on file   • Years of education: Not on file   • Highest education level: Not on file   Occupational History   • Not on file   Tobacco Use   • Smoking status: Every Day     Current packs/day: 1.50     Average packs/day: 1.5 packs/day for 35.0 years (52.5 ttl pk-yrs)     Types: Cigarettes   • Smokeless tobacco: Not on file   Vaping Use   • Vaping status: Never Used   Substance and Sexual Activity   • Alcohol use: Not Currently   • Drug use: Never   • Sexual activity: Yes     Partners: Male   Other Topics Concern   • Not on file   Social History Narrative   • Not on file     Social Determinants of Health     Financial Resource Strain: Not on file   Food Insecurity: No Food Insecurity (12/15/2023)    Hunger Vital Sign    • Worried About Running Out of Food in the Last Year: Never true    • Ran Out of Food in the Last Year: Never true   Transportation Needs: No Transportation Needs (12/15/2023)    PRAPARE - Transportation    • Lack of Transportation (Medical): No    • Lack of Transportation (Non-Medical): No   Physical Activity: Not on file   Stress: Not on file   Social Connections: Not on file   Intimate Partner Violence: Not on file   Housing Stability: Low Risk  (12/15/2023)    Housing Stability Vital Sign    • Unable to Pay for Housing in the Last Year: No     • Number of Places Lived in the Last Year: 1    • Unstable Housing in the Last Year: No       Current Outpatient Medications:   •  amphetamine-dextroamphetamine (ADDERALL XR) 20 MG 24 hr capsule, Take 20 mg by mouth every morning, Disp: , Rfl:   •  aspirin 81 MG tablet, daily, Disp: , Rfl:   •  Belsomra 10 MG TABS, , Disp: , Rfl:   •  carvedilol (COREG) 6.25 mg tablet, Take 6.25 mg by mouth 2 (two) times a day, Disp: , Rfl:   •  Continuous Blood Gluc Sensor (FreeStyle Geovanny 3 Sensor) MISC, use as directed TO MONITOR GLUCOSE DAILY, Disp: , Rfl:   •  Creon 71667-65926 units capsule, take 1 capsule by mouth three times a day with meals, Disp: 180 capsule, Rfl: 0  •  desvenlafaxine succinate (PRISTIQ) 50 mg 24 hr tablet, Take 50 mg by mouth daily, Disp: , Rfl:   •  diphenhydramine, lidocaine, Al/Mg hydroxide, simethicone (Magic Mouthwash) SUSP, Swish and spit 10 mL every 4 (four) hours as needed for mouth pain or discomfort, Disp: 237 mL, Rfl: 2  •  HYDROcodone-acetaminophen (Norco) 5-325 mg per tablet, Take 1 tablet by mouth daily as needed for pain Max Daily Amount: 1 tablet, Disp: 30 tablet, Rfl: 0  •  insulin aspart (NovoLOG FlexPen) 100 UNIT/ML injection pen, Inject 8 Units under the skin 3 (three) times a day with meals, Disp: 21.6 mL, Rfl: 0  •  insulin aspart, w/niacinamide, (Fiasp FlexTouch) 100 Units/mL injection pen, Inject 8 Units under the skin 3 (three) times a day with meals, Disp: 3 mL, Rfl: 2  •  Jardiance 25 MG TABS, Take 1 tablet (25 mg total) by mouth every morning, Disp: 90 tablet, Rfl: 3  •  lisinopril (ZESTRIL) 5 mg tablet, Take 1 tablet by mouth daily, Disp: , Rfl:   •  metFORMIN (GLUCOPHAGE) 1000 MG tablet, Take 1 tablet (1,000 mg total) by mouth daily with breakfast, Disp: 90 tablet, Rfl: 2  •  omeprazole (PriLOSEC) 40 MG capsule, , Disp: , Rfl:   •  ondansetron (ZOFRAN) 8 mg tablet, Take 1 tablet (8 mg total) by mouth every 8 (eight) hours as needed for nausea or vomiting for up to 20 days,  Disp: 20 tablet, Rfl: 2  •  rosuvastatin (CRESTOR) 10 MG tablet, Take 1 tablet by mouth daily, Disp: , Rfl:   •  Tresiba FlexTouch 100 units/mL injection pen, Inject 40 Units under the skin daily at bedtime, Disp: 36 mL, Rfl: 0  •  valACYclovir (VALTREX) 1,000 mg tablet, Take 1 tablet (1,000 mg total) by mouth 2 (two) times a day for 10 days, Disp: 20 tablet, Rfl: 3  •  fluorouracil 4,535 mg in CADD/Elastomeric Infusion Device, Infuse 4,535 mg (1,200 mg/m2/day x 1.89 m2) into a catheter in a vein via infusion device over 46 hours for 2 days  Infusion planned for May 1, 2024. (Patient not taking: Infuse via infusion device Reported on 5/2/2024  Infusion planned for May 1, 2024.), Disp: 1 each, Rfl: 0  •  zolpidem (AMBIEN CR) 12.5 MG CR tablet, Take 12.5 mg by mouth daily at bedtime as needed (Patient not taking: Reported on 5/2/2024), Disp: , Rfl:   •  zolpidem (AMBIEN) 10 mg tablet, Take 10 mg by mouth daily at bedtime as needed (Patient not taking: Reported on 5/2/2024), Disp: , Rfl:   No Known Allergies  Vitals:    05/02/24 1427   BP: 118/70   Pulse: 101   SpO2: 95%       Physical Exam  Constitutional: General appearance: The Patient is well-developed and well-nourished who appears the stated age in no acute distress. Patient is pleasant and talkative.     HEENT:  Normocephalic.  Sclerae are anicteric. Mucous membranes are moist. Neck is supple without adenopathy. No JVD.     Chest: The lungs are clear to auscultation.     Cardiac: Heart is regular rate.     Abdomen: Abdomen is soft, non-tender, non-distended and without masses.     Extremities: There is no clubbing or cyanosis. There is no edema.  Symmetric.  Neuro: Grossly nonfocal. Gait is normal.     Lymphatic: No evidence of cervical adenopathy bilaterally.   No evidence of axillary adenopathy bilaterally.   Skin: Warm, anicteric.    Psych:  Patient is pleasant and talkative.  Breasts:        Pathology:  [unfilled]    Labs:  Component  Ref Range & Units  4/11/24 11:32 AM 2/20/24 10:30 AM 11/1/23 12:54 PM   CA 19-9  0 - 35 U/mL 945 High  583 High   High  CM       Imaging  CT abdomen pelvis w contrast    Result Date: 4/26/2024  Narrative: CT ABDOMEN AND PELVIS WITH IV CONTRAST INDICATION: C25.3: Malignant neoplasm of pancreatic duct I25.10: Atherosclerotic heart disease of native coronary artery without angina pectoris E11.65: Type 2 diabetes mellitus with hyperglycemia Z79.4: Long term (current) use of insulin. History of adenocarcinoma of the pancreatic neck. COMPARISON: Multiple prior CT studies dating back to 10/23/2023, most recent dated 2/26/2024. TECHNIQUE: CT examination of the abdomen and pelvis was performed. Multiplanar 2D reformatted images were created from the source data. This examination, like all CT scans performed in the [facility], was performed utilizing techniques to minimize radiation dose exposure, including the use of iterative reconstruction and automated exposure control. Radiation dose length product (DLP) for this visit: 1293 mGy-cm IV Contrast: 100 mL of iohexol (OMNIPAQUE) Enteric Contrast: Not administered. FINDINGS: ABDOMEN LOWER CHEST: Bibasilar atelectasis. Lung bases are otherwise clear. LIVER/BILIARY TREE: Common bile duct stent remains in place. Similar small associated pneumobilia. No suspicious hepatic mass. Normal hepatic contours. GALLBLADDER: The gallbladder is again noted to be distended with areas of wall thickening. Focal adenomyomatosis is possible at the fundus. No calcified gallstones. Similar mild pericholecystic inflammatory change may be related to peripancreatic inflammation. SPLEEN: Subcentimeter hypodense lesion in the superior spleen too small to characterize, stable since 10/23/2023. PANCREAS: Overall similar appearance of ill-defined hypodense heterogeneous mass involving the pancreatic neck with upstream main pancreatic ductal dilation in keeping with patient's history of pancreatic  adenocarcinoma. The mass is difficult to measure discretely but measures approximately 6 x 3 cm similar to prior. Again noted encasement of the proximal portal vein just distal to the confluence with increased severe narrowing (series 302 image 62 on the current study; series 603 image 61 of the prior study). There is similar encasement of the gastroduodenal artery, which remains patent. Similar mild inflammatory change around the pancreas may represent secondary pancreatitis. ADRENAL GLANDS: Stable multiple left adrenal myolipomas measuring up to 1.3 cm (series 201 image 50). KIDNEYS/URETERS: Right upper pole simple renal cyst. Additional subcentimeter hypoattenuating renal lesions, too small to characterize but likely benign. No hydronephrosis. STOMACH AND BOWEL: Colonic diverticulosis without findings of acute diverticulitis. APPENDIX: Appendicolith near the tip of the appendix (series 201 image 59). The appendix otherwise appears unremarkable. ABDOMINOPELVIC CAVITY: No ascites. No pneumoperitoneum. No lymphadenopathy. VESSELS: Atherosclerosis without abdominal aortic aneurysm. Accessory left hepatic artery from the left gastric artery. Gastrohepatic ligament, perigastric, and perisplenic varices. PELVIS REPRODUCTIVE ORGANS: Unremarkable for patient's age. URINARY BLADDER: Unremarkable. ABDOMINAL WALL/INGUINAL REGIONS: Prior ventral hernia repair with mesh. BONES: No acute fracture or suspicious osseous lesion. Spinal mild spinal degenerative changes.     Impression: 1. Overall similar appearance of the pancreatic neck neoplasm. Again noted encasement of the proximal portal vein with increased severe narrowing compared to 2/26/2024. Similar mild inflammatory change around the pancreas may represent secondary pancreatitis. 2. No findings of metastatic disease in the abdomen or pelvis. 3. Similar distended gallbladder with areas of wall thickening with possible adenomyomatosis. Mild pericholecystic inflammatory  change may be related to peripancreatic inflammation. Resident: SHAKA Ram I, the attending radiologist, have reviewed the images and agree with the final report above. Workstation performed: [id number]     I personally reviewed and interpreted the above laboratory and imaging data.    Discussion/Summary: 58-year-old male with locally advanced pancreas cancer.  There continues to be no evidence of metastasis, but the mass is stable and his CA 19-9 has risen to over 900.  Based on his imaging it appears that he would require portal vein resection as well as possible celiac artery resection as well.  I did discuss that we would not resect this here given the celiac involvement, but he could consider going to the few places that would consider major arterial resection including [facility] and the [facility].  We also discussed definitive chemoradiation which would be my recommendation.  He has seen Dr. Madden in the past.  He will schedule a follow-up with her.  If there are changes in the imaging I can see him back in the future.  He is agreeable to this plan.  All his questions were answered.

## 2024-05-03 RX ORDER — CYCLOBENZAPRINE HCL 10 MG
10 TABLET ORAL 3 TIMES DAILY PRN
Qty: 30 TABLET | Refills: 0 | Status: SHIPPED | OUTPATIENT
Start: 2024-05-03

## 2024-05-13 ENCOUNTER — RADIATION ONCOLOGY FOLLOW-UP (OUTPATIENT)
Dept: RADIATION ONCOLOGY | Facility: CLINIC | Age: 59
End: 2024-05-13
Attending: RADIOLOGY
Payer: COMMERCIAL

## 2024-05-13 VITALS
BODY MASS INDEX: 24.37 KG/M2 | RESPIRATION RATE: 16 BRPM | WEIGHT: 165 LBS | HEART RATE: 83 BPM | TEMPERATURE: 97 F | SYSTOLIC BLOOD PRESSURE: 128 MMHG | DIASTOLIC BLOOD PRESSURE: 66 MMHG | OXYGEN SATURATION: 98 %

## 2024-05-13 DIAGNOSIS — C25.3 PRIMARY ADENOCARCINOMA OF PANCREATIC DUCT (HCC): Primary | ICD-10-CM

## 2024-05-13 PROCEDURE — 99215 OFFICE O/P EST HI 40 MIN: CPT | Performed by: RADIOLOGY

## 2024-05-13 NOTE — PROGRESS NOTES
Derick Richey 1965 is a 58 y.o. 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  He has completed 6 cycles modified FOLFIRINOX with dose reductions required. His CA 19-9 has risen to 945. He was seen in consult on 2/19/24 and is being referred back by Dr. Cervantes for consideration  of concurrent chemoradiation.  He presents today for consult.      4/11/24 Dr. Calderon  1 pancreatic cancer T2 N0 M0  2 to complete FOLFIRINOX cycles 11 and 12  3 stable polycythemia  Plan: To complete chemotherapy.  To be seen by surgical oncology 5/30.  Will get a CT scan abdomen pelvis along with CEA 19-9 levels.  Long-term prognosis guarded.  Hopefully will get a good response to treatment.      4/22/24 CT A/P w contrast  1. Overall similar appearance of the pancreatic neck neoplasm. Again noted encasement of the proximal portal vein with increased severe narrowing compared to 2/26/2024. Similar mild inflammatory change around the pancreas may represent secondary   pancreatitis.   2. No findings of metastatic disease in the abdomen or pelvis.   3. Similar distended gallbladder with areas of wall thickening with possible adenomyomatosis. Mild pericholecystic inflammatory change may be related to peripancreatic inflammation.      5/2/24 Dr. Cervantes  locally advanced pancreas cancer. There continues to be no evidence of metastasis, but the mass is stable and his CA 19-9 has risen to over 900.   Based on his imaging it appears that he would require portal vein resection as well as possible celiac artery resection as well.   did discuss that we would not resect this here given the celiac involvement, but he could consider going to the few places that would consider major arterial resection   also discussed definitive chemoradiation which would be my recommendation.       5/3/24 completed FOLFIRINOX  12/12 cycles        Upcoming:  Does not have Med Onc follow up.      Follow up visit     Oncology History    Right upper quadrant pain   10/30/2023 Biopsy    Endoscopic ultrasound:  Pancreas, Neck mass:  Malignant  Adenocarcinoma.        11/16/2023 -  Cancer Staged    Staging form: Pancreas, AJCC 8th Edition  - Clinical: Stage IB (cT2, cN0, cM0) - Signed by Samir Cervantes MD on 11/16/2023  Total positive nodes: 0       11/28/2023 -  Chemotherapy    alteplase (CATHFLO), 2 mg, Intracatheter, Every 1 Minute as needed, 11 of 12 cycles  irinotecan (CAMPTOSAR) chemo infusion, 150 mg/m2 = 278 mg, Intravenous, Once, 11 of 12 cycles  Dose modification: 125 mg/m2 (100 % of original dose 150 mg/m2, Cycle 9, Reason: Dose Not Tolerated), 125.1 mg/m2 (83.4 % of original dose 125 mg/m2, Cycle 2, Reason: Dose Not Tolerated), 125 mg/m2 (100 % of original dose 125 mg/m2, Cycle 2, Reason: Dose Not Tolerated)  Administration: 239 mg (3/19/2024), 278 mg (11/28/2023), 231 mg (12/12/2023), 231 mg (12/27/2023), 231 mg (1/10/2024), 231 mg (1/24/2024), 231 mg (2/7/2024), 231 mg (2/21/2024), 238 mg (3/6/2024), 238 mg (4/3/2024), 238 mg (4/17/2024)  oxaliplatin (ELOXATIN) chemo infusion, 65 mg/m2 = 120.25 mg, Intravenous, Once, 3 of 3 cycles  Administration: 120.25 mg (11/28/2023), 120.25 mg (12/12/2023), 120.25 mg (12/27/2023)  fluorouracil (ADRUCIL) ambulatory infusion Soln, 1,200 mg/m2/day = 4,440 mg, Intravenous, Over 46 hours, 11 of 12 cycles     Primary adenocarcinoma of pancreatic duct (HCC)   10/30/2023 Initial Diagnosis    Primary adenocarcinoma of pancreatic duct (HCC)     10/30/2023 Biopsy    Final Diagnosis   A.B. Pancreas, Neck mass (Cell Block and smear Preparations):  Malignant  Adenocarcinoma.     Satisfactory for evaluation.        10/30/2023 -  Cancer Staged    Staging form: Pancreas, AJCC 8th Edition  - Clinical stage from 10/30/2023: Stage IB (cT2, cN0, cM0) - Signed by Malgorzata Madden MD on 2/19/2024  Stage prefix: Initial diagnosis  Total positive nodes: 0           Review of Systems:  Review of Systems   Constitutional:  Positive  for fatigue. Negative for appetite change and unexpected weight change.   HENT:  Positive for dental problem, mouth sores (using magic mouthwash prn) and postnasal drip.    Eyes: Negative.    Respiratory:  Negative for shortness of breath.         Smoking 1.5 ppd   Gastrointestinal:  Positive for abdominal distention (bloating with constipation/gas), abdominal pain (constant mild LUQ dull ache), constipation (occasional, uses MOM prn), diarrhea and vomiting (occasional).   Endocrine: Positive for cold intolerance.   Genitourinary:  Positive for frequency and urgency.   Musculoskeletal: Negative.    Skin:  Positive for rash (around mouth).   Allergic/Immunologic: Positive for immunocompromised state. Negative for environmental allergies and food allergies.   Neurological:  Negative for dizziness, weakness and light-headedness.   Hematological: Negative.    Psychiatric/Behavioral:  Positive for sleep disturbance.        Clinical Trial: no          Teaching: [facility] book, side effects, [facility]    Right chest [facility]    Health Maintenance   Topic Date Due    Hepatitis C Screening  Never done    HIV Screening  Never done    Hepatitis A Vaccine (1 of 2 - Risk 2-dose series) Never done    Zoster Vaccine (1 of 2) Never done    Pneumococcal Vaccine: Pediatrics (0 to 5 Years) and At-Risk Patients (6 to 64 Years) (2 of 2 - PCV) 12/11/2020    COVID-19 Vaccine (4 - 2023-24 season) 09/01/2023    HEMOGLOBIN A1C  05/21/2024    Colorectal Cancer Screening  09/10/2024    Annual Physical  11/21/2024    Diabetic Foot Exam  11/21/2024    Depression Screening  02/19/2025    Lung Cancer Screening  02/26/2025    BMI: Adult  05/02/2025    DM Eye Exam  06/01/2025    DTaP,Tdap,and Td Vaccines (2 - Td or Tdap) 12/11/2029    Influenza Vaccine  Completed    HIB Vaccine  Aged Out    IPV Vaccine  Aged Out    Meningococcal ACWY Vaccine  Aged Out    HPV Vaccine  Aged Out     Patient Active Problem List   Diagnosis    Right upper quadrant pain    Type 2  diabetes mellitus with hyperglycemia, with long-term current use of insulin (HCC)    Mixed hyperlipidemia    Coronary artery disease involving native coronary artery of native heart without angina pectoris    Gastroesophageal reflux disease    Primary insomnia    Dehydration    Attention deficit hyperactivity disorder (ADHD), combined type    Depression with anxiety    Right upper quadrant abdominal pain    Primary adenocarcinoma of pancreatic duct (HCC)    SIRS (systemic inflammatory response syndrome) (HCC)    Pancreatic adenocarcinoma (HCC)    Palliative care patient    Primary hypertension     Past Medical History:   Diagnosis Date    Cancer (HCC)     pancreatic    Coronary artery disease     Diabetes mellitus (HCC)     Hyperlipidemia     Hypertension     Pancreatic cancer (HCC)      Past Surgical History:   Procedure Laterality Date    CORONARY ANGIOPLASTY WITH STENT PLACEMENT      ERCP W/ PLASTIC STENT PLACEMENT      HERNIA REPAIR      IR PORT PLACEMENT  11/24/2023     Family History   Problem Relation Age of Onset    Heart attack Father     Skin cancer Father      Social History     Socioeconomic History    Marital status: /Civil Union     Spouse name: Not on file    Number of children: Not on file    Years of education: Not on file    Highest education level: Not on file   Occupational History    Not on file   Tobacco Use    Smoking status: Every Day     Current packs/day: 1.50     Average packs/day: 1.5 packs/day for 35.0 years (52.5 ttl pk-yrs)     Types: Cigarettes    Smokeless tobacco: Not on file   Vaping Use    Vaping status: Never Used   Substance and Sexual Activity    Alcohol use: Not Currently    Drug use: Never    Sexual activity: Yes     Partners: Male   Other Topics Concern    Not on file   Social History Narrative    Not on file     Social Determinants of Health     Financial Resource Strain: Not on file   Food Insecurity: No Food Insecurity (12/15/2023)    Hunger Vital Sign     Worried  About Running Out of Food in the Last Year: Never true     Ran Out of Food in the Last Year: Never true   Transportation Needs: No Transportation Needs (12/15/2023)    PRAPARE - Transportation     Lack of Transportation (Medical): No     Lack of Transportation (Non-Medical): No   Physical Activity: Not on file   Stress: Not on file   Social Connections: Not on file   Intimate Partner Violence: Not on file   Housing Stability: Low Risk  (12/15/2023)    Housing Stability Vital Sign     Unable to Pay for Housing in the Last Year: No     Number of Places Lived in the Last Year: 1     Unstable Housing in the Last Year: No       Current Outpatient Medications:     amphetamine-dextroamphetamine (ADDERALL XR) 20 MG 24 hr capsule, Take 20 mg by mouth every morning, Disp: , Rfl:     aspirin 81 MG tablet, daily, Disp: , Rfl:     Belsomra 10 MG TABS, , Disp: , Rfl:     carvedilol (COREG) 6.25 mg tablet, Take 6.25 mg by mouth 2 (two) times a day, Disp: , Rfl:     Continuous Blood Gluc Sensor (FreeStyle Geovanny 3 Sensor) MISC, use as directed TO MONITOR GLUCOSE DAILY, Disp: , Rfl:     Creon 68311-41999 units capsule, take 1 capsule by mouth three times a day with meals, Disp: 180 capsule, Rfl: 0    cyclobenzaprine (FLEXERIL) 10 mg tablet, Take 1 tablet (10 mg total) by mouth 3 (three) times a day as needed for muscle spasms, Disp: 30 tablet, Rfl: 0    desvenlafaxine succinate (PRISTIQ) 50 mg 24 hr tablet, Take 50 mg by mouth daily, Disp: , Rfl:     diphenhydramine, lidocaine, Al/Mg hydroxide, simethicone (Magic Mouthwash) SUSP, Swish and spit 10 mL every 4 (four) hours as needed for mouth pain or discomfort, Disp: 237 mL, Rfl: 2    HYDROcodone-acetaminophen (Norco) 5-325 mg per tablet, Take 1 tablet by mouth daily as needed for pain Max Daily Amount: 1 tablet, Disp: 30 tablet, Rfl: 0    insulin aspart (NovoLOG FlexPen) 100 UNIT/ML injection pen, Inject 8 Units under the skin 3 (three) times a day with meals, Disp: 21.6 mL, Rfl: 0     insulin aspart, w/niacinamide, (Fiasp FlexTouch) 100 Units/mL injection pen, Inject 8 Units under the skin 3 (three) times a day with meals, Disp: 3 mL, Rfl: 2    Jardiance 25 MG TABS, Take 1 tablet (25 mg total) by mouth every morning, Disp: 90 tablet, Rfl: 3    lisinopril (ZESTRIL) 5 mg tablet, Take 1 tablet by mouth daily, Disp: , Rfl:     metFORMIN (GLUCOPHAGE) 1000 MG tablet, Take 1 tablet (1,000 mg total) by mouth daily with breakfast, Disp: 90 tablet, Rfl: 2    omeprazole (PriLOSEC) 40 MG capsule, , Disp: , Rfl:     ondansetron (ZOFRAN) 8 mg tablet, Take 1 tablet (8 mg total) by mouth every 8 (eight) hours as needed for nausea or vomiting for up to 20 days, Disp: 20 tablet, Rfl: 2    rosuvastatin (CRESTOR) 10 MG tablet, Take 1 tablet by mouth daily, Disp: , Rfl:     Tresiba FlexTouch 100 units/mL injection pen, Inject 40 Units under the skin daily at bedtime, Disp: 36 mL, Rfl: 0    valACYclovir (VALTREX) 1,000 mg tablet, Take 1 tablet (1,000 mg total) by mouth 2 (two) times a day for 10 days, Disp: 20 tablet, Rfl: 3    zolpidem (AMBIEN CR) 12.5 MG CR tablet, Take 12.5 mg by mouth daily at bedtime as needed (Patient not taking: Reported on 5/2/2024), Disp: , Rfl:     zolpidem (AMBIEN) 10 mg tablet, Take 10 mg by mouth daily at bedtime as needed (Patient not taking: Reported on 5/2/2024), Disp: , Rfl:   No Known Allergies  There were no vitals filed for this visit.

## 2024-05-15 ENCOUNTER — OFFICE VISIT (OUTPATIENT)
Dept: FAMILY MEDICINE CLINIC | Facility: CLINIC | Age: 59
End: 2024-05-15
Payer: COMMERCIAL

## 2024-05-15 VITALS
TEMPERATURE: 97.3 F | DIASTOLIC BLOOD PRESSURE: 72 MMHG | WEIGHT: 166 LBS | BODY MASS INDEX: 24.59 KG/M2 | SYSTOLIC BLOOD PRESSURE: 124 MMHG | HEIGHT: 69 IN

## 2024-05-15 DIAGNOSIS — R39.11 URINARY HESITANCY: ICD-10-CM

## 2024-05-15 DIAGNOSIS — F11.20 CONTINUOUS OPIOID DEPENDENCE (HCC): Primary | ICD-10-CM

## 2024-05-15 DIAGNOSIS — K59.03 DRUG-INDUCED CONSTIPATION: ICD-10-CM

## 2024-05-15 LAB
BACTERIA UR QL AUTO: ABNORMAL /HPF
BILIRUB UR QL STRIP: NEGATIVE
CLARITY UR: CLEAR
COLOR UR: YELLOW
GLUCOSE UR STRIP-MCNC: ABNORMAL MG/DL
HGB UR QL STRIP.AUTO: ABNORMAL
KETONES UR STRIP-MCNC: NEGATIVE MG/DL
LEUKOCYTE ESTERASE UR QL STRIP: ABNORMAL
NITRITE UR QL STRIP: NEGATIVE
NON-SQ EPI CELLS URNS QL MICRO: ABNORMAL /HPF
PH UR STRIP.AUTO: 6.5 [PH]
PROT UR STRIP-MCNC: ABNORMAL MG/DL
RBC #/AREA URNS AUTO: ABNORMAL /HPF
SL AMB  POCT GLUCOSE, UA: ABNORMAL
SL AMB LEUKOCYTE ESTERASE,UA: ABNORMAL
SL AMB POCT BILIRUBIN,UA: NEGATIVE
SL AMB POCT BLOOD,UA: ABNORMAL
SL AMB POCT CLARITY,UA: CLEAR
SL AMB POCT COLOR,UA: YELLOW
SL AMB POCT KETONES,UA: NEGATIVE
SL AMB POCT NITRITE,UA: NEGATIVE
SL AMB POCT PH,UA: 6.5
SL AMB POCT SPECIFIC GRAVITY,UA: 1.02
SL AMB POCT URINE PROTEIN: NEGATIVE
SL AMB POCT UROBILINOGEN: 0.2
SP GR UR STRIP.AUTO: 1.03 (ref 1–1.03)
UROBILINOGEN UR STRIP-ACNC: <2 MG/DL
WBC #/AREA URNS AUTO: ABNORMAL /HPF

## 2024-05-15 PROCEDURE — 87077 CULTURE AEROBIC IDENTIFY: CPT | Performed by: STUDENT IN AN ORGANIZED HEALTH CARE EDUCATION/TRAINING PROGRAM

## 2024-05-15 PROCEDURE — 87186 SC STD MICRODIL/AGAR DIL: CPT | Performed by: STUDENT IN AN ORGANIZED HEALTH CARE EDUCATION/TRAINING PROGRAM

## 2024-05-15 PROCEDURE — 99214 OFFICE O/P EST MOD 30 MIN: CPT | Performed by: STUDENT IN AN ORGANIZED HEALTH CARE EDUCATION/TRAINING PROGRAM

## 2024-05-15 PROCEDURE — 81002 URINALYSIS NONAUTO W/O SCOPE: CPT | Performed by: STUDENT IN AN ORGANIZED HEALTH CARE EDUCATION/TRAINING PROGRAM

## 2024-05-15 PROCEDURE — 81001 URINALYSIS AUTO W/SCOPE: CPT | Performed by: STUDENT IN AN ORGANIZED HEALTH CARE EDUCATION/TRAINING PROGRAM

## 2024-05-15 PROCEDURE — 87086 URINE CULTURE/COLONY COUNT: CPT | Performed by: STUDENT IN AN ORGANIZED HEALTH CARE EDUCATION/TRAINING PROGRAM

## 2024-05-15 RX ORDER — PHENAZOPYRIDINE HYDROCHLORIDE 95 MG/1
95 TABLET ORAL 3 TIMES DAILY PRN
Qty: 10 TABLET | Refills: 0 | Status: SHIPPED | OUTPATIENT
Start: 2024-05-15

## 2024-05-15 RX ORDER — POLYETHYLENE GLYCOL 3350 17 G/17G
17 POWDER, FOR SOLUTION ORAL DAILY
Qty: 510 G | Refills: 0 | Status: SHIPPED | OUTPATIENT
Start: 2024-05-15

## 2024-05-15 NOTE — PROGRESS NOTES
Assessment/Plan:         Problem List Items Addressed This Visit        Behavioral Health    Continuous opioid dependence (HCC) - Primary    Relevant Medications    polyethylene glycol (GLYCOLAX) 17 GM/SCOOP powder   Other Visit Diagnoses     Drug-induced constipation        Relevant Medications    polyethylene glycol (GLYCOLAX) 17 GM/SCOOP powder    Urinary hesitancy        Relevant Medications    phenazopyridine (PYRIDIUM) 95 MG tablet    Other Relevant Orders    Urine culture    UA (URINE) with reflex to Scope    POCT urine dip (Completed)        Believe constipation is causing symptoms and bladder compression, follow up urine studies and do miralaax and ducolax over milk of magnesia    Subjective:      Patient ID: Derick Richey is a 58 y.o. male.    Difficulty Urinating   This is a new problem. The current episode started yesterday. The problem occurs every urination. The problem has been unchanged. The quality of the pain is described as aching. The pain is at a severity of 4/10. The pain is moderate. There has been no fever. He is Not sexually active. There is No history of pyelonephritis. Associated symptoms include frequency, hesitancy and urgency. Pertinent negatives include no nausea or vomiting.       Constipation, using milk of magnesia which causes diarrhea    Gets lower abdomen pressure. Sometimes goes to posterior left flank    Yesterday going to the bathroom quite often, felt as though he could not empty hsi bladder    The following portions of the patient's history were reviewed and updated as appropriate:   Past Medical History:  He has a past medical history of Cancer (HCC), Coronary artery disease, Diabetes mellitus (HCC), Hyperlipidemia, Hypertension, and Pancreatic cancer (HCC).,  _______________________________________________________________________  Medical Problems:  does not have any pertinent problems on  file.,  _______________________________________________________________________  Past Surgical History:   has a past surgical history that includes Hernia repair; Coronary angioplasty with stent; ERCP w/ plastic stent placement; and IR port placement (11/24/2023).,  _______________________________________________________________________  Family History:  family history includes Heart attack in his father; Skin cancer in his father.,  _______________________________________________________________________  Social History:   reports that he has been smoking cigarettes. He has a 52.5 pack-year smoking history. He does not have any smokeless tobacco history on file. He reports that he does not currently use alcohol. He reports that he does not use drugs.,  _______________________________________________________________________  Allergies:  has No Known Allergies..  _______________________________________________________________________  Current Outpatient Medications   Medication Sig Dispense Refill   • amphetamine-dextroamphetamine (ADDERALL XR) 20 MG 24 hr capsule Take 20 mg by mouth every morning     • aspirin 81 MG tablet daily     • Belsomra 10 MG TABS Take 10 mg by mouth once as needed     • carvedilol (COREG) 6.25 mg tablet Take 6.25 mg by mouth 2 (two) times a day     • Continuous Blood Gluc Sensor (FreeStyle Geovanny 3 Sensor) MISC use as directed TO MONITOR GLUCOSE DAILY     • Creon [id number] units capsule take 1 capsule by mouth three times a day with meals 180 capsule 0   • cyclobenzaprine (FLEXERIL) 10 mg tablet Take 1 tablet (10 mg total) by mouth 3 (three) times a day as needed for muscle spasms 30 tablet 0   • desvenlafaxine succinate (PRISTIQ) 50 mg 24 hr tablet Take 50 mg by mouth daily     • HYDROcodone-acetaminophen (Norco) 5-325 mg per tablet Take 1 tablet by mouth daily as needed for pain Max Daily Amount: 1 tablet 30 tablet 0   • insulin aspart, w/niacinamide, (Fiasp FlexTouch) 100 Units/mL  injection pen Inject 8 Units under the skin 3 (three) times a day with meals 3 mL 2   • Jardiance 25 MG TABS Take 1 tablet (25 mg total) by mouth every morning 90 tablet 3   • lisinopril (ZESTRIL) 5 mg tablet Take 1 tablet by mouth daily     • metFORMIN (GLUCOPHAGE) 1000 MG tablet Take 1 tablet (1,000 mg total) by mouth daily with breakfast 90 tablet 2   • omeprazole (PriLOSEC) 40 MG capsule      • phenazopyridine (PYRIDIUM) 95 MG tablet Take 1 tablet (95 mg total) by mouth 3 (three) times a day as needed for bladder spasms 10 tablet 0   • polyethylene glycol (GLYCOLAX) 17 GM/SCOOP powder Take 17 g by mouth daily 510 g 0   • rosuvastatin (CRESTOR) 10 MG tablet Take 1 tablet by mouth daily     • Tresiba FlexTouch 100 units/mL injection pen Inject 40 Units under the skin daily at bedtime 36 mL 0   • diphenhydramine, lidocaine, Al/Mg hydroxide, simethicone (Magic Mouthwash) SUSP Swish and spit 10 mL every 4 (four) hours as needed for mouth pain or discomfort (Patient not taking: Reported on 5/15/2024) 237 mL 2   • ondansetron (ZOFRAN) 8 mg tablet Take 1 tablet (8 mg total) by mouth every 8 (eight) hours as needed for nausea or vomiting for up to 20 days 20 tablet 2   • valACYclovir (VALTREX) 1,000 mg tablet Take 1 tablet (1,000 mg total) by mouth 2 (two) times a day for 10 days 20 tablet 3     No current facility-administered medications for this visit.     _______________________________________________________________________  Review of Systems   Constitutional:  Negative for activity change, appetite change, fatigue and fever.   HENT:  Negative for congestion, rhinorrhea and sore throat.    Eyes:  Negative for visual disturbance.   Respiratory:  Negative for cough and shortness of breath.    Cardiovascular:  Negative for chest pain.   Gastrointestinal:  Positive for constipation. Negative for nausea and vomiting.   Genitourinary:  Positive for dysuria, frequency, hesitancy and urgency.         Objective:  Vitals:     "05/15/24 1225   BP: 124/72   Temp: (!) 97.3 °F (36.3 °C)   Weight: 75.3 kg (166 lb)   Height: 5' 9\" (1.753 m)     Body mass index is 24.51 kg/m².     Physical Exam  Constitutional:       General: He is not in acute distress.     Appearance: Normal appearance.   HENT:      Head: Normocephalic and atraumatic.   Pulmonary:      Effort: Pulmonary effort is normal. No respiratory distress.   Neurological:      Mental Status: He is alert and oriented to person, place, and time. Mental status is at baseline.   Psychiatric:         Mood and Affect: Mood normal.         Behavior: Behavior normal.         "

## 2024-05-16 NOTE — PROGRESS NOTES
Consultation - Radiation Oncology      MRN:[id number] : 1965  Encounter: [id number]  Patient Information: Derick Richey      CHIEF COMPLAINT  Chief Complaint   Patient presents with    Pancreatic Cancer    Follow-up     Cancer Staging   Primary adenocarcinoma of pancreatic duct (HCC)  Staging form: Pancreas, AJCC 8th Edition  - Clinical stage from 10/30/2023: Stage IB (cT2, cN0, cM0) - Signed by Malgorzata Madden MD on 2024  Stage prefix: Initial diagnosis  Total positive nodes: 0    Right upper quadrant pain  Staging form: Pancreas, AJCC 8th Edition  - Clinical: Stage IB (cT2, cN0, cM0) - Signed by Samir Cervantes MD on 2023  Total positive nodes: 0           History of Present Illness   Derick Richey is a 58 y.o. 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  He completed 6 cycles modified FOLFIRINOX with dose reductions required. His CA 19-9 has risen to 945. He was seen in consult on 24 and is being referred back by Dr. Cervantes for consideration  of concurrent chemoradiation.  He presents today for follow-up evaluation.        24 Dr. Calderon  1 pancreatic cancer T2 N0 M0  2 to complete FOLFIRINOX cycles 11 and 12  3 stable polycythemia  Plan: To complete chemotherapy.  To be seen by surgical oncology .  Will get a CT scan abdomen pelvis along with CEA 19-9 levels.  Long-term prognosis guarded.  Hopefully will get a good response to treatment.        24 CT A/P w contrast  1. Overall similar appearance of the pancreatic neck neoplasm. Again noted encasement of the proximal portal vein with increased severe narrowing compared to 2024. Similar mild inflammatory change around the pancreas may represent secondary pancreatitis.   2. No findings of metastatic disease in the abdomen or pelvis.   3. Similar distended gallbladder with areas of wall thickening with possible adenomyomatosis. Mild pericholecystic inflammatory change may be related to  peripancreatic inflammation.     5/2/24 Dr. Cervantes  locally advanced pancreas cancer. There continues to be no evidence of metastasis, but the mass is stable and his CA 19-9 has risen to over 900. Based on his imaging it appears that he would require portal vein resection as well as possible celiac artery resection as well.   did discuss that we would not resect this here given the celiac involvement, but he could consider going to the few places that would consider major arterial resection   also discussed definitive chemoradiation which would be my recommendation.      5/3/24 completed FOLFIRINOX  12/12 cycles     The patient denies nausea, vomiting, abdominal pain.  He has some constipation.  He denies rectal bleeding or lower extremity edema.  Weight has been stable.  He is smoking 1+ packs per day with no intention of quitting at this time.     Upcoming:  Does not have Med Onc follow up.    Historical Information   Oncology History   Right upper quadrant pain   10/30/2023 Biopsy    Endoscopic ultrasound:  Pancreas, Neck mass:  Malignant  Adenocarcinoma.        11/16/2023 -  Cancer Staged    Staging form: Pancreas, AJCC 8th Edition  - Clinical: Stage IB (cT2, cN0, cM0) - Signed by Samir Cervantes MD on 11/16/2023  Total positive nodes: 0       11/28/2023 -  Chemotherapy    alteplase (CATHFLO), 2 mg, Intracatheter, Every 1 Minute as needed, 11 of 12 cycles  irinotecan (CAMPTOSAR) chemo infusion, 150 mg/m2 = 278 mg, Intravenous, Once, 11 of 12 cycles  Dose modification: 125 mg/m2 (100 % of original dose 150 mg/m2, Cycle 9, Reason: Dose Not Tolerated), 125.1 mg/m2 (83.4 % of original dose 125 mg/m2, Cycle 2, Reason: Dose Not Tolerated), 125 mg/m2 (100 % of original dose 125 mg/m2, Cycle 2, Reason: Dose Not Tolerated)  Administration: 239 mg (3/19/2024), 278 mg (11/28/2023), 231 mg (12/12/2023), 231 mg (12/27/2023), 231 mg (1/10/2024), 231 mg (1/24/2024), 231 mg (2/7/2024), 231 mg (2/21/2024), 238 mg (3/6/2024), 238 mg  (4/3/2024), 238 mg (4/17/2024)  oxaliplatin (ELOXATIN) chemo infusion, 65 mg/m2 = 120.25 mg, Intravenous, Once, 3 of 3 cycles  Administration: 120.25 mg (11/28/2023), 120.25 mg (12/12/2023), 120.25 mg (12/27/2023)  fluorouracil (ADRUCIL) ambulatory infusion Soln, 1,200 mg/m2/day = 4,440 mg, Intravenous, Over 46 hours, 11 of 12 cycles     Primary adenocarcinoma of pancreatic duct (HCC)   10/30/2023 Initial Diagnosis    Primary adenocarcinoma of pancreatic duct (HCC)     10/30/2023 Biopsy    Final Diagnosis   A.B. Pancreas, Neck mass (Cell Block and smear Preparations):  Malignant  Adenocarcinoma.     Satisfactory for evaluation.        10/30/2023 -  Cancer Staged    Staging form: Pancreas, AJCC 8th Edition  - Clinical stage from 10/30/2023: Stage IB (cT2, cN0, cM0) - Signed by Malgorzata Madden MD on 2/19/2024  Stage prefix: Initial diagnosis  Total positive nodes: 0             Past Medical History:   Diagnosis Date    Cancer (HCC)     pancreatic    Coronary artery disease     Diabetes mellitus (HCC)     Hyperlipidemia     Hypertension     Pancreatic cancer (HCC)      Past Surgical History:   Procedure Laterality Date    CORONARY ANGIOPLASTY WITH STENT PLACEMENT      ERCP W/ PLASTIC STENT PLACEMENT      HERNIA REPAIR      IR PORT PLACEMENT  11/24/2023       Family History   Problem Relation Age of Onset    Heart attack Father     Skin cancer Father        Social History   Social History     Substance and Sexual Activity   Alcohol Use Not Currently     Social History     Substance and Sexual Activity   Drug Use Never     Social History     Tobacco Use   Smoking Status Every Day    Current packs/day: 1.50    Average packs/day: 1.5 packs/day for 35.0 years (52.5 ttl pk-yrs)    Types: Cigarettes   Smokeless Tobacco Not on file         Meds/Allergies     Current Outpatient Medications:     amphetamine-dextroamphetamine (ADDERALL XR) 20 MG 24 hr capsule, Take 20 mg by mouth every morning, Disp: , Rfl:     aspirin 81 MG tablet,  daily, Disp: , Rfl:     Belsomra 10 MG TABS, Take 10 mg by mouth once as needed, Disp: , Rfl:     carvedilol (COREG) 6.25 mg tablet, Take 6.25 mg by mouth 2 (two) times a day, Disp: , Rfl:     Continuous Blood Gluc Sensor (FreeStyle Geovanny 3 Sensor) MISC, use as directed TO MONITOR GLUCOSE DAILY, Disp: , Rfl:     Creon 25968-88913 units capsule, take 1 capsule by mouth three times a day with meals, Disp: 180 capsule, Rfl: 0    cyclobenzaprine (FLEXERIL) 10 mg tablet, Take 1 tablet (10 mg total) by mouth 3 (three) times a day as needed for muscle spasms, Disp: 30 tablet, Rfl: 0    desvenlafaxine succinate (PRISTIQ) 50 mg 24 hr tablet, Take 50 mg by mouth daily, Disp: , Rfl:     diphenhydramine, lidocaine, Al/Mg hydroxide, simethicone (Magic Mouthwash) SUSP, Swish and spit 10 mL every 4 (four) hours as needed for mouth pain or discomfort (Patient not taking: Reported on 5/15/2024), Disp: 237 mL, Rfl: 2    HYDROcodone-acetaminophen (Norco) 5-325 mg per tablet, Take 1 tablet by mouth daily as needed for pain Max Daily Amount: 1 tablet, Disp: 30 tablet, Rfl: 0    insulin aspart, w/niacinamide, (Fiasp FlexTouch) 100 Units/mL injection pen, Inject 8 Units under the skin 3 (three) times a day with meals, Disp: 3 mL, Rfl: 2    Jardiance 25 MG TABS, Take 1 tablet (25 mg total) by mouth every morning, Disp: 90 tablet, Rfl: 3    lisinopril (ZESTRIL) 5 mg tablet, Take 1 tablet by mouth daily, Disp: , Rfl:     metFORMIN (GLUCOPHAGE) 1000 MG tablet, Take 1 tablet (1,000 mg total) by mouth daily with breakfast, Disp: 90 tablet, Rfl: 2    omeprazole (PriLOSEC) 40 MG capsule, , Disp: , Rfl:     rosuvastatin (CRESTOR) 10 MG tablet, Take 1 tablet by mouth daily, Disp: , Rfl:     Tresiba FlexTouch 100 units/mL injection pen, Inject 40 Units under the skin daily at bedtime, Disp: 36 mL, Rfl: 0    ondansetron (ZOFRAN) 8 mg tablet, Take 1 tablet (8 mg total) by mouth every 8 (eight) hours as needed for nausea or vomiting for up to 20 days,  Disp: 20 tablet, Rfl: 2    phenazopyridine (PYRIDIUM) 95 MG tablet, Take 1 tablet (95 mg total) by mouth 3 (three) times a day as needed for bladder spasms, Disp: 10 tablet, Rfl: 0    polyethylene glycol (GLYCOLAX) 17 GM/SCOOP powder, Take 17 g by mouth daily, Disp: 510 g, Rfl: 0    valACYclovir (VALTREX) 1,000 mg tablet, Take 1 tablet (1,000 mg total) by mouth 2 (two) times a day for 10 days, Disp: 20 tablet, Rfl: 3  No Known Allergies      Review of Systems   Constitutional:  Negative for activity change, appetite change, fatigue and fever.   HENT:  Negative for congestion, rhinorrhea and sore throat.    Eyes:  Negative for visual disturbance.   Respiratory:  Negative for cough and shortness of breath.    Cardiovascular:  Negative for chest pain.   Gastrointestinal:  Positive for constipation. Negative for nausea and vomiting.   Genitourinary:  Positive for dysuria, frequency and urgency.         OBJECTIVE:   /66   Pulse 83   Temp (!) 97 °F (36.1 °C)   Resp 16   Wt 74.8 kg (165 lb)   SpO2 98%   BMI 24.37 kg/m²   Performance Status: Karnofsky: 90 - Able to carry on normal activity; minor signs or symptoms of disease     Physical Exam  Vitals and nursing note reviewed.   Constitutional:       General: He is not in acute distress.     Appearance: He is well-developed.   Cardiovascular:      Rate and Rhythm: Normal rate and regular rhythm.   Pulmonary:      Breath sounds: No wheezing, rhonchi or rales.   Abdominal:      General: There is no distension.      Palpations: Abdomen is soft.      Tenderness: There is no abdominal tenderness.   Musculoskeletal:      Right lower leg: No edema.      Left lower leg: No edema.   Lymphadenopathy:      Cervical: No cervical adenopathy.      Upper Body:      Right upper body: No supraclavicular adenopathy.      Left upper body: No supraclavicular adenopathy.   Neurological:      Mental Status: He is alert and oriented to person, place, and time.            RESULTS  Lab  Results    Chemistry        Component Value Date/Time    K 5.1 04/11/2024 1132    K 4.2 11/01/2023 1254     04/11/2024 1132     11/01/2023 1254    CO2 30 04/11/2024 1132    CO2 23 11/01/2023 1254    BUN 13 04/11/2024 1132    BUN 18 11/01/2023 1254    CREATININE 0.77 04/11/2024 1132        Component Value Date/Time    CALCIUM 9.8 04/11/2024 1132    ALKPHOS 95 04/11/2024 1132    AST 14 04/11/2024 1132    AST 26 11/01/2023 1254    ALT 15 04/11/2024 1132    ALT 78 (H) 11/01/2023 1254            Lab Results   Component Value Date    WBC 8.47 04/11/2024    HGB 17.0 04/11/2024    HCT 49.9 (H) 04/11/2024     (H) 04/11/2024     04/11/2024         Imaging Studies  CT abdomen pelvis w contrast    Result Date: 4/26/2024  Narrative: CT ABDOMEN AND PELVIS WITH IV CONTRAST INDICATION: C25.3: Malignant neoplasm of pancreatic duct I25.10: Atherosclerotic heart disease of native coronary artery without angina pectoris E11.65: Type 2 diabetes mellitus with hyperglycemia Z79.4: Long term (current) use of insulin. History of adenocarcinoma of the pancreatic neck. COMPARISON: Multiple prior CT studies dating back to 10/23/2023, most recent dated 2/26/2024. TECHNIQUE: CT examination of the abdomen and pelvis was performed. Multiplanar 2D reformatted images were created from the source data. This examination, like all CT scans performed in the [facility], was performed utilizing techniques to minimize radiation dose exposure, including the use of iterative reconstruction and automated exposure control. Radiation dose length product (DLP) for this visit: 1293 mGy-cm IV Contrast: 100 mL of iohexol (OMNIPAQUE) Enteric Contrast: Not administered. FINDINGS: ABDOMEN LOWER CHEST: Bibasilar atelectasis. Lung bases are otherwise clear. LIVER/BILIARY TREE: Common bile duct stent remains in place. Similar small associated pneumobilia. No suspicious hepatic mass. Normal hepatic contours. GALLBLADDER: The  gallbladder is again noted to be distended with areas of wall thickening. Focal adenomyomatosis is possible at the fundus. No calcified gallstones. Similar mild pericholecystic inflammatory change may be related to peripancreatic inflammation. SPLEEN: Subcentimeter hypodense lesion in the superior spleen too small to characterize, stable since 10/23/2023. PANCREAS: Overall similar appearance of ill-defined hypodense heterogeneous mass involving the pancreatic neck with upstream main pancreatic ductal dilation in keeping with patient's history of pancreatic adenocarcinoma. The mass is difficult to measure discretely but measures approximately 6 x 3 cm similar to prior. Again noted encasement of the proximal portal vein just distal to the confluence with increased severe narrowing (series 302 image 62 on the current study; series 603 image 61 of the prior study). There is similar encasement of the gastroduodenal artery, which remains patent. Similar mild inflammatory change around the pancreas may represent secondary pancreatitis. ADRENAL GLANDS: Stable multiple left adrenal myolipomas measuring up to 1.3 cm (series 201 image 50). KIDNEYS/URETERS: Right upper pole simple renal cyst. Additional subcentimeter hypoattenuating renal lesions, too small to characterize but likely benign. No hydronephrosis. STOMACH AND BOWEL: Colonic diverticulosis without findings of acute diverticulitis. APPENDIX: Appendicolith near the tip of the appendix (series 201 image 59). The appendix otherwise appears unremarkable. ABDOMINOPELVIC CAVITY: No ascites. No pneumoperitoneum. No lymphadenopathy. VESSELS: Atherosclerosis without abdominal aortic aneurysm. Accessory left hepatic artery from the left gastric artery. Gastrohepatic ligament, perigastric, and perisplenic varices. PELVIS REPRODUCTIVE ORGANS: Unremarkable for patient's age. URINARY BLADDER: Unremarkable. ABDOMINAL WALL/INGUINAL REGIONS: Prior ventral hernia repair with mesh.  BONES: No acute fracture or suspicious osseous lesion. Spinal mild spinal degenerative changes.     Impression: 1. Overall similar appearance of the pancreatic neck neoplasm. Again noted encasement of the proximal portal vein with increased severe narrowing compared to 2/26/2024. Similar mild inflammatory change around the pancreas may represent secondary pancreatitis. 2. No findings of metastatic disease in the abdomen or pelvis. 3. Similar distended gallbladder with areas of wall thickening with possible adenomyomatosis. Mild pericholecystic inflammatory change may be related to peripancreatic inflammation. Resident: SHAKA Ram I, the attending radiologist, have reviewed the images and agree with the final report above. Workstation performed: [id number]         ASSESSMENT  No diagnosis found.  Cancer Staging   Primary adenocarcinoma of pancreatic duct (HCC)  Staging form: Pancreas, AJCC 8th Edition  - Clinical stage from 10/30/2023: Stage IB (cT2, cN0, cM0) - Signed by Malgorzata Madden MD on 2/19/2024  Stage prefix: Initial diagnosis  Total positive nodes: 0    Right upper quadrant pain  Staging form: Pancreas, AJCC 8th Edition  - Clinical: Stage IB (cT2, cN0, cM0) - Signed by Samir Cervantes MD on 11/16/2023  Total positive nodes: 0        PLAN/DISCUSSION  Derick Richey is a 58 y.o. man with T2N0M0 pancreatic adenocarcinoma of the neck, considered unresectable.  He completed 12 cycles mFOLFIRINOX with dose reductions required. His CA 19-9 barbara to 945 and repeat imaging demonstrated minimal response.  He was recommended to consider chemoradiation.    I agree with the recommendation to proceed with chemoradiation.  We would plan a dose of 50-54Gy directed towards primary with inclusion of the draining lymph nodes.   This will provide improved local regional control and possible disease free survival.  This will be done with definitive intent, but at time of re-evaluation if he becomes resectable then reconsideration for  "resection is possible.  This is in accordance with NCCN guidelines.    The rationale and potential benefits, as well as the risks and acute and late side effects and potential toxicities of radiation were discussed with the patient at length. Side effects discussed included, but were not limited to: fatigue, nausea, vomiting, bowel injury, worsening diabetes, exocrine pancreatic dysfunction, pancreatitis, hepatitis, liver injury, radiation nephritis, kidney injury, diarrhea, ulcers, bleeding.      He was given the chance to ask questions that were answered at length.  He wished to pursue recommended treatment plan.    Smoking cessation counseling done.  Referral back to Med Onc for concurrent chemotherapy.  4DCT simulation scheduled.    Coordinate with Medical Oncology for treatment.    I have spent a total time of 50 minutes on 05/16/24 in caring for this patient including Diagnostic results, Risks and benefits of tx options, Reviewing / ordering tests, medicine, procedures  , Obtaining or reviewing history  , and Communicating with other healthcare professionals .       Malgorzata Madden MD  5/16/2024,1:26 PM      Portions of the record may have been created with voice recognition software.  Occasional wrong word or \"sound a like\" substitutions may have occurred due to the inherent limitations of voice recognition software.  Read the chart carefully and recognize, using context, where substitutions have occurred.          "

## 2024-05-17 DIAGNOSIS — E11.00 TYPE 2 DIABETES MELLITUS WITH HYPEROSMOLARITY WITHOUT COMA, WITHOUT LONG-TERM CURRENT USE OF INSULIN (HCC): ICD-10-CM

## 2024-05-17 LAB
BACTERIA UR CULT: ABNORMAL
BACTERIA UR CULT: ABNORMAL

## 2024-05-18 RX ORDER — INSULIN DEGLUDEC 100 U/ML
INJECTION, SOLUTION SUBCUTANEOUS
Qty: 15 ML | Refills: 5 | Status: SHIPPED | OUTPATIENT
Start: 2024-05-18

## 2024-05-21 DIAGNOSIS — C25.9 PANCREATIC ADENOCARCINOMA (HCC): ICD-10-CM

## 2024-05-21 RX ORDER — PANCRELIPASE 60000; 12000; 38000 [USP'U]/1; [USP'U]/1; [USP'U]/1
12000 CAPSULE, DELAYED RELEASE PELLETS ORAL
Qty: 180 CAPSULE | Refills: 0 | Status: SHIPPED | OUTPATIENT
Start: 2024-05-21

## 2024-05-22 ENCOUNTER — RADIATION THERAPY TREATMENT (OUTPATIENT)
Dept: RADIATION ONCOLOGY | Facility: CLINIC | Age: 59
End: 2024-05-22
Attending: RADIOLOGY
Payer: COMMERCIAL

## 2024-05-22 ENCOUNTER — TELEPHONE (OUTPATIENT)
Dept: HEMATOLOGY ONCOLOGY | Facility: CLINIC | Age: 59
End: 2024-05-22

## 2024-05-22 PROCEDURE — 77334 RADIATION TREATMENT AID(S): CPT | Performed by: RADIOLOGY

## 2024-05-22 PROCEDURE — 77470 SPECIAL RADIATION TREATMENT: CPT | Performed by: RADIOLOGY

## 2024-05-22 PROCEDURE — 77263 THER RADIOLOGY TX PLNG CPLX: CPT | Performed by: RADIOLOGY

## 2024-05-23 ENCOUNTER — TELEPHONE (OUTPATIENT)
Dept: HEMATOLOGY ONCOLOGY | Facility: CLINIC | Age: 59
End: 2024-05-23

## 2024-05-23 NOTE — TELEPHONE ENCOUNTER
Appointment Schedule   Who are you speaking with? Patient   If it is not the patient, are they listed on an active communication consent form? N/A   Which provider is the appointment scheduled with? Dr Calderon   At which location is the appointment scheduled for? [place]   When is the appointment scheduled?  Please list date and time 5/24/24   What is the reason for this appointment? Follow up  Per a telephone encounter in the chart   Did patient voice understanding of the details of this appointment? Yes   Was the no show policy reviewed with patient? Yes

## 2024-05-24 ENCOUNTER — OFFICE VISIT (OUTPATIENT)
Dept: HEMATOLOGY ONCOLOGY | Facility: CLINIC | Age: 59
End: 2024-05-24
Payer: COMMERCIAL

## 2024-05-24 ENCOUNTER — TELEPHONE (OUTPATIENT)
Dept: HEMATOLOGY ONCOLOGY | Facility: CLINIC | Age: 59
End: 2024-05-24

## 2024-05-24 VITALS
OXYGEN SATURATION: 97 % | RESPIRATION RATE: 16 BRPM | SYSTOLIC BLOOD PRESSURE: 138 MMHG | HEART RATE: 89 BPM | DIASTOLIC BLOOD PRESSURE: 72 MMHG | TEMPERATURE: 97.3 F | WEIGHT: 169 LBS | HEIGHT: 69 IN | BODY MASS INDEX: 25.03 KG/M2

## 2024-05-24 DIAGNOSIS — C25.3 PRIMARY ADENOCARCINOMA OF PANCREATIC DUCT (HCC): ICD-10-CM

## 2024-05-24 DIAGNOSIS — E11.65 TYPE 2 DIABETES MELLITUS WITH HYPERGLYCEMIA, WITH LONG-TERM CURRENT USE OF INSULIN (HCC): ICD-10-CM

## 2024-05-24 DIAGNOSIS — C25.3 PRIMARY ADENOCARCINOMA OF PANCREATIC DUCT (HCC): Primary | ICD-10-CM

## 2024-05-24 DIAGNOSIS — R10.11 RIGHT UPPER QUADRANT PAIN: Primary | ICD-10-CM

## 2024-05-24 DIAGNOSIS — Z79.4 TYPE 2 DIABETES MELLITUS WITH HYPERGLYCEMIA, WITH LONG-TERM CURRENT USE OF INSULIN (HCC): ICD-10-CM

## 2024-05-24 DIAGNOSIS — C25.9 PANCREATIC ADENOCARCINOMA (HCC): ICD-10-CM

## 2024-05-24 PROCEDURE — 99214 OFFICE O/P EST MOD 30 MIN: CPT | Performed by: INTERNAL MEDICINE

## 2024-05-24 NOTE — TELEPHONE ENCOUNTER
Pt will be doing Cadd pump with concurrent RT Days 1-5 only. Off days 6-7.   Please schedule- Goal will be to start on [place] 3 if possible earliest appt available and provider asked to get disconnected at latest appt available on Fridays. TY

## 2024-05-24 NOTE — PROGRESS NOTES
Patient was provided education on 5FU continuously days 1-5 when doing concurrent radiation  and new

## 2024-05-24 NOTE — PROGRESS NOTES
Hematology/Oncology Outpatient Follow- up Note  Derick Richey 58 y.o. 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  3 type 2 diabetes mellitus  Plan: Patient will undergo 5-FU continuous infusion day 1 through 5 weekly during radiation treatment side effects were discussed and consent was obtained [id number] are ready in place.  Otherwise no other major suggestions at this time.  He is willing to go undergo that.  Hopefully will begin in the week of [place] 3.  He will be seen here again on 7/5 and sooner if problems arise.      HPI: 58-year-old gentleman here for follow-up.  He has locally advanced pancreatic cancer involving multiple blood vessels.  He had received FOLFIRINOX.  Unfortunately he did not have a good response disease is essentially stable and did not shrink.  Because of its location it would be a very difficult operation.  Subsequently he was referred by Dr. Cervantes of surgery after completion of FOLFIRINOX to radiation therapy.  Their recommendation would be radiation therapy.  He will receive 5-FU day 1 through 5 continuous infusion during radiation.  He already knows the side effects of 5-FU and they were rediscussed.  Beyond that depending on response we would probably offer gemcitabine/Abraxane.  However we will wait to see how he responds to treatment for doing so.      Interval History:          Cancer Staging:  Cancer Staging   Primary adenocarcinoma of pancreatic duct (HCC)  Staging form: Pancreas, AJCC 8th Edition  - Clinical stage from 10/30/2023: Stage IB (cT2, cN0, cM0) - Signed by Malgorzata Madden MD on 2/19/2024  Stage prefix: Initial diagnosis  Total positive nodes: 0    Right upper quadrant pain  Staging form: Pancreas, AJCC 8th Edition  - Clinical: Stage IB (cT2, cN0, cM0) - Signed by Samir Cervantes MD on 11/16/2023  Total positive nodes: 0      Molecular  Testing:     Previous Hematologic/ Oncologic History:    Oncology History   Right upper quadrant pain   10/30/2023 Biopsy    Endoscopic ultrasound:  Pancreas, Neck mass:  Malignant  Adenocarcinoma.        11/16/2023 -  Cancer Staged    Staging form: Pancreas, AJCC 8th Edition  - Clinical: Stage IB (cT2, cN0, cM0) - Signed by Samir Cervantes MD on 11/16/2023  Total positive nodes: 0       11/28/2023 - 4/19/2024 Chemotherapy    alteplase (CATHFLO), 2 mg, Intracatheter, Every 1 Minute as needed, 11 of 12 cycles  irinotecan (CAMPTOSAR) chemo infusion, 150 mg/m2 = 278 mg, Intravenous, Once, 11 of 12 cycles  Dose modification: 125 mg/m2 (100 % of original dose 150 mg/m2, Cycle 9, Reason: Dose Not Tolerated), 125.1 mg/m2 (83.4 % of original dose 125 mg/m2, Cycle 2, Reason: Dose Not Tolerated), 125 mg/m2 (100 % of original dose 125 mg/m2, Cycle 2, Reason: Dose Not Tolerated)  Administration: 239 mg (3/19/2024), 278 mg (11/28/2023), 231 mg (12/12/2023), 231 mg (12/27/2023), 231 mg (1/10/2024), 231 mg (1/24/2024), 231 mg (2/7/2024), 231 mg (2/21/2024), 238 mg (3/6/2024), 238 mg (4/3/2024), 238 mg (4/17/2024)  oxaliplatin (ELOXATIN) chemo infusion, 65 mg/m2 = 120.25 mg, Intravenous, Once, 3 of 3 cycles  Administration: 120.25 mg (11/28/2023), 120.25 mg (12/12/2023), 120.25 mg (12/27/2023)  fluorouracil (ADRUCIL) ambulatory infusion Soln, 1,200 mg/m2/day = 4,440 mg, Intravenous, Over 46 hours, 11 of 12 cycles     6/3/2024 -  Chemotherapy    alteplase (CATHFLO), 2 mg, Intracatheter, Every 1 Minute as needed, 0 of 5 cycles  fluorouracil (ADRUCIL) ambulatory infusion Soln, 225 mg/m2/day = 2,160 mg, Intravenous, Over 120 hours, 0 of 5 cycles     Primary adenocarcinoma of pancreatic duct (HCC)   10/30/2023 Initial Diagnosis    Primary adenocarcinoma of pancreatic duct (HCC)     10/30/2023 Biopsy    Final Diagnosis   A.B. Pancreas, Neck mass (Cell Block and smear Preparations):  Malignant  Adenocarcinoma.     Satisfactory for  "evaluation.        10/30/2023 -  Cancer Staged    Staging form: Pancreas, AJCC 8th Edition  - Clinical stage from 10/30/2023: Stage IB (cT2, cN0, cM0) - Signed by Malgorzata Madden MD on 2/19/2024  Stage prefix: Initial diagnosis  Total positive nodes: 0       6/3/2024 -  Chemotherapy    alteplase (CATHFLO), 2 mg, Intracatheter, Every 1 Minute as needed, 0 of 5 cycles  fluorouracil (ADRUCIL) ambulatory infusion Soln, 225 mg/m2/day = 2,160 mg, Intravenous, Over 120 hours, 0 of 5 cycles     Pancreatic adenocarcinoma (HCC)   12/28/2023 Initial Diagnosis    Pancreatic adenocarcinoma (HCC)     6/3/2024 -  Chemotherapy    alteplase (CATHFLO), 2 mg, Intracatheter, Every 1 Minute as needed, 0 of 5 cycles  fluorouracil (ADRUCIL) ambulatory infusion Soln, 225 mg/m2/day = 2,160 mg, Intravenous, Over 120 hours, 0 of 5 cycles         Current Hematologic/ Oncologic Treatment:       Cycle 1         Test Results:    Imaging: No results found.          Labs:   Lab Results   Component Value Date    WBC 8.47 04/11/2024    HGB 17.0 04/11/2024    HCT 49.9 (H) 04/11/2024     (H) 04/11/2024     04/11/2024     Lab Results   Component Value Date    K 5.1 04/11/2024     04/11/2024    CO2 30 04/11/2024    BUN 13 04/11/2024    CREATININE 0.77 04/11/2024    GLUF 165 (H) 04/02/2024    CALCIUM 9.8 04/11/2024    AST 14 04/11/2024    ALT 15 04/11/2024    ALKPHOS 95 04/11/2024    EGFR 100 04/11/2024         No results found for: \"SPEP\", \"UPEP\"    No results found for: \"PSA\"    No results found for: \"CEA\"    No results found for: \"\"    No results found for: \"AFP\"    No results found for: \"IRON\", \"TIBC\", \"FERRITIN\"    No results found for: \"QPRMKGPY86\"      ROS: Review of Systems   Constitutional:  Positive for fatigue.   HENT: Negative.     Eyes: Negative.    Respiratory: Negative.     Cardiovascular: Negative.    Gastrointestinal:  Positive for abdominal pain.   Endocrine: Negative.    Genitourinary: Negative.    Musculoskeletal: " Negative.    Skin: Negative.    Allergic/Immunologic: Negative.    Neurological: Negative.    Hematological: Negative.      Some midepigastric pain noted.  He is able to eat.  He is not vomiting     Current Medications: Reviewed  Allergies: Reviewed  PMH/FH/SH:  Reviewed      Physical Exam:    Body surface area is 1.92 meters squared.    Wt Readings from Last 3 Encounters:   05/24/24 76.7 kg (169 lb)   05/15/24 75.3 kg (166 lb)   05/13/24 74.8 kg (165 lb)        Temp Readings from Last 3 Encounters:   05/24/24 (!) 97.3 °F (36.3 °C) (Temporal)   05/15/24 (!) 97.3 °F (36.3 °C)   05/13/24 (!) 97 °F (36.1 °C)        BP Readings from Last 3 Encounters:   05/24/24 138/72   05/15/24 124/72   05/13/24 128/66         Pulse Readings from Last 3 Encounters:   05/24/24 89   05/13/24 83   05/02/24 101     @LASTSAO2(3)@      Physical Exam  Constitutional:       Appearance: Normal appearance. He is normal weight.   HENT:      Head: Normocephalic and atraumatic.   Eyes:      Extraocular Movements: Extraocular movements intact.      Conjunctiva/sclera: Conjunctivae normal.      Pupils: Pupils are equal, round, and reactive to light.   Cardiovascular:      Rate and Rhythm: Normal rate and regular rhythm.      Heart sounds: Normal heart sounds.   Pulmonary:      Effort: Pulmonary effort is normal.      Breath sounds: Normal breath sounds.   Abdominal:      General: Abdomen is flat. Bowel sounds are normal.      Palpations: Abdomen is soft.   Musculoskeletal:         General: Normal range of motion.      Cervical back: Normal range of motion and neck supple.   Skin:     General: Skin is warm and dry.   Neurological:      General: No focal deficit present.      Mental Status: He is alert and oriented to person, place, and time. Mental status is at baseline.     No leg edema noted      Goals and Barriers:  Current Goal: Prolong Survival from Cancer.   Barriers: None.      Patient's Capacity to Self Care:  Patient is able to self  care.    Code Status: [unfilled]  Advance Directive and Living Will:      Power of :

## 2024-05-28 DIAGNOSIS — R10.11 RIGHT UPPER QUADRANT PAIN: Primary | ICD-10-CM

## 2024-05-28 DIAGNOSIS — C25.3 PRIMARY ADENOCARCINOMA OF PANCREATIC DUCT (HCC): ICD-10-CM

## 2024-05-28 DIAGNOSIS — C25.9 PANCREATIC ADENOCARCINOMA (HCC): ICD-10-CM

## 2024-05-30 ENCOUNTER — APPOINTMENT (OUTPATIENT)
Dept: LAB | Facility: CLINIC | Age: 59
End: 2024-05-30
Payer: COMMERCIAL

## 2024-05-30 DIAGNOSIS — C25.3 PRIMARY ADENOCARCINOMA OF PANCREATIC DUCT (HCC): ICD-10-CM

## 2024-05-30 DIAGNOSIS — C25.9 PANCREATIC ADENOCARCINOMA (HCC): ICD-10-CM

## 2024-05-30 DIAGNOSIS — R10.11 RIGHT UPPER QUADRANT PAIN: ICD-10-CM

## 2024-05-30 LAB
ALBUMIN SERPL BCP-MCNC: 4.1 G/DL (ref 3.5–5)
ALP SERPL-CCNC: 98 U/L (ref 34–104)
ALT SERPL W P-5'-P-CCNC: 21 U/L (ref 7–52)
ANION GAP SERPL CALCULATED.3IONS-SCNC: 8 MMOL/L (ref 4–13)
AST SERPL W P-5'-P-CCNC: 17 U/L (ref 13–39)
BASOPHILS # BLD AUTO: 0.08 THOUSANDS/ÂΜL (ref 0–0.1)
BASOPHILS NFR BLD AUTO: 1 % (ref 0–1)
BILIRUB SERPL-MCNC: 0.68 MG/DL (ref 0.2–1)
BUN SERPL-MCNC: 15 MG/DL (ref 5–25)
CALCIUM SERPL-MCNC: 9.8 MG/DL (ref 8.4–10.2)
CHLORIDE SERPL-SCNC: 107 MMOL/L (ref 96–108)
CO2 SERPL-SCNC: 26 MMOL/L (ref 21–32)
CREAT SERPL-MCNC: 0.77 MG/DL (ref 0.6–1.3)
EOSINOPHIL # BLD AUTO: 0.28 THOUSAND/ÂΜL (ref 0–0.61)
EOSINOPHIL NFR BLD AUTO: 3 % (ref 0–6)
ERYTHROCYTE [DISTWIDTH] IN BLOOD BY AUTOMATED COUNT: 13.3 % (ref 11.6–15.1)
GFR SERPL CREATININE-BSD FRML MDRD: 100 ML/MIN/1.73SQ M
GLUCOSE P FAST SERPL-MCNC: 70 MG/DL (ref 65–99)
HCT VFR BLD AUTO: 51.6 % (ref 36.5–49.3)
HGB BLD-MCNC: 16.6 G/DL (ref 12–17)
IMM GRANULOCYTES # BLD AUTO: 0.06 THOUSAND/UL (ref 0–0.2)
IMM GRANULOCYTES NFR BLD AUTO: 1 % (ref 0–2)
LYMPHOCYTES # BLD AUTO: 1.87 THOUSANDS/ÂΜL (ref 0.6–4.47)
LYMPHOCYTES NFR BLD AUTO: 19 % (ref 14–44)
MCH RBC QN AUTO: 32.5 PG (ref 26.8–34.3)
MCHC RBC AUTO-ENTMCNC: 32.2 G/DL (ref 31.4–37.4)
MCV RBC AUTO: 101 FL (ref 82–98)
MONOCYTES # BLD AUTO: 0.92 THOUSAND/ÂΜL (ref 0.17–1.22)
MONOCYTES NFR BLD AUTO: 9 % (ref 4–12)
NEUTROPHILS # BLD AUTO: 6.64 THOUSANDS/ÂΜL (ref 1.85–7.62)
NEUTS SEG NFR BLD AUTO: 67 % (ref 43–75)
NRBC BLD AUTO-RTO: 0 /100 WBCS
PLATELET # BLD AUTO: 189 THOUSANDS/UL (ref 149–390)
PMV BLD AUTO: 10.9 FL (ref 8.9–12.7)
POTASSIUM SERPL-SCNC: 4.6 MMOL/L (ref 3.5–5.3)
PROT SERPL-MCNC: 7.5 G/DL (ref 6.4–8.4)
RBC # BLD AUTO: 5.1 MILLION/UL (ref 3.88–5.62)
SODIUM SERPL-SCNC: 141 MMOL/L (ref 135–147)
WBC # BLD AUTO: 9.85 THOUSAND/UL (ref 4.31–10.16)

## 2024-05-30 PROCEDURE — 80053 COMPREHEN METABOLIC PANEL: CPT

## 2024-05-30 PROCEDURE — 85025 COMPLETE CBC W/AUTO DIFF WBC: CPT

## 2024-05-30 PROCEDURE — 36415 COLL VENOUS BLD VENIPUNCTURE: CPT

## 2024-05-31 PROCEDURE — 77300 RADIATION THERAPY DOSE PLAN: CPT | Performed by: RADIOLOGY

## 2024-05-31 PROCEDURE — 77338 DESIGN MLC DEVICE FOR IMRT: CPT | Performed by: RADIOLOGY

## 2024-05-31 PROCEDURE — 77301 RADIOTHERAPY DOSE PLAN IMRT: CPT | Performed by: RADIOLOGY

## 2024-06-03 ENCOUNTER — HOSPITAL ENCOUNTER (OUTPATIENT)
Dept: INFUSION CENTER | Facility: CLINIC | Age: 59
Discharge: HOME | End: 2024-06-03

## 2024-06-03 ENCOUNTER — TELEPHONE (OUTPATIENT)
Age: 59
End: 2024-06-03

## 2024-06-03 ENCOUNTER — APPOINTMENT (OUTPATIENT)
Dept: RADIATION ONCOLOGY | Facility: CLINIC | Age: 59
End: 2024-06-03
Attending: RADIOLOGY
Payer: COMMERCIAL

## 2024-06-03 VITALS — WEIGHT: 165.6 LBS | HEIGHT: 69 IN | BODY MASS INDEX: 24.53 KG/M2

## 2024-06-03 DIAGNOSIS — C25.3 PRIMARY ADENOCARCINOMA OF PANCREATIC DUCT (HCC): Primary | ICD-10-CM

## 2024-06-03 DIAGNOSIS — C25.9 PANCREATIC ADENOCARCINOMA (HCC): ICD-10-CM

## 2024-06-03 DIAGNOSIS — R10.11 RIGHT UPPER QUADRANT PAIN: ICD-10-CM

## 2024-06-03 DIAGNOSIS — E11.00 TYPE 2 DIABETES MELLITUS WITH HYPEROSMOLARITY WITHOUT COMA, WITHOUT LONG-TERM CURRENT USE OF INSULIN (HCC): ICD-10-CM

## 2024-06-03 PROCEDURE — 77386 HB NTSTY MODUL RAD TX DLVR CPLX: CPT | Performed by: RADIOLOGY

## 2024-06-03 PROCEDURE — 77427 RADIATION TX MANAGEMENT X5: CPT | Performed by: RADIOLOGY

## 2024-06-03 PROCEDURE — 77014 CHG CT GUIDANCE RADIATION THERAPY FLDS PLACEMENT: CPT | Performed by: RADIOLOGY

## 2024-06-03 NOTE — TELEPHONE ENCOUNTER
"Pt states he was taking Metformin incorrectly for the last month and a half.  Pt states that he thought he had 500mg tablets and was taking them BID but the RX is actually for 1000mg tablets and was taking that BID.    Pt states he noticed though that his sugar levels \"were in the green\" from 22% of the time to 39% of the time for the last week and a half.    Pt states he already ran out of 90 quantity as he was taking Metformin incorrectly.  Please advise if provider would like him to continue to take Metformin 1000mg BID.  If so please send new Rx to [facility] on [address].  Please review and advise how pt should proceed.  "

## 2024-06-03 NOTE — PROGRESS NOTES
Pt here for chemotherapy, cadd pump hook up,  offering no complaints.  Right port accessed with positive blood return noted throughout treatment. CADD pump hooked up to patient it is running.  Double checked with Yadira Mendieta RN. Went over all info with patient regarding new pump.  He is aware to come back on Friday for disconnect 6/7 @ 530pm, we will draw labs and disconnect him.  AVS declined. Walked out in stable condition

## 2024-06-04 ENCOUNTER — APPOINTMENT (OUTPATIENT)
Dept: RADIATION ONCOLOGY | Facility: CLINIC | Age: 59
End: 2024-06-04
Attending: STUDENT IN AN ORGANIZED HEALTH CARE EDUCATION/TRAINING PROGRAM
Payer: COMMERCIAL

## 2024-06-04 PROCEDURE — 77014 CHG CT GUIDANCE RADIATION THERAPY FLDS PLACEMENT: CPT | Performed by: RADIOLOGY

## 2024-06-04 PROCEDURE — 77386 HB NTSTY MODUL RAD TX DLVR CPLX: CPT | Performed by: RADIOLOGY

## 2024-06-05 ENCOUNTER — APPOINTMENT (OUTPATIENT)
Dept: RADIATION ONCOLOGY | Facility: CLINIC | Age: 59
End: 2024-06-05
Attending: RADIOLOGY
Payer: COMMERCIAL

## 2024-06-05 ENCOUNTER — APPOINTMENT (OUTPATIENT)
Dept: LAB | Facility: CLINIC | Age: 59
End: 2024-06-05
Payer: COMMERCIAL

## 2024-06-05 DIAGNOSIS — C25.9 PANCREATIC ADENOCARCINOMA (HCC): ICD-10-CM

## 2024-06-05 DIAGNOSIS — C25.3 PRIMARY ADENOCARCINOMA OF PANCREATIC DUCT (HCC): Primary | ICD-10-CM

## 2024-06-05 DIAGNOSIS — R10.11 RIGHT UPPER QUADRANT PAIN: Primary | ICD-10-CM

## 2024-06-05 DIAGNOSIS — C25.3 PRIMARY ADENOCARCINOMA OF PANCREATIC DUCT (HCC): ICD-10-CM

## 2024-06-05 LAB
ALBUMIN SERPL BCP-MCNC: 4 G/DL (ref 3.5–5)
ALP SERPL-CCNC: 87 U/L (ref 34–104)
ALT SERPL W P-5'-P-CCNC: 17 U/L (ref 7–52)
ANION GAP SERPL CALCULATED.3IONS-SCNC: 7 MMOL/L (ref 4–13)
AST SERPL W P-5'-P-CCNC: 15 U/L (ref 13–39)
BASOPHILS # BLD AUTO: 0.07 THOUSANDS/ÂΜL (ref 0–0.1)
BASOPHILS NFR BLD AUTO: 1 % (ref 0–1)
BILIRUB SERPL-MCNC: 0.77 MG/DL (ref 0.2–1)
BUN SERPL-MCNC: 14 MG/DL (ref 5–25)
CALCIUM SERPL-MCNC: 9.6 MG/DL (ref 8.4–10.2)
CHLORIDE SERPL-SCNC: 106 MMOL/L (ref 96–108)
CO2 SERPL-SCNC: 29 MMOL/L (ref 21–32)
CREAT SERPL-MCNC: 0.77 MG/DL (ref 0.6–1.3)
EOSINOPHIL # BLD AUTO: 0.24 THOUSAND/ÂΜL (ref 0–0.61)
EOSINOPHIL NFR BLD AUTO: 3 % (ref 0–6)
ERYTHROCYTE [DISTWIDTH] IN BLOOD BY AUTOMATED COUNT: 13.3 % (ref 11.6–15.1)
GFR SERPL CREATININE-BSD FRML MDRD: 100 ML/MIN/1.73SQ M
GLUCOSE P FAST SERPL-MCNC: 114 MG/DL (ref 65–99)
HCT VFR BLD AUTO: 49.6 % (ref 36.5–49.3)
HGB BLD-MCNC: 16.4 G/DL (ref 12–17)
IMM GRANULOCYTES # BLD AUTO: 0.04 THOUSAND/UL (ref 0–0.2)
IMM GRANULOCYTES NFR BLD AUTO: 1 % (ref 0–2)
LYMPHOCYTES # BLD AUTO: 1.23 THOUSANDS/ÂΜL (ref 0.6–4.47)
LYMPHOCYTES NFR BLD AUTO: 16 % (ref 14–44)
MCH RBC QN AUTO: 32.9 PG (ref 26.8–34.3)
MCHC RBC AUTO-ENTMCNC: 33.1 G/DL (ref 31.4–37.4)
MCV RBC AUTO: 99 FL (ref 82–98)
MONOCYTES # BLD AUTO: 0.75 THOUSAND/ÂΜL (ref 0.17–1.22)
MONOCYTES NFR BLD AUTO: 10 % (ref 4–12)
NEUTROPHILS # BLD AUTO: 5.5 THOUSANDS/ÂΜL (ref 1.85–7.62)
NEUTS SEG NFR BLD AUTO: 69 % (ref 43–75)
NRBC BLD AUTO-RTO: 0 /100 WBCS
PLATELET # BLD AUTO: 150 THOUSANDS/UL (ref 149–390)
PMV BLD AUTO: 11 FL (ref 8.9–12.7)
POTASSIUM SERPL-SCNC: 4.5 MMOL/L (ref 3.5–5.3)
PROT SERPL-MCNC: 7.1 G/DL (ref 6.4–8.4)
RBC # BLD AUTO: 4.99 MILLION/UL (ref 3.88–5.62)
SODIUM SERPL-SCNC: 142 MMOL/L (ref 135–147)
WBC # BLD AUTO: 7.83 THOUSAND/UL (ref 4.31–10.16)

## 2024-06-05 PROCEDURE — 85025 COMPLETE CBC W/AUTO DIFF WBC: CPT

## 2024-06-05 PROCEDURE — 77014 CHG CT GUIDANCE RADIATION THERAPY FLDS PLACEMENT: CPT | Performed by: RADIOLOGY

## 2024-06-05 PROCEDURE — 36415 COLL VENOUS BLD VENIPUNCTURE: CPT

## 2024-06-05 PROCEDURE — 80053 COMPREHEN METABOLIC PANEL: CPT

## 2024-06-05 PROCEDURE — 77386 HB NTSTY MODUL RAD TX DLVR CPLX: CPT | Performed by: RADIOLOGY

## 2024-06-06 ENCOUNTER — APPOINTMENT (OUTPATIENT)
Dept: RADIATION ONCOLOGY | Facility: CLINIC | Age: 59
End: 2024-06-06
Attending: RADIOLOGY
Payer: COMMERCIAL

## 2024-06-06 PROCEDURE — 77014 CHG CT GUIDANCE RADIATION THERAPY FLDS PLACEMENT: CPT | Performed by: RADIOLOGY

## 2024-06-06 PROCEDURE — 77386 HB NTSTY MODUL RAD TX DLVR CPLX: CPT | Performed by: RADIOLOGY

## 2024-06-07 ENCOUNTER — APPOINTMENT (OUTPATIENT)
Dept: RADIATION ONCOLOGY | Facility: CLINIC | Age: 59
End: 2024-06-07
Attending: RADIOLOGY
Payer: COMMERCIAL

## 2024-06-07 ENCOUNTER — TELEPHONE (OUTPATIENT)
Age: 59
End: 2024-06-07

## 2024-06-07 ENCOUNTER — HOSPITAL ENCOUNTER (OUTPATIENT)
Dept: INFUSION CENTER | Facility: CLINIC | Age: 59
Discharge: HOME | End: 2024-06-07

## 2024-06-07 DIAGNOSIS — R10.11 RIGHT UPPER QUADRANT PAIN: ICD-10-CM

## 2024-06-07 DIAGNOSIS — C25.9 PANCREATIC ADENOCARCINOMA (HCC): ICD-10-CM

## 2024-06-07 DIAGNOSIS — C25.3 PRIMARY ADENOCARCINOMA OF PANCREATIC DUCT (HCC): Primary | ICD-10-CM

## 2024-06-07 PROCEDURE — 77386 HB NTSTY MODUL RAD TX DLVR CPLX: CPT | Performed by: RADIOLOGY

## 2024-06-07 PROCEDURE — 77014 CHG CT GUIDANCE RADIATION THERAPY FLDS PLACEMENT: CPT | Performed by: RADIOLOGY

## 2024-06-07 PROCEDURE — 77336 RADIATION PHYSICS CONSULT: CPT | Performed by: RADIOLOGY

## 2024-06-07 NOTE — TELEPHONE ENCOUNTER
Taking hyrocodone 5-325 mg can the doctor send higher mg or a different type of medication he had 5 days of radiation having severe moderate pain can the office call him when script has been sent [phone]

## 2024-06-07 NOTE — PROGRESS NOTES
Pt presents for CADD pump disconnect offering no complaints. CADD disconnected early per note from Shabbir Fernandez RN, OK to disconnect CADD pump early on 6/7/24 per Dr. Calderon. Total medication recieved 83.85ml. CADD disconnected without difficulty. Port flushed positive blood return noted. Port a cath deaccessed without difficulty. AVS declined. Next appointment on 6/10/24 at 8am.

## 2024-06-10 ENCOUNTER — APPOINTMENT (OUTPATIENT)
Dept: RADIATION ONCOLOGY | Facility: CLINIC | Age: 59
End: 2024-06-10
Attending: RADIOLOGY
Payer: COMMERCIAL

## 2024-06-10 ENCOUNTER — HOSPITAL ENCOUNTER (OUTPATIENT)
Dept: INFUSION CENTER | Facility: CLINIC | Age: 59
Discharge: HOME | End: 2024-06-10

## 2024-06-10 VITALS
RESPIRATION RATE: 18 BRPM | HEART RATE: 85 BPM | TEMPERATURE: 97.5 F | WEIGHT: 164 LBS | HEIGHT: 69 IN | SYSTOLIC BLOOD PRESSURE: 119 MMHG | BODY MASS INDEX: 24.29 KG/M2 | DIASTOLIC BLOOD PRESSURE: 77 MMHG

## 2024-06-10 DIAGNOSIS — C25.9 PANCREATIC ADENOCARCINOMA (HCC): ICD-10-CM

## 2024-06-10 DIAGNOSIS — C25.3 PRIMARY ADENOCARCINOMA OF PANCREATIC DUCT (HCC): Primary | ICD-10-CM

## 2024-06-10 DIAGNOSIS — R10.11 RIGHT UPPER QUADRANT PAIN: ICD-10-CM

## 2024-06-10 PROCEDURE — 77427 RADIATION TX MANAGEMENT X5: CPT | Performed by: RADIOLOGY

## 2024-06-10 PROCEDURE — 77014 CHG CT GUIDANCE RADIATION THERAPY FLDS PLACEMENT: CPT | Performed by: RADIOLOGY

## 2024-06-10 PROCEDURE — 77386 HB NTSTY MODUL RAD TX DLVR CPLX: CPT | Performed by: RADIOLOGY

## 2024-06-10 NOTE — PROGRESS NOTES
Pt into clinic for fluorouracil CADD pump. Pt offers no complaints. Port accessed and attached to CADD pump. Clamps open to run. CADD pump ok to be disconnected on 6/14/24 at 0825. Pt aware of next appointment on 6/14/25 at 0830. AVS declined.

## 2024-06-11 ENCOUNTER — APPOINTMENT (OUTPATIENT)
Dept: RADIATION ONCOLOGY | Facility: CLINIC | Age: 59
End: 2024-06-11
Attending: RADIOLOGY
Payer: COMMERCIAL

## 2024-06-11 PROCEDURE — 77014 CHG CT GUIDANCE RADIATION THERAPY FLDS PLACEMENT: CPT | Performed by: STUDENT IN AN ORGANIZED HEALTH CARE EDUCATION/TRAINING PROGRAM

## 2024-06-11 PROCEDURE — 77386 HB NTSTY MODUL RAD TX DLVR CPLX: CPT | Performed by: STUDENT IN AN ORGANIZED HEALTH CARE EDUCATION/TRAINING PROGRAM

## 2024-06-12 ENCOUNTER — APPOINTMENT (OUTPATIENT)
Dept: RADIATION ONCOLOGY | Facility: CLINIC | Age: 59
End: 2024-06-12
Attending: RADIOLOGY
Payer: COMMERCIAL

## 2024-06-12 ENCOUNTER — APPOINTMENT (OUTPATIENT)
Dept: LAB | Facility: CLINIC | Age: 59
End: 2024-06-12
Payer: COMMERCIAL

## 2024-06-12 DIAGNOSIS — C25.9 PANCREATIC ADENOCARCINOMA (HCC): ICD-10-CM

## 2024-06-12 DIAGNOSIS — C25.3 PRIMARY ADENOCARCINOMA OF PANCREATIC DUCT (HCC): ICD-10-CM

## 2024-06-12 DIAGNOSIS — R10.11 RIGHT UPPER QUADRANT PAIN: ICD-10-CM

## 2024-06-12 LAB
ALBUMIN SERPL BCP-MCNC: 4.1 G/DL (ref 3.5–5)
ALP SERPL-CCNC: 91 U/L (ref 34–104)
ALT SERPL W P-5'-P-CCNC: 14 U/L (ref 7–52)
ANION GAP SERPL CALCULATED.3IONS-SCNC: 9 MMOL/L (ref 4–13)
AST SERPL W P-5'-P-CCNC: 13 U/L (ref 13–39)
BASOPHILS # BLD AUTO: 0.05 THOUSANDS/ÂΜL (ref 0–0.1)
BASOPHILS NFR BLD AUTO: 1 % (ref 0–1)
BILIRUB SERPL-MCNC: 0.59 MG/DL (ref 0.2–1)
BUN SERPL-MCNC: 11 MG/DL (ref 5–25)
CALCIUM SERPL-MCNC: 9.7 MG/DL (ref 8.4–10.2)
CHLORIDE SERPL-SCNC: 105 MMOL/L (ref 96–108)
CO2 SERPL-SCNC: 28 MMOL/L (ref 21–32)
CREAT SERPL-MCNC: 0.74 MG/DL (ref 0.6–1.3)
EOSINOPHIL # BLD AUTO: 0.27 THOUSAND/ÂΜL (ref 0–0.61)
EOSINOPHIL NFR BLD AUTO: 3 % (ref 0–6)
ERYTHROCYTE [DISTWIDTH] IN BLOOD BY AUTOMATED COUNT: 13.6 % (ref 11.6–15.1)
GFR SERPL CREATININE-BSD FRML MDRD: 101 ML/MIN/1.73SQ M
GLUCOSE P FAST SERPL-MCNC: 134 MG/DL (ref 65–99)
HCT VFR BLD AUTO: 49.7 % (ref 36.5–49.3)
HGB BLD-MCNC: 16.2 G/DL (ref 12–17)
IMM GRANULOCYTES # BLD AUTO: 0.03 THOUSAND/UL (ref 0–0.2)
IMM GRANULOCYTES NFR BLD AUTO: 0 % (ref 0–2)
LYMPHOCYTES # BLD AUTO: 0.66 THOUSANDS/ÂΜL (ref 0.6–4.47)
LYMPHOCYTES NFR BLD AUTO: 8 % (ref 14–44)
MCH RBC QN AUTO: 32.7 PG (ref 26.8–34.3)
MCHC RBC AUTO-ENTMCNC: 32.6 G/DL (ref 31.4–37.4)
MCV RBC AUTO: 100 FL (ref 82–98)
MONOCYTES # BLD AUTO: 0.81 THOUSAND/ÂΜL (ref 0.17–1.22)
MONOCYTES NFR BLD AUTO: 10 % (ref 4–12)
NEUTROPHILS # BLD AUTO: 6.22 THOUSANDS/ÂΜL (ref 1.85–7.62)
NEUTS SEG NFR BLD AUTO: 78 % (ref 43–75)
NRBC BLD AUTO-RTO: 0 /100 WBCS
PLATELET # BLD AUTO: 132 THOUSANDS/UL (ref 149–390)
PMV BLD AUTO: 11 FL (ref 8.9–12.7)
POTASSIUM SERPL-SCNC: 4.4 MMOL/L (ref 3.5–5.3)
PROT SERPL-MCNC: 7 G/DL (ref 6.4–8.4)
RBC # BLD AUTO: 4.96 MILLION/UL (ref 3.88–5.62)
SODIUM SERPL-SCNC: 142 MMOL/L (ref 135–147)
WBC # BLD AUTO: 8.04 THOUSAND/UL (ref 4.31–10.16)

## 2024-06-12 PROCEDURE — 80053 COMPREHEN METABOLIC PANEL: CPT

## 2024-06-12 PROCEDURE — 77386 HB NTSTY MODUL RAD TX DLVR CPLX: CPT | Performed by: RADIOLOGY

## 2024-06-12 PROCEDURE — 85025 COMPLETE CBC W/AUTO DIFF WBC: CPT

## 2024-06-12 PROCEDURE — 36415 COLL VENOUS BLD VENIPUNCTURE: CPT

## 2024-06-12 PROCEDURE — 77014 CHG CT GUIDANCE RADIATION THERAPY FLDS PLACEMENT: CPT | Performed by: RADIOLOGY

## 2024-06-13 ENCOUNTER — APPOINTMENT (OUTPATIENT)
Dept: RADIATION ONCOLOGY | Facility: CLINIC | Age: 59
End: 2024-06-13
Attending: RADIOLOGY
Payer: COMMERCIAL

## 2024-06-13 PROCEDURE — 77014 CHG CT GUIDANCE RADIATION THERAPY FLDS PLACEMENT: CPT | Performed by: STUDENT IN AN ORGANIZED HEALTH CARE EDUCATION/TRAINING PROGRAM

## 2024-06-13 PROCEDURE — 77386 HB NTSTY MODUL RAD TX DLVR CPLX: CPT | Performed by: STUDENT IN AN ORGANIZED HEALTH CARE EDUCATION/TRAINING PROGRAM

## 2024-06-14 ENCOUNTER — HOSPITAL ENCOUNTER (OUTPATIENT)
Dept: INFUSION CENTER | Facility: CLINIC | Age: 59
Discharge: HOME | End: 2024-06-14

## 2024-06-14 ENCOUNTER — APPOINTMENT (OUTPATIENT)
Dept: RADIATION ONCOLOGY | Facility: CLINIC | Age: 59
End: 2024-06-14
Attending: RADIOLOGY
Payer: COMMERCIAL

## 2024-06-14 DIAGNOSIS — R10.11 RIGHT UPPER QUADRANT PAIN: Primary | ICD-10-CM

## 2024-06-14 DIAGNOSIS — C25.3 PRIMARY ADENOCARCINOMA OF PANCREATIC DUCT (HCC): ICD-10-CM

## 2024-06-14 DIAGNOSIS — C25.9 PANCREATIC ADENOCARCINOMA (HCC): ICD-10-CM

## 2024-06-14 PROCEDURE — 77336 RADIATION PHYSICS CONSULT: CPT | Performed by: RADIOLOGY

## 2024-06-14 PROCEDURE — 77014 CHG CT GUIDANCE RADIATION THERAPY FLDS PLACEMENT: CPT | Performed by: RADIOLOGY

## 2024-06-14 PROCEDURE — 77386 HB NTSTY MODUL RAD TX DLVR CPLX: CPT | Performed by: RADIOLOGY

## 2024-06-14 NOTE — PROGRESS NOTES
"Patient to clinic for CADD pump disconnect. Offers no complaints at this time. As per provider order, CADD pump stopped with 11.9 ml remaining. Patient declined labs, made patient aware labs needed for tx on Monday, however, continued to decline stating \"I would rather get them done by the phlebotomists on Wednesday\"  Moisture noted underneath central dressing, patient admitted to showering. Patient made aware moisture under central line dressing can lead to infection. Port De-accessed and flushed. Skin cleansed with alcohol pad and band aid applied.  Aware of next appointment on 6/24/24 at 8am AVS declined.   "

## 2024-06-17 ENCOUNTER — APPOINTMENT (OUTPATIENT)
Dept: RADIATION ONCOLOGY | Facility: CLINIC | Age: 59
End: 2024-06-17
Attending: RADIOLOGY
Payer: COMMERCIAL

## 2024-06-17 PROCEDURE — 77386 HB NTSTY MODUL RAD TX DLVR CPLX: CPT | Performed by: RADIOLOGY

## 2024-06-17 PROCEDURE — 77427 RADIATION TX MANAGEMENT X5: CPT | Performed by: RADIOLOGY

## 2024-06-17 PROCEDURE — 77014 CHG CT GUIDANCE RADIATION THERAPY FLDS PLACEMENT: CPT | Performed by: RADIOLOGY

## 2024-06-18 ENCOUNTER — APPOINTMENT (OUTPATIENT)
Dept: RADIATION ONCOLOGY | Facility: CLINIC | Age: 59
End: 2024-06-18
Attending: RADIOLOGY
Payer: COMMERCIAL

## 2024-06-18 ENCOUNTER — DOCUMENTATION (OUTPATIENT)
Dept: NUTRITION | Facility: CLINIC | Age: 59
End: 2024-06-18

## 2024-06-18 DIAGNOSIS — R10.11 RIGHT UPPER QUADRANT PAIN: Primary | ICD-10-CM

## 2024-06-18 DIAGNOSIS — C25.3 PRIMARY ADENOCARCINOMA OF PANCREATIC DUCT (HCC): ICD-10-CM

## 2024-06-18 DIAGNOSIS — C25.9 PANCREATIC ADENOCARCINOMA (HCC): ICD-10-CM

## 2024-06-18 PROCEDURE — 77014 CHG CT GUIDANCE RADIATION THERAPY FLDS PLACEMENT: CPT | Performed by: RADIOLOGY

## 2024-06-18 PROCEDURE — 77386 HB NTSTY MODUL RAD TX DLVR CPLX: CPT | Performed by: RADIOLOGY

## 2024-06-18 NOTE — PROGRESS NOTES
Outpatient Oncology Nutrition Education  Type of Consult:  Nutrition Education  Care Location: Radiation Oncology    Reason for referral: notification from Sylvia Bakari Sotelo on 6/18/24 that pt is appropriate for oncology nutrition services (reason: pancreatic cancer; T2DM)     Oncology Diagnosis & Treatments:   Diagnosed with pancreatic cancer 10/30/2023.   Chemo (camptosar, oxaliplatin, adrucil) 11/28/23-4/19/24.  Current chemo (adrucil) regimen and RT to the abdomen began 6/3/24  Oncology History   Right upper quadrant pain   10/30/2023 Biopsy    Endoscopic ultrasound:  Pancreas, Neck mass:  Malignant  Adenocarcinoma.        11/16/2023 -  Cancer Staged    Staging form: Pancreas, AJCC 8th Edition  - Clinical: Stage IB (cT2, cN0, cM0) - Signed by Samir Cervantes MD on 11/16/2023  Total positive nodes: 0       11/28/2023 - 4/19/2024 Chemotherapy    alteplase (CATHFLO), 2 mg, Intracatheter, Every 1 Minute as needed, 11 of 12 cycles  irinotecan (CAMPTOSAR) chemo infusion, 150 mg/m2 = 278 mg, Intravenous, Once, 11 of 12 cycles  Dose modification: 125 mg/m2 (100 % of original dose 150 mg/m2, Cycle 9, Reason: Dose Not Tolerated), 125.1 mg/m2 (83.4 % of original dose 125 mg/m2, Cycle 2, Reason: Dose Not Tolerated), 125 mg/m2 (100 % of original dose 125 mg/m2, Cycle 2, Reason: Dose Not Tolerated)  Administration: 239 mg (3/19/2024), 278 mg (11/28/2023), 231 mg (12/12/2023), 231 mg (12/27/2023), 231 mg (1/10/2024), 231 mg (1/24/2024), 231 mg (2/7/2024), 231 mg (2/21/2024), 238 mg (3/6/2024), 238 mg (4/3/2024), 238 mg (4/17/2024)  oxaliplatin (ELOXATIN) chemo infusion, 65 mg/m2 = 120.25 mg, Intravenous, Once, 3 of 3 cycles  Administration: 120.25 mg (11/28/2023), 120.25 mg (12/12/2023), 120.25 mg (12/27/2023)  fluorouracil (ADRUCIL) ambulatory infusion Soln, 1,200 mg/m2/day = 4,440 mg, Intravenous, Over 46 hours, 11 of 12 cycles     6/3/2024 -  Chemotherapy    alteplase (CATHFLO), 2 mg, Intracatheter, Every 1 Minute as needed,  2 of 5 cycles  fluorouracil (ADRUCIL) ambulatory infusion Soln, 225 mg/m2/day = 2,160 mg, Intravenous, Over 120 hours, 2 of 5 cycles     Primary adenocarcinoma of pancreatic duct (HCC)   10/30/2023 Initial Diagnosis    Primary adenocarcinoma of pancreatic duct (HCC)     10/30/2023 Biopsy    Final Diagnosis   A.B. Pancreas, Neck mass (Cell Block and smear Preparations):  Malignant  Adenocarcinoma.     Satisfactory for evaluation.        10/30/2023 -  Cancer Staged    Staging form: Pancreas, AJCC 8th Edition  - Clinical stage from 10/30/2023: Stage IB (cT2, cN0, cM0) - Signed by Malgorzata Madden MD on 2/19/2024  Stage prefix: Initial diagnosis  Total positive nodes: 0       6/3/2024 -  Chemotherapy    alteplase (CATHFLO), 2 mg, Intracatheter, Every 1 Minute as needed, 2 of 5 cycles  fluorouracil (ADRUCIL) ambulatory infusion Soln, 225 mg/m2/day = 2,160 mg, Intravenous, Over 120 hours, 2 of 5 cycles     Pancreatic adenocarcinoma (HCC)   12/28/2023 Initial Diagnosis    Pancreatic adenocarcinoma (HCC)     6/3/2024 -  Chemotherapy    alteplase (CATHFLO), 2 mg, Intracatheter, Every 1 Minute as needed, 2 of 5 cycles  fluorouracil (ADRUCIL) ambulatory infusion Soln, 225 mg/m2/day = 2,160 mg, Intravenous, Over 120 hours, 2 of 5 cycles       Past Medical & Surgical Hx:   Patient Active Problem List   Diagnosis    Right upper quadrant pain    Type 2 diabetes mellitus with hyperglycemia, with long-term current use of insulin (HCC)    Mixed hyperlipidemia    Coronary artery disease involving native coronary artery of native heart without angina pectoris    Gastroesophageal reflux disease    Primary insomnia    Dehydration    Attention deficit hyperactivity disorder (ADHD), combined type    Depression with anxiety    Right upper quadrant abdominal pain    Primary adenocarcinoma of pancreatic duct (HCC)    SIRS (systemic inflammatory response syndrome) (HCC)    Pancreatic adenocarcinoma (HCC)    Palliative care patient    Primary  hypertension    Continuous opioid dependence (HCC)     Past Medical History:   Diagnosis Date    Cancer (HCC)     pancreatic    Coronary artery disease     Diabetes mellitus (HCC)     Hyperlipidemia     Hypertension     Pancreatic cancer (HCC)      Past Surgical History:   Procedure Laterality Date    CORONARY ANGIOPLASTY WITH STENT PLACEMENT      ERCP W/ PLASTIC STENT PLACEMENT      HERNIA REPAIR      IR PORT PLACEMENT  11/24/2023       Review of Medications:     Current Outpatient Medications:     amphetamine-dextroamphetamine (ADDERALL XR) 20 MG 24 hr capsule, Take 20 mg by mouth every morning, Disp: , Rfl:     aspirin 81 MG tablet, daily, Disp: , Rfl:     Belsomra 10 MG TABS, Take 10 mg by mouth once as needed, Disp: , Rfl:     carvedilol (COREG) 6.25 mg tablet, Take 6.25 mg by mouth 2 (two) times a day, Disp: , Rfl:     Continuous Blood Gluc Sensor (FreeStyle Geovanny 3 Sensor) MISC, use as directed TO MONITOR GLUCOSE DAILY, Disp: , Rfl:     cyclobenzaprine (FLEXERIL) 10 mg tablet, Take 1 tablet (10 mg total) by mouth 3 (three) times a day as needed for muscle spasms, Disp: 30 tablet, Rfl: 0    desvenlafaxine succinate (PRISTIQ) 50 mg 24 hr tablet, Take 50 mg by mouth daily, Disp: , Rfl:     diphenhydramine, lidocaine, Al/Mg hydroxide, simethicone (Magic Mouthwash) SUSP, Swish and spit 10 mL every 4 (four) hours as needed for mouth pain or discomfort (Patient not taking: Reported on 5/15/2024), Disp: 237 mL, Rfl: 2    [START ON 6/24/2024] fluorouracil 2,160 mg in CADD/Elastomeric Infusion Device, Infuse 2,160 mg (225 mg/m2/day x 1.92 m2) into a catheter in a vein via infusion device over 120 hours for 5 days  Infusion planned for June 24, 2024., Disp: 1 each, Rfl: 0    HYDROcodone-acetaminophen (Norco) 5-325 mg per tablet, Take 1 tablet by mouth daily as needed for pain Max Daily Amount: 1 tablet, Disp: 30 tablet, Rfl: 0    insulin aspart, w/niacinamide, (Fiasp FlexTouch) 100 Units/mL injection pen, Inject 8 Units  "under the skin 3 (three) times a day with meals, Disp: 3 mL, Rfl: 2    insulin degludec (Tresiba FlexTouch) 100 units/mL injection pen, INJECT 30 UNITS SUBCUTANEOUSLY ONCE DAILY AT BEDTIME, Disp: 15 mL, Rfl: 5    Jardiance 25 MG TABS, Take 1 tablet (25 mg total) by mouth every morning, Disp: 90 tablet, Rfl: 3    lisinopril (ZESTRIL) 5 mg tablet, Take 1 tablet by mouth daily, Disp: , Rfl:     metFORMIN (GLUCOPHAGE) 1000 MG tablet, Take 1 tablet (1,000 mg total) by mouth 2 (two) times a day with meals, Disp: 90 tablet, Rfl: 2    omeprazole (PriLOSEC) 40 MG capsule, , Disp: , Rfl:     ondansetron (ZOFRAN) 8 mg tablet, Take 1 tablet (8 mg total) by mouth every 8 (eight) hours as needed for nausea or vomiting for up to 20 days, Disp: 20 tablet, Rfl: 2    pancrelipase, Lip-Prot-Amyl, (Creon) 12,000 units capsule, Take 1 capsule (12,000 Units total) by mouth 3 (three) times a day with meals, Disp: 180 capsule, Rfl: 0    phenazopyridine (PYRIDIUM) 95 MG tablet, Take 1 tablet (95 mg total) by mouth 3 (three) times a day as needed for bladder spasms, Disp: 10 tablet, Rfl: 0    polyethylene glycol (GLYCOLAX) 17 GM/SCOOP powder, Take 17 g by mouth daily, Disp: 510 g, Rfl: 0    rosuvastatin (CRESTOR) 10 MG tablet, Take 1 tablet by mouth daily, Disp: , Rfl:     valACYclovir (VALTREX) 1,000 mg tablet, Take 1 tablet (1,000 mg total) by mouth 2 (two) times a day for 10 days, Disp: 20 tablet, Rfl: 3    Most Recent Lab Results:   Lab Results   Component Value Date    WBC 8.04 06/12/2024    ALT 14 06/12/2024    AST 13 06/12/2024    K 4.4 06/12/2024    K 4.5 06/05/2024    BUN 11 06/12/2024    BUN 14 06/05/2024    CREATININE 0.74 06/12/2024    CREATININE 0.77 06/05/2024    HGBA1C 7.8 (A) 11/21/2023    HGBA1C 8.5 (H) 09/06/2023    HGBA1C 6.6 (H) 06/01/2023    CALCIUM 9.7 06/12/2024       Anthropometric Measurements:   Ht Readings from Last 1 Encounters:   06/10/24 5' 9.02\" (1.753 m)     -Weight History:   Wt Readings from Last 15 " Encounters:   06/10/24 74.4 kg (164 lb)   06/03/24 75.1 kg (165 lb 9.6 oz)   05/24/24 76.7 kg (169 lb)   05/15/24 75.3 kg (166 lb)   05/13/24 74.8 kg (165 lb)   05/02/24 74.4 kg (164 lb)   04/18/24 73.5 kg (162 lb)   04/17/24 73.6 kg (162 lb 3.2 oz)   04/11/24 74.8 kg (165 lb)   04/03/24 74.1 kg (163 lb 6.4 oz)   03/29/24 71.2 kg (157 lb)   03/19/24 74.5 kg (164 lb 3.2 oz)   03/07/24 75.4 kg (166 lb 3.2 oz)   03/06/24 74.6 kg (164 lb 6.4 oz)   02/29/24 73.5 kg (162 lb)     Varian weights: (6/3/24) 166#, (6/6/24) 164.5#, (6/10/24) 165.5#, (6/13/24) 162#, (6/17/24) 159#, (6/18/24) 161#     Oncology Nutrition-Anthropometrics      Flowsheet Row [facility]   Patient age (years): 58 years 58 years   Patient (male) height (in): 69 in 69 in   Current weight (lbs): 161 lbs 158.6 lbs   Current weight to be used for anthropometric calculations (kg) 73.2 kg 72.1 kg   BMI: 23.8 23.4   IBW male 160 lb 160 lb   IBW (kg) male 72.7 kg 72.7 kg   IBW % (male) 100.6 % 99.1 %   Adjusted BW (male): 160.3 lbs 159.7 lbs   Adjusted BW in kg (male): 72.9 kg 72.6 kg   % weight change after 1 week: -2.7 % -0.9 %   Weight change after 1 week (lbs) -4.5 lbs -1.4 lbs   % weight change after 1 month: -3 % 1 %   Weight change after 1 month (lbs) -5 lbs 1.6 lbs   % weight change after 3 months: -1.9 % 6.4 %   Weight change after 3 months (lbs) -3.2 lbs 9.6 lbs          Oncology Nutrition-Estimated Needs      Flowsheet Row Documentation from 6/18/2024 in [facility] [place] Nutrition from 1/10/2024 in [facility] [place]   Weight type used Actual weight Actual weight   Weight in kilograms (kg) used for estimated needs -- 72.1 kg   Weight in kilograms (kg) used for estimated needs 73.2 kg --   Energy needs formula:  35-40 kcal/kg 30-35 kcal/kg   Energy needs based on 30  kcal/kg: -- 2163 kcal   Energy needs based on 35 kcal/kg: -- 2523 kcal   Energy needs based on 35 kcal/k kcal --   Energy needs based on 40 kcal/k kcal --   Protein needs formula: 1.5-2 g/kg 1.2-1.5 g/kg   Protein needs based on 1.2 g/kg: -- 87 g   Protein needs based on 1.5 g/kg -- 108 g   Protein needs based on 1.5 g/kg 110 g --   Protein needs based on 2 g/kg 146 g --   Fluid needs formula: 30-35 mL/kg 30-35 mL/kg   Fluid needs based on 30 mL/kg 2196 mL 2169 mL   Fluid needs in ounces 74 oz 73 oz   Fluid needs based on 35 mL/kg 2562 mL 2531 mL   Fluid needs in ounces 87 oz 86 oz          Discussion/Summary:  Derick was seen today for nutrition education regarding  pancreatic cancer and T2DM .  Derick is currently undergoing treatment for pancreatic cancer. He explains that he has been trying to manage his BG and has been able to get it somewhat under control by eating smaller/ more frequent meals throughout the day rather than 3 large meals. We talked about strategies for BG management: consistent PO intakes throughout the day, protein at all meals/snacks, having a snack before bed that includes protein and carbohydrates. Discussed options for protein choices, he is having difficulty with most meats. Also suggested trying a protein supplement, reviewed options that will help maintain glycemic control (Premier Protein ready to drink or powder, Ensure Max). Derick is appreciative of conversation today. He will reach out to RD with any nutrition needs. All questions and concerns addressed during today’s visit.  Derick has RD contact information.    Follow Up Plan: prn per pt request  Recommend Referral to Other Providers: none at this time

## 2024-06-19 ENCOUNTER — APPOINTMENT (OUTPATIENT)
Dept: RADIATION ONCOLOGY | Facility: CLINIC | Age: 59
End: 2024-06-19
Attending: RADIOLOGY
Payer: COMMERCIAL

## 2024-06-19 DIAGNOSIS — G89.3 CANCER-RELATED PAIN: ICD-10-CM

## 2024-06-19 PROCEDURE — 77014 CHG CT GUIDANCE RADIATION THERAPY FLDS PLACEMENT: CPT | Performed by: RADIOLOGY

## 2024-06-19 PROCEDURE — 77386 HB NTSTY MODUL RAD TX DLVR CPLX: CPT | Performed by: RADIOLOGY

## 2024-06-19 RX ORDER — HYDROCODONE BITARTRATE AND ACETAMINOPHEN 5; 325 MG/1; MG/1
1 TABLET ORAL DAILY PRN
Qty: 30 TABLET | Refills: 0 | Status: SHIPPED | OUTPATIENT
Start: 2024-06-19

## 2024-06-19 NOTE — TELEPHONE ENCOUNTER
Medication:  PDMP   1 [id number] 06/10/2024 06/10/2024 Mixed Amphetamine Salt (Capsule, Extended Release) 30.0 30 20 MG NA [facility] Commercial Insurance 0 / 0 [place]    1 [id number] 05/12/2024 04/30/2024 Mixed Amphetamine Salt (Capsule, Extended Release) 30.0 30 20 MG NA [facility] Commercial Insurance 0 / 0 PA    1 [id number] 05/02/2024 04/30/2024 Belsomra (Tablet) 30.0 30 10 MG NA [facility] Commercial Insurance 0 / 1 PA    1 [id number] 04/17/2024 04/17/2024 HYDROcodone BITARTRATE 5 MG ORAL TABLET/ACETAMINOPHEN 325 MG (Tablet) 30.0 30 5.0 MG/325.0 MG 5.0 VITALIY [facility] Commercial Insurance 0 / 0 PA        Active agreement on file -Yes

## 2024-06-19 NOTE — TELEPHONE ENCOUNTER
Medication: Hydrocodone-acetaminophen (Norco)    Dose/Frequency: 5-325 mg, take 1 tablet by mouth daily as needed for pain    Quantity: 30    Pharmacy: [facility] [place]    Office:   [x] PCP/Provider -   [] Speciality/Provider -     Does the patient have enough for 3 days?   [x] Yes   [] No - Send as HP to POD

## 2024-06-20 ENCOUNTER — APPOINTMENT (OUTPATIENT)
Dept: LAB | Facility: CLINIC | Age: 59
End: 2024-06-20
Payer: COMMERCIAL

## 2024-06-20 ENCOUNTER — APPOINTMENT (OUTPATIENT)
Dept: RADIATION ONCOLOGY | Facility: CLINIC | Age: 59
End: 2024-06-20
Attending: RADIOLOGY
Payer: COMMERCIAL

## 2024-06-20 DIAGNOSIS — C25.3 PRIMARY ADENOCARCINOMA OF PANCREATIC DUCT (HCC): ICD-10-CM

## 2024-06-20 LAB
ALBUMIN SERPL BCP-MCNC: 3.9 G/DL (ref 3.5–5)
ALP SERPL-CCNC: 81 U/L (ref 34–104)
ALT SERPL W P-5'-P-CCNC: 11 U/L (ref 7–52)
ANION GAP SERPL CALCULATED.3IONS-SCNC: 11 MMOL/L (ref 4–13)
AST SERPL W P-5'-P-CCNC: 12 U/L (ref 13–39)
BASOPHILS # BLD AUTO: 0.04 THOUSANDS/ÂΜL (ref 0–0.1)
BASOPHILS NFR BLD AUTO: 1 % (ref 0–1)
BILIRUB SERPL-MCNC: 0.62 MG/DL (ref 0.2–1)
BUN SERPL-MCNC: 17 MG/DL (ref 5–25)
CALCIUM SERPL-MCNC: 9.5 MG/DL (ref 8.4–10.2)
CHLORIDE SERPL-SCNC: 103 MMOL/L (ref 96–108)
CO2 SERPL-SCNC: 27 MMOL/L (ref 21–32)
CREAT SERPL-MCNC: 0.71 MG/DL (ref 0.6–1.3)
EOSINOPHIL # BLD AUTO: 0.2 THOUSAND/ÂΜL (ref 0–0.61)
EOSINOPHIL NFR BLD AUTO: 3 % (ref 0–6)
ERYTHROCYTE [DISTWIDTH] IN BLOOD BY AUTOMATED COUNT: 14.4 % (ref 11.6–15.1)
GFR SERPL CREATININE-BSD FRML MDRD: 103 ML/MIN/1.73SQ M
GLUCOSE P FAST SERPL-MCNC: 170 MG/DL (ref 65–99)
HCT VFR BLD AUTO: 47.9 % (ref 36.5–49.3)
HGB BLD-MCNC: 15.7 G/DL (ref 12–17)
IMM GRANULOCYTES # BLD AUTO: 0.03 THOUSAND/UL (ref 0–0.2)
IMM GRANULOCYTES NFR BLD AUTO: 1 % (ref 0–2)
LYMPHOCYTES # BLD AUTO: 0.49 THOUSANDS/ÂΜL (ref 0.6–4.47)
LYMPHOCYTES NFR BLD AUTO: 8 % (ref 14–44)
MCH RBC QN AUTO: 32.5 PG (ref 26.8–34.3)
MCHC RBC AUTO-ENTMCNC: 32.8 G/DL (ref 31.4–37.4)
MCV RBC AUTO: 99 FL (ref 82–98)
MONOCYTES # BLD AUTO: 0.83 THOUSAND/ÂΜL (ref 0.17–1.22)
MONOCYTES NFR BLD AUTO: 13 % (ref 4–12)
NEUTROPHILS # BLD AUTO: 4.6 THOUSANDS/ÂΜL (ref 1.85–7.62)
NEUTS SEG NFR BLD AUTO: 74 % (ref 43–75)
NRBC BLD AUTO-RTO: 0 /100 WBCS
PLATELET # BLD AUTO: 120 THOUSANDS/UL (ref 149–390)
PMV BLD AUTO: 10.7 FL (ref 8.9–12.7)
POTASSIUM SERPL-SCNC: 4.9 MMOL/L (ref 3.5–5.3)
PROT SERPL-MCNC: 7 G/DL (ref 6.4–8.4)
RBC # BLD AUTO: 4.83 MILLION/UL (ref 3.88–5.62)
SODIUM SERPL-SCNC: 141 MMOL/L (ref 135–147)
WBC # BLD AUTO: 6.19 THOUSAND/UL (ref 4.31–10.16)

## 2024-06-20 PROCEDURE — 80053 COMPREHEN METABOLIC PANEL: CPT

## 2024-06-20 PROCEDURE — 85025 COMPLETE CBC W/AUTO DIFF WBC: CPT

## 2024-06-20 PROCEDURE — 36415 COLL VENOUS BLD VENIPUNCTURE: CPT

## 2024-06-20 PROCEDURE — 77386 HB NTSTY MODUL RAD TX DLVR CPLX: CPT | Performed by: RADIOLOGY

## 2024-06-20 PROCEDURE — 77014 CHG CT GUIDANCE RADIATION THERAPY FLDS PLACEMENT: CPT | Performed by: RADIOLOGY

## 2024-06-21 ENCOUNTER — APPOINTMENT (OUTPATIENT)
Dept: RADIATION ONCOLOGY | Facility: CLINIC | Age: 59
End: 2024-06-21
Attending: RADIOLOGY
Payer: COMMERCIAL

## 2024-06-21 PROCEDURE — 77336 RADIATION PHYSICS CONSULT: CPT | Performed by: RADIOLOGY

## 2024-06-21 PROCEDURE — 77386 HB NTSTY MODUL RAD TX DLVR CPLX: CPT | Performed by: RADIOLOGY

## 2024-06-21 PROCEDURE — 77014 CHG CT GUIDANCE RADIATION THERAPY FLDS PLACEMENT: CPT | Performed by: RADIOLOGY

## 2024-06-23 ENCOUNTER — TELEPHONE (OUTPATIENT)
Dept: OTHER | Facility: OTHER | Age: 59
End: 2024-06-23

## 2024-06-23 NOTE — TELEPHONE ENCOUNTER
Pt states he is scheduled to have an early infusion tomorrow with Hem Onc but is also scheduled for later that day with Radiation Onc. Pt is not sure if he can do both or if he needs to reschedule and would like to speak to on call today to see what he should do?

## 2024-06-24 ENCOUNTER — APPOINTMENT (OUTPATIENT)
Dept: RADIATION ONCOLOGY | Facility: CLINIC | Age: 59
End: 2024-06-24
Attending: RADIOLOGY
Payer: COMMERCIAL

## 2024-06-24 ENCOUNTER — TELEPHONE (OUTPATIENT)
Age: 59
End: 2024-06-24

## 2024-06-24 ENCOUNTER — HOSPITAL ENCOUNTER (OUTPATIENT)
Dept: INFUSION CENTER | Facility: CLINIC | Age: 59
Discharge: HOME | End: 2024-06-24

## 2024-06-24 VITALS
TEMPERATURE: 97 F | HEART RATE: 86 BPM | RESPIRATION RATE: 18 BRPM | BODY MASS INDEX: 23.99 KG/M2 | WEIGHT: 162 LBS | DIASTOLIC BLOOD PRESSURE: 71 MMHG | SYSTOLIC BLOOD PRESSURE: 115 MMHG | HEIGHT: 69 IN

## 2024-06-24 DIAGNOSIS — C25.3 PRIMARY ADENOCARCINOMA OF PANCREATIC DUCT (HCC): Primary | ICD-10-CM

## 2024-06-24 DIAGNOSIS — R10.11 RIGHT UPPER QUADRANT PAIN: ICD-10-CM

## 2024-06-24 DIAGNOSIS — C25.9 PANCREATIC ADENOCARCINOMA (HCC): ICD-10-CM

## 2024-06-24 PROCEDURE — 77014 CHG CT GUIDANCE RADIATION THERAPY FLDS PLACEMENT: CPT | Performed by: RADIOLOGY

## 2024-06-24 PROCEDURE — 77334 RADIATION TREATMENT AID(S): CPT | Performed by: RADIOLOGY

## 2024-06-24 PROCEDURE — 77386 HB NTSTY MODUL RAD TX DLVR CPLX: CPT | Performed by: RADIOLOGY

## 2024-06-24 PROCEDURE — 77427 RADIATION TX MANAGEMENT X5: CPT | Performed by: RADIOLOGY

## 2024-06-24 NOTE — PROGRESS NOTES
Pt into clinic for flouroruacil CADD pump. Pt offers complaints of nausea without vomiting.     Port accessed and attached to CADD pump. Tolerated procedure. CADD pump running. CADD due to be disconnected on 6/29/24 at 0845, however, per Dr Calderon, he is ok to be disconnected on 6/28/24 at 5:30pm.     Patient educated to not shower with CADD pump.    Patient aware of his next appointment.  AVS declined

## 2024-06-24 NOTE — TELEPHONE ENCOUNTER
PA for HYDROcodone-acetaminophen (Norco) 5-325 mg     Submitted via    []C[employer]anya-KEY   [x]Prong-Case ID # [id number]  []Faxed to plan   []Other website   []Phone call Case ID #     Office notes sent, clinical questions answered. Awaiting determination    Turnaround time for your insurance to make a decision on your Prior Authorization can take 7-21 business days.

## 2024-06-24 NOTE — TELEPHONE ENCOUNTER
Returned call and patient stated this was resolved this morning. No further questions or concerns.

## 2024-06-24 NOTE — TELEPHONE ENCOUNTER
PA for HYDROcodone-acetaminophen (Norco) 5-325 mg  Approved   Date(s) approved 6/24/24-6/24/25  Case #    Patient advised by [x] [employer] Message                      [x] Phone call       Pharmacy advised by [x]Fax                                     []Phone call    Approval letter scanned into Media Yes

## 2024-06-25 ENCOUNTER — APPOINTMENT (OUTPATIENT)
Dept: RADIATION ONCOLOGY | Facility: CLINIC | Age: 59
End: 2024-06-25
Attending: RADIOLOGY
Payer: COMMERCIAL

## 2024-06-25 DIAGNOSIS — R10.11 RIGHT UPPER QUADRANT PAIN: Primary | ICD-10-CM

## 2024-06-25 DIAGNOSIS — C25.3 PRIMARY ADENOCARCINOMA OF PANCREATIC DUCT (HCC): ICD-10-CM

## 2024-06-25 DIAGNOSIS — C25.9 PANCREATIC ADENOCARCINOMA (HCC): ICD-10-CM

## 2024-06-25 PROCEDURE — 77386 HB NTSTY MODUL RAD TX DLVR CPLX: CPT | Performed by: STUDENT IN AN ORGANIZED HEALTH CARE EDUCATION/TRAINING PROGRAM

## 2024-06-25 PROCEDURE — 77014 CHG CT GUIDANCE RADIATION THERAPY FLDS PLACEMENT: CPT | Performed by: STUDENT IN AN ORGANIZED HEALTH CARE EDUCATION/TRAINING PROGRAM

## 2024-06-26 ENCOUNTER — APPOINTMENT (OUTPATIENT)
Dept: RADIATION ONCOLOGY | Facility: CLINIC | Age: 59
End: 2024-06-26
Attending: RADIOLOGY
Payer: COMMERCIAL

## 2024-06-26 PROCEDURE — 77014 CHG CT GUIDANCE RADIATION THERAPY FLDS PLACEMENT: CPT | Performed by: RADIOLOGY

## 2024-06-26 PROCEDURE — 77386 HB NTSTY MODUL RAD TX DLVR CPLX: CPT | Performed by: RADIOLOGY

## 2024-06-27 ENCOUNTER — APPOINTMENT (OUTPATIENT)
Dept: RADIATION ONCOLOGY | Facility: CLINIC | Age: 59
End: 2024-06-27
Attending: RADIOLOGY
Payer: COMMERCIAL

## 2024-06-27 ENCOUNTER — APPOINTMENT (OUTPATIENT)
Dept: LAB | Facility: CLINIC | Age: 59
End: 2024-06-27
Payer: COMMERCIAL

## 2024-06-27 DIAGNOSIS — C25.3 PRIMARY ADENOCARCINOMA OF PANCREATIC DUCT (HCC): ICD-10-CM

## 2024-06-27 LAB
ALBUMIN SERPL BCG-MCNC: 3.8 G/DL (ref 3.5–5)
ALP SERPL-CCNC: 83 U/L (ref 34–104)
ALT SERPL W P-5'-P-CCNC: 11 U/L (ref 7–52)
ANION GAP SERPL CALCULATED.3IONS-SCNC: 9 MMOL/L (ref 4–13)
AST SERPL W P-5'-P-CCNC: 11 U/L (ref 13–39)
BASOPHILS # BLD AUTO: 0.06 THOUSANDS/ÂΜL (ref 0–0.1)
BASOPHILS NFR BLD AUTO: 1 % (ref 0–1)
BILIRUB SERPL-MCNC: 0.69 MG/DL (ref 0.2–1)
BUN SERPL-MCNC: 13 MG/DL (ref 5–25)
CALCIUM SERPL-MCNC: 9.6 MG/DL (ref 8.4–10.2)
CHLORIDE SERPL-SCNC: 105 MMOL/L (ref 96–108)
CO2 SERPL-SCNC: 28 MMOL/L (ref 21–32)
CREAT SERPL-MCNC: 0.63 MG/DL (ref 0.6–1.3)
EOSINOPHIL # BLD AUTO: 0.21 THOUSAND/ÂΜL (ref 0–0.61)
EOSINOPHIL NFR BLD AUTO: 3 % (ref 0–6)
ERYTHROCYTE [DISTWIDTH] IN BLOOD BY AUTOMATED COUNT: 14.2 % (ref 11.6–15.1)
GFR SERPL CREATININE-BSD FRML MDRD: 108 ML/MIN/1.73SQ M
GLUCOSE P FAST SERPL-MCNC: 153 MG/DL (ref 65–99)
HCT VFR BLD AUTO: 47.1 % (ref 36.5–49.3)
HGB BLD-MCNC: 15.8 G/DL (ref 12–17)
IMM GRANULOCYTES # BLD AUTO: 0.06 THOUSAND/UL (ref 0–0.2)
IMM GRANULOCYTES NFR BLD AUTO: 1 % (ref 0–2)
LYMPHOCYTES # BLD AUTO: 0.43 THOUSANDS/ÂΜL (ref 0.6–4.47)
LYMPHOCYTES NFR BLD AUTO: 6 % (ref 14–44)
MCH RBC QN AUTO: 32.6 PG (ref 26.8–34.3)
MCHC RBC AUTO-ENTMCNC: 33.5 G/DL (ref 31.4–37.4)
MCV RBC AUTO: 97 FL (ref 82–98)
MONOCYTES # BLD AUTO: 0.95 THOUSAND/ÂΜL (ref 0.17–1.22)
MONOCYTES NFR BLD AUTO: 12 % (ref 4–12)
NEUTROPHILS # BLD AUTO: 6.06 THOUSANDS/ÂΜL (ref 1.85–7.62)
NEUTS SEG NFR BLD AUTO: 77 % (ref 43–75)
NRBC BLD AUTO-RTO: 0 /100 WBCS
PLATELET # BLD AUTO: 138 THOUSANDS/UL (ref 149–390)
PMV BLD AUTO: 11.3 FL (ref 8.9–12.7)
POTASSIUM SERPL-SCNC: 4.4 MMOL/L (ref 3.5–5.3)
PROT SERPL-MCNC: 7.2 G/DL (ref 6.4–8.4)
RBC # BLD AUTO: 4.84 MILLION/UL (ref 3.88–5.62)
SODIUM SERPL-SCNC: 142 MMOL/L (ref 135–147)
WBC # BLD AUTO: 7.77 THOUSAND/UL (ref 4.31–10.16)

## 2024-06-27 PROCEDURE — 77014 CHG CT GUIDANCE RADIATION THERAPY FLDS PLACEMENT: CPT | Performed by: STUDENT IN AN ORGANIZED HEALTH CARE EDUCATION/TRAINING PROGRAM

## 2024-06-27 PROCEDURE — 80053 COMPREHEN METABOLIC PANEL: CPT

## 2024-06-27 PROCEDURE — 85025 COMPLETE CBC W/AUTO DIFF WBC: CPT

## 2024-06-27 PROCEDURE — 77386 HB NTSTY MODUL RAD TX DLVR CPLX: CPT | Performed by: STUDENT IN AN ORGANIZED HEALTH CARE EDUCATION/TRAINING PROGRAM

## 2024-06-27 PROCEDURE — 36415 COLL VENOUS BLD VENIPUNCTURE: CPT

## 2024-06-28 ENCOUNTER — HOSPITAL ENCOUNTER (OUTPATIENT)
Dept: INFUSION CENTER | Facility: CLINIC | Age: 59
Discharge: HOME | End: 2024-06-28

## 2024-06-28 ENCOUNTER — APPOINTMENT (OUTPATIENT)
Dept: RADIATION ONCOLOGY | Facility: CLINIC | Age: 59
End: 2024-06-28
Attending: RADIOLOGY
Payer: COMMERCIAL

## 2024-06-28 DIAGNOSIS — C25.9 PANCREATIC ADENOCARCINOMA (HCC): ICD-10-CM

## 2024-06-28 DIAGNOSIS — C25.3 PRIMARY ADENOCARCINOMA OF PANCREATIC DUCT (HCC): Primary | ICD-10-CM

## 2024-06-28 DIAGNOSIS — R10.11 RIGHT UPPER QUADRANT PAIN: ICD-10-CM

## 2024-06-28 PROCEDURE — 77336 RADIATION PHYSICS CONSULT: CPT | Performed by: RADIOLOGY

## 2024-06-28 PROCEDURE — 77386 HB NTSTY MODUL RAD TX DLVR CPLX: CPT | Performed by: RADIOLOGY

## 2024-06-28 PROCEDURE — 77014 CHG CT GUIDANCE RADIATION THERAPY FLDS PLACEMENT: CPT | Performed by: RADIOLOGY

## 2024-06-28 NOTE — PROGRESS NOTES
Patient to clinic for CADD pump disconnect. Pump stopped at 17:17 as per provider order, with 12.3 ml of chemo remaining. Labs not drawn due to patient preference in utilizing outpatient lab. Redness and a small pimple noted around port insertion site. Site cleaned with alcohol and band aid applied. Patient informed to watch area for increasing redness and swelling and to notify staff if symptoms increase. In basket sent to Liliya Mandujano RN regarding port site assessment.

## 2024-07-01 ENCOUNTER — HOSPITAL ENCOUNTER (OUTPATIENT)
Dept: INFUSION CENTER | Facility: CLINIC | Age: 59
Discharge: HOME | End: 2024-07-01
Payer: COMMERCIAL

## 2024-07-01 ENCOUNTER — APPOINTMENT (OUTPATIENT)
Dept: RADIATION ONCOLOGY | Facility: CLINIC | Age: 59
End: 2024-07-01
Attending: RADIOLOGY
Payer: COMMERCIAL

## 2024-07-01 VITALS
HEIGHT: 69 IN | DIASTOLIC BLOOD PRESSURE: 82 MMHG | SYSTOLIC BLOOD PRESSURE: 145 MMHG | BODY MASS INDEX: 23.52 KG/M2 | HEART RATE: 94 BPM | WEIGHT: 158.8 LBS | TEMPERATURE: 98.2 F | RESPIRATION RATE: 18 BRPM | OXYGEN SATURATION: 97 %

## 2024-07-01 DIAGNOSIS — R10.11 RIGHT UPPER QUADRANT PAIN: ICD-10-CM

## 2024-07-01 DIAGNOSIS — C25.3 PRIMARY ADENOCARCINOMA OF PANCREATIC DUCT (HCC): Primary | ICD-10-CM

## 2024-07-01 DIAGNOSIS — C25.9 PANCREATIC ADENOCARCINOMA (HCC): ICD-10-CM

## 2024-07-01 PROCEDURE — 77338 DESIGN MLC DEVICE FOR IMRT: CPT | Performed by: RADIOLOGY

## 2024-07-01 PROCEDURE — 77427 RADIATION TX MANAGEMENT X5: CPT | Performed by: RADIOLOGY

## 2024-07-01 PROCEDURE — 77300 RADIATION THERAPY DOSE PLAN: CPT | Performed by: RADIOLOGY

## 2024-07-01 PROCEDURE — 77301 RADIOTHERAPY DOSE PLAN IMRT: CPT | Performed by: RADIOLOGY

## 2024-07-01 PROCEDURE — G0498 CHEMO EXTEND IV INFUS W/PUMP: HCPCS

## 2024-07-01 PROCEDURE — 77386 HB NTSTY MODUL RAD TX DLVR CPLX: CPT | Performed by: RADIOLOGY

## 2024-07-01 NOTE — PROGRESS NOTES
Pt into clinic for fluorouracil cadd pump connect. Pt offers complaints of nausea.     Port accessed and attached to the cadd pump. Device running. Patient tolerated procedure. Patient educated on not showering while attached to cadd pump.     Cadd due to be disconnected on 7/6/24 at 0840, however per Dr Calderon, cadd pump ok to be disconnected early on 7/5/24 during the latest appointment time at 5:30pm. Pt aware of next appointment. AVS declined.

## 2024-07-02 ENCOUNTER — APPOINTMENT (OUTPATIENT)
Dept: RADIATION ONCOLOGY | Facility: CLINIC | Age: 59
End: 2024-07-02
Attending: RADIOLOGY
Payer: COMMERCIAL

## 2024-07-02 PROCEDURE — 77386 HB NTSTY MODUL RAD TX DLVR CPLX: CPT | Performed by: RADIOLOGY

## 2024-07-02 PROCEDURE — 77014 CHG CT GUIDANCE RADIATION THERAPY FLDS PLACEMENT: CPT | Performed by: RADIOLOGY

## 2024-07-03 ENCOUNTER — APPOINTMENT (OUTPATIENT)
Dept: RADIATION ONCOLOGY | Facility: CLINIC | Age: 59
End: 2024-07-03
Attending: RADIOLOGY
Payer: COMMERCIAL

## 2024-07-03 DIAGNOSIS — C25.3 PRIMARY ADENOCARCINOMA OF PANCREATIC DUCT (HCC): ICD-10-CM

## 2024-07-03 DIAGNOSIS — C25.9 PANCREATIC ADENOCARCINOMA (HCC): ICD-10-CM

## 2024-07-03 DIAGNOSIS — R10.11 RIGHT UPPER QUADRANT PAIN: Primary | ICD-10-CM

## 2024-07-03 PROCEDURE — 77386 HB NTSTY MODUL RAD TX DLVR CPLX: CPT | Performed by: RADIOLOGY

## 2024-07-05 ENCOUNTER — APPOINTMENT (OUTPATIENT)
Dept: RADIATION ONCOLOGY | Facility: CLINIC | Age: 59
End: 2024-07-05
Attending: RADIOLOGY
Payer: COMMERCIAL

## 2024-07-05 ENCOUNTER — HOSPITAL ENCOUNTER (OUTPATIENT)
Dept: INFUSION CENTER | Facility: CLINIC | Age: 59
End: 2024-07-05
Payer: COMMERCIAL

## 2024-07-05 ENCOUNTER — OFFICE VISIT (OUTPATIENT)
Dept: HEMATOLOGY ONCOLOGY | Facility: CLINIC | Age: 59
End: 2024-07-05
Payer: COMMERCIAL

## 2024-07-05 VITALS
DIASTOLIC BLOOD PRESSURE: 84 MMHG | BODY MASS INDEX: 23.55 KG/M2 | HEIGHT: 69 IN | SYSTOLIC BLOOD PRESSURE: 122 MMHG | OXYGEN SATURATION: 98 % | HEART RATE: 96 BPM | WEIGHT: 159 LBS

## 2024-07-05 DIAGNOSIS — C25.9 PANCREATIC ADENOCARCINOMA (HCC): ICD-10-CM

## 2024-07-05 DIAGNOSIS — E11.65 TYPE 2 DIABETES MELLITUS WITH HYPERGLYCEMIA, WITH LONG-TERM CURRENT USE OF INSULIN (HCC): ICD-10-CM

## 2024-07-05 DIAGNOSIS — Z79.4 TYPE 2 DIABETES MELLITUS WITH HYPERGLYCEMIA, WITH LONG-TERM CURRENT USE OF INSULIN (HCC): ICD-10-CM

## 2024-07-05 DIAGNOSIS — C25.3 PRIMARY ADENOCARCINOMA OF PANCREATIC DUCT (HCC): ICD-10-CM

## 2024-07-05 DIAGNOSIS — C25.9 PANCREATIC ADENOCARCINOMA (HCC): Primary | ICD-10-CM

## 2024-07-05 DIAGNOSIS — R10.11 RIGHT UPPER QUADRANT PAIN: ICD-10-CM

## 2024-07-05 DIAGNOSIS — C25.3 PRIMARY ADENOCARCINOMA OF PANCREATIC DUCT (HCC): Primary | ICD-10-CM

## 2024-07-05 LAB
ALBUMIN SERPL BCG-MCNC: 3.7 G/DL (ref 3.5–5)
ALP SERPL-CCNC: 74 U/L (ref 34–104)
ALT SERPL W P-5'-P-CCNC: 9 U/L (ref 7–52)
ANION GAP SERPL CALCULATED.3IONS-SCNC: 8 MMOL/L (ref 4–13)
AST SERPL W P-5'-P-CCNC: 10 U/L (ref 13–39)
BASOPHILS # BLD AUTO: 0.03 THOUSANDS/ÂΜL (ref 0–0.1)
BASOPHILS NFR BLD AUTO: 1 % (ref 0–1)
BILIRUB SERPL-MCNC: 0.6 MG/DL (ref 0.2–1)
BUN SERPL-MCNC: 18 MG/DL (ref 5–25)
CALCIUM SERPL-MCNC: 8.9 MG/DL (ref 8.4–10.2)
CHLORIDE SERPL-SCNC: 103 MMOL/L (ref 96–108)
CO2 SERPL-SCNC: 27 MMOL/L (ref 21–32)
CREAT SERPL-MCNC: 0.55 MG/DL (ref 0.6–1.3)
EOSINOPHIL # BLD AUTO: 0.17 THOUSAND/ÂΜL (ref 0–0.61)
EOSINOPHIL NFR BLD AUTO: 3 % (ref 0–6)
ERYTHROCYTE [DISTWIDTH] IN BLOOD BY AUTOMATED COUNT: 14.9 % (ref 11.6–15.1)
GFR SERPL CREATININE-BSD FRML MDRD: 114 ML/MIN/1.73SQ M
GLUCOSE SERPL-MCNC: 68 MG/DL (ref 65–140)
HCT VFR BLD AUTO: 43.1 % (ref 36.5–49.3)
HGB BLD-MCNC: 14.2 G/DL (ref 12–17)
IMM GRANULOCYTES # BLD AUTO: 0.02 THOUSAND/UL (ref 0–0.2)
IMM GRANULOCYTES NFR BLD AUTO: 0 % (ref 0–2)
LYMPHOCYTES # BLD AUTO: 0.22 THOUSANDS/ÂΜL (ref 0.6–4.47)
LYMPHOCYTES NFR BLD AUTO: 4 % (ref 14–44)
MCH RBC QN AUTO: 31.8 PG (ref 26.8–34.3)
MCHC RBC AUTO-ENTMCNC: 32.9 G/DL (ref 31.4–37.4)
MCV RBC AUTO: 96 FL (ref 82–98)
MONOCYTES # BLD AUTO: 0.89 THOUSAND/ÂΜL (ref 0.17–1.22)
MONOCYTES NFR BLD AUTO: 15 % (ref 4–12)
NEUTROPHILS # BLD AUTO: 4.7 THOUSANDS/ÂΜL (ref 1.85–7.62)
NEUTS SEG NFR BLD AUTO: 77 % (ref 43–75)
NRBC BLD AUTO-RTO: 0 /100 WBCS
PLATELET # BLD AUTO: 133 THOUSANDS/UL (ref 149–390)
PMV BLD AUTO: 9.9 FL (ref 8.9–12.7)
POTASSIUM SERPL-SCNC: 3.5 MMOL/L (ref 3.5–5.3)
PROT SERPL-MCNC: 6.9 G/DL (ref 6.4–8.4)
RBC # BLD AUTO: 4.47 MILLION/UL (ref 3.88–5.62)
SODIUM SERPL-SCNC: 138 MMOL/L (ref 135–147)
WBC # BLD AUTO: 6.03 THOUSAND/UL (ref 4.31–10.16)

## 2024-07-05 PROCEDURE — 85025 COMPLETE CBC W/AUTO DIFF WBC: CPT | Performed by: INTERNAL MEDICINE

## 2024-07-05 PROCEDURE — 77386 HB NTSTY MODUL RAD TX DLVR CPLX: CPT | Performed by: RADIOLOGY

## 2024-07-05 PROCEDURE — 86301 IMMUNOASSAY TUMOR CA 19-9: CPT

## 2024-07-05 PROCEDURE — 99214 OFFICE O/P EST MOD 30 MIN: CPT | Performed by: INTERNAL MEDICINE

## 2024-07-05 PROCEDURE — 80053 COMPREHEN METABOLIC PANEL: CPT

## 2024-07-05 NOTE — PROGRESS NOTES
Hematology/Oncology Outpatient Follow- up Note  Derick Richey 58 y.o. 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  We will rediscuss what further we would offer.  We will probably need to obtain a repeat scan shortly thereafter.  He will be seeing radiation therapy and follow-up as well.        HPI: 58-year-old gentleman here for follow-up.  He unfortunately has pancreatic cancer T2 N0 M0 however it was wrapping around multiple blood vessels and was locally advanced.  He did not respond in terms of any major response to FOLFIRINOX.  Lesion was stable did not shrink.  Surgery would be in [place] technically very difficult and probably may not been very productive.  He was referred to radiation therapy.  He is presently continuing 5-FU day 1 through 5 weekly during radiation therapy.  He completes radiation and 5-FU next week.  He has some fatigue some nausea slight weight loss.  Overall though he is holding his own no major diarrhea.  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  3 type 2 diabetes mellitus  Plan: Patient will undergo 5-FU continuous infusion day 1 through 5 weekly during radiation treatment side effects were discussed and consent was obtained [id number] are ready in place.  Otherwise no other major suggestions at this time.  He is willing to go undergo that.  Hopefully will begin in the week of [place] 3.  He will be seen here again on 7/5 and sooner if problems arise.  HPI: 58-year-old gentleman here for follow-up.  He has locally advanced pancreatic cancer involving multiple blood vessels.   He had received FOLFIRINOX.  Unfortunately he did not have a good response disease is essentially stable and did not shrink.  Because of its location it would be a very difficult operation.  Subsequently he was referred by Dr. Cervantes of surgery after completion of FOLFIRINOX to radiation therapy.  Their recommendation would be radiation therapy.  He will receive 5-FU day 1 through 5 continuous infusion during radiation.  He already knows the side effects of 5-FU and they were rediscussed.  Beyond that depending on response we would probably offer gemcitabine/Abraxane.  However we will wait to see how he responds to treatment for doing so.      Cancer Staging:  Cancer Staging   Primary adenocarcinoma of pancreatic duct (HCC)  Staging form: Pancreas, AJCC 8th Edition  - Clinical stage from 10/30/2023: Stage IB (cT2, cN0, cM0) - Signed by Malgorzata Madden MD on 2/19/2024  Stage prefix: Initial diagnosis  Total positive nodes: 0    Right upper quadrant pain  Staging form: Pancreas, AJCC 8th Edition  - Clinical: Stage IB (cT2, cN0, cM0) - Signed by Samir Cervantes MD on 11/16/2023  Total positive nodes: 0      Molecular Testing:     Previous Hematologic/ Oncologic History:    Oncology History   Right upper quadrant pain   10/30/2023 Biopsy    Endoscopic ultrasound:  Pancreas, Neck mass:  Malignant  Adenocarcinoma.        11/16/2023 -  Cancer Staged    Staging form: Pancreas, AJCC 8th Edition  - Clinical: Stage IB (cT2, cN0, cM0) - Signed by Samir Cervantes MD on 11/16/2023  Total positive nodes: 0       11/28/2023 - 4/19/2024 Chemotherapy    alteplase (CATHFLO), 2 mg, Intracatheter, Every 1 Minute as needed, 11 of 12 cycles  irinotecan (CAMPTOSAR) chemo infusion, 150 mg/m2 = 278 mg, Intravenous, Once, 11 of 12 cycles  Dose modification: 125 mg/m2 (100 % of original dose 150 mg/m2, Cycle 9, Reason: Dose Not Tolerated), 125.1 mg/m2 (83.4 % of original dose 125 mg/m2, Cycle 2, Reason: Dose Not Tolerated), 125 mg/m2 (100 % of original  dose 125 mg/m2, Cycle 2, Reason: Dose Not Tolerated)  Administration: 239 mg (3/19/2024), 278 mg (11/28/2023), 231 mg (12/12/2023), 231 mg (12/27/2023), 231 mg (1/10/2024), 231 mg (1/24/2024), 231 mg (2/7/2024), 231 mg (2/21/2024), 238 mg (3/6/2024), 238 mg (4/3/2024), 238 mg (4/17/2024)  oxaliplatin (ELOXATIN) chemo infusion, 65 mg/m2 = 120.25 mg, Intravenous, Once, 3 of 3 cycles  Administration: 120.25 mg (11/28/2023), 120.25 mg (12/12/2023), 120.25 mg (12/27/2023)  fluorouracil (ADRUCIL) ambulatory infusion Soln, 1,200 mg/m2/day = 4,440 mg, Intravenous, Over 46 hours, 11 of 12 cycles     6/3/2024 -  Chemotherapy    alteplase (CATHFLO), 2 mg, Intracatheter, Every 1 Minute as needed, 4 of 5 cycles  fluorouracil (ADRUCIL) ambulatory infusion Soln, 225 mg/m2/day = 2,160 mg, Intravenous, Over 120 hours, 4 of 5 cycles     Primary adenocarcinoma of pancreatic duct (HCC)   10/30/2023 Initial Diagnosis    Primary adenocarcinoma of pancreatic duct (HCC)     10/30/2023 Biopsy    Final Diagnosis   A.B. Pancreas, Neck mass (Cell Block and smear Preparations):  Malignant  Adenocarcinoma.     Satisfactory for evaluation.        10/30/2023 -  Cancer Staged    Staging form: Pancreas, AJCC 8th Edition  - Clinical stage from 10/30/2023: Stage IB (cT2, cN0, cM0) - Signed by Malgorzata Madden MD on 2/19/2024  Stage prefix: Initial diagnosis  Total positive nodes: 0       6/3/2024 -  Chemotherapy    alteplase (CATHFLO), 2 mg, Intracatheter, Every 1 Minute as needed, 4 of 5 cycles  fluorouracil (ADRUCIL) ambulatory infusion Soln, 225 mg/m2/day = 2,160 mg, Intravenous, Over 120 hours, 4 of 5 cycles     Pancreatic adenocarcinoma (HCC)   12/28/2023 Initial Diagnosis    Pancreatic adenocarcinoma (HCC)     6/3/2024 -  Chemotherapy    alteplase (CATHFLO), 2 mg, Intracatheter, Every 1 Minute as needed, 4 of 5 cycles  fluorouracil (ADRUCIL) ambulatory infusion Soln, 225 mg/m2/day = 2,160 mg, Intravenous, Over 120 hours, 4 of 5 cycles         Current  "Hematologic/ Oncologic Treatment:       Cycle 1         Test Results:    Imaging: No results found.          Labs:   Lab Results   Component Value Date    WBC 7.77 06/27/2024    HGB 15.8 06/27/2024    HCT 47.1 06/27/2024    MCV 97 06/27/2024     (L) 06/27/2024     Lab Results   Component Value Date    K 4.4 06/27/2024     06/27/2024    CO2 28 06/27/2024    BUN 13 06/27/2024    CREATININE 0.63 06/27/2024    GLUF 153 (H) 06/27/2024    CALCIUM 9.6 06/27/2024    AST 11 (L) 06/27/2024    ALT 11 06/27/2024    ALKPHOS 83 06/27/2024    EGFR 108 06/27/2024         No results found for: \"SPEP\", \"UPEP\"    No results found for: \"PSA\"    No results found for: \"CEA\"    No results found for: \"\"    No results found for: \"AFP\"    No results found for: \"IRON\", \"TIBC\", \"FERRITIN\"    No results found for: \"FYWTDUBY26\"      ROS: Review of Systems   Constitutional: Negative.    HENT: Negative.     Eyes: Negative.    Respiratory: Negative.     Cardiovascular: Negative.    Gastrointestinal:  Positive for nausea.   Endocrine: Negative.    Genitourinary: Negative.    Musculoskeletal: Negative.    Skin: Negative.    Allergic/Immunologic: Negative.    Neurological: Negative.    Hematological: Negative.      Complaints of mild nausea and 1 episode of vomiting moving bowels.  Appetite decreased.    Current Medications: Reviewed  Allergies: Reviewed  PMH/FH/SH:  Reviewed      Physical Exam:    Body surface area is 1.87 meters squared.    Wt Readings from Last 3 Encounters:   07/05/24 72.1 kg (159 lb)   07/01/24 72 kg (158 lb 12.8 oz)   06/24/24 73.5 kg (162 lb)        Temp Readings from Last 3 Encounters:   07/01/24 98.2 °F (36.8 °C) (Oral)   06/24/24 (!) 97 °F (36.1 °C) (Oral)   06/10/24 97.5 °F (36.4 °C) (Temporal)        BP Readings from Last 3 Encounters:   07/05/24 122/84   07/01/24 145/82   06/24/24 115/71         Pulse Readings from Last 3 Encounters:   07/05/24 96   07/01/24 94   06/24/24 86     " @LASTSAO2(3)@      Physical Exam  Constitutional:       Appearance: Normal appearance. He is normal weight.   HENT:      Head: Normocephalic and atraumatic.   Eyes:      Extraocular Movements: Extraocular movements intact.      Conjunctiva/sclera: Conjunctivae normal.      Pupils: Pupils are equal, round, and reactive to light.   Cardiovascular:      Rate and Rhythm: Normal rate and regular rhythm.      Heart sounds: Normal heart sounds.   Pulmonary:      Effort: Pulmonary effort is normal.      Breath sounds: Normal breath sounds.   Abdominal:      General: Abdomen is flat. Bowel sounds are normal.      Palpations: Abdomen is soft.   Musculoskeletal:         General: Normal range of motion.      Cervical back: Normal range of motion and neck supple.   Skin:     General: Skin is warm and dry.   Neurological:      General: No focal deficit present.      Mental Status: He is alert and oriented to person, place, and time. Mental status is at baseline.     No major point tenderness in the abdomen      Goals and Barriers:  Current Goal: Prolong Survival from Cancer.   Barriers: None.      Patient's Capacity to Self Care:  Patient is able to self care.    Code Status: [unfilled]  Advance Directive and Living Will:      Power of :

## 2024-07-05 NOTE — PROGRESS NOTES
Patient to clinic for CADD pump disconnect. Pump stopped at 12:15 pm as per provider order, with 16.4 ml of chemo remaining. Labs drawn via port. Redness and a small crusted drainage noted around port insertion site. Site cleaned with alcohol and band aid applied. Liliya Mandujano RN made aware and as per Dr Calderon, have patient apply warm soaks over the weekend and call the on call provider for increased pain, redness and swelling. Patient called informed of the plan and in agreement.

## 2024-07-06 LAB — CANCER AG19-9 SERPL-ACNC: 621 U/ML (ref 0–35)

## 2024-07-08 ENCOUNTER — APPOINTMENT (OUTPATIENT)
Dept: RADIATION ONCOLOGY | Facility: CLINIC | Age: 59
End: 2024-07-08
Attending: RADIOLOGY
Payer: COMMERCIAL

## 2024-07-08 ENCOUNTER — HOSPITAL ENCOUNTER (OUTPATIENT)
Dept: INFUSION CENTER | Facility: CLINIC | Age: 59
Discharge: HOME | End: 2024-07-08
Payer: COMMERCIAL

## 2024-07-08 VITALS
HEIGHT: 69 IN | BODY MASS INDEX: 22.87 KG/M2 | SYSTOLIC BLOOD PRESSURE: 147 MMHG | OXYGEN SATURATION: 98 % | HEART RATE: 84 BPM | WEIGHT: 154.4 LBS | TEMPERATURE: 97.7 F | RESPIRATION RATE: 18 BRPM | DIASTOLIC BLOOD PRESSURE: 82 MMHG

## 2024-07-08 DIAGNOSIS — C25.3 PRIMARY ADENOCARCINOMA OF PANCREATIC DUCT (HCC): Primary | ICD-10-CM

## 2024-07-08 DIAGNOSIS — R10.11 RIGHT UPPER QUADRANT PAIN: ICD-10-CM

## 2024-07-08 DIAGNOSIS — C25.9 PANCREATIC ADENOCARCINOMA (HCC): ICD-10-CM

## 2024-07-08 PROCEDURE — 77336 RADIATION PHYSICS CONSULT: CPT | Performed by: RADIOLOGY

## 2024-07-08 PROCEDURE — 77014 CHG CT GUIDANCE RADIATION THERAPY FLDS PLACEMENT: CPT | Performed by: RADIOLOGY

## 2024-07-08 PROCEDURE — 77386 HB NTSTY MODUL RAD TX DLVR CPLX: CPT | Performed by: RADIOLOGY

## 2024-07-08 PROCEDURE — G0498 CHEMO EXTEND IV INFUS W/PUMP: HCPCS

## 2024-07-08 NOTE — PROGRESS NOTES
Pt into clinic for fluorouracil cadd pump connect. Pt offers complaints of nausea.      Port accessed and attached to the cadd pump. Cadd pump running. Patient tolerated procedure. Patient educated on not showering while attached to cadd pump.      Cadd due to be disconnected on 7/13/24 at 0840, however per Dr Calderon, cadd pump ok to be disconnected early on 7/12/24 during the latest appointment time at 5:30pm. Pt aware of next appointment. AVS declined.

## 2024-07-09 ENCOUNTER — APPOINTMENT (OUTPATIENT)
Dept: RADIATION ONCOLOGY | Facility: CLINIC | Age: 59
End: 2024-07-09
Attending: RADIOLOGY
Payer: COMMERCIAL

## 2024-07-09 ENCOUNTER — TELEPHONE (OUTPATIENT)
Age: 59
End: 2024-07-09

## 2024-07-09 PROCEDURE — 77014 CHG CT GUIDANCE RADIATION THERAPY FLDS PLACEMENT: CPT | Performed by: STUDENT IN AN ORGANIZED HEALTH CARE EDUCATION/TRAINING PROGRAM

## 2024-07-09 PROCEDURE — 77386 HB NTSTY MODUL RAD TX DLVR CPLX: CPT | Performed by: STUDENT IN AN ORGANIZED HEALTH CARE EDUCATION/TRAINING PROGRAM

## 2024-07-09 NOTE — PROGRESS NOTES
TIMEOUT   S/w Liliya MARTINEZ RN, patient is done with radiation Wednesday 7/10 and he has appt with Dr Madden at 1115 am.  We are allowed to disconnect chemo ball early per office. He will come upstairs after he is finished with his appt in radiation.

## 2024-07-09 NOTE — TELEPHONE ENCOUNTER
Patient would like to know if he can modify his appointment on 7/22 with Dr. Veloz to a Virtual Visit because of his chemo. Driving to his appointment will be very bad due to the chemo, it makes him very very car sick.    Patient would like a call back with an update.

## 2024-07-10 ENCOUNTER — HOSPITAL ENCOUNTER (OUTPATIENT)
Dept: INFUSION CENTER | Facility: CLINIC | Age: 59
Discharge: HOME | End: 2024-07-10

## 2024-07-10 ENCOUNTER — APPOINTMENT (OUTPATIENT)
Dept: RADIATION ONCOLOGY | Facility: CLINIC | Age: 59
End: 2024-07-10
Attending: RADIOLOGY
Payer: COMMERCIAL

## 2024-07-10 DIAGNOSIS — C25.3 PRIMARY ADENOCARCINOMA OF PANCREATIC DUCT (HCC): Primary | ICD-10-CM

## 2024-07-10 DIAGNOSIS — C25.9 PANCREATIC ADENOCARCINOMA (HCC): ICD-10-CM

## 2024-07-10 DIAGNOSIS — R10.11 RIGHT UPPER QUADRANT PAIN: ICD-10-CM

## 2024-07-10 PROCEDURE — 77014 CHG CT GUIDANCE RADIATION THERAPY FLDS PLACEMENT: CPT | Performed by: RADIOLOGY

## 2024-07-10 PROCEDURE — 77386 HB NTSTY MODUL RAD TX DLVR CPLX: CPT | Performed by: RADIOLOGY

## 2024-07-10 NOTE — PROGRESS NOTES
Pt presents for CADD disconnect offering no complaints. Order in treatment plan from Dr. Kvng CALI to remove CADD early after radiation treatment. CADD disconnected at 55ml remaining. Port flushed positive blood return noted. Port a cath deaccessed without difficulty. AVS declined. No future appointments at this time.

## 2024-07-11 ENCOUNTER — APPOINTMENT (OUTPATIENT)
Dept: RADIATION ONCOLOGY | Facility: CLINIC | Age: 59
End: 2024-07-11
Attending: RADIOLOGY
Payer: COMMERCIAL

## 2024-07-11 ENCOUNTER — TELEPHONE (OUTPATIENT)
Age: 59
End: 2024-07-11

## 2024-07-11 DIAGNOSIS — C25.3 PRIMARY ADENOCARCINOMA OF PANCREATIC DUCT (HCC): Primary | ICD-10-CM

## 2024-07-11 DIAGNOSIS — G89.3 CANCER-RELATED PAIN: ICD-10-CM

## 2024-07-11 RX ORDER — HYDROCODONE BITARTRATE AND ACETAMINOPHEN 5; 325 MG/1; MG/1
1 TABLET ORAL DAILY PRN
Qty: 30 TABLET | Refills: 0 | Status: SHIPPED | OUTPATIENT
Start: 2024-07-11

## 2024-07-11 NOTE — TELEPHONE ENCOUNTER
Pt reports he had radiation therapy on his pancrease. He is requesting if PCP can order HYDROcodone-acetaminophen (Norco) 5-325 mg per tablet for his pain. Pt also stated if he is concerned about ordering it, please refer pt to palliative care facility that can see pt ASAP. Pts pain is a 9 to 10 he states and really needs help.  Please further advise pt.

## 2024-07-12 ENCOUNTER — HOSPITAL ENCOUNTER (OUTPATIENT)
Dept: INFUSION CENTER | Facility: CLINIC | Age: 59
Discharge: HOME | End: 2024-07-12

## 2024-07-18 ENCOUNTER — APPOINTMENT (OUTPATIENT)
Dept: LAB | Facility: CLINIC | Age: 59
End: 2024-07-18
Payer: COMMERCIAL

## 2024-07-18 DIAGNOSIS — Z79.4 TYPE 2 DIABETES MELLITUS WITH HYPERGLYCEMIA, WITH LONG-TERM CURRENT USE OF INSULIN (HCC): ICD-10-CM

## 2024-07-18 DIAGNOSIS — E11.65 TYPE 2 DIABETES MELLITUS WITH HYPERGLYCEMIA, WITH LONG-TERM CURRENT USE OF INSULIN (HCC): ICD-10-CM

## 2024-07-18 LAB
CHOLEST SERPL-MCNC: 123 MG/DL
EST. AVERAGE GLUCOSE BLD GHB EST-MCNC: 200 MG/DL
HBA1C MFR BLD: 8.6 %
HDLC SERPL-MCNC: 48 MG/DL
LDLC SERPL CALC-MCNC: 46 MG/DL (ref 0–100)
NONHDLC SERPL-MCNC: 75 MG/DL
TRIGL SERPL-MCNC: 144 MG/DL

## 2024-07-18 PROCEDURE — 83036 HEMOGLOBIN GLYCOSYLATED A1C: CPT

## 2024-07-18 PROCEDURE — 80061 LIPID PANEL: CPT

## 2024-07-18 PROCEDURE — 36415 COLL VENOUS BLD VENIPUNCTURE: CPT

## 2024-07-22 ENCOUNTER — TELEMEDICINE (OUTPATIENT)
Dept: ENDOCRINOLOGY | Facility: CLINIC | Age: 59
End: 2024-07-22
Payer: COMMERCIAL

## 2024-07-22 VITALS — BODY MASS INDEX: 21.62 KG/M2 | HEIGHT: 69 IN | WEIGHT: 146 LBS

## 2024-07-22 DIAGNOSIS — E78.2 MIXED HYPERLIPIDEMIA: ICD-10-CM

## 2024-07-22 DIAGNOSIS — E11.65 TYPE 2 DIABETES MELLITUS WITH HYPERGLYCEMIA, WITH LONG-TERM CURRENT USE OF INSULIN (HCC): Primary | ICD-10-CM

## 2024-07-22 DIAGNOSIS — I10 PRIMARY HYPERTENSION: ICD-10-CM

## 2024-07-22 DIAGNOSIS — Z79.4 TYPE 2 DIABETES MELLITUS WITH HYPERGLYCEMIA, WITH LONG-TERM CURRENT USE OF INSULIN (HCC): Primary | ICD-10-CM

## 2024-07-22 PROCEDURE — 95251 CONT GLUC MNTR ANALYSIS I&R: CPT | Performed by: STUDENT IN AN ORGANIZED HEALTH CARE EDUCATION/TRAINING PROGRAM

## 2024-07-22 PROCEDURE — 99214 OFFICE O/P EST MOD 30 MIN: CPT | Performed by: STUDENT IN AN ORGANIZED HEALTH CARE EDUCATION/TRAINING PROGRAM

## 2024-07-22 NOTE — ASSESSMENT & PLAN NOTE
Lab Results   Component Value Date    HGBA1C 8.6 (H) 07/18/2024     Suboptimally controlled , Although his glycemic control has improved with TIR of 55% in his CGM.  His diet has been erratic while he is on chemotherapy, currently off chemo.  He was advised to take Fiasp 8 units before ( 10 minutes) meals,   Continue Tresiba and metformin.  Return back in 3 months   Lab prior to next visit.   Check C- Peptide

## 2024-07-22 NOTE — PROGRESS NOTES
Virtual Regular Visit  Name: Derick Richey      : 1965      MRN: [id number]  Encounter Provider: Antoinette Veloz MD  Encounter Date: 2024   Encounter department: [facility] [place]    Verification of patient location:    Patient is located at Home in the following state in which I hold an active license [place]    Assessment & Plan   1. Type 2 diabetes mellitus with hyperglycemia, with long-term current use of insulin (HCC)  Assessment & Plan:    Lab Results   Component Value Date    HGBA1C 8.6 (H) 2024     Suboptimally controlled , Although his glycemic control has improved with TIR of 55% in his CGM.  His diet has been erratic while he is on chemotherapy, currently off chemo.  He was advised to take Fiasp 8 units before ( 10 minutes) meals,   Continue Tresiba and metformin.  Return back in 3 months   Lab prior to next visit.   Check C- Peptide   Orders:  -     Hemoglobin A1C; Future; Expected date: 10/22/2024  -     Comprehensive metabolic panel; Future; Expected date: 10/22/2024  -     C-peptide; Future; Expected date: 10/22/2024  2. Mixed hyperlipidemia  Assessment & Plan:  Continue Crestor.   3. Primary hypertension  Assessment & Plan:  Continue Lisinopril         Encounter provider Antoinette Veloz MD    The patient was identified by name and date of birth. Derick Richey was informed that this is a telemedicine visit and that the visit is being conducted through the Epic Embedded platform. He agrees to proceed..  My office door was closed. No one else was in the room.  He acknowledged consent and understanding of privacy and security of the video platform. The patient has agreed to participate and understands they can discontinue the visit at any time.    Patient is aware this is a billable service.     History of Present Illness     Derick Richey is a 58 y.o. male who presents for a follow up visit for type 2 diabetes, hypertension and hyperlipidemia.    Fiasp:  8 units before meals,  Tresiba 30 units qhs  Metformin 1000 mg bid      Derick Richey   Device used,geovanny 3  Home use       Indication   Type 2 Diabetes      More than 72 hours of data was reviewed. Report to be scanned to chart.     Date Range: July 9 - 22nd    Analysis of data:   Average Glucose: 177 mg/dl  Coefficient of Variation: 27.1%   SD : x   Time in Target Range: 55%   Time Above Range: 45%   Time Below Range: 0      Interpretation of data:     Review of Systems   Constitutional:  Positive for appetite change and fatigue. Negative for unexpected weight change.   HENT:  Negative for trouble swallowing.    Eyes:  Negative for visual disturbance.   Cardiovascular:  Negative for chest pain and palpitations.   Gastrointestinal:  Negative for abdominal pain, constipation, diarrhea, nausea and vomiting.   Endocrine: Negative for polydipsia, polyphagia and polyuria.     Medical History Reviewed by provider this encounter:       Current Outpatient Medications on File Prior to Visit   Medication Sig Dispense Refill    amphetamine-dextroamphetamine (ADDERALL XR) 20 MG 24 hr capsule Take 20 mg by mouth every morning      aspirin 81 MG tablet daily      Belsomra 10 MG TABS Take 10 mg by mouth once as needed      carvedilol (COREG) 6.25 mg tablet Take 6.25 mg by mouth 2 (two) times a day      Continuous Blood Gluc Sensor (FreeStyle Geovanny 3 Sensor) MISC use as directed TO MONITOR GLUCOSE DAILY      cyclobenzaprine (FLEXERIL) 10 mg tablet Take 1 tablet (10 mg total) by mouth 3 (three) times a day as needed for muscle spasms 30 tablet 0    desvenlafaxine succinate (PRISTIQ) 50 mg 24 hr tablet Take 50 mg by mouth daily      HYDROcodone-acetaminophen (Norco) 5-325 mg per tablet Take 1 tablet by mouth daily as needed for pain Max Daily Amount: 1 tablet 30 tablet 0    insulin aspart, w/niacinamide, (Fiasp FlexTouch) 100 Units/mL injection pen Inject 8 Units under the skin 3 (three) times a day with meals 3 mL 2    Jardiance  "25 MG TABS Take 1 tablet (25 mg total) by mouth every morning 90 tablet 3    lisinopril (ZESTRIL) 5 mg tablet Take 1 tablet by mouth daily      metFORMIN (GLUCOPHAGE) 1000 MG tablet Take 1 tablet (1,000 mg total) by mouth 2 (two) times a day with meals 90 tablet 2    omeprazole (PriLOSEC) 40 MG capsule       ondansetron (ZOFRAN) 8 mg tablet Take 1 tablet (8 mg total) by mouth every 8 (eight) hours as needed for nausea or vomiting for up to 20 days 20 tablet 2    pancrelipase, Lip-Prot-Amyl, (Creon) 12,000 units capsule Take 1 capsule (12,000 Units total) by mouth 3 (three) times a day with meals 180 capsule 0    phenazopyridine (PYRIDIUM) 95 MG tablet Take 1 tablet (95 mg total) by mouth 3 (three) times a day as needed for bladder spasms 10 tablet 0    polyethylene glycol (GLYCOLAX) 17 GM/SCOOP powder Take 17 g by mouth daily 510 g 0    rosuvastatin (CRESTOR) 10 MG tablet Take 1 tablet by mouth daily      diphenhydramine, lidocaine, Al/Mg hydroxide, simethicone (Magic Mouthwash) SUSP Swish and spit 10 mL every 4 (four) hours as needed for mouth pain or discomfort (Patient not taking: Reported on 5/15/2024) 237 mL 2    insulin degludec (Tresiba FlexTouch) 100 units/mL injection pen INJECT 30 UNITS SUBCUTANEOUSLY ONCE DAILY AT BEDTIME 15 mL 5    valACYclovir (VALTREX) 1,000 mg tablet Take 1 tablet (1,000 mg total) by mouth 2 (two) times a day for 10 days (Patient not taking: Reported on 7/22/2024) 20 tablet 3     No current facility-administered medications on file prior to visit.      Objective     Ht 5' 9\" (1.753 m)   Wt 66.2 kg (146 lb)   BMI 21.56 kg/m²   Physical Exam  Vitals reviewed.   Constitutional:       General: He is not in acute distress.     Appearance: He is well-developed.   HENT:      Head: Normocephalic and atraumatic.   Eyes:      Conjunctiva/sclera: Conjunctivae normal.   Pulmonary:      Effort: Pulmonary effort is normal. No respiratory distress.   Neurological:      Mental Status: He is alert " and oriented to person, place, and time.   Psychiatric:         Mood and Affect: Mood normal.         Visit Time  Total Visit Duration: 20

## 2024-08-01 ENCOUNTER — OFFICE VISIT (OUTPATIENT)
Dept: HEMATOLOGY ONCOLOGY | Facility: CLINIC | Age: 59
End: 2024-08-01
Payer: COMMERCIAL

## 2024-08-01 VITALS
SYSTOLIC BLOOD PRESSURE: 128 MMHG | BODY MASS INDEX: 23.11 KG/M2 | OXYGEN SATURATION: 98 % | DIASTOLIC BLOOD PRESSURE: 80 MMHG | WEIGHT: 156 LBS | RESPIRATION RATE: 18 BRPM | HEIGHT: 69 IN | TEMPERATURE: 97.8 F | HEART RATE: 91 BPM

## 2024-08-01 DIAGNOSIS — Z79.4 TYPE 2 DIABETES MELLITUS WITH HYPERGLYCEMIA, WITH LONG-TERM CURRENT USE OF INSULIN (HCC): ICD-10-CM

## 2024-08-01 DIAGNOSIS — C25.3 PRIMARY ADENOCARCINOMA OF PANCREATIC DUCT (HCC): Primary | ICD-10-CM

## 2024-08-01 DIAGNOSIS — E11.65 TYPE 2 DIABETES MELLITUS WITH HYPERGLYCEMIA, WITH LONG-TERM CURRENT USE OF INSULIN (HCC): ICD-10-CM

## 2024-08-01 PROCEDURE — 3079F DIAST BP 80-89 MM HG: CPT | Performed by: INTERNAL MEDICINE

## 2024-08-01 PROCEDURE — 99213 OFFICE O/P EST LOW 20 MIN: CPT | Performed by: INTERNAL MEDICINE

## 2024-08-01 PROCEDURE — 3074F SYST BP LT 130 MM HG: CPT | Performed by: INTERNAL MEDICINE

## 2024-08-01 RX ORDER — AMOXICILLIN 500 MG/1
500 CAPSULE ORAL EVERY 8 HOURS SCHEDULED
COMMUNITY
Start: 2024-07-30

## 2024-08-01 NOTE — PROGRESS NOTES
Hematology/Oncology Outpatient Follow- up Note  Derick Richey 58 y.o. 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  Plan: He is to get his scans also get a CBC CMP CA 19-9 he will see Dr. Cervantes as well as Dr. Madden we will see him at the end of the month.  Will see blood count is she has had and decide about further treatment.      HPI: 58-year-old G2P cancer T2 N0 M0.  Chemoradiation.  He did not really have a major response to FOLFIRINOX.  He had encasement and still has of his proximal portal vein with narrowing noted.  We are awaiting results of scans later this month having completed radiation.  Has some abdominal discomfort he is able to eat he is moving his bowels no blood in his bowel movements.      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  We will rediscuss what further we would offer.  We will probably need to obtain a repeat scan shortly thereafter.  He will be seeing radiation therapy and follow-up as well.  HPI: 58-year-old gentleman here for follow-up.  He unfortunately has pancreatic cancer T2 N0 M0 however it was wrapping around multiple blood vessels and was locally advanced.  He did not respond in terms of any major response to FOLFIRINOX.  Lesion was stable did not shrink.  Surgery would be in [place] technically very difficult and probably may not been very productive.  He was referred to radiation therapy.  He is presently continuing 5-FU day 1 through 5 weekly during radiation therapy.  He completes radiation and  5-FU next week.  He has some fatigue some nausea slight weight loss.  Overall though he is holding his own no major diarrhea.  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  3 type 2 diabetes mellitus  Plan: Patient will undergo 5-FU continuous infusion day 1 through 5 weekly during radiation treatment side effects were discussed and consent was obtained [id number] are ready in place.  Otherwise no other major suggestions at this time.  He is willing to go undergo that.  Hopefully will begin in the week of [place] 3.  He will be seen here again on 7/5 and sooner if problems arise.  HPI: 58-year-old gentleman here for follow-up.  He has locally advanced pancreatic cancer involving multiple blood vessels.  He had received FOLFIRINOX.  Unfortunately he did not have a good response disease is essentially stable and did not shrink.  Because of its location it would be a very difficult operation.  Subsequently he was referred by Dr. Cervantes of surgery after completion of FOLFIRINOX to radiation therapy.  Their recommendation would be radiation therapy.  He will receive 5-FU day 1 through 5 continuous infusion during radiation.  He already knows the side effects of 5-FU and they were rediscussed.  Beyond that depending on response we would probably offer gemcitabine/Abraxane.  However we will wait to see how he responds to treatment for doing so.      Cancer Staging:  Cancer Staging   Primary adenocarcinoma of pancreatic duct (HCC)  Staging form: Pancreas, AJCC 8th Edition  - Clinical stage from 10/30/2023: Stage IB (cT2, cN0, cM0) - Signed by Malgrozata Madden MD on 2/19/2024  Stage prefix: Initial diagnosis  Total positive nodes: 0    Right upper quadrant pain  Staging form: Pancreas, AJCC 8th Edition  - Clinical: Stage IB (cT2, cN0, cM0) - Signed by Samir Cervantes MD on 11/16/2023  Total positive  nodes: 0      Molecular Testing:     Previous Hematologic/ Oncologic History:    Oncology History Overview Note   with T2N0M0 pancreatic adenocarcinoma of the neck, considered unresectable. He completed 12 cycles mFOLFIRINOX with dose reductions required with minimal response. He completed a course of concurrent chemoradiation on 7/10/24. He is having telephone follow up today.    The patient experienced fatigue, nausea controlled with anti-emetics, bloating treated with medication and diet modification.  He had abdominal pain that preceded treatment, but did slightly worsen during therapy.  This was managed with pain medication as needed.  Poor appetite with 10 pound weight loss.  This stabilized by mid therapy.        24 Dr. Calderon        Upcom[facility]24 CT  8/15/24 Dr. Cervantes  8/15/24 Palliative Care       Right upper quadrant pain   10/30/2023 Biopsy    Endoscopic ultrasound:  Pancreas, Neck mass:  Malignant  Adenocarcinoma.        2023 -  Cancer Staged    Staging form: Pancreas, AJCC 8th Edition  - Clinical: Stage IB (cT2, cN0, cM0) - Signed by Samir Cervantes MD on 2023  Total positive nodes: 0       2023 - 2024 Chemotherapy    alteplase (CATHFLO), 2 mg, Intracatheter, Every 1 Minute as needed, 11 of 12 cycles  irinotecan (CAMPTOSAR) chemo infusion, 150 mg/m2 = 278 mg, Intravenous, Once, 11 of 12 cycles  Dose modification: 125 mg/m2 (100 % of original dose 150 mg/m2, Cycle 9, Reason: Dose Not Tolerated), 125.1 mg/m2 (83.4 % of original dose 125 mg/m2, Cycle 2, Reason: Dose Not Tolerated), 125 mg/m2 (100 % of original dose 125 mg/m2, Cycle 2, Reason: Dose Not Tolerated)  Administration: 239 mg (3/19/2024), 278 mg (2023), 231 mg (2023), 231 mg (2023), 231 mg (1/10/2024), 231 mg (2024), 231 mg (2024), 231 mg (2024), 238 mg (3/6/2024), 238 mg (4/3/2024), 238 mg (2024)  oxaliplatin (ELOXATIN) chemo infusion, 65 mg/m2 = 120.25 mg, Intravenous, Once, 3 of  3 cycles  Administration: 120.25 mg (11/28/2023), 120.25 mg (12/12/2023), 120.25 mg (12/27/2023)  fluorouracil (ADRUCIL) ambulatory infusion Soln, 1,200 mg/m2/day = 4,440 mg, Intravenous, Over 46 hours, 11 of 12 cycles     6/3/2024 -  Chemotherapy    alteplase (CATHFLO), 2 mg, Intracatheter, Every 1 Minute as needed, 5 of 5 cycles  fluorouracil (ADRUCIL) ambulatory infusion Soln, 225 mg/m2/day = 2,160 mg, Intravenous, Over 120 hours, 5 of 5 cycles     Primary adenocarcinoma of pancreatic duct (HCC)   10/30/2023 Initial Diagnosis    Primary adenocarcinoma of pancreatic duct (HCC)     10/30/2023 Biopsy    Final Diagnosis   A.B. Pancreas, Neck mass (Cell Block and smear Preparations):  Malignant  Adenocarcinoma.     Satisfactory for evaluation.        10/30/2023 -  Cancer Staged    Staging form: Pancreas, AJCC 8th Edition  - Clinical stage from 10/30/2023: Stage IB (cT2, cN0, cM0) - Signed by Malgorzata Madden MD on 2/19/2024  Stage prefix: Initial diagnosis  Total positive nodes: 0       6/3/2024 -  Chemotherapy    alteplase (CATHFLO), 2 mg, Intracatheter, Every 1 Minute as needed, 5 of 5 cycles  fluorouracil (ADRUCIL) ambulatory infusion Soln, 225 mg/m2/day = 2,160 mg, Intravenous, Over 120 hours, 5 of 5 cycles     6/3/2024 - 7/10/2024 Radiation    Treatments:  Course: C1  Plan ID Energy Fractions Dose per Fraction (cGy) Dose Correction (cGy) Total Dose Delivered (cGy) Elapsed Days   Abdomen CD 6X 2 / 2 200 0 400 1   Abdomen SIB 6X 25 / 25 200 0 5,000 35    Treatment Dates:  6/3/2024 - 7/10/2024.      Pancreatic adenocarcinoma (HCC)   12/28/2023 Initial Diagnosis    Pancreatic adenocarcinoma (HCC)     6/3/2024 -  Chemotherapy    alteplase (CATHFLO), 2 mg, Intracatheter, Every 1 Minute as needed, 5 of 5 cycles  fluorouracil (ADRUCIL) ambulatory infusion Soln, 225 mg/m2/day = 2,160 mg, Intravenous, Over 120 hours, 5 of 5 cycles     6/3/2024 - 7/10/2024 Radiation    Treatments:  Course: C1  Plan ID Energy Fractions Dose per  "Fraction (cGy) Dose Correction (cGy) Total Dose Delivered (cGy) Elapsed Days   Abdomen CD 6X 2 / 2 200 0 400 1   Abdomen SIB 6X 25 / 25 200 0 5,000 35    Treatment Dates:  6/3/2024 - 7/10/2024.          Current Hematologic/ Oncologic Treatment:       Cycle 1         Test Results:    Imaging: No results found.          Labs:   Lab Results   Component Value Date    WBC 6.03 07/05/2024    HGB 14.2 07/05/2024    HCT 43.1 07/05/2024    MCV 96 07/05/2024     (L) 07/05/2024     Lab Results   Component Value Date    K 3.5 07/05/2024     07/05/2024    CO2 27 07/05/2024    BUN 18 07/05/2024    CREATININE 0.55 (L) 07/05/2024    GLUF 153 (H) 06/27/2024    CALCIUM 8.9 07/05/2024    AST 10 (L) 07/05/2024    ALT 9 07/05/2024    ALKPHOS 74 07/05/2024    EGFR 114 07/05/2024         No results found for: \"SPEP\", \"UPEP\"    No results found for: \"PSA\"    No results found for: \"CEA\"    No results found for: \"\"    No results found for: \"AFP\"    No results found for: \"IRON\", \"TIBC\", \"FERRITIN\"    No results found for: \"CTCFSIGA24\"      ROS: Review of Systems   Constitutional: Negative.    HENT: Negative.     Eyes: Negative.    Respiratory: Negative.     Cardiovascular: Negative.    Gastrointestinal: Negative.    Endocrine: Negative.    Genitourinary: Negative.    Musculoskeletal: Negative.    Skin: Negative.    Allergic/Immunologic: Negative.    Neurological: Negative.    Hematological: Negative.          Current Medications: Reviewed  Allergies: Reviewed  PMH/FH/SH:  Reviewed      Physical Exam:    Body surface area is 1.86 meters squared.    Wt Readings from Last 3 Encounters:   08/01/24 70.8 kg (156 lb)   07/22/24 66.2 kg (146 lb)   07/08/24 70 kg (154 lb 6.4 oz)        Temp Readings from Last 3 Encounters:   08/01/24 97.8 °F (36.6 °C) (Temporal)   07/08/24 97.7 °F (36.5 °C) (Oral)   07/01/24 98.2 °F (36.8 °C) (Oral)        BP Readings from Last 3 Encounters:   08/01/24 128/80   07/08/24 147/82   07/05/24 122/84       "   Pulse Readings from Last 3 Encounters:   08/01/24 91   07/08/24 84   07/05/24 96     @LASTSAO2(3)@      Physical Exam  Constitutional:       Appearance: Normal appearance. He is normal weight.   HENT:      Head: Normocephalic and atraumatic.   Eyes:      Extraocular Movements: Extraocular movements intact.      Conjunctiva/sclera: Conjunctivae normal.      Pupils: Pupils are equal, round, and reactive to light.   Cardiovascular:      Rate and Rhythm: Normal rate and regular rhythm.      Heart sounds: Normal heart sounds.   Pulmonary:      Effort: Pulmonary effort is normal.      Breath sounds: Normal breath sounds.   Abdominal:      General: Abdomen is flat. Bowel sounds are normal.      Palpations: Abdomen is soft.   Musculoskeletal:         General: Normal range of motion.      Cervical back: Normal range of motion and neck supple.   Skin:     General: Skin is warm and dry.   Neurological:      General: No focal deficit present.      Mental Status: He is alert and oriented to person, place, and time. Mental status is at baseline.     No leg edema no calf tenderness      Goals and Barriers:  Current Goal: Prolong Survival from Cancer.   Barriers: None.      Patient's Capacity to Self Care:  Patient is able to self care.    Code Status: [unfilled]  Advance Directive and Living Will:      Power of :

## 2024-08-07 ENCOUNTER — TELEPHONE (OUTPATIENT)
Age: 59
End: 2024-08-07

## 2024-08-07 DIAGNOSIS — G89.3 CANCER-RELATED PAIN: ICD-10-CM

## 2024-08-07 RX ORDER — HYDROCODONE BITARTRATE AND ACETAMINOPHEN 5; 325 MG/1; MG/1
1 TABLET ORAL DAILY PRN
Qty: 30 TABLET | Refills: 0 | Status: SHIPPED | OUTPATIENT
Start: 2024-08-07 | End: 2024-08-13 | Stop reason: SDUPTHER

## 2024-08-07 NOTE — TELEPHONE ENCOUNTER
Pt called not able to get medication hydrocodone -acetaminophen 5/325 mg not sure if it needs to be change to new instead of reorder.to soon to fill pt is out of medication.Please advise.

## 2024-08-07 NOTE — TELEPHONE ENCOUNTER
Pt called to request refill for Hydrocodone.  Pt states that a new prescription needs to be sent over all together or else [place] State won't let the pharmacy fill it.  Pt also states that if PCP is hesitant to fill Hydrocodone, he will be going to be seeing Palliative Care on 8/15 and they will be taking over management of his controlled meds.  Preferred pharmacy pended.  Please review and advise.    Medication: Hydrocodone-Acetaminophen 5-325mg    Dose/Frequency: 1 tablet by mouth daily as needed for pain    Quantity: 30 Refills 0    Pharmacy: [facility]    Office:   [x] PCP/Provider -   [] Speciality/Provider -     Does the patient have enough for 3 days?   [x] Yes   [] No - Send as HP to POD

## 2024-08-08 ENCOUNTER — TELEPHONE (OUTPATIENT)
Age: 59
End: 2024-08-08

## 2024-08-12 ENCOUNTER — HOSPITAL ENCOUNTER (OUTPATIENT)
Dept: CT IMAGING | Facility: HOSPITAL | Age: 59
Discharge: HOME | End: 2024-08-12
Attending: RADIOLOGY
Payer: COMMERCIAL

## 2024-08-12 ENCOUNTER — TELEPHONE (OUTPATIENT)
Age: 59
End: 2024-08-12

## 2024-08-12 DIAGNOSIS — G89.3 CANCER-RELATED PAIN: ICD-10-CM

## 2024-08-12 DIAGNOSIS — C25.3 PRIMARY ADENOCARCINOMA OF PANCREATIC DUCT (HCC): ICD-10-CM

## 2024-08-12 PROCEDURE — 74177 CT ABD & PELVIS W/CONTRAST: CPT

## 2024-08-12 RX ADMIN — IOHEXOL 100 ML: 350 INJECTION, SOLUTION INTRAVENOUS at 14:00

## 2024-08-12 NOTE — TELEPHONE ENCOUNTER
Pt stated Oncology Provider: Dr. Calderon    Actionable item: Please return phone call to patient with further instructions/recommendations.     What is the reason for the call/chief complaint? Patient calling in reporting that he was prescribed norco 5-325mg per tablet and reports that has not been effective in managing his pain. He reports that his pain in his pancreas is about a 8/10, he's been taking ibuprofen in-between his doses, but that has also not been effective. He states he has been taking more than what he is prescribed, taking a half a tablet to one full extra tablet a day on top of what has been prescribed to him, requiring him to need a refill on the medication sooner. I educated patient on importance of following instructions when providers order him medications, as they are not able to do refills earlier secondary to having to follow state regulations.  Patient states that his pain would be better managed if he had a higher dose.     Patient does not see palliative care until 8/21- who will likely take over the management of his pain/symptoms.  I confirmed with patient that I would send this to oncology team- per his request as well as the provider who ordered the norco. He verbalized understanding and has no other questions or concerns at this moment.

## 2024-08-13 RX ORDER — HYDROCODONE BITARTRATE AND ACETAMINOPHEN 5; 325 MG/1; MG/1
2 TABLET ORAL DAILY PRN
Qty: 60 TABLET | Refills: 0 | Status: SHIPPED | OUTPATIENT
Start: 2024-08-13

## 2024-08-15 ENCOUNTER — APPOINTMENT (OUTPATIENT)
Dept: LAB | Facility: CLINIC | Age: 59
End: 2024-08-15
Payer: COMMERCIAL

## 2024-08-15 ENCOUNTER — OFFICE VISIT (OUTPATIENT)
Dept: SURGICAL ONCOLOGY | Facility: CLINIC | Age: 59
End: 2024-08-15
Payer: COMMERCIAL

## 2024-08-15 VITALS
HEIGHT: 69 IN | BODY MASS INDEX: 22.66 KG/M2 | OXYGEN SATURATION: 97 % | RESPIRATION RATE: 16 BRPM | TEMPERATURE: 97.9 F | DIASTOLIC BLOOD PRESSURE: 70 MMHG | WEIGHT: 153 LBS | SYSTOLIC BLOOD PRESSURE: 100 MMHG | HEART RATE: 101 BPM

## 2024-08-15 DIAGNOSIS — Z79.4 TYPE 2 DIABETES MELLITUS WITH HYPERGLYCEMIA, WITH LONG-TERM CURRENT USE OF INSULIN (HCC): ICD-10-CM

## 2024-08-15 DIAGNOSIS — C25.3 PRIMARY ADENOCARCINOMA OF PANCREATIC DUCT (HCC): Primary | ICD-10-CM

## 2024-08-15 DIAGNOSIS — C25.3 PRIMARY ADENOCARCINOMA OF PANCREATIC DUCT (HCC): ICD-10-CM

## 2024-08-15 DIAGNOSIS — E11.65 TYPE 2 DIABETES MELLITUS WITH HYPERGLYCEMIA, WITH LONG-TERM CURRENT USE OF INSULIN (HCC): ICD-10-CM

## 2024-08-15 DIAGNOSIS — C25.9 PANCREATIC ADENOCARCINOMA (HCC): Primary | ICD-10-CM

## 2024-08-15 LAB
ALBUMIN SERPL BCG-MCNC: 3.8 G/DL (ref 3.5–5)
ALP SERPL-CCNC: 93 U/L (ref 34–104)
ALT SERPL W P-5'-P-CCNC: 15 U/L (ref 7–52)
ANION GAP SERPL CALCULATED.3IONS-SCNC: 10 MMOL/L (ref 4–13)
AST SERPL W P-5'-P-CCNC: 12 U/L (ref 13–39)
BASOPHILS # BLD AUTO: 0.05 THOUSANDS/ÂΜL (ref 0–0.1)
BASOPHILS NFR BLD AUTO: 1 % (ref 0–1)
BILIRUB SERPL-MCNC: 0.66 MG/DL (ref 0.2–1)
BUN SERPL-MCNC: 13 MG/DL (ref 5–25)
CALCIUM SERPL-MCNC: 9.5 MG/DL (ref 8.4–10.2)
CHLORIDE SERPL-SCNC: 101 MMOL/L (ref 96–108)
CO2 SERPL-SCNC: 27 MMOL/L (ref 21–32)
CREAT SERPL-MCNC: 0.72 MG/DL (ref 0.6–1.3)
EOSINOPHIL # BLD AUTO: 0.14 THOUSAND/ÂΜL (ref 0–0.61)
EOSINOPHIL NFR BLD AUTO: 2 % (ref 0–6)
ERYTHROCYTE [DISTWIDTH] IN BLOOD BY AUTOMATED COUNT: 15.9 % (ref 11.6–15.1)
GFR SERPL CREATININE-BSD FRML MDRD: 102 ML/MIN/1.73SQ M
GLUCOSE P FAST SERPL-MCNC: 247 MG/DL (ref 65–99)
HCT VFR BLD AUTO: 47.2 % (ref 36.5–49.3)
HGB BLD-MCNC: 15.8 G/DL (ref 12–17)
IMM GRANULOCYTES # BLD AUTO: 0.03 THOUSAND/UL (ref 0–0.2)
IMM GRANULOCYTES NFR BLD AUTO: 0 % (ref 0–2)
LYMPHOCYTES # BLD AUTO: 0.42 THOUSANDS/ÂΜL (ref 0.6–4.47)
LYMPHOCYTES NFR BLD AUTO: 5 % (ref 14–44)
MCH RBC QN AUTO: 33.2 PG (ref 26.8–34.3)
MCHC RBC AUTO-ENTMCNC: 33.5 G/DL (ref 31.4–37.4)
MCV RBC AUTO: 99 FL (ref 82–98)
MONOCYTES # BLD AUTO: 0.8 THOUSAND/ÂΜL (ref 0.17–1.22)
MONOCYTES NFR BLD AUTO: 10 % (ref 4–12)
NEUTROPHILS # BLD AUTO: 6.44 THOUSANDS/ÂΜL (ref 1.85–7.62)
NEUTS SEG NFR BLD AUTO: 82 % (ref 43–75)
NRBC BLD AUTO-RTO: 0 /100 WBCS
PLATELET # BLD AUTO: 149 THOUSANDS/UL (ref 149–390)
PMV BLD AUTO: 10.4 FL (ref 8.9–12.7)
POTASSIUM SERPL-SCNC: 4.2 MMOL/L (ref 3.5–5.3)
PROT SERPL-MCNC: 7.3 G/DL (ref 6.4–8.4)
RBC # BLD AUTO: 4.76 MILLION/UL (ref 3.88–5.62)
SODIUM SERPL-SCNC: 138 MMOL/L (ref 135–147)
WBC # BLD AUTO: 7.88 THOUSAND/UL (ref 4.31–10.16)

## 2024-08-15 PROCEDURE — 80053 COMPREHEN METABOLIC PANEL: CPT

## 2024-08-15 PROCEDURE — 99214 OFFICE O/P EST MOD 30 MIN: CPT | Performed by: SURGERY

## 2024-08-15 PROCEDURE — 86301 IMMUNOASSAY TUMOR CA 19-9: CPT

## 2024-08-15 PROCEDURE — 85025 COMPLETE CBC W/AUTO DIFF WBC: CPT

## 2024-08-15 PROCEDURE — 36415 COLL VENOUS BLD VENIPUNCTURE: CPT

## 2024-08-15 RX ORDER — SODIUM FLUORIDE 6 MG/ML
PASTE, DENTIFRICE DENTAL
COMMUNITY
Start: 2024-08-05

## 2024-08-15 NOTE — PROGRESS NOTES
Surgical Oncology Follow Up       [facility]GICAL ONCOLOGY [facility]  [address]  [address]  [place] [address]  [phone]    Derick Richey  1965  [id number]  [facility]  [facility] SURGICAL ONCOLOGY [facility]  [address]  [address]  [place] [address]  [phone]    1. Pancreatic adenocarcinoma (HCC)  2. Primary adenocarcinoma of pancreatic duct (HCC)  Assessment & Plan:  58-year-old male with locally advanced pancreas cancer.  He completed chemoradiation.  His CA 19-9 is rising and his CT shows evidence of metastasis.  He is having no evidence of fevers or chills, so I doubt this is an abscess.  I discussed that my recommendation would be to consider further systemic chemotherapy.  This would likely be different than the chemotherapy he had before.  He is already set up to see Dr. Calderon to discuss this.  I also discussed that he should meet with palliative care.  This has been arranged as well.  He is focused on his quality of life.  My recommendation would be that he start systemic therapy.  If he can tolerate this and there is even stability of his tumor on imaging, that would be reasonable.  If he cannot tolerate chemotherapy, I would transition to hospice.  He is focused on his quality of life.  I also discussed seeing if there are clinical trials available for him as well.  He will follow-up with medical oncology.  I will see him in the future should the need arise. He is agreeable to this plan.  All his questions were answered.          Chief Complaint   Patient presents with    Follow-up       No follow-ups on file.      Oncology History Overview Note   with T2N0M0 pancreatic adenocarcinoma of the neck, considered unresectable. He completed 12 cycles mFOLFIRINOX with dose reductions required with minimal response. He completed a course of concurrent chemoradiation  on 7/10/24. He is having telephone follow up today.    The patient experienced fatigue, nausea controlled with anti-emetics, bloating treated with medication and diet modification.  He had abdominal pain that preceded treatment, but did slightly worsen during therapy.  This was managed with pain medication as needed.  Poor appetite with 10 pound weight loss.  This stabilized by mid therapy.        24 Dr. Calderon        Upcomin24 CT  8/15/24 Dr. Cervantes  8/15/24 Palliative Care       Right upper quadrant pain   10/30/2023 Biopsy    Endoscopic ultrasound:  Pancreas, Neck mass:  Malignant  Adenocarcinoma.        2023 -  Cancer Staged    Staging form: Pancreas, AJCC 8th Edition  - Clinical: Stage IB (cT2, cN0, cM0) - Signed by Samir Cervantes MD on 2023  Total positive nodes: 0       2023 - 2024 Chemotherapy    alteplase (CATHFLO), 2 mg, Intracatheter, Every 1 Minute as needed, 11 of 12 cycles  irinotecan (CAMPTOSAR) chemo infusion, 150 mg/m2 = 278 mg, Intravenous, Once, 11 of 12 cycles  Dose modification: 125 mg/m2 (100 % of original dose 150 mg/m2, Cycle 9, Reason: Dose Not Tolerated), 125.1 mg/m2 (83.4 % of original dose 125 mg/m2, Cycle 2, Reason: Dose Not Tolerated), 125 mg/m2 (100 % of original dose 125 mg/m2, Cycle 2, Reason: Dose Not Tolerated)  Administration: 239 mg (3/19/2024), 278 mg (2023), 231 mg (2023), 231 mg (2023), 231 mg (1/10/2024), 231 mg (2024), 231 mg (2024), 231 mg (2024), 238 mg (3/6/2024), 238 mg (4/3/2024), 238 mg (2024)  oxaliplatin (ELOXATIN) chemo infusion, 65 mg/m2 = 120.25 mg, Intravenous, Once, 3 of 3 cycles  Administration: 120.25 mg (2023), 120.25 mg (2023), 120.25 mg (2023)  fluorouracil (ADRUCIL) ambulatory infusion Soln, 1,200 mg/m2/day = 4,440 mg, Intravenous, Over 46 hours, 11 of 12 cycles     6/3/2024 -  Chemotherapy    alteplase (CATHFLO), 2 mg, Intracatheter, Every 1 Minute as needed, 5 of 5  cycles  fluorouracil (ADRUCIL) ambulatory infusion Soln, 225 mg/m2/day = 2,160 mg, Intravenous, Over 120 hours, 5 of 5 cycles     Primary adenocarcinoma of pancreatic duct (HCC)   10/30/2023 Initial Diagnosis    Primary adenocarcinoma of pancreatic duct (HCC)     10/30/2023 Biopsy    Final Diagnosis   A.B. Pancreas, Neck mass (Cell Block and smear Preparations):  Malignant  Adenocarcinoma.     Satisfactory for evaluation.        10/30/2023 -  Cancer Staged    Staging form: Pancreas, AJCC 8th Edition  - Clinical stage from 10/30/2023: Stage IB (cT2, cN0, cM0) - Signed by Malgorzata Madden MD on 2/19/2024  Stage prefix: Initial diagnosis  Total positive nodes: 0       6/3/2024 -  Chemotherapy    alteplase (CATHFLO), 2 mg, Intracatheter, Every 1 Minute as needed, 5 of 5 cycles  fluorouracil (ADRUCIL) ambulatory infusion Soln, 225 mg/m2/day = 2,160 mg, Intravenous, Over 120 hours, 5 of 5 cycles     6/3/2024 - 7/10/2024 Radiation    Treatments:  Course: C1  Plan ID Energy Fractions Dose per Fraction (cGy) Dose Correction (cGy) Total Dose Delivered (cGy) Elapsed Days   Abdomen CD 6X 2 / 2 200 0 400 1   Abdomen SIB 6X 25 / 25 200 0 5,000 35    Treatment Dates:  6/3/2024 - 7/10/2024.      Pancreatic adenocarcinoma (HCC)   12/28/2023 Initial Diagnosis    Pancreatic adenocarcinoma (HCC)     6/3/2024 -  Chemotherapy    alteplase (CATHFLO), 2 mg, Intracatheter, Every 1 Minute as needed, 5 of 5 cycles  fluorouracil (ADRUCIL) ambulatory infusion Soln, 225 mg/m2/day = 2,160 mg, Intravenous, Over 120 hours, 5 of 5 cycles     6/3/2024 - 7/10/2024 Radiation    Treatments:  Course: C1  Plan ID Energy Fractions Dose per Fraction (cGy) Dose Correction (cGy) Total Dose Delivered (cGy) Elapsed Days   Abdomen CD 6X 2 / 2 200 0 400 1   Abdomen SIB 6X 25 / 25 200 0 5,000 35    Treatment Dates:  6/3/2024 - 7/10/2024.          Staging: Locally advanced pancreas cancer  Treatment history: FOLFIRINOX  Current treatment: FOLFIRINOX  Disease status:  Stable    History of Present Illness: Patient returns in follow-up.  He completed his chemoradiation.  He still has abdominal discomfort.  CT from August 12, 2024 reveals a new lesion in the dome of the liver.  The pancreas mass is ill-defined.  There continues to be encasement of the celiac axis.  I personally reviewed the films.  CA 19-9 was elevated to 621.    Review of Systems  Complete ROS Surg Onc:   Complete ROS Surg Onc:   Constitutional: The patient denies new or recent history of general fatigue, no recent weight loss, no change in appetite.   Eyes: No complaints of visual problems, no scleral icterus.   ENT: no complaints of ear pain, no hoarseness, no difficulty swallowing,  no tinnitus and no new masses in head, oral cavity, or neck.   Cardiovascular: No complaints of chest pain, no palpitations, no ankle edema.   Respiratory: No complaints of shortness of breath, no cough.   Gastrointestinal: No complaints of jaundice, no bloody stools, no pale stools.   Genitourinary: No complaints of dysuria, no hematuria, no nocturia, no frequent urination, no urethral discharge.   Musculoskeletal: No complaints of weakness, paralysis, joint stiffness or arthralgias.  Integumentary: No complaints of rash, no new lesions.   Neurological: No complaints of convulsions, no seizures, no dizziness.   Hematologic/Lymphatic: No complaints of easy bruising.   Endocrine:  No hot or cold intolerance.  No polydipsia, polyphagia, or polyuria.  Allergy/immunology:  No environmental allergies.  No food allergies.  Not immunocompromised.  Skin:  No pallor or rash.  No wound.        Patient Active Problem List   Diagnosis    Right upper quadrant pain    Type 2 diabetes mellitus with hyperglycemia, with long-term current use of insulin (HCC)    Mixed hyperlipidemia    Coronary artery disease involving native coronary artery of native heart without angina pectoris    Gastroesophageal reflux disease    Primary insomnia    Dehydration     Attention deficit hyperactivity disorder (ADHD), combined type    Depression with anxiety    Right upper quadrant abdominal pain    Primary adenocarcinoma of pancreatic duct (HCC)    SIRS (systemic inflammatory response syndrome) (HCC)    Pancreatic adenocarcinoma (HCC)    Palliative care patient    Primary hypertension    Continuous opioid dependence (HCC)     Past Medical History:   Diagnosis Date    Cancer (HCC)     pancreatic    Coronary artery disease     Diabetes mellitus (HCC)     Hyperlipidemia     Hypertension     Pancreatic cancer (HCC)      Past Surgical History:   Procedure Laterality Date    CORONARY ANGIOPLASTY WITH STENT PLACEMENT      ERCP W/ PLASTIC STENT PLACEMENT      HERNIA REPAIR      IR PORT PLACEMENT  11/24/2023     Family History   Problem Relation Age of Onset    Heart attack Father     Skin cancer Father      Social History     Socioeconomic History    Marital status: /Civil Union     Spouse name: Not on file    Number of children: Not on file    Years of education: Not on file    Highest education level: Not on file   Occupational History    Not on file   Tobacco Use    Smoking status: Every Day     Current packs/day: 1.50     Average packs/day: 1.5 packs/day for 35.0 years (52.5 ttl pk-yrs)     Types: Cigarettes    Smokeless tobacco: Not on file   Vaping Use    Vaping status: Never Used   Substance and Sexual Activity    Alcohol use: Not Currently    Drug use: Never    Sexual activity: Yes     Partners: Male   Other Topics Concern    Not on file   Social History Narrative    Not on file     Social Determinants of Health     Financial Resource Strain: Not on file   Food Insecurity: No Food Insecurity (12/15/2023)    Hunger Vital Sign     Worried About Running Out of Food in the Last Year: Never true     Ran Out of Food in the Last Year: Never true   Transportation Needs: No Transportation Needs (12/15/2023)    PRAPARE - Transportation     Lack of Transportation (Medical): No      Lack of Transportation (Non-Medical): No   Physical Activity: Not on file   Stress: Not on file   Social Connections: Unknown (6/18/2024)    Received from [employer]    Social [employer]     How often do you feel lonely or isolated from those around you? (Adult - for ages 18 years and over): Not on file   Intimate Partner Violence: Not on file   Housing Stability: Low Risk  (12/15/2023)    Housing Stability Vital Sign     Unable to Pay for Housing in the Last Year: No     Number of Times Moved in the Last Year: 1     Homeless in the Last Year: No       Current Outpatient Medications:     amphetamine-dextroamphetamine (ADDERALL XR) 20 MG 24 hr capsule, Take 20 mg by mouth every morning, Disp: , Rfl:     aspirin 81 MG tablet, daily, Disp: , Rfl:     Belsomra 10 MG TABS, Take 10 mg by mouth once as needed, Disp: , Rfl:     carvedilol (COREG) 6.25 mg tablet, Take 6.25 mg by mouth 2 (two) times a day, Disp: , Rfl:     Continuous Blood Gluc Sensor (FreeStyle Geovanny 3 Sensor) MISC, use as directed TO MONITOR GLUCOSE DAILY, Disp: , Rfl:     cyclobenzaprine (FLEXERIL) 10 mg tablet, Take 1 tablet (10 mg total) by mouth 3 (three) times a day as needed for muscle spasms, Disp: 30 tablet, Rfl: 0    desvenlafaxine succinate (PRISTIQ) 50 mg 24 hr tablet, Take 50 mg by mouth daily, Disp: , Rfl:     HYDROcodone-acetaminophen (Norco) 5-325 mg per tablet, Take 2 tablets by mouth daily as needed for pain Max Daily Amount: 2 tablets, Disp: 60 tablet, Rfl: 0    insulin degludec (Tresiba FlexTouch) 100 units/mL injection pen, INJECT 30 UNITS SUBCUTANEOUSLY ONCE DAILY AT BEDTIME, Disp: 15 mL, Rfl: 5    Jardiance 25 MG TABS, Take 1 tablet (25 mg total) by mouth every morning, Disp: 90 tablet, Rfl: 3    metFORMIN (GLUCOPHAGE) 1000 MG tablet, Take 1 tablet (1,000 mg total) by mouth 2 (two) times a day with meals, Disp: 90 tablet, Rfl: 2    omeprazole (PriLOSEC) 40 MG capsule, , Disp: , Rfl:     ondansetron (ZOFRAN) 8 mg tablet, Take 1 tablet  (8 mg total) by mouth every 8 (eight) hours as needed for nausea or vomiting for up to 20 days, Disp: 20 tablet, Rfl: 2    pancrelipase, Lip-Prot-Amyl, (Creon) 12,000 units capsule, Take 1 capsule (12,000 Units total) by mouth 3 (three) times a day with meals, Disp: 180 capsule, Rfl: 0    rosuvastatin (CRESTOR) 10 MG tablet, Take 1 tablet by mouth daily, Disp: , Rfl:     Sodium Fluoride 5000 PPM 1.1 % PSTE, Use as directed, Disp: , Rfl:     amoxicillin (AMOXIL) 500 mg capsule, Take 500 mg by mouth every 8 (eight) hours (Patient not taking: Reported on 8/15/2024), Disp: , Rfl:     diphenhydramine, lidocaine, Al/Mg hydroxide, simethicone (Magic Mouthwash) SUSP, Swish and spit 10 mL every 4 (four) hours as needed for mouth pain or discomfort (Patient not taking: Reported on 5/15/2024), Disp: 237 mL, Rfl: 2    insulin aspart, w/niacinamide, (Fiasp FlexTouch) 100 Units/mL injection pen, Inject 8 Units under the skin 3 (three) times a day with meals, Disp: 3 mL, Rfl: 2    lisinopril (ZESTRIL) 5 mg tablet, Take 1 tablet by mouth daily (Patient not taking: Reported on 8/15/2024), Disp: , Rfl:     phenazopyridine (PYRIDIUM) 95 MG tablet, Take 1 tablet (95 mg total) by mouth 3 (three) times a day as needed for bladder spasms (Patient not taking: Reported on 8/15/2024), Disp: 10 tablet, Rfl: 0    polyethylene glycol (GLYCOLAX) 17 GM/SCOOP powder, Take 17 g by mouth daily (Patient not taking: Reported on 8/15/2024), Disp: 510 g, Rfl: 0    valACYclovir (VALTREX) 1,000 mg tablet, Take 1 tablet (1,000 mg total) by mouth 2 (two) times a day for 10 days (Patient not taking: Reported on 7/22/2024), Disp: 20 tablet, Rfl: 3  No Known Allergies  Vitals:    08/15/24 1248   BP: 100/70   Pulse: 101   Resp: 16   Temp: 97.9 °F (36.6 °C)   SpO2: 97%       Physical Exam  Constitutional: General appearance: The Patient is well-developed and well-nourished who appears the stated age in no acute distress. Patient is pleasant and talkative.      HEENT:  Normocephalic.  Sclerae are anicteric. Mucous membranes are moist.  Extremities: There is no clubbing or cyanosis. There is no edema.  Symmetric.  Neuro: Grossly nonfocal. Gait is normal.     Skin: Warm, anicteric.    Psych:  Patient is pleasant and talkative.  Breasts:        Pathology:  [unfilled]    Labs:  Component  Ref Range & Units 7/5/24 12:25 PM 4/11/24 11:32 AM 2/20/24 10:30 AM 11/1/23 12:54 PM   CA 19-9  0 - 35 U/mL 621 High  945 High   High   High  C       Imaging  CT abdomen pelvis w contrast    Result Date: 8/14/2024  Narrative: CT ABDOMEN AND PELVIS WITH IV CONTRAST INDICATION: C25.3: Malignant neoplasm of pancreatic duct. COMPARISON: 4/22/2022 TECHNIQUE: CT examination of the abdomen and pelvis was performed. Scanning through the abdomen was performed in arterial, venous and delayed phases according to a protocol specifically designed to evaluate the upper abdominal viscera. Multiplanar 2D reformatted images were created from the source data. This examination, like all CT scans performed in the [facility], was performed utilizing techniques to minimize radiation dose exposure, including the use of iterative reconstruction and automated exposure control. Radiation dose length product (DLP) for this visit: 1102 mGy-cm IV Contrast: 100 mL of iohexol (OMNIPAQUE) Enteric Contrast: Administered. FINDINGS: ABDOMEN LOWER CHEST: No clinically significant abnormality in the visualized lower chest. LIVER/BILIARY TREE: There is a new peripherally hyperenhancing centrally hypodense lesion at the dome of the liver measuring 1.2 x 1.3 cm on series 2 image 17 and series 3 image 17 likely representing metastasis. There are multiple additional foci of arterial enhancement without corresponding abnormality on portal venous phases. This includes a 1.5 x 1.8 cm segment 2/4A lesion on series 2 image 31 and a 1.1 x 1.5 cm segment 7/8 lesion on series 2 image 27. Additional lesions  are peripheral, some of which appearing wedge-shaped suggestive of RITA. There is a common bile duct stent in place with pneumobilia and mild intrahepatic ductal dilatation similar to the prior study. GALLBLADDER: No calcified gallstones. The gallbladder is distended with wall thickening at the fundus and posteriorly. There is an enlarging fluid collection along the posterior wall of the gallbladder of uncertain etiology. It is uncertain if this is intramural or pericholecystic. There is stable cystic change at the fundus. SPLEEN: Unremarkable. PANCREAS: Ill-defined pancreatic head mass may be larger though again difficult to discretely measure. Infiltrating soft tissue measures approximately 5.7 x 5.8 cm on series 3 image 60, previously 5.2 x 5.8 when measured in a similar fashion on the previous study, series 301 image 48. There is encasement of the celiac axis and the mass is inseparable from the wall of the gastric antrum/pylorus. There is an enlarging focus of fluid in the pancreatic body region on series 3 image 56 measuring approximately 1.6 x 3.6 cm, previously 1.2 x 1.6 cm. ADRENAL GLANDS: Unremarkable. KIDNEYS/URETERS: There are small renal cysts. No hydronephrosis. STOMACH AND BOWEL: Unremarkable. APPENDIX: No findings to suggest appendicitis. ABDOMINOPELVIC CAVITY: No ascites. No pneumoperitoneum. No lymphadenopathy. VESSELS: The intrahepatic portions of the portal vein are patent though there is occlusion at the portal confluence secondary to tumor encasement with numerous mesenteric and retroperitoneal collaterals again seen. PELVIS REPRODUCTIVE ORGANS: Unremarkable for patient's age. URINARY BLADDER: The bladder wall is thickened. ABDOMINAL WALL/INGUINAL REGIONS: Unremarkable. BONES: No acute fracture or suspicious osseous lesion.     Impression: 1.  New peripherally enhancing hepatic dome lesion suspicious for metastasis. Additional foci of arterial enhancement only may represent additional  metastasis with RITA. Full extent of hepatic metastatic disease would be best evaluated with MRI and Eovist contrast. 2.  Ill-defined infiltrating pancreatic head tumor encasing the celiac axis and portal confluence, likely mildly increased in size from April. An enlarging cystic area within the pancreatic body is of uncertain etiology. 3.  Enlarging fluid collection along the gallbladder of uncertain etiology. This is either intramural or pericholecystic. Smaller cystic foci are seen at the fundus though this appearance is typical for adenomyomatosis. The study was marked in EPIC for significant notification. Workstation performed: [id number]     I personally reviewed and interpreted the above laboratory and imaging data.

## 2024-08-15 NOTE — ASSESSMENT & PLAN NOTE
58-year-old male with locally advanced pancreas cancer.  He completed chemoradiation.  His CA 19-9 is rising and his CT shows evidence of metastasis.  He is having no evidence of fevers or chills, so I doubt this is an abscess.  I discussed that my recommendation would be to consider further systemic chemotherapy.  This would likely be different than the chemotherapy he had before.  He is already set up to see Dr. Calderon to discuss this.  I also discussed that he should meet with palliative care.  This has been arranged as well.  He is focused on his quality of life.  My recommendation would be that he start systemic therapy.  If he can tolerate this and there is even stability of his tumor on imaging, that would be reasonable.  If he cannot tolerate chemotherapy, I would transition to hospice.  He is focused on his quality of life.  I also discussed seeing if there are clinical trials available for him as well.  He will follow-up with medical oncology.  I will see him in the future should the need arise. He is agreeable to this plan.  All his questions were answered.

## 2024-08-15 NOTE — LETTER
August 15, 2024     Elo Modi MD  [address] [place] [address]  [address]  [place] [id number]    Patient: Derick Richey   YOB: 1965   Date of Visit: 8/15/2024       Dear Dr. Modi:    Thank you for referring Derick Richey to me for evaluation. Below are my notes for this consultation.    If you have questions, please do not hesitate to call me. I look forward to following your patient along with you.         Sincerely,        Samir Cervantes MD        CC: MD Malgorzata Carroll MD Darius Desai, MD  8/15/2024  1:10 PM  Sign when Signing Visit               Surgical Oncology Follow Up       [facility] [place]  [address]  [address]  [place] [address]  [phone]    Derick Richey  1965  [id number]  [facility] [place]  [address]  [address]  [place] [address]  [phone]    1. Pancreatic adenocarcinoma (HCC)  2. Primary adenocarcinoma of pancreatic duct (HCC)  Assessment & Plan:  58-year-old male with locally advanced pancreas cancer.  He completed chemoradiation.  His CA 19-9 is rising and his CT shows evidence of metastasis.  He is having no evidence of fevers or chills, so I doubt this is an abscess.  I discussed that my recommendation would be to consider further systemic chemotherapy.  This would likely be different than the chemotherapy he had before.  He is already set up to see Dr. Calderon to discuss this.  I also discussed that he should meet with palliative care.  This has been arranged as well.  He is focused on his quality of life.  My recommendation would be that he start systemic therapy.  If he can tolerate this and there is even stability of his tumor on imaging, that would be reasonable.  If he cannot tolerate chemotherapy, I would transition to hospice.  He is focused on his quality of life.  I also  PROCEDURE NOTE: Focal Laser, Choroid #1 OS. Diagnosis: Neovascular AMD with Active CNV. Anesthesia: Topical. Prior to focal laser, risks/benefits/alternatives to laser discussed including scotoma and loss of vision and patient wished to proceed. A written consent is on file, and the need for today’s laser was discussed and the patient is understanding and wishes to proceed. Spot size:100 * um. Pulse power: 100* mW. Number of pulses:32 *. Duration: 80* ms. Procedure Time: 1211PM*. Patient tolerated procedure well. There were no complications. Post procedure instructions given. Patient given office phone number/answering service number and advised to call immediately should there be loss of vision or pain, or should they have any other questions or concerns. Queen Nemesio discussed seeing if there are clinical trials available for him as well.  He will follow-up with medical oncology.  I will see him in the future should the need arise. He is agreeable to this plan.  All his questions were answered.          Chief Complaint   Patient presents with   • Follow-up       No follow-ups on file.      Oncology History Overview Note   with T2N0M0 pancreatic adenocarcinoma of the neck, considered unresectable. He completed 12 cycles mFOLFIRINOX with dose reductions required with minimal response. He completed a course of concurrent chemoradiation on 7/10/24. He is having telephone follow up today.    The patient experienced fatigue, nausea controlled with anti-emetics, bloating treated with medication and diet modification.  He had abdominal pain that preceded treatment, but did slightly worsen during therapy.  This was managed with pain medication as needed.  Poor appetite with 10 pound weight loss.  This stabilized by mid therapy.        24 Dr. Calderon        Upcomin24 CT  8/15/24 Dr. Cervantes  8/15/24 Palliative Care       Right upper quadrant pain   10/30/2023 Biopsy    Endoscopic ultrasound:  Pancreas, Neck mass:  Malignant  Adenocarcinoma.        2023 -  Cancer Staged    Staging form: Pancreas, AJCC 8th Edition  - Clinical: Stage IB (cT2, cN0, cM0) - Signed by Samir Cervantes MD on 2023  Total positive nodes: 0       2023 - 2024 Chemotherapy    alteplase (CATHFLO), 2 mg, Intracatheter, Every 1 Minute as needed, 11 of 12 cycles  irinotecan (CAMPTOSAR) chemo infusion, 150 mg/m2 = 278 mg, Intravenous, Once, 11 of 12 cycles  Dose modification: 125 mg/m2 (100 % of original dose 150 mg/m2, Cycle 9, Reason: Dose Not Tolerated), 125.1 mg/m2 (83.4 % of original dose 125 mg/m2, Cycle 2, Reason: Dose Not Tolerated), 125 mg/m2 (100 % of original dose 125 mg/m2, Cycle 2, Reason: Dose Not Tolerated)  Administration: 239 mg (3/19/2024), 278 mg (2023), 231 mg  (12/12/2023), 231 mg (12/27/2023), 231 mg (1/10/2024), 231 mg (1/24/2024), 231 mg (2/7/2024), 231 mg (2/21/2024), 238 mg (3/6/2024), 238 mg (4/3/2024), 238 mg (4/17/2024)  oxaliplatin (ELOXATIN) chemo infusion, 65 mg/m2 = 120.25 mg, Intravenous, Once, 3 of 3 cycles  Administration: 120.25 mg (11/28/2023), 120.25 mg (12/12/2023), 120.25 mg (12/27/2023)  fluorouracil (ADRUCIL) ambulatory infusion Soln, 1,200 mg/m2/day = 4,440 mg, Intravenous, Over 46 hours, 11 of 12 cycles     6/3/2024 -  Chemotherapy    alteplase (CATHFLO), 2 mg, Intracatheter, Every 1 Minute as needed, 5 of 5 cycles  fluorouracil (ADRUCIL) ambulatory infusion Soln, 225 mg/m2/day = 2,160 mg, Intravenous, Over 120 hours, 5 of 5 cycles     Primary adenocarcinoma of pancreatic duct (HCC)   10/30/2023 Initial Diagnosis    Primary adenocarcinoma of pancreatic duct (HCC)     10/30/2023 Biopsy    Final Diagnosis   A.B. Pancreas, Neck mass (Cell Block and smear Preparations):  Malignant  Adenocarcinoma.     Satisfactory for evaluation.        10/30/2023 -  Cancer Staged    Staging form: Pancreas, AJCC 8th Edition  - Clinical stage from 10/30/2023: Stage IB (cT2, cN0, cM0) - Signed by Malgorzata Madden MD on 2/19/2024  Stage prefix: Initial diagnosis  Total positive nodes: 0       6/3/2024 -  Chemotherapy    alteplase (CATHFLO), 2 mg, Intracatheter, Every 1 Minute as needed, 5 of 5 cycles  fluorouracil (ADRUCIL) ambulatory infusion Soln, 225 mg/m2/day = 2,160 mg, Intravenous, Over 120 hours, 5 of 5 cycles     6/3/2024 - 7/10/2024 Radiation    Treatments:  Course: C1  Plan ID Energy Fractions Dose per Fraction (cGy) Dose Correction (cGy) Total Dose Delivered (cGy) Elapsed Days   Abdomen CD 6X 2 / 2 200 0 400 1   Abdomen SIB 6X 25 / 25 200 0 5,000 35    Treatment Dates:  6/3/2024 - 7/10/2024.      Pancreatic adenocarcinoma (HCC)   12/28/2023 Initial Diagnosis    Pancreatic adenocarcinoma (HCC)     6/3/2024 -  Chemotherapy    alteplase (CATHFLO), 2 mg, Intracatheter,  Every 1 Minute as needed, 5 of 5 cycles  fluorouracil (ADRUCIL) ambulatory infusion Soln, 225 mg/m2/day = 2,160 mg, Intravenous, Over 120 hours, 5 of 5 cycles     6/3/2024 - 7/10/2024 Radiation    Treatments:  Course: C1  Plan ID Energy Fractions Dose per Fraction (cGy) Dose Correction (cGy) Total Dose Delivered (cGy) Elapsed Days   Abdomen CD 6X 2 / 2 200 0 400 1   Abdomen SIB 6X 25 / 25 200 0 5,000 35    Treatment Dates:  6/3/2024 - 7/10/2024.          Staging: Locally advanced pancreas cancer  Treatment history: FOLFIRINOX  Current treatment: FOLFIRINOX  Disease status: Stable    History of Present Illness: Patient returns in follow-up.  He completed his chemoradiation.  He still has abdominal discomfort.  CT from August 12, 2024 reveals a new lesion in the dome of the liver.  The pancreas mass is ill-defined.  There continues to be encasement of the celiac axis.  I personally reviewed the films.  CA 19-9 was elevated to 621.    Review of Systems  Complete ROS Surg Onc:   Complete ROS Surg Onc:   Constitutional: The patient denies new or recent history of general fatigue, no recent weight loss, no change in appetite.   Eyes: No complaints of visual problems, no scleral icterus.   ENT: no complaints of ear pain, no hoarseness, no difficulty swallowing,  no tinnitus and no new masses in head, oral cavity, or neck.   Cardiovascular: No complaints of chest pain, no palpitations, no ankle edema.   Respiratory: No complaints of shortness of breath, no cough.   Gastrointestinal: No complaints of jaundice, no bloody stools, no pale stools.   Genitourinary: No complaints of dysuria, no hematuria, no nocturia, no frequent urination, no urethral discharge.   Musculoskeletal: No complaints of weakness, paralysis, joint stiffness or arthralgias.  Integumentary: No complaints of rash, no new lesions.   Neurological: No complaints of convulsions, no seizures, no dizziness.   Hematologic/Lymphatic: No complaints of easy  bruising.   Endocrine:  No hot or cold intolerance.  No polydipsia, polyphagia, or polyuria.  Allergy/immunology:  No environmental allergies.  No food allergies.  Not immunocompromised.  Skin:  No pallor or rash.  No wound.        Patient Active Problem List   Diagnosis   • Right upper quadrant pain   • Type 2 diabetes mellitus with hyperglycemia, with long-term current use of insulin (HCC)   • Mixed hyperlipidemia   • Coronary artery disease involving native coronary artery of native heart without angina pectoris   • Gastroesophageal reflux disease   • Primary insomnia   • Dehydration   • Attention deficit hyperactivity disorder (ADHD), combined type   • Depression with anxiety   • Right upper quadrant abdominal pain   • Primary adenocarcinoma of pancreatic duct (HCC)   • SIRS (systemic inflammatory response syndrome) (HCC)   • Pancreatic adenocarcinoma (HCC)   • Palliative care patient   • Primary hypertension   • Continuous opioid dependence (HCC)     Past Medical History:   Diagnosis Date   • Cancer (HCC)     pancreatic   • Coronary artery disease    • Diabetes mellitus (HCC)    • Hyperlipidemia    • Hypertension    • Pancreatic cancer (HCC)      Past Surgical History:   Procedure Laterality Date   • CORONARY ANGIOPLASTY WITH STENT PLACEMENT     • ERCP W/ PLASTIC STENT PLACEMENT     • HERNIA REPAIR     • IR PORT PLACEMENT  11/24/2023     Family History   Problem Relation Age of Onset   • Heart attack Father    • Skin cancer Father      Social History     Socioeconomic History   • Marital status: /Civil Union     Spouse name: Not on file   • Number of children: Not on file   • Years of education: Not on file   • Highest education level: Not on file   Occupational History   • Not on file   Tobacco Use   • Smoking status: Every Day     Current packs/day: 1.50     Average packs/day: 1.5 packs/day for 35.0 years (52.5 ttl pk-yrs)     Types: Cigarettes   • Smokeless tobacco: Not on file   Vaping Use   • Vaping  status: Never Used   Substance and Sexual Activity   • Alcohol use: Not Currently   • Drug use: Never   • Sexual activity: Yes     Partners: Male   Other Topics Concern   • Not on file   Social History Narrative   • Not on file     Social Determinants of Health     Financial Resource Strain: Not on file   Food Insecurity: No Food Insecurity (12/15/2023)    Hunger Vital Sign    • Worried About Running Out of Food in the Last Year: Never true    • Ran Out of Food in the Last Year: Never true   Transportation Needs: No Transportation Needs (12/15/2023)    PRAPARE - Transportation    • Lack of Transportation (Medical): No    • Lack of Transportation (Non-Medical): No   Physical Activity: Not on file   Stress: Not on file   Social Connections: Unknown (6/18/2024)    Received from [employer]    Social [employer]    • How often do you feel lonely or isolated from those around you? (Adult - for ages 18 years and over): Not on file   Intimate Partner Violence: Not on file   Housing Stability: Low Risk  (12/15/2023)    Housing Stability Vital Sign    • Unable to Pay for Housing in the Last Year: No    • Number of Times Moved in the Last Year: 1    • Homeless in the Last Year: No       Current Outpatient Medications:   •  amphetamine-dextroamphetamine (ADDERALL XR) 20 MG 24 hr capsule, Take 20 mg by mouth every morning, Disp: , Rfl:   •  aspirin 81 MG tablet, daily, Disp: , Rfl:   •  Belsomra 10 MG TABS, Take 10 mg by mouth once as needed, Disp: , Rfl:   •  carvedilol (COREG) 6.25 mg tablet, Take 6.25 mg by mouth 2 (two) times a day, Disp: , Rfl:   •  Continuous Blood Gluc Sensor (FreeStyle Geovanny 3 Sensor) MISC, use as directed TO MONITOR GLUCOSE DAILY, Disp: , Rfl:   •  cyclobenzaprine (FLEXERIL) 10 mg tablet, Take 1 tablet (10 mg total) by mouth 3 (three) times a day as needed for muscle spasms, Disp: 30 tablet, Rfl: 0  •  desvenlafaxine succinate (PRISTIQ) 50 mg 24 hr tablet, Take 50 mg by mouth daily, Disp: , Rfl:   •   HYDROcodone-acetaminophen (Norco) 5-325 mg per tablet, Take 2 tablets by mouth daily as needed for pain Max Daily Amount: 2 tablets, Disp: 60 tablet, Rfl: 0  •  insulin degludec (Tresiba FlexTouch) 100 units/mL injection pen, INJECT 30 UNITS SUBCUTANEOUSLY ONCE DAILY AT BEDTIME, Disp: 15 mL, Rfl: 5  •  Jardiance 25 MG TABS, Take 1 tablet (25 mg total) by mouth every morning, Disp: 90 tablet, Rfl: 3  •  metFORMIN (GLUCOPHAGE) 1000 MG tablet, Take 1 tablet (1,000 mg total) by mouth 2 (two) times a day with meals, Disp: 90 tablet, Rfl: 2  •  omeprazole (PriLOSEC) 40 MG capsule, , Disp: , Rfl:   •  ondansetron (ZOFRAN) 8 mg tablet, Take 1 tablet (8 mg total) by mouth every 8 (eight) hours as needed for nausea or vomiting for up to 20 days, Disp: 20 tablet, Rfl: 2  •  pancrelipase, Lip-Prot-Amyl, (Creon) 12,000 units capsule, Take 1 capsule (12,000 Units total) by mouth 3 (three) times a day with meals, Disp: 180 capsule, Rfl: 0  •  rosuvastatin (CRESTOR) 10 MG tablet, Take 1 tablet by mouth daily, Disp: , Rfl:   •  Sodium Fluoride 5000 PPM 1.1 % PSTE, Use as directed, Disp: , Rfl:   •  amoxicillin (AMOXIL) 500 mg capsule, Take 500 mg by mouth every 8 (eight) hours (Patient not taking: Reported on 8/15/2024), Disp: , Rfl:   •  diphenhydramine, lidocaine, Al/Mg hydroxide, simethicone (Magic Mouthwash) SUSP, Swish and spit 10 mL every 4 (four) hours as needed for mouth pain or discomfort (Patient not taking: Reported on 5/15/2024), Disp: 237 mL, Rfl: 2  •  insulin aspart, w/niacinamide, (Fiasp FlexTouch) 100 Units/mL injection pen, Inject 8 Units under the skin 3 (three) times a day with meals, Disp: 3 mL, Rfl: 2  •  lisinopril (ZESTRIL) 5 mg tablet, Take 1 tablet by mouth daily (Patient not taking: Reported on 8/15/2024), Disp: , Rfl:   •  phenazopyridine (PYRIDIUM) 95 MG tablet, Take 1 tablet (95 mg total) by mouth 3 (three) times a day as needed for bladder spasms (Patient not taking: Reported on 8/15/2024), Disp: 10  tablet, Rfl: 0  •  polyethylene glycol (GLYCOLAX) 17 GM/SCOOP powder, Take 17 g by mouth daily (Patient not taking: Reported on 8/15/2024), Disp: 510 g, Rfl: 0  •  valACYclovir (VALTREX) 1,000 mg tablet, Take 1 tablet (1,000 mg total) by mouth 2 (two) times a day for 10 days (Patient not taking: Reported on 7/22/2024), Disp: 20 tablet, Rfl: 3  No Known Allergies  Vitals:    08/15/24 1248   BP: 100/70   Pulse: 101   Resp: 16   Temp: 97.9 °F (36.6 °C)   SpO2: 97%       Physical Exam  Constitutional: General appearance: The Patient is well-developed and well-nourished who appears the stated age in no acute distress. Patient is pleasant and talkative.     HEENT:  Normocephalic.  Sclerae are anicteric. Mucous membranes are moist.  Extremities: There is no clubbing or cyanosis. There is no edema.  Symmetric.  Neuro: Grossly nonfocal. Gait is normal.     Skin: Warm, anicteric.    Psych:  Patient is pleasant and talkative.  Breasts:        Pathology:  [unfilled]    Labs:  Component  Ref Range & Units 7/5/24 12:25 PM 4/11/24 11:32 AM 2/20/24 10:30 AM 11/1/23 12:54 PM   CA 19-9  0 - 35 U/mL 621 High  945 High   High   High  C       Imaging  CT abdomen pelvis w contrast    Result Date: 8/14/2024  Narrative: CT ABDOMEN AND PELVIS WITH IV CONTRAST INDICATION: C25.3: Malignant neoplasm of pancreatic duct. COMPARISON: 4/22/2022 TECHNIQUE: CT examination of the abdomen and pelvis was performed. Scanning through the abdomen was performed in arterial, venous and delayed phases according to a protocol specifically designed to evaluate the upper abdominal viscera. Multiplanar 2D reformatted images were created from the source data. This examination, like all CT scans performed in the Critical [facility] hospital, was performed utilizing techniques to minimize radiation dose exposure, including the use of iterative reconstruction and automated exposure control. Radiation dose length product (DLP) for this visit: 1102  mGy-cm IV Contrast: 100 mL of iohexol (OMNIPAQUE) Enteric Contrast: Administered. FINDINGS: ABDOMEN LOWER CHEST: No clinically significant abnormality in the visualized lower chest. LIVER/BILIARY TREE: There is a new peripherally hyperenhancing centrally hypodense lesion at the dome of the liver measuring 1.2 x 1.3 cm on series 2 image 17 and series 3 image 17 likely representing metastasis. There are multiple additional foci of arterial enhancement without corresponding abnormality on portal venous phases. This includes a 1.5 x 1.8 cm segment 2/4A lesion on series 2 image 31 and a 1.1 x 1.5 cm segment 7/8 lesion on series 2 image 27. Additional lesions are peripheral, some of which appearing wedge-shaped suggestive of RITA. There is a common bile duct stent in place with pneumobilia and mild intrahepatic ductal dilatation similar to the prior study. GALLBLADDER: No calcified gallstones. The gallbladder is distended with wall thickening at the fundus and posteriorly. There is an enlarging fluid collection along the posterior wall of the gallbladder of uncertain etiology. It is uncertain if this is intramural or pericholecystic. There is stable cystic change at the fundus. SPLEEN: Unremarkable. PANCREAS: Ill-defined pancreatic head mass may be larger though again difficult to discretely measure. Infiltrating soft tissue measures approximately 5.7 x 5.8 cm on series 3 image 60, previously 5.2 x 5.8 when measured in a similar fashion on the previous study, series 301 image 48. There is encasement of the celiac axis and the mass is inseparable from the wall of the gastric antrum/pylorus. There is an enlarging focus of fluid in the pancreatic body region on series 3 image 56 measuring approximately 1.6 x 3.6 cm, previously 1.2 x 1.6 cm. ADRENAL GLANDS: Unremarkable. KIDNEYS/URETERS: There are small renal cysts. No hydronephrosis. STOMACH AND BOWEL: Unremarkable. APPENDIX: No findings to suggest appendicitis.  ABDOMINOPELVIC CAVITY: No ascites. No pneumoperitoneum. No lymphadenopathy. VESSELS: The intrahepatic portions of the portal vein are patent though there is occlusion at the portal confluence secondary to tumor encasement with numerous mesenteric and retroperitoneal collaterals again seen. PELVIS REPRODUCTIVE ORGANS: Unremarkable for patient's age. URINARY BLADDER: The bladder wall is thickened. ABDOMINAL WALL/INGUINAL REGIONS: Unremarkable. BONES: No acute fracture or suspicious osseous lesion.     Impression: 1.  New peripherally enhancing hepatic dome lesion suspicious for metastasis. Additional foci of arterial enhancement only may represent additional metastasis with RITA. Full extent of hepatic metastatic disease would be best evaluated with MRI and Eovist contrast. 2.  Ill-defined infiltrating pancreatic head tumor encasing the celiac axis and portal confluence, likely mildly increased in size from April. An enlarging cystic area within the pancreatic body is of uncertain etiology. 3.  Enlarging fluid collection along the gallbladder of uncertain etiology. This is either intramural or pericholecystic. Smaller cystic foci are seen at the fundus though this appearance is typical for adenomyomatosis. The study was marked in EPIC for significant notification. Workstation performed: [id number]     I personally reviewed and interpreted the above laboratory and imaging data.

## 2024-08-16 LAB — CANCER AG19-9 SERPL-ACNC: 561 U/ML (ref 0–35)

## 2024-08-19 RX ORDER — DEXTROAMPHETAMINE SACCHARATE, AMPHETAMINE ASPARTATE MONOHYDRATE, DEXTROAMPHETAMINE SULFATE AND AMPHETAMINE SULFATE 2.5; 2.5; 2.5; 2.5 MG/1; MG/1; MG/1; MG/1
CAPSULE, EXTENDED RELEASE ORAL
COMMUNITY
Start: 2024-06-25

## 2024-08-19 RX ORDER — DEXTROAMPHETAMINE SACCHARATE, AMPHETAMINE ASPARTATE MONOHYDRATE, DEXTROAMPHETAMINE SULFATE AND AMPHETAMINE SULFATE 7.5; 7.5; 7.5; 7.5 MG/1; MG/1; MG/1; MG/1
30 CAPSULE, EXTENDED RELEASE ORAL EVERY MORNING
COMMUNITY
Start: 2024-08-08

## 2024-08-20 ENCOUNTER — TELEPHONE (OUTPATIENT)
Dept: HEMATOLOGY ONCOLOGY | Facility: CLINIC | Age: 59
End: 2024-08-20

## 2024-08-20 ENCOUNTER — OFFICE VISIT (OUTPATIENT)
Dept: HEMATOLOGY ONCOLOGY | Facility: CLINIC | Age: 59
End: 2024-08-20
Payer: COMMERCIAL

## 2024-08-20 VITALS
WEIGHT: 153.5 LBS | SYSTOLIC BLOOD PRESSURE: 108 MMHG | HEART RATE: 89 BPM | DIASTOLIC BLOOD PRESSURE: 72 MMHG | BODY MASS INDEX: 22.74 KG/M2 | HEIGHT: 69 IN | OXYGEN SATURATION: 97 % | RESPIRATION RATE: 18 BRPM | TEMPERATURE: 96.5 F

## 2024-08-20 DIAGNOSIS — C25.9 PANCREATIC ADENOCARCINOMA (HCC): ICD-10-CM

## 2024-08-20 DIAGNOSIS — Z85.89 HISTORY OF KNOWN METASTASIS TO LIVER: ICD-10-CM

## 2024-08-20 DIAGNOSIS — C25.3 PRIMARY ADENOCARCINOMA OF PANCREATIC DUCT (HCC): Primary | ICD-10-CM

## 2024-08-20 DIAGNOSIS — R10.11 RIGHT UPPER QUADRANT PAIN: Primary | ICD-10-CM

## 2024-08-20 DIAGNOSIS — C25.3 PRIMARY ADENOCARCINOMA OF PANCREATIC DUCT (HCC): ICD-10-CM

## 2024-08-20 PROCEDURE — 99214 OFFICE O/P EST MOD 30 MIN: CPT | Performed by: INTERNAL MEDICINE

## 2024-08-20 NOTE — PROGRESS NOTES
Hematology/Oncology Outpatient Follow- up Note  Derick Richey 58 y.o. male MRN: @ Encounter: [id number]        Date:  8/20/2024        Assessment / Plan:    1 stage IV pancreatic cancer  2 new liver metastasis  Plan: Patient will undergo treated with Abraxane Gemzar.  He has previously failed FOLFIRINOX and then 5-FU with radiation.  Hopefully he will have a response.  Side effects were discussed consent was obtained.  We will see him again in 3 to 4 weeks.        HPI: 58-year-old gentleman here for follow-up.  Unfortunately status post 5-FU with radiation therapy he has failed with new disease in his liver.  He denies nausea vomiting fever chills or sweats.  He has abdominal pain with gas.  He has flatulence to relieve some discomfort.  He is due to see palliative care tomorrow which point they will probably offer him continuation of analgesics as needed.  We talked about salvage therapy.  He has not had either Abraxane or Gemzar.  We would offer him that day 1 and 15 every 28 days.  Hopefully he will tolerate side effects and we will see if he gets some response.  We discussed rechecking after 4-6 treatments with scans and see if he has any improvement.  He is willing to do so.      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  Plan: He is to get his scans also get a CBC CMP CA 19-9 he will see Dr. Cervantes as well as Dr. Madden we will see him at the end of the month.  Will see blood count is she has had and decide about further treatment.  HPI: 58-year-old G2P cancer T2 N0 M0.  Chemoradiation.  He did not really have a major response to FOLFIRINOX.  He had encasement and still has of his proximal portal vein with narrowing noted.  We are awaiting results of scans later this month having completed radiation.   Has some abdominal discomfort he is able to eat he is moving his bowels no blood in his bowel movements.  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  We will rediscuss what further we would offer.  We will probably need to obtain a repeat scan shortly thereafter.  He will be seeing radiation therapy and follow-up as well.  HPI: 58-year-old gentleman here for follow-up.  He unfortunately has pancreatic cancer T2 N0 M0 however it was wrapping around multiple blood vessels and was locally advanced.  He did not respond in terms of any major response to FOLFIRINOX.  Lesion was stable did not shrink.  Surgery would be in [place] technically very difficult and probably may not been very productive.  He was referred to radiation therapy.  He is presently continuing 5-FU day 1 through 5 weekly during radiation therapy.  He completes radiation and 5-FU next week.  He has some fatigue some nausea slight weight loss.  Overall though he is holding his own no major diarrhea.  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  3 type 2 diabetes mellitus  Plan: Patient will undergo 5-FU continuous infusion day 1 through 5 weekly during radiation treatment side effects were discussed and consent was obtained Lytnht-v-Yurm are ready in place.  Otherwise no other major suggestions at this time.  He is willing to go undergo that.  Hopefully will begin in the week of [place] 3.  He will be seen here again on 7/5 and sooner if problems arise.  HPI: 58-year-old gentleman here for follow-up.  He has locally advanced pancreatic cancer involving multiple blood vessels.   He had received FOLFIRINOX.  Unfortunately he did not have a good response disease is essentially stable and did not shrink.  Because of its location it would be a very difficult operation.  Subsequently he was referred by Dr. Cervantes of surgery after completion of FOLFIRINOX to radiation therapy.  Their recommendation would be radiation therapy.  He will receive 5-FU day 1 through 5 continuous infusion during radiation.  He already knows the side effects of 5-FU and they were rediscussed.  Beyond that depending on response we would probably offer gemcitabine/Abraxane.  However we will wait to see how he responds to treatment for doing so.      Cancer Staging:  Cancer Staging   Primary adenocarcinoma of pancreatic duct (HCC)  Staging form: Pancreas, AJCC 8th Edition  - Clinical stage from 10/30/2023: Stage IB (cT2, cN0, cM0) - Signed by Malgorzata Maddne MD on 2024  Stage prefix: Initial diagnosis  Total positive nodes: 0    Right upper quadrant pain  Staging form: Pancreas, AJCC 8th Edition  - Clinical: Stage IB (cT2, cN0, cM0) - Signed by Samir Cervantes MD on 2023  Total positive nodes: 0      Molecular Testing:     Previous Hematologic/ Oncologic History:    Oncology History Overview Note   with T2N0M0 pancreatic adenocarcinoma of the neck, considered unresectable. He completed 12 cycles mFOLFIRINOX with dose reductions required with minimal response. He completed a course of concurrent chemoradiation on 7/10/24. He is having telephone follow up today.    The patient experienced fatigue, nausea controlled with anti-emetics, bloating treated with medication and diet modification.  He had abdominal pain that preceded treatment, but did slightly worsen during therapy.  This was managed with pain medication as needed.  Poor appetite with 10 pound weight loss.  This stabilized by mid therapy.        24 Dr. Calderon        Upcomin24 CT  8/15/24 Dr. Cervantes  8/15/24 Palliative Care       Right upper quadrant  pain   10/30/2023 Biopsy    Endoscopic ultrasound:  Pancreas, Neck mass:  Malignant  Adenocarcinoma.        11/16/2023 -  Cancer Staged    Staging form: Pancreas, AJCC 8th Edition  - Clinical: Stage IB (cT2, cN0, cM0) - Signed by Samir Cervantes MD on 11/16/2023  Total positive nodes: 0       11/28/2023 - 4/19/2024 Chemotherapy    alteplase (CATHFLO), 2 mg, Intracatheter, Every 1 Minute as needed, 11 of 12 cycles  irinotecan (CAMPTOSAR) chemo infusion, 150 mg/m2 = 278 mg, Intravenous, Once, 11 of 12 cycles  Dose modification: 125 mg/m2 (100 % of original dose 150 mg/m2, Cycle 9, Reason: Dose Not Tolerated), 125.1 mg/m2 (83.4 % of original dose 125 mg/m2, Cycle 2, Reason: Dose Not Tolerated), 125 mg/m2 (100 % of original dose 125 mg/m2, Cycle 2, Reason: Dose Not Tolerated)  Administration: 239 mg (3/19/2024), 278 mg (11/28/2023), 231 mg (12/12/2023), 231 mg (12/27/2023), 231 mg (1/10/2024), 231 mg (1/24/2024), 231 mg (2/7/2024), 231 mg (2/21/2024), 238 mg (3/6/2024), 238 mg (4/3/2024), 238 mg (4/17/2024)  oxaliplatin (ELOXATIN) chemo infusion, 65 mg/m2 = 120.25 mg, Intravenous, Once, 3 of 3 cycles  Administration: 120.25 mg (11/28/2023), 120.25 mg (12/12/2023), 120.25 mg (12/27/2023)  fluorouracil (ADRUCIL) ambulatory infusion Soln, 1,200 mg/m2/day = 4,440 mg, Intravenous, Over 46 hours, 11 of 12 cycles     6/3/2024 - 7/10/2024 Chemotherapy    alteplase (CATHFLO), 2 mg, Intracatheter, Every 1 Minute as needed, 5 of 5 cycles  fluorouracil (ADRUCIL) ambulatory infusion Soln, 225 mg/m2/day = 2,160 mg, Intravenous, Over 120 hours, 5 of 5 cycles     9/3/2024 -  Chemotherapy    alteplase (CATHFLO), 2 mg, Intracatheter, Every 1 Minute as needed, 0 of 6 cycles  paclitaxel protein-bound (ABRAXANE) IVPB, 125 mg/m2, Intravenous, Once, 0 of 6 cycles  gemcitabine (GEMZAR) infusion, 1,000 mg/m2, Intravenous, Once, 0 of 6 cycles     Primary adenocarcinoma of pancreatic duct (HCC)   10/30/2023 Initial Diagnosis    Primary  adenocarcinoma of pancreatic duct (HCC)     10/30/2023 Biopsy    Final Diagnosis   A.B. Pancreas, Neck mass (Cell Block and smear Preparations):  Malignant  Adenocarcinoma.     Satisfactory for evaluation.        10/30/2023 -  Cancer Staged    Staging form: Pancreas, AJCC 8th Edition  - Clinical stage from 10/30/2023: Stage IB (cT2, cN0, cM0) - Signed by Malgorzata Madden MD on 2/19/2024  Stage prefix: Initial diagnosis  Total positive nodes: 0       6/3/2024 - 7/10/2024 Chemotherapy    alteplase (CATHFLO), 2 mg, Intracatheter, Every 1 Minute as needed, 5 of 5 cycles  fluorouracil (ADRUCIL) ambulatory infusion Soln, 225 mg/m2/day = 2,160 mg, Intravenous, Over 120 hours, 5 of 5 cycles     6/3/2024 - 7/10/2024 Radiation    Treatments:  Course: C1  Plan ID Energy Fractions Dose per Fraction (cGy) Dose Correction (cGy) Total Dose Delivered (cGy) Elapsed Days   Abdomen CD 6X 2 / 2 200 0 400 1   Abdomen SIB 6X 25 / 25 200 0 5,000 35    Treatment Dates:  6/3/2024 - 7/10/2024.      9/3/2024 -  Chemotherapy    alteplase (CATHFLO), 2 mg, Intracatheter, Every 1 Minute as needed, 0 of 6 cycles  paclitaxel protein-bound (ABRAXANE) IVPB, 125 mg/m2, Intravenous, Once, 0 of 6 cycles  gemcitabine (GEMZAR) infusion, 1,000 mg/m2, Intravenous, Once, 0 of 6 cycles     Pancreatic adenocarcinoma (HCC)   12/28/2023 Initial Diagnosis    Pancreatic adenocarcinoma (HCC)     6/3/2024 - 7/10/2024 Chemotherapy    alteplase (CATHFLO), 2 mg, Intracatheter, Every 1 Minute as needed, 5 of 5 cycles  fluorouracil (ADRUCIL) ambulatory infusion Soln, 225 mg/m2/day = 2,160 mg, Intravenous, Over 120 hours, 5 of 5 cycles     6/3/2024 - 7/10/2024 Radiation    Treatments:  Course: C1  Plan ID Energy Fractions Dose per Fraction (cGy) Dose Correction (cGy) Total Dose Delivered (cGy) Elapsed Days   Abdomen CD 6X 2 / 2 200 0 400 1   Abdomen SIB 6X 25 / 25 200 0 5,000 35    Treatment Dates:  6/3/2024 - 7/10/2024.      9/3/2024 -  Chemotherapy    alteplase (CATHFLO), 2  mg, Intracatheter, Every 1 Minute as needed, 0 of 6 cycles  paclitaxel protein-bound (ABRAXANE) IVPB, 125 mg/m2, Intravenous, Once, 0 of 6 cycles  gemcitabine (GEMZAR) infusion, 1,000 mg/m2, Intravenous, Once, 0 of 6 cycles         Current Hematologic/ Oncologic Treatment:       Cycle 1         Test Results:    Imaging: CT abdomen pelvis w contrast    Result Date: 8/14/2024  Narrative: CT ABDOMEN AND PELVIS WITH IV CONTRAST INDICATION: C25.3: Malignant neoplasm of pancreatic duct. COMPARISON: 4/22/2022 TECHNIQUE: CT examination of the abdomen and pelvis was performed. Scanning through the abdomen was performed in arterial, venous and delayed phases according to a protocol specifically designed to evaluate the upper abdominal viscera. Multiplanar 2D reformatted images were created from the source data. This examination, like all CT scans performed in the [facility], was performed utilizing techniques to minimize radiation dose exposure, including the use of iterative reconstruction and automated exposure control. Radiation dose length product (DLP) for this visit: 1102 mGy-cm IV Contrast: 100 mL of iohexol (OMNIPAQUE) Enteric Contrast: Administered. FINDINGS: ABDOMEN LOWER CHEST: No clinically significant abnormality in the visualized lower chest. LIVER/BILIARY TREE: There is a new peripherally hyperenhancing centrally hypodense lesion at the dome of the liver measuring 1.2 x 1.3 cm on series 2 image 17 and series 3 image 17 likely representing metastasis. There are multiple additional foci of arterial enhancement without corresponding abnormality on portal venous phases. This includes a 1.5 x 1.8 cm segment 2/4A lesion on series 2 image 31 and a 1.1 x 1.5 cm segment 7/8 lesion on series 2 image 27. Additional lesions are peripheral, some of which appearing wedge-shaped suggestive of RITA. There is a common bile duct stent in place with pneumobilia and mild intrahepatic ductal dilatation similar to  the prior study. GALLBLADDER: No calcified gallstones. The gallbladder is distended with wall thickening at the fundus and posteriorly. There is an enlarging fluid collection along the posterior wall of the gallbladder of uncertain etiology. It is uncertain if this is intramural or pericholecystic. There is stable cystic change at the fundus. SPLEEN: Unremarkable. PANCREAS: Ill-defined pancreatic head mass may be larger though again difficult to discretely measure. Infiltrating soft tissue measures approximately 5.7 x 5.8 cm on series 3 image 60, previously 5.2 x 5.8 when measured in a similar fashion on the previous study, series 301 image 48. There is encasement of the celiac axis and the mass is inseparable from the wall of the gastric antrum/pylorus. There is an enlarging focus of fluid in the pancreatic body region on series 3 image 56 measuring approximately 1.6 x 3.6 cm, previously 1.2 x 1.6 cm. ADRENAL GLANDS: Unremarkable. KIDNEYS/URETERS: There are small renal cysts. No hydronephrosis. STOMACH AND BOWEL: Unremarkable. APPENDIX: No findings to suggest appendicitis. ABDOMINOPELVIC CAVITY: No ascites. No pneumoperitoneum. No lymphadenopathy. VESSELS: The intrahepatic portions of the portal vein are patent though there is occlusion at the portal confluence secondary to tumor encasement with numerous mesenteric and retroperitoneal collaterals again seen. PELVIS REPRODUCTIVE ORGANS: Unremarkable for patient's age. URINARY BLADDER: The bladder wall is thickened. ABDOMINAL WALL/INGUINAL REGIONS: Unremarkable. BONES: No acute fracture or suspicious osseous lesion.     Impression: 1.  New peripherally enhancing hepatic dome lesion suspicious for metastasis. Additional foci of arterial enhancement only may represent additional metastasis with RITA. Full extent of hepatic metastatic disease would be best evaluated with MRI and Eovist contrast. 2.  Ill-defined infiltrating pancreatic head tumor encasing the celiac axis  "and portal confluence, likely mildly increased in size from April. An enlarging cystic area within the pancreatic body is of uncertain etiology. 3.  Enlarging fluid collection along the gallbladder of uncertain etiology. This is either intramural or pericholecystic. Smaller cystic foci are seen at the fundus though this appearance is typical for adenomyomatosis. The study was marked in EPIC for significant notification. Workstation performed: [id number]             Labs:   Lab Results   Component Value Date    WBC 7.88 08/15/2024    HGB 15.8 08/15/2024    HCT 47.2 08/15/2024    MCV 99 (H) 08/15/2024     08/15/2024     Lab Results   Component Value Date    K 4.2 08/15/2024     08/15/2024    CO2 27 08/15/2024    BUN 13 08/15/2024    CREATININE 0.72 08/15/2024    GLUF 247 (H) 08/15/2024    CALCIUM 9.5 08/15/2024    AST 12 (L) 08/15/2024    ALT 15 08/15/2024    ALKPHOS 93 08/15/2024    EGFR 102 08/15/2024         No results found for: \"SPEP\", \"UPEP\"    No results found for: \"PSA\"    No results found for: \"CEA\"    No results found for: \"\"    No results found for: \"AFP\"    No results found for: \"IRON\", \"TIBC\", \"FERRITIN\"    No results found for: \"JLOBSEQD89\"      ROS: Review of Systems   Constitutional: Negative.    HENT: Negative.     Eyes: Negative.    Respiratory: Negative.     Cardiovascular: Negative.    Gastrointestinal: Negative.    Endocrine: Negative.    Genitourinary: Negative.    Musculoskeletal: Negative.    Skin: Negative.    Allergic/Immunologic: Negative.    Neurological: Negative.    Hematological: Negative.          Current Medications: Reviewed  Allergies: Reviewed  PMH/FH/SH:  Reviewed      Physical Exam:    Body surface area is 1.85 meters squared.    Wt Readings from Last 3 Encounters:   08/20/24 69.6 kg (153 lb 8 oz)   08/15/24 69.4 kg (153 lb)   08/01/24 70.8 kg (156 lb)        Temp Readings from Last 3 Encounters:   08/20/24 (!) 96.5 °F (35.8 °C) (Temporal)   08/15/24 97.9 °F (36.6 " °C) (Temporal)   08/01/24 97.8 °F (36.6 °C) (Temporal)        BP Readings from Last 3 Encounters:   08/20/24 108/72   08/15/24 100/70   08/01/24 128/80         Pulse Readings from Last 3 Encounters:   08/20/24 89   08/15/24 101   08/01/24 91     @LASTSAO2(3)@      Physical Exam  Constitutional:       Appearance: Normal appearance. He is normal weight.   HENT:      Head: Normocephalic and atraumatic.   Eyes:      Extraocular Movements: Extraocular movements intact.      Conjunctiva/sclera: Conjunctivae normal.      Pupils: Pupils are equal, round, and reactive to light.   Cardiovascular:      Rate and Rhythm: Normal rate and regular rhythm.      Heart sounds: Normal heart sounds.   Pulmonary:      Effort: Pulmonary effort is normal.      Breath sounds: Normal breath sounds.   Abdominal:      General: Abdomen is flat. Bowel sounds are normal.      Palpations: Abdomen is soft.   Musculoskeletal:         General: Normal range of motion.      Cervical back: Normal range of motion and neck supple.   Skin:     General: Skin is warm and dry.   Neurological:      General: No focal deficit present.      Mental Status: He is alert and oriented to person, place, and time. Mental status is at baseline.     No major point tenderness.      Goals and Barriers:  Current Goal: Prolong Survival from Cancer.   Barriers: None.      Patient's Capacity to Self Care:  Patient is able to self care.    Code Status: [unfilled]  Advance Directive and Living Will:      Power of :

## 2024-08-20 NOTE — PROGRESS NOTES
Patient and spouse who was on phone were provided education on Gemzar and Abraxane. All education sheets were printed from [employer] and most common side effects were discussed. All questions answered. Re-educated regarding when to call our office including   Temp of 100.4 and instructed not to take tylenol   Medication for nausea is not helping despite taking as ordered   Diarrhea is experienced and imodium doesn't resolve the issue.    Constipation for more than three days and taking stool softener and not bowel movement.  Patient has had chemotherapy in past and aware of protocols.  Patient wishes to start treatments after labor day. Confirmed with provider that this will be Day 1 & 15. Day 8 was dropped from regimen.    Consent obtained

## 2024-08-21 ENCOUNTER — OFFICE VISIT (OUTPATIENT)
Dept: PALLIATIVE MEDICINE | Facility: CLINIC | Age: 59
End: 2024-08-21
Payer: COMMERCIAL

## 2024-08-21 VITALS
TEMPERATURE: 96.9 F | OXYGEN SATURATION: 99 % | HEART RATE: 90 BPM | DIASTOLIC BLOOD PRESSURE: 60 MMHG | SYSTOLIC BLOOD PRESSURE: 108 MMHG | WEIGHT: 153 LBS | BODY MASS INDEX: 22.59 KG/M2

## 2024-08-21 DIAGNOSIS — C25.3 PRIMARY ADENOCARCINOMA OF PANCREATIC DUCT (HCC): Primary | ICD-10-CM

## 2024-08-21 DIAGNOSIS — G89.3 CANCER RELATED PAIN: ICD-10-CM

## 2024-08-21 DIAGNOSIS — Z51.5 PALLIATIVE CARE PATIENT: ICD-10-CM

## 2024-08-21 DIAGNOSIS — Z71.89 COUNSELING REGARDING ADVANCE CARE PLANNING AND GOALS OF CARE: ICD-10-CM

## 2024-08-21 PROCEDURE — 99215 OFFICE O/P EST HI 40 MIN: CPT | Performed by: STUDENT IN AN ORGANIZED HEALTH CARE EDUCATION/TRAINING PROGRAM

## 2024-08-21 NOTE — ASSESSMENT & PLAN NOTE
Well-controlled, but has been increasing since the last visit    Palliative and supportive care will now take over opioid care and prescriptions--> opioid contract reviewed and signed at today's visit    Continue Norco 5-325 mg BID prn  No evidence of OIC currently, will monitor

## 2024-08-21 NOTE — TELEPHONE ENCOUNTER
Lm for pt with first appt date/time, asked him to call back and confirm   
MO  Zana pt- starting new regimen-pls schedule  
Statement Selected

## 2024-08-21 NOTE — ASSESSMENT & PLAN NOTE
Diagnosed 10/2023  Now stage IV  Recent CA 19-9 level increased with CT scan showing metastasis  S/p FOLFIRINOX chemotherapy without much response, but may need to start systemic therapy again-->new course plan of 6 cycles/12 weeks of paclitaxel/gemcitabine  Follows with Idris Calderon MD (oncology) and Samir Cervantes MD (surg onc)

## 2024-08-21 NOTE — ASSESSMENT & PLAN NOTE
Continue disease directed cares without limits  Would like to transition to hospice at home in [place] living with his sister when his disease becomes end-stage and/or he no longer response to treatment with clinical decompensation    Educated and counseled on ACP documentation--> gave copies of [facility] ACP document and 5 wishes document to look over  Counseled that those documents can be signed and witnessed at the next appointment if desired

## 2024-08-21 NOTE — ASSESSMENT & PLAN NOTE
Supported by spouse, Suma ( 30+ years)  Also supported at home by son Remberto (contact info put in the chart today)  5 adult children (2 biological, 3 stepchildren)  Currently works as an , but not currently full-time due to accommodations request  Has  service with the [employer]  No spiritual needs or signs of existential distress

## 2024-08-21 NOTE — PROGRESS NOTES
Outpatient Follow-Up - Palliative and Supportive Care   Derick Richey 58 y.o. male [id number]  1. Primary adenocarcinoma of pancreatic duct (HCC)  Assessment & Plan:  Diagnosed 10/2023  Now stage IV  Recent CA 19-9 level increased with CT scan showing metastasis  S/p FOLFIRINOX chemotherapy without much response, but may need to start systemic therapy again-->new course plan of 6 cycles/12 weeks of paclitaxel/gemcitabine  Follows with Idris Calderon MD (oncology) and Samir Cervantes MD (surg onc)  2. Cancer related pain  Assessment & Plan:  Well-controlled, but has been increasing since the last visit    Palliative and supportive care will now take over opioid care and prescriptions--> opioid contract reviewed and signed at today's visit    Continue Norco 5-325 mg BID prn  No evidence of OIC currently, will monitor  3. Palliative care patient  Assessment & Plan:  Supported by spouse, Suma ( 30+ years)  Also supported at home by son Remberto (contact info put in the chart today)  5 adult children (2 biological, 3 stepchildren)  Currently works as an , but not currently full-time due to accommodations request  Has  service with the [employer]  No spiritual needs or signs of existential distress  4. Counseling regarding advance care planning and goals of care  Assessment & Plan:  Continue disease directed cares without limits  Would like to transition to hospice at home in [place] living with his sister when his disease becomes end-stage and/or he no longer response to treatment with clinical decompensation    Educated and counseled on ACP documentation--> gave copies of [facility] ACP document and 5 wishes document to look over  Counseled that those documents can be signed and witnessed at the next appointment if desired    There are no discontinued medications.    Follow-up  - Return to clinic: 3-4 months      Derick Richey was seen today for symptoms and planning cares related to above  illnesses.  I have reviewed the patient's controlled substance dispensing history in the Prescription Drug Monitoring Program in compliance with the [facility] regulations before prescribing any controlled substances.    They are invited to continue to follow with us.  If there are questions or concerns, please contact us through our clinic/answering service 24 hours a day, seven days a week.    Tarik Saldana DO  [facility]iative [facility]  [phone]      Narrative and Interval History      Derick Richey is a 58 y.o. male who presents in follow up of symptoms related to pancreatic adenocarcinoma.  Pertinent issues include: symptom management, pain, neoplasm related, assessment of goals of care, advance care planning    Derick presents for office follow-up.  He last was seen in the office by Dr. Hanson on 1/25/2024.  Since then, he has transition from being stage III to stage IV with his cancer after new diagnosis of liver mets were found on CT scan.  He also has noticed increased left upper quadrant abdominal pain since the last appointment.  There are plans to start a new chemotherapy regimen.  Whereas before, he only has to take his Norco medication every once in a while, he now takes 1 to 2 tablets every day to help ease the pain, which is well-controlled on that regimen.  No other symptoms, normal appetite, normal bowel and bladder habits, no insomnia.  Emotionally, he has been having a tricky time navigating the change in diagnosis with his wife having COPD.  His son Remberto lives with him and his wife, and he also helps with a lot of things around the house to help shoulder the burden.  However, in talking with his sister in [place], Derick has decided that whenever his cancer diagnosis becomes more end-stage and does not respond to treatments and/or he clinically decompensates, that he would like to move in with his sister to do home hospice services there.  In terms of his occupation, he has not worked  full-time since late 2023 due to accommodations.  Overall, there is no concern for existential distress and he is coping well.    Past medical, surgical, social, and family histories are reviewed and pertinent updates are made.      Physical Exam and Objective Data  Vital Signs - /60 (BP Location: Right arm, Patient Position: Sitting, Cuff Size: Standard)   Pulse 90   Temp (!) 96.9 °F (36.1 °C) (Temporal)   Wt 69.4 kg (153 lb)   SpO2 99%   BMI 22.59 kg/m²   Physical Exam  Constitutional:       General: He is not in acute distress.     Appearance: He is not ill-appearing.   HENT:      Head: Normocephalic and atraumatic.      Right Ear: External ear normal.      Left Ear: External ear normal.      Nose: Nose normal.      Mouth/Throat:      Mouth: Mucous membranes are moist.      Pharynx: Oropharynx is clear.   Eyes:      Conjunctiva/sclera: Conjunctivae normal.   Pulmonary:      Effort: Pulmonary effort is normal. No respiratory distress.   Abdominal:      General: There is no distension.   Skin:     Coloration: Skin is not jaundiced or pale.   Neurological:      General: No focal deficit present.      Mental Status: He is alert. Mental status is at baseline.   Psychiatric:         Mood and Affect: Mood normal.         Behavior: Behavior normal.         Thought Content: Thought content normal.         Judgment: Judgment normal.           Radiology and Laboratory:  I personally reviewed and interpreted the following results:   CBC, CMP, CA 19-9 8/15/2024: Stable renal and liver function  CT abdomen pelvis with contrast 8/12/2024: New suspicious lesion in liver concerning for metastasis with increased size of pancreatic head tumor    I have spent a total time of 40 minutes in caring for this patient on the day of the visit/encounter including Patient and family education, Impressions, Counseling / Coordination of care, Documenting in the medical record, Reviewing / ordering tests, medicine, procedures  , and  Obtaining or reviewing history  .

## 2024-08-24 DIAGNOSIS — E11.00 TYPE 2 DIABETES MELLITUS WITH HYPEROSMOLARITY WITHOUT COMA, WITHOUT LONG-TERM CURRENT USE OF INSULIN (HCC): ICD-10-CM

## 2024-08-29 RX ORDER — SODIUM CHLORIDE 9 MG/ML
20 INJECTION, SOLUTION INTRAVENOUS ONCE
OUTPATIENT
Start: 2024-09-17

## 2024-08-29 RX ORDER — SODIUM CHLORIDE 9 MG/ML
20 INJECTION, SOLUTION INTRAVENOUS ONCE
OUTPATIENT
Start: 2024-09-03

## 2024-08-30 DIAGNOSIS — Z79.4 TYPE 2 DIABETES MELLITUS WITH HYPERGLYCEMIA, WITH LONG-TERM CURRENT USE OF INSULIN (HCC): ICD-10-CM

## 2024-08-30 DIAGNOSIS — E11.65 TYPE 2 DIABETES MELLITUS WITH HYPERGLYCEMIA, WITH LONG-TERM CURRENT USE OF INSULIN (HCC): ICD-10-CM

## 2024-08-30 RX ORDER — INSULIN ASPART INJECTION 100 [IU]/ML
INJECTION, SOLUTION SUBCUTANEOUS
Qty: 24 ML | Refills: 1 | Status: SHIPPED | OUTPATIENT
Start: 2024-08-30 | End: 2024-11-28

## 2024-08-30 RX ORDER — INSULIN ASPART INJECTION 100 [IU]/ML
INJECTION, SOLUTION SUBCUTANEOUS
Qty: 15 ML | Refills: 5 | Status: SHIPPED | OUTPATIENT
Start: 2024-08-30 | End: 2024-08-30

## 2024-09-03 RX ORDER — INSULIN ASPART INJECTION 100 [IU]/ML
INJECTION, SOLUTION SUBCUTANEOUS
Qty: 21 ML | Refills: 1 | Status: SHIPPED | OUTPATIENT
Start: 2024-09-03 | End: 2024-09-15

## 2024-09-04 ENCOUNTER — APPOINTMENT (OUTPATIENT)
Dept: LAB | Facility: HOSPITAL | Age: 59
End: 2024-09-04
Payer: COMMERCIAL

## 2024-09-04 DIAGNOSIS — R10.11 RIGHT UPPER QUADRANT PAIN: ICD-10-CM

## 2024-09-04 DIAGNOSIS — C25.3 PRIMARY ADENOCARCINOMA OF PANCREATIC DUCT (HCC): ICD-10-CM

## 2024-09-04 DIAGNOSIS — C25.9 PANCREATIC ADENOCARCINOMA (HCC): ICD-10-CM

## 2024-09-04 LAB
ALBUMIN SERPL BCG-MCNC: 3.4 G/DL (ref 3.5–5)
ALP SERPL-CCNC: 109 U/L (ref 34–104)
ALT SERPL W P-5'-P-CCNC: 13 U/L (ref 7–52)
ANION GAP SERPL CALCULATED.3IONS-SCNC: 7 MMOL/L (ref 4–13)
AST SERPL W P-5'-P-CCNC: 13 U/L (ref 13–39)
BASOPHILS # BLD AUTO: 0.04 THOUSANDS/ÂΜL (ref 0–0.1)
BASOPHILS NFR BLD AUTO: 1 % (ref 0–1)
BILIRUB SERPL-MCNC: 0.58 MG/DL (ref 0.2–1)
BUN SERPL-MCNC: 8 MG/DL (ref 5–25)
CALCIUM ALBUM COR SERPL-MCNC: 9.4 MG/DL (ref 8.3–10.1)
CALCIUM SERPL-MCNC: 8.9 MG/DL (ref 8.4–10.2)
CHLORIDE SERPL-SCNC: 106 MMOL/L (ref 96–108)
CO2 SERPL-SCNC: 29 MMOL/L (ref 21–32)
CREAT SERPL-MCNC: 0.57 MG/DL (ref 0.6–1.3)
EOSINOPHIL # BLD AUTO: 0.17 THOUSAND/ÂΜL (ref 0–0.61)
EOSINOPHIL NFR BLD AUTO: 2 % (ref 0–6)
ERYTHROCYTE [DISTWIDTH] IN BLOOD BY AUTOMATED COUNT: 14.4 % (ref 11.6–15.1)
GFR SERPL CREATININE-BSD FRML MDRD: 113 ML/MIN/1.73SQ M
GLUCOSE SERPL-MCNC: 178 MG/DL (ref 65–140)
HCT VFR BLD AUTO: 43.3 % (ref 36.5–49.3)
HGB BLD-MCNC: 14.3 G/DL (ref 12–17)
IMM GRANULOCYTES # BLD AUTO: 0.06 THOUSAND/UL (ref 0–0.2)
IMM GRANULOCYTES NFR BLD AUTO: 1 % (ref 0–2)
LYMPHOCYTES # BLD AUTO: 0.39 THOUSANDS/ÂΜL (ref 0.6–4.47)
LYMPHOCYTES NFR BLD AUTO: 5 % (ref 14–44)
MCH RBC QN AUTO: 31.7 PG (ref 26.8–34.3)
MCHC RBC AUTO-ENTMCNC: 33 G/DL (ref 31.4–37.4)
MCV RBC AUTO: 96 FL (ref 82–98)
MONOCYTES # BLD AUTO: 0.78 THOUSAND/ÂΜL (ref 0.17–1.22)
MONOCYTES NFR BLD AUTO: 11 % (ref 4–12)
NEUTROPHILS # BLD AUTO: 5.89 THOUSANDS/ÂΜL (ref 1.85–7.62)
NEUTS SEG NFR BLD AUTO: 80 % (ref 43–75)
NRBC BLD AUTO-RTO: 0 /100 WBCS
PLATELET # BLD AUTO: 169 THOUSANDS/UL (ref 149–390)
PMV BLD AUTO: 9.2 FL (ref 8.9–12.7)
POTASSIUM SERPL-SCNC: 4.4 MMOL/L (ref 3.5–5.3)
PROT SERPL-MCNC: 6.9 G/DL (ref 6.4–8.4)
RBC # BLD AUTO: 4.51 MILLION/UL (ref 3.88–5.62)
SODIUM SERPL-SCNC: 142 MMOL/L (ref 135–147)
WBC # BLD AUTO: 7.33 THOUSAND/UL (ref 4.31–10.16)

## 2024-09-04 PROCEDURE — 80053 COMPREHEN METABOLIC PANEL: CPT

## 2024-09-04 PROCEDURE — 85025 COMPLETE CBC W/AUTO DIFF WBC: CPT

## 2024-09-04 PROCEDURE — 36415 COLL VENOUS BLD VENIPUNCTURE: CPT

## 2024-09-05 ENCOUNTER — HOSPITAL ENCOUNTER (OUTPATIENT)
Dept: INFUSION CENTER | Facility: CLINIC | Age: 59
Discharge: HOME | End: 2024-09-05

## 2024-09-05 ENCOUNTER — TELEPHONE (OUTPATIENT)
Dept: SURGICAL ONCOLOGY | Facility: CLINIC | Age: 59
End: 2024-09-05

## 2024-09-05 NOTE — TELEPHONE ENCOUNTER
MO   Existing  Please cancel all infusion appts- pt has decided to stop treatments   
Patient canceled.  
Patient had called and left a vm stating he was not coming for appts today. Called to check up on him and had conversation that he really didn't want to do more chemotherapy. He wanted to do this for his family however he just is not feeling well and he would rather to just wait and see what time he has left and try to enjoy. He will keep his appt with Dr. Calderon next week however he wants all appt cancelled for infusion. He stated that he will discuss with Dr. Calderon having port removed as well. Offered support from our office if needed and to reach out to us.  
Female

## 2024-09-06 DIAGNOSIS — C25.9 PANCREATIC ADENOCARCINOMA (HCC): ICD-10-CM

## 2024-09-06 RX ORDER — PANCRELIPASE 60000; 12000; 38000 [USP'U]/1; [USP'U]/1; [USP'U]/1
12000 CAPSULE, DELAYED RELEASE PELLETS ORAL
Qty: 180 CAPSULE | Refills: 0 | Status: SHIPPED | OUTPATIENT
Start: 2024-09-06 | End: 2024-09-15

## 2024-09-06 NOTE — TELEPHONE ENCOUNTER
pancrelipase, Lip-Prot-Amyl, (Creon) 12,000 units capsule    Patient is out of medication, he stated pharmacy has been trying to get in contact for a few days now for a refill. Pharmacy: [facility]/pharmacy #[id number] - [place] - [phone]

## 2024-09-10 ENCOUNTER — TELEPHONE (OUTPATIENT)
Age: 59
End: 2024-09-10

## 2024-09-10 ENCOUNTER — TELEMEDICINE (OUTPATIENT)
Dept: HEMATOLOGY ONCOLOGY | Facility: CLINIC | Age: 59
End: 2024-09-10
Payer: COMMERCIAL

## 2024-09-10 DIAGNOSIS — C25.9 PANCREATIC ADENOCARCINOMA (HCC): Primary | ICD-10-CM

## 2024-09-10 PROCEDURE — 99212 OFFICE O/P EST SF 10 MIN: CPT | Performed by: INTERNAL MEDICINE

## 2024-09-10 NOTE — TELEPHONE ENCOUNTER
Patient would like to know if today's visit with Dr Calderon can be changed to a virtual visit, he states he is experiencing pain for his reasoning. Please call him at [phone] with decision.

## 2024-09-10 NOTE — PROGRESS NOTES
Virtual Regular Visit  Name: Dercik Richey      : 1965      MRN: [id number]  Encounter Provider: Idris Calderon MD  Encounter Date: 9/10/2024   Encounter department: [facility] [place]    Verification of patient location:    Patient is located at Home in the following state in which I hold an active license [place]    Assessment & Plan  Pancreatic adenocarcinoma (HCC)    Orders:    Ambulatory Referral to Interventional Radiology; Future        Encounter provider Idris Calderon MD    The patient was identified by name and date of birth. Derick Richey was informed that this is a telemedicine visit and that the visit is being conducted through the [employer] platform. He agrees to proceed..  My office door was closed. No one else was in the room.  He acknowledged consent and understanding of privacy and security of the video platform. The patient has agreed to participate and understands they can discontinue the visit at any time.    Patient is aware this is a billable service.     History of Present Illness     Derick Richey is a 58 y.o. male who presents with history of stage IV.  He is failed FOLFIRINOX he is getting Gemzar/Abraxane.  He has suffered fairly marked fatigue nausea and intermittent gas problems.  He has abdominal discomfort.  After discussion with family and he is elected to discontinue treatment.  He is receiving palliative care and will consider hospice.  He denies any blood in his stools he is not vomiting pain is fairly controlled with oxycodone every 12 hours.  Gas remains a problem is flatulence and he is trying different maneuvers to see if that will help.  History obtained from : patient  Review of Systems: No nausea or vomiting GI primarily flatulence and intermittent abdominal discomfort and cramps.  No major  problems no major cardiovascular no major pulmonary problems no headaches no history of seizures no major edema in the legs no major rashes  no major joint problems.  No major head neck problems.  Medical History Reviewed by provider this encounter:           Objective     There were no vitals taken for this visit.  This was a virtual visit and no vitals were taken.  No physical exam as it was a virtual visit.    Visit Time  Total Visit Duration: 11 minutes

## 2024-09-14 ENCOUNTER — TELEPHONE (OUTPATIENT)
Dept: OTHER | Facility: OTHER | Age: 59
End: 2024-09-14

## 2024-09-14 NOTE — TELEPHONE ENCOUNTER
Patients wife calling in to report that patient is in pain, weak, N&V, needs walker, not eating, and concerns of dehydration. Does not want to go to ER. ESC message sent to on call provider.

## 2024-09-15 PROBLEM — Z51.5 COMFORT MEASURES ONLY STATUS: Status: ACTIVE | Noted: 2024-01-01

## 2024-09-15 PROBLEM — R57.8 HEMORRHAGIC SHOCK (HCC): Status: ACTIVE | Noted: 2024-01-01

## 2024-09-15 NOTE — ED PROVIDER NOTES
1. Hemorrhagic shock (HCC)    2. GI bleed    3. Syncope    4. Hypothermia, initial encounter    5. Acute blood loss anemia      ED Disposition       ED Disposition   Admit    Condition   Critical    Date/Time   Sat Sep 14, 2024 11:57 PM    Comment   Case was discussed with critical care and the patient's admission status was agreed to be Admission Status: inpatient status to the service of Dr. Wesley.               Assessment & Plan       Medical Decision Making  58-year-old male with metastatic pancreatic cancer, presents to the ED for GI bleed, syncope and hypotension.  Patient presents hypotensive, hypoxic requiring oxygen.  2 18-gauge IVs established immediately and patient provided with normal saline wide open via pressure bag for hypotension.  Full cardiac, abdominal and septic workup initiated.  VBG for stat hemoglobin.  Will obtain uncrossed blood to start blood transfusion due to suspected hemorrhagic shock from GI bleed.  Will obtain CT scan of the abdomen and pelvis once more hemodynamically stable.  Will consult GI as well as ICU.    Amount and/or Complexity of Data Reviewed  Independent Historian: EMS     Details: Patient's son  Labs: ordered. Decision-making details documented in ED Course.  Radiology: ordered and independent interpretation performed. Decision-making details documented in ED Course.  ECG/medicine tests: ordered and independent interpretation performed. Decision-making details documented in ED Course.    Risk  Decision regarding hospitalization.                  ED Course as of 09/15/24 0032   Sat Sep 14, 2024   2321 Hemoglobin(!): 8.3  Hemoglobin 10 days ago was 14.3, significant drop in 10 days.   2322 Platelet Count: 209   2339 Confirmed with patient that he is DNI/DNR however he does want medical care and treatment for the acute GI bleed.  He would accept EGD as long as he does not need intubation.   2340 Discussed case with critical care attending Dr. Wesley, and VAISHNAVI Dougherty  Lma, and AP will evaluate patient at bedside.   2341 hs TnI 0hr: 4   2355 Critical care accepted patient.  She reported that they would likely transition to comfort care and advised to cancel CT scan and additional blood orders.       Medications   haloperidol lactate (HALDOL) injection 0.5 mg (has no administration in time range)   LORazepam (ATIVAN) injection 1 mg (has no administration in time range)   glycopyrrolate (ROBINUL) injection 0.1 mg (has no administration in time range)   morphine injection 4 mg (has no administration in time range)   sodium chloride 0.9 % bolus 1,000 mL (0 mL Intravenous Stopped 9/14/24 2336)   lactated ringers bolus 1,000 mL (0 mL Intravenous Stopped 9/14/24 2320)   pantoprazole (PROTONIX) 80 mg in sodium chloride 0.9 % 100 mL IVPB (0 mg Intravenous Stopped 9/14/24 2329)   ondansetron (FOR EMS ONLY) (ZOFRAN) 4 mg/2 mL injection 8 mg (0 mg Does not apply Given to EMS 9/14/24 2311)   EPINEPHrine (FOR EMS ONLY) (ADRENALIN) injection 1 mg (0 mg Does not apply Given to EMS 9/14/24 2311)   desmopressin (DDAVP) 21.2 mcg in sodium chloride 0.9 % 50 mL IVPB (0 mcg Intravenous Stopped 9/15/24 0002)       History of Present Illness       Patient is a 58-year-old male with past medical history significant for coronary artery disease status post 4 stents, type 2 diabetes, metastatic pancreatic cancer not currently receiving treatment (last chemo/radiation in June 2024), presents to the emergency department by ambulance for vomiting blood, bloody stool, syncope and hypotension.  According to EMS, patient and patient's son, today he had 1 large episode of bloody vomiting which she says is bright red blood.  He also had a very large bowel movement that had blood in it however he is unsure what color the blood was.  Tonight, he was feeling very lightheaded and weak and according to son he had 2 syncopal episodes.  There was no significant injury or fall or head strike.  When EMS arrived,  patient was significantly hypotensive with blood pressure 60s/40s.  He was given 1 L of IV fluids as well as 20 mcg of epinephrine and blood pressure improved after the epinephrine to 90 systolic however just prior to getting to the ED, it started to become lower.  On arrival to the ED, patient's blood pressure 70 systolic.  He is on nasal cannula oxygen with O2 sat in the low 90s.  Patient has GCS of 14 but opens eyes to verbal stimuli and is able to answer all questions.  He is oriented x 3.  Patient does complain of epigastric pain which is worse today but this has been chronic pain from his cancer.  He reports feeling very weak and dizzy described as lightheaded.  He denies being on any anticoagulant agents but does take 81 mg of aspirin daily.  He denies any chest pain, dyspnea, palpitations, focal neurologic deficits.  He denies any cough, hemoptysis, fevers or chills.  Denies any history of vomiting blood before but he has had blood in his stool in the past.  He states prior to today though it has been a long time since he has had any GI bleed.  Patient received 8 mg total of Zofran prehospital.  He reports his nausea is improved.      History provided by:  Patient, EMS personnel and relative (Son)   used: No    Syncope  Associated symptoms: dizziness, nausea, vomiting and weakness    Associated symptoms: no chest pain, no confusion, no fever, no headaches, no palpitations, no seizures and no shortness of breath            Review of Systems   Constitutional:  Positive for fatigue. Negative for chills and fever.   HENT:  Negative for congestion, ear pain, rhinorrhea and sore throat.    Eyes:  Negative for photophobia, pain and visual disturbance.   Respiratory:  Negative for cough, chest tightness, shortness of breath and wheezing.    Cardiovascular:  Positive for syncope. Negative for chest pain, palpitations and leg swelling.   Gastrointestinal:  Positive for abdominal pain, blood in  stool, diarrhea, nausea and vomiting. Negative for abdominal distention and constipation.   Genitourinary:  Negative for dysuria, flank pain, frequency and hematuria.   Musculoskeletal:  Negative for back pain, neck pain and neck stiffness.   Skin:  Positive for pallor. Negative for color change, rash and wound.   Allergic/Immunologic: Negative for immunocompromised state.   Neurological:  Positive for dizziness, syncope, weakness and light-headedness. Negative for seizures, facial asymmetry, speech difficulty, numbness and headaches.   Hematological:  Negative for adenopathy. Does not bruise/bleed easily.   Psychiatric/Behavioral:  Negative for confusion and decreased concentration.    All other systems reviewed and are negative.          Objective     ED Triage Vitals   Temperature Pulse Blood Pressure Respirations SpO2 Patient Position - Orthostatic VS   09/14/24 2321 09/14/24 2254 09/14/24 2255 09/14/24 2254 09/14/24 2254 09/14/24 2254   (!) 94.5 °F (34.7 °C) 84 (!) 64/42 16 90 % Lying      Temp Source Heart Rate Source BP Location FiO2 (%) Pain Score    09/14/24 2321 09/14/24 2254 09/14/24 2254 -- --    Rectal Monitor Left arm        Vitals:    09/14/24 2330 09/14/24 2335 09/14/24 2345 09/15/24 0006   BP: (!) 102/48 (!) 86/52 (!) 70/49 (!) 75/50   Pulse: 79 80 95 87   Resp: (!) 25 20 20 20   Temp:   (!) 95 °F (35 °C) (!) 95 °F (35 °C)   TempSrc:       SpO2: 97% 96%     Weight:            Physical Exam  Vitals and nursing note reviewed.   Constitutional:       General: He is not in acute distress.     Appearance: He is well-developed. He is ill-appearing. He is not toxic-appearing or diaphoretic.      Comments: Patient presents with eyes closed but is awake, answers questions appropriately.  He appears pale and generally ill.   HENT:      Head: Normocephalic and atraumatic.      Right Ear: External ear normal.      Left Ear: External ear normal.      Nose: Nose normal.      Mouth/Throat:      Mouth: Mucous  membranes are moist.      Pharynx: Oropharynx is clear. No oropharyngeal exudate.      Comments: Dried blood in oropharynx.  Eyes:      Extraocular Movements: Extraocular movements intact.      Conjunctiva/sclera: Conjunctivae normal.      Pupils: Pupils are equal, round, and reactive to light.   Neck:      Vascular: No JVD.   Cardiovascular:      Rate and Rhythm: Normal rate and regular rhythm.      Pulses: Normal pulses.      Heart sounds: Normal heart sounds. No murmur heard.     No friction rub. No gallop.   Pulmonary:      Effort: Pulmonary effort is normal. No respiratory distress.      Breath sounds: Normal breath sounds. No wheezing, rhonchi or rales.   Abdominal:      General: There is no distension.      Palpations: Abdomen is soft.      Tenderness: There is abdominal tenderness. There is no guarding or rebound.      Comments: +Epigastric tenderness.    Genitourinary:     Rectum: Guaiac result positive.   Musculoskeletal:         General: No swelling or tenderness. Normal range of motion.      Cervical back: Normal range of motion and neck supple. No rigidity.   Skin:     General: Skin is warm and dry.      Coloration: Skin is pale.      Findings: No erythema or rash.   Neurological:      General: No focal deficit present.      Mental Status: He is oriented to person, place, and time.      Cranial Nerves: No cranial nerve deficit.      Sensory: No sensory deficit.      Motor: No weakness.   Psychiatric:         Behavior: Behavior normal.         Thought Content: Thought content normal.         Labs Reviewed   CBC AND DIFFERENTIAL - Abnormal       Result Value    WBC 8.44      RBC 2.62 (*)     Hemoglobin 8.3 (*)     Hematocrit 25.6 (*)     MCV 98      MCH 31.7      MCHC 32.4      RDW 14.6      MPV 9.7      Platelets 209      nRBC 0      Segmented % 86 (*)     Immature Grans % 1      Lymphocytes % 5 (*)     Monocytes % 7      Eosinophils Relative 1      Basophils Relative 0      Absolute Neutrophils 7.32       Absolute Immature Grans 0.09      Absolute Lymphocytes 0.41 (*)     Absolute Monocytes 0.55      Eosinophils Absolute 0.04      Basophils Absolute 0.03     PROTIME-INR - Abnormal    Protime 17.0 (*)     INR 1.31 (*)     Narrative:     INR Therapeutic Range    Indication                                             INR Range      Atrial Fibrillation                                               2.0-3.0  Hypercoagulable State                                    2.0.2.3  Left Ventricular Asist Device                            2.0-3.0  Mechanical Heart Valve                                  -    Aortic(with afib, MI, embolism, HF, LA enlargement,    and/or coagulopathy)                                     2.0-3.0 (2.5-3.5)     Mitral                                                             2.5-3.5  Prosthetic/Bioprosthetic Heart Valve               2.0-3.0  Venous thromboembolism (VTE: VT, PE        2.0-3.0   BASIC METABOLIC PANEL - Abnormal    Sodium 140      Potassium 3.6      Chloride 109 (*)     CO2 22      ANION GAP 9      BUN 12      Creatinine 0.68      Glucose 214 (*)     Calcium 6.9 (*)     eGFR 105      Narrative:     National Kid[employer] Foundation guidelines for Chronic Kidney Disease (CKD):     Stage 1 with normal or high GFR (GFR > 90 mL/min/1.73 square meters)    Stage 2 Mild CKD (GFR = 60-89 mL/min/1.73 square meters)    Stage 3A Moderate CKD (GFR = 45-59 mL/min/1.73 square meters)    Stage 3B Moderate CKD (GFR = 30-44 mL/min/1.73 square meters)    Stage 4 Severe CKD (GFR = 15-29 mL/min/1.73 square meters)    Stage 5 End Stage CKD (GFR <15 mL/min/1.73 square meters)  Note: GFR calculation is accurate only with a steady state creatinine   HEPATIC FUNCTION PANEL - Abnormal    Total Bilirubin 0.42      Bilirubin, Direct 0.11      Alkaline Phosphatase 73      AST 13      ALT 10      Total Protein 4.3 (*)     Albumin 2.3 (*)    LACTIC ACID, PLASMA (W/REFLEX IF RESULT > 2.0) - Abnormal    LACTIC ACID  4.4 (*)     Narrative:     Result may be elevated if tourniquet was used during collection.   LIPASE - Abnormal    Lipase <6 (*)    BLOOD GAS, VENOUS - Abnormal    pH, Danie 7.298 (*)     pCO2, Danie 47.7      pO2, Danie 30.3 (*)     HCO3, Danie 22.8 (*)     Base Excess, Danie -3.6      O2 Content, Danie 7.1      O2 HGB, VENOUS 52.1 (*)    POCT GLUCOSE - Abnormal    POC Glucose 216 (*)    POCT BLOOD GAS (CG8+) - Abnormal    ph, Danie ISTAT 7.307      pCO2, Danie i-STAT 41.6 (*)     pO2, Danie i-STAT 19.0 (*)     BE, i-STAT -5 (*)     HCO3, Danie i-STAT 20.8 (*)     CO2, i-STAT 22      O2 Sat, i-STAT 26 (*)     SODIUM, I-STAT 141      Potassium, i-STAT 3.5      Calcium, Ionized i-STAT 1.04 (*)     Hct, i-STAT 22 (*)     Hgb, i-STAT 7.5 (*)     Glucose, i-STAT 202 (*)     Specimen Type VENOUS     APTT - Normal    PTT 33     HS TROPONIN I 0HR - Normal    hs TnI 0hr 4     MAGNESIUM - Normal    Magnesium 2.0     PROCALCITONIN TEST - Normal    Procalcitonin 0.12     BLOOD CULTURE   BLOOD CULTURE   LACTIC ACID 2 HOUR   TYPE AND SCREEN    ABO Grouping O      Rh Factor Positive      Antibody Screen Negative      Specimen Expiration Date 20240917     BLOOD BANK EMERGENT NOTIFICATION   PREPARE LEUKOREDUCED RBC    Unit Product Code Z3484W72      Unit Number [id number]      Unit ABO O      Unit RH POS      Unit Dispense Status Post Issue XM      Unit Product Volume 300      Unit Product Code Y3442J39      Unit Number [id number]      Unit ABO O      Unit RH POS      Unit Dispense Status Post Issue XM      Unit Product Volume 300      Crossmatch Compatible      Crossmatch Compatible     PREPARE LEUKOREDUCED RBC    Unit Product Code H4518U32      Unit Number [id number]      Unit ABO O      Unit RH POS      Crossmatch Compatible      Unit Dispense Status Crossmatched      Unit Product Volume 350      Unit Product Code S7315S05      Unit Number [id number]      Unit ABO O      Unit RH POS      Crossmatch Compatible      Unit Dispense  Status Crossmatched      Unit Product Volume 350       XR chest 1 view portable   ED Interpretation by Zo Sarmiento DO (09/15 0010)   No acute abnormality in the chest.          ECG 12 Lead Documentation Only    Date/Time: 9/14/2024 10:56 AM    Performed by: Zo Sarmiento DO  Authorized by: Zo Sarmiento DO    ECG reviewed by me, the ED Provider: yes    Patient location:  ED  Previous ECG:     Previous ECG:  Compared to current    Comparison ECG info:  12-; QT has lengthened  Rate:     ECG rate:  88    ECG rate assessment: normal    Rhythm:     Rhythm: sinus rhythm    Ectopy:     Ectopy: none    QRS:     QRS axis:  Normal    QRS intervals:  Normal  Conduction:     Conduction: normal    ST segments:     ST segments:  Normal  T waves:     T waves: normal    Other findings:     Other findings: early repolarization and prolonged qTc interval    Comments:      Low voltage QRS complex.  CriticalCare Time    Date/Time: 9/15/2024 12:31 AM    Performed by: Zo Sarmiento DO  Authorized by: Zo Sarmiento DO    Critical care provider statement:     Critical care time (minutes):  75    Critical care time was exclusive of:  Separately billable procedures and treating other patients and teaching time    Critical care was necessary to treat or prevent imminent or life-threatening deterioration of the following conditions:  Shock and circulatory failure (Hemorrhagic shock from GI bleed.)    Critical care was time spent personally by me on the following activities:  Blood draw for specimens, obtaining history from patient or surrogate, development of treatment plan with patient or surrogate, discussions with consultants, evaluation of patient's response to treatment, examination of patient, review of old charts, re-evaluation of patient's condition, ordering and review of radiographic studies, ordering and review of laboratory studies, ordering and performing treatments  and interventions and interpretation of cardiac output measurements    I assumed direction of critical care for this patient from another provider in my specialty: sea Sarmiento, DO  09/15/24 0032

## 2024-09-15 NOTE — CASE MANAGEMENT
Case Management Discharge Planning Note    Patient name Derick Richey  Location ICU ICU  MRN [id number]  : 1965 Date 9/15/2024       Current Admission Date: 2024  Current Admission Diagnosis:Hemorrhagic shock (HCC)   Patient Active Problem List    Diagnosis Date Noted Date Diagnosed    Comfort measures only status 09/15/2024     Hemorrhagic shock (HCC) 09/15/2024     Cancer related pain 2024     Counseling regarding advance care planning and goals of care 2024     History of known metastasis to liver 2024     Continuous opioid dependence (HCC) 05/15/2024     Primary hypertension 2024     Palliative care patient 2024     Pancreatic adenocarcinoma (HCC) 2023     Right upper quadrant abdominal pain 12/15/2023     Primary adenocarcinoma of pancreatic duct (HCC) 12/15/2023     SIRS (systemic inflammatory response syndrome) (HCC) 12/15/2023     Dehydration 2023     Mixed hyperlipidemia 2023     Coronary artery disease involving native coronary artery of native heart without angina pectoris 2023     Gastroesophageal reflux disease 2023     Primary insomnia 2023     Type 2 diabetes mellitus with hyperglycemia, with long-term current use of insulin (HCC) 2023     Right upper quadrant pain 11/15/2023     Attention deficit hyperactivity disorder (ADHD), combined type 10/14/2019     Depression with anxiety 2017       LOS (days): 0  Geometric Mean LOS (GMLOS) (days):   Days to GMLOS:     OBJECTIVE:  Risk of Unplanned Readmission Score: 26.47         Current admission status: Inpatient   Preferred Pharmacy:   [id number] - [place] - [address]  [address]  [place] [address]  Phone: [phone] Fax: [phone]    Primary Care Provider: Elo Modi MD    Primary Insurance: BLUE CROSS  Secondary Insurance:     DISCHARGE DETAILS:                                          Other  Referral/Resources/Interventions Provided:  Interventions: Hospice  Referral Comments: CM spoke to Halima from  Hospice and she will have a nurse to pt's home tomorrow at 09:30.  DME (O2) will be delivered today.  After discussion with his family, pt wants to be discharged today with the understanding that hospice cannot be to his home until tomorrow.  BLS transport requested for 12:15.  Pt declining a hospital bed-wants to be in his own bed.  Halima from [facility] informed of d/c.    Would you like to participate in our [facility] service program?  : No - Declined    Treatment Team Recommendation: Hospice  Discharge Destination Plan:: Hospice  Transport at Discharge : BLS Ambulance

## 2024-09-15 NOTE — DISCHARGE SUMMARY
"Discharge Summary - Critical Care/ICU   Name: Derick Richey 58 y.o. male I MRN: [id number]  Unit/Bed#: ICU 01 I Date of Admission: 9/14/2024   Date of Service: 9/15/2024 I Hospital Day: 1     Admission Date: 9/14/2024 2251  Discharge Date: 09/15/24  Admitting Diagnosis: Hemorrhagic shock (HCC) [R57.8]  Acute blood loss anemia [D62]  Syncope [R55]  GI bleed [K92.2]  Hypothermia, initial encounter [T68.XXXA]  Discharge Diagnosis:   Medical Problems       Resolved Problems  Date Reviewed: 8/21/2024   None         HPI: Per Ladonna FERGUSON, \"Derick Richey is a 58 y.o. with past medical history most significant for pancreatic adenocarcinoma stage IV with liver metastasis who presents with 2 episodes of syncope and lightheadedness with associated hypotension requiring epinephrine administration with fluids in the ambulance.  Upon arrival, patient was administered 1 unit of uncrossed packed red blood cells.  Patient hematemesis and hematochezia today, starting a couple hours ago.  Of note patient experienced an 8 g hemoglobin drop from labs procured 10 days ago.  On 9/10 patient advised his care team that he is discontinuing aggressive treatment in favor of comfort measures with the anticipation of setting up hospice.  Critical care was asked see this patient due to hemorrhagic shock.  This provider had a goals of care conversation with the patient who is completely awake alert oriented x 3 and has insight into his diagnosis, and patient agrees he would not wish aggressive therapy, intubation, or resuscitation.  We will admit Derick and begin comfort care for him with possible transition to hospice if he should survive this bleeding episode.\"    Procedures Performed:   Orders Placed This Encounter   Procedures    Critical Care    ED ECG Documentation Only       Summary of Hospital Course: Patient remained neurologically intact and was able to participate in goals of care and hospice plan.  He and his " family voice a strong desire to go home today.  The hospice team will be able to take over care in the AM at approximately 0930.  The family was collectively comfortable with managing his discomforts through the night and allow hospice to see in the AM.  He will be discharged to home with hospice services to start tomorrow.  A prescription for liquid oral morphine was sent electronically to their designated pharmacy and the patient's wife was educated on its use.  The family was provided with supplies they may need overnight.  He is currently hemodynamically stable to travel home via ambulance.   Significant Findings, Care, Treatment and Services Provided: NA    Complications: none     Condition at Discharge: comfort measures       Discharge instructions/Information to patient and family:   See After Visit Summary (AVS) for information provided to patient and family.      Provisions for Follow-Up Care:  See after visit summary for information related to follow-up care and any pertinent home health orders.      PCP: Elo Modi MD    Disposition: Home    Planned Readmission: No   Future Encounters        9/24/2024 11:05 AM Pend Preadm    [facility] OP RM, OR POOL     Case [id number] 9/24/2024 11:05 AM (45 min)  Richard    REMOVAL VENOUS PORT (PORT-A-CATH)TAZ Ruiz, DO - Primary    [facility] MAIN OR                            Discharge Medications:  See after visit summary for reconciled discharge medications provided to patient and family.      Discharge Statement:  I have spent a total time of 25 minutes in caring for this patient on the day of the visit/encounter. .

## 2024-09-15 NOTE — ASSESSMENT & PLAN NOTE
Patient with stage IV pancreatic adenocarcinoma with liver metastasis presented today with GI bleed and syncopal episodes with hemorrhagic shock.  Patient recently discontinued aggressive treatment for his pancreatic cancer, opting for comfort measures.

## 2024-09-15 NOTE — NURSING NOTE
All discharge instructions reviewed with pt wife and son both stated an understanding.  Report given to ambulance team.  Pt moved to [facility].  Pt discharged with cell phone glasses, and clothes.

## 2024-09-15 NOTE — H&P
H&P - Critical Care/ICU   Name: Derick Richey 58 y.o. male I MRN: [id number]  Unit/Bed#: TR 30 I Date of Admission: 9/14/2024   Date of Service: 9/15/2024 I Hospital Day: 0       Assessment & Plan  Hemorrhagic shock (HCC)  Related to GI bleeding   Patient received 1 unit of uncrossed packed red blood cells in the emergency room, fluids to hold blood pressure until family could arrive  Pancreatic adenocarcinoma (HCC)  Patient with stage IV pancreatic adenocarcinoma with liver metastasis presented today with GI bleed and syncopal episodes with hemorrhagic shock.  Patient recently discontinued aggressive treatment for his pancreatic cancer, opting for comfort measures.    Comfort measures only status  Patient had recently discontinued aggressive treatment for his pancreatic cancer, opting for comfort measures.  Hospice care had not yet been set up.  Patient is very clear that he does not wish to be resuscitated, nor intubated for any reason including endoscopy.  He does not wish aggressive treatment at this time realizing that this is likely a life ending hemorrhage.  Emotional support to the patient and his family.  Code level 4  Morphine for pain and air hunger, Ativan for anxiety and agitation.  Glycopyrrolate for secretions if needed.  Nursing to contact provider if doses are not adequate for the patient's needs.  Disposition: Med Surg    History of Present Illness   Derick Richey is a 58 y.o. with past medical history most significant for pancreatic adenocarcinoma stage IV with liver metastasis who presents with 2 episodes of syncope and lightheadedness with associated hypotension requiring epinephrine administration with fluids in the ambulance.  Upon arrival, patient was administered 1 unit of uncrossed packed red blood cells.  Patient hematemesis and hematochezia today, starting a couple hours ago.  Of note patient experienced an 8 g hemoglobin drop from labs procured 10 days ago.  On 9/10 patient advised  his care team that he is discontinuing aggressive treatment in favor of comfort measures with the anticipation of setting up hospice.  Critical care was asked see this patient due to hemorrhagic shock.  This provider had a goals of care conversation with the patient who is completely awake alert oriented x 3 and has insight into his diagnosis, and patient agrees he would not wish aggressive therapy, intubation, or resuscitation.  We will admit Derick and begin comfort care for him with possible transition to hospice if he should survive this bleeding episode.    History obtained from the patient and the chart  Review of Systems: See HPI for Review of Systems    Historical Information   Past Medical History:  No date: Cancer (HCC)      Comment:  pancreatic  No date: Coronary artery disease  No date: Diabetes mellitus (HCC)  No date: Hyperlipidemia  No date: Hypertension  No date: Pancreatic cancer (HCC) Past Surgical History:  No date: ANGIOPLASTY  No date: CORONARY ANGIOPLASTY WITH STENT PLACEMENT  No date: ERCP W/ PLASTIC STENT PLACEMENT  No date: HERNIA REPAIR  11/24/2023: IR PORT PLACEMENT   Current Outpatient Medications   Medication Instructions    amoxicillin (AMOXIL) 500 mg, Every 8 hours scheduled    amphetamine-dextroamphetamine (ADDERALL XR) 10 MG 24 hr capsule take 1 capsule by mouth every day in the morning    amphetamine-dextroamphetamine (ADDERALL XR) 20 MG 24 hr capsule 20 mg, Every morning    amphetamine-dextroamphetamine (ADDERALL XR) 30 MG 24 hr capsule 30 mg, Oral, Every morning    aspirin 81 MG tablet Daily    Belsomra 10 mg, Oral, Once as needed    carvedilol (COREG) 6.25 mg, Oral, 2 times daily    Continuous Blood Gluc Sensor (FreeStyle Geovanny 3 Sensor) MISC use as directed TO MONITOR GLUCOSE DAILY    cyclobenzaprine (FLEXERIL) 10 mg, Oral, 3 times daily PRN    desvenlafaxine succinate (PRISTIQ) 50 mg, Oral, Daily    diphenhydramine, lidocaine, Al/Mg hydroxide, simethicone (Magic Mouthwash) SUSP  10 mL, Swish & Spit, Every 4 hours PRN    HYDROcodone-acetaminophen (Norco) 5-325 mg per tablet 2 tablets, Oral, Daily PRN    insulin aspart, w/niacinamide, (Fiasp FlexTouch) 100 Units/mL injection pen INJECT 8 UNITS SUBCUTANEOUSLY 3 TIMES A DAY WITH MEALS    insulin degludec (Tresiba FlexTouch) 100 units/mL injection pen INJECT 30 UNITS SUBCUTANEOUSLY ONCE DAILY AT BEDTIME    Jardiance 25 mg, Oral, Every morning    lisinopril (ZESTRIL) 5 mg tablet 1 tablet, Daily    metFORMIN (GLUCOPHAGE) 1,000 mg, Oral, 2 times daily with meals    omeprazole (PriLOSEC) 40 MG capsule     ondansetron (ZOFRAN) 8 mg, Oral, Every 8 hours PRN    pancrelipase, Lip-Prot-Amyl, (Creon) 12,000 units capsule 12,000 Units, Oral, 3 times daily with meals    phenazopyridine (PYRIDIUM) 95 mg, Oral, 3 times daily PRN    polyethylene glycol (GLYCOLAX) 17 g, Oral, Daily    prochlorperazine (COMPAZINE) 5 mg, Oral, Every 6 hours PRN    rosuvastatin (CRESTOR) 10 MG tablet 1 tablet, Oral, Daily    senna (SENOKOT) 8.6 mg, Oral, Daily at bedtime    Sodium Fluoride 5000 PPM 1.1 % PSTE Use as directed    valACYclovir (VALTREX) 1,000 mg, Oral, 2 times daily    No Known Allergies   Social History     Tobacco Use    Smoking status: Every Day     Current packs/day: 1.50     Average packs/day: 1.5 packs/day for 35.0 years (52.5 ttl pk-yrs)     Types: Cigarettes    Tobacco comments:     Smokes 1 ppd advised not to smoke prior to procedure   Vaping Use    Vaping status: Never Used   Substance Use Topics    Alcohol use: Not Currently    Drug use: Never    Family History   Problem Relation Age of Onset    Heart attack Father     Skin cancer Father           Objective                          Vitals I/O      Most Recent Min/Max in 24hrs   Temp (!) 95 °F (35 °C) Temp  Min: 94.5 °F (34.7 °C)  Max: 95 °F (35 °C)   Pulse 87 Pulse  Min: 75  Max: 95   Resp 20 Resp  Min: 16  Max: 25   BP (!) 75/50 BP  Min: 64/42  Max: 102/48   O2 Sat 96 % SpO2  Min: 90 %  Max: 98 %       Intake/Output Summary (Last 24 hours) at 9/15/2024 0020  Last data filed at 9/15/2024 0002  Gross per 24 hour   Intake 2150 ml   Output --   Net 2150 ml       Diet Regular; Pleasure Feed    Invasive Monitoring           Physical Exam   Physical Exam  Vitals and nursing note reviewed.   Eyes:      General: No scleral icterus.     Extraocular Movements: Extraocular movements intact.      Conjunctiva/sclera: Conjunctivae normal.   Skin:     General: Skin is cool and dry.      Capillary Refill: Capillary refill takes more than 3 seconds.      Coloration: Skin is pale.   HENT:      Head: Normocephalic and atraumatic.      Mouth/Throat:      Mouth: Mucous membranes are dry.   Neck:      Vascular: No JVD.   Cardiovascular:      Rate and Rhythm: Normal rate and regular rhythm.      Pulses: Decreased pulses.      Heart sounds: Normal heart sounds.   Musculoskeletal:         General: Normal range of motion.      Right lower leg: No edema.      Left lower leg: No edema.   Abdominal: General: There is distension.     Palpations: Abdomen is soft.      Tenderness: There is abdominal tenderness. There is no guarding.   Constitutional:       General: He is not in acute distress.     Appearance: He is well-developed and well-nourished.   Pulmonary:      Effort: Pulmonary effort is normal. No accessory muscle usage, respiratory distress or accessory muscle usage.      Breath sounds: Normal breath sounds.   Secretions are normal.Chest:      Chest wall: No tenderness.   Neurological:      General: No focal deficit present.      Mental Status: He is alert and oriented to person, place and time. Mental status is at baseline. He is calm.      Motor: gross motor function is at baseline for patient. Strength full and intact in all extremities.          Diagnostic Studies        Lab Results: I have reviewed the following results:      Medications:  Scheduled PRN      glycopyrrolate, 0.1 mg, Q4H PRN  haloperidol lactate, 0.5 mg, Q2H  PRN  LORazepam, 1 mg, Q10 Min PRN  morphine injection, 4 mg, Q10 Min PRN       Continuous          Labs:   CBC    Recent Labs     09/14/24 2303 09/14/24 2305   WBC 8.44  --    HGB 8.3* 7.5*   HCT 25.6* 22*     --      BMP    Recent Labs     09/14/24 2303 09/14/24 2305   SODIUM 140  --    K 3.6  --    *  --    CO2 22 22   AGAP 9  --    BUN 12  --    CREATININE 0.68  --    CALCIUM 6.9*  --        Coags    Recent Labs     09/14/24 2303   INR 1.31*   PTT 33        Additional Electrolytes  Recent Labs     09/14/24 2303 09/14/24 2305   MG 2.0  --    CAIONIZED  --  1.04*          Blood Gas    No recent results  Recent Labs     09/14/24 2306   PHVEN 7.298*   EIX9EFN 47.7   PO2VEN 30.3*   QMP2HGR 22.8*   BEVEN -3.6   T0OTPPJ 52.1*    LFTs  Recent Labs     09/14/24 2303   ALT 10   AST 13   ALKPHOS 73   ALB 2.3*   TBILI 0.42       Infectious  Recent Labs     09/14/24 2306   PROCALCITONI 0.12     Glucose  Recent Labs     09/14/24 2303   GLUC 214*

## 2024-09-15 NOTE — ASSESSMENT & PLAN NOTE
Related to GI bleeding   Patient received 1 unit of uncrossed packed red blood cells in the emergency room, fluids to hold blood pressure until family could arrive

## 2024-09-15 NOTE — PROGRESS NOTES
I received a message from the answering service about a call from this patient's wife regarding pain, nausea, vomiting, and poor p.o. intake.    Mr. Richey is a 58-year-old male with past medical history of stage IV pancreatic cancer found to have new liver metastasis and undergoing treatment with Abraxane and Gemzar after having failed treatment with FOLFIRINOX and then 5-FU with radiation.  The patient's wife endorsed significant and poorly controlled worsening pain over the last few days, and extremely poor p.o. intake of both fluid and solids often followed by emesis.  The patient stated that he has been taking Norco 10 every 12 hours staggered with Advil every 12 hours, that Zofran typically controls his nausea but that he does have breakthrough symptoms, and that he is taken MiraLAX in the past but is not currently taking it.  He is concerned for symptoms of opioids like constipation which she has had in the past.  His wife is a former long-term care and home health nurse who is very concerned about his current symptoms.    I recommended that he go to the emergency room for fluid resuscitation, titration of his pain management, and control of his nausea and vomiting to establish reliable p.o. intake.  The patient declined this recommendation despite my encouragement and his wife's persistent encouragement.    Alternatively, I recommended the following secondary plan.  I advised him to contact Dr. Calderon's office to schedule routine hydration infusions.  I wrote for Compazine to be used for breakthrough nausea and vomiting.  I wrote for Senokot to be given twice a day.  MiraLAX is likely not a good option at this time as the patient has poor fluid intake, and the ingestion of MiraLAX may result in worsening constipation.  I advised the patient to take Senokot even if he is not constipated in conjunction with my recommendation to increase his pain regimen to take his Norco more frequently.  Based on his poor  p.o. intake which may interfere with his ability to take p.o. Norco and his escalating pain, I recommended that he contact his palliative care team to discuss a fentanyl patch.  I did not prescribe the fentanyl patch over the phone as this would risk his current pain contract with palliative care.  I again reemphasized my primary recommendation that he go to the ED.    Kvng Conde DO, PGY4  Hematology/Oncology Fellow

## 2024-09-15 NOTE — UTILIZATION REVIEW
Initial Clinical Review    Admission: Date/Time/Statement:   Admission Orders (From admission, onward)       Ordered        09/15/24 0010  Inpatient Admission  Once                          Orders Placed This Encounter   Procedures    Inpatient Admission     Standing Status:   Standing     Number of Occurrences:   1     Order Specific Question:   Level of Care     Answer:   Med Surg [16]     Order Specific Question:   Estimated length of stay     Answer:   More than 2 Midnights     Order Specific Question:   Certification     Answer:   I certify that inpatient services are medically necessary for this patient for a duration of greater than two midnights. See H&P and MD Progress Notes for additional information about the patient's course of treatment.     ED Arrival Information       Expected   -    Arrival   9/14/2024 22:51    Acuity   Emergent              Means of arrival   Ambulance    Escorted by   [facility]   Critical Care/ICU    Admission type   Emergency              Arrival complaint   GI bleed             Chief Complaint   Patient presents with    Syncope     Pt has been vomiting and stooling blood.  Had 2 syncopal episodes.  Was hypotensive for 60/30 ems. 20 mec epi given by ems       Initial Presentation: 58 y.o. male to ED presents for Syncope x2 episodes and lightheadedness with associated hypotension requiring epinephrine administration with fluids in the ambulance. On arrival, given uncrossed PRBC x1 unit. Pt notes hematemesis and hematochezia today, starting a couple hours ago. She had an 8 g hemoglobin drop from labs procured 10 days ago. On 9/10 patient advised his care team that he is discontinuing aggressive treatment in favor of comfort measures with the anticipation of setting up hospice. Critical care for hemorrhagic shock. PMH for pancreatic adenocarcinoma stage IV with liver metastasis.  Admit to Inpatient Dx; Hemorrhagic shock. Pancreatic adenocarcinoma. Comfort  measures only status  Plan; S/p Bld transfusion x1 unit in ED. Iv fluids to maintain BP until family could arrive.  Hospice care had not yet been set up. Code level 4. Morphine for pain and air hunger, Ativan for anxiety and agitation. Glycopyrrolate for secretions if needed.   Pt is very clear that he does not wish to be resuscitated, nor intubated for any reason including endoscopy. He does not wish aggressive treatment at this time realizing that this is likely a life ending hemorrhage.     9/15  Per CM notes; Pt wishes to be d/christine home with hospice through  Hospice asap.     Update CM Notes; Per  Hospice, nurse to see pt at his home tomorrow @ 0930. DME (O2) will be delivered today. After discussion with his family, pt wants to be discharged today with the understanding that hospice cannot be to his home until tomorrow. BLS transport requested for 12:15. Pt declining a hospital bed-wants to be in his own bed.    Plan for discharge home today       ED Triage Vitals   Temperature Pulse Respirations Blood Pressure SpO2 Pain Score   09/14/24 2321 09/14/24 2254 09/14/24 2254 09/14/24 2255 09/14/24 2254 09/15/24 0100   (!) 94.5 °F (34.7 °C) 84 16 (!) 64/42 90 % 5     Weight (last 2 days)       Date/Time Weight    09/15/24 0125 71.2 (156.97)    09/14/24 2254 71 (156.53)            Vital Signs (last 3 days)       Date/Time Temp Pulse Resp BP MAP (mmHg) SpO2 O2 Device Patient Position - Orthostatic VS [place] Coma Scale Score Pain    09/15/24 0724 -- -- -- -- -- -- -- -- -- 6    09/15/24 0521 -- 108 20 74/52 59 91 % -- -- -- 6    09/15/24 0215 94.7 °F (34.8 °C) 96 17 68/44 -- 92 % None (Room air) -- -- --    09/15/24 0200 -- 93 18 67/45 52 78 % -- -- -- --    09/15/24 0125 94.9 °F (34.9 °C) 88 16 96/58 -- -- -- -- 15 --    09/15/24 0122 -- -- -- -- -- -- -- -- -- 7    09/15/24 0115 94.9 °F (34.9 °C) 96 18 97/62 -- 98 % None (Room air) -- -- --    09/15/24 0100 -- -- -- -- -- -- -- -- -- 5    09/15/24 0006 95 °F (35  °C) 87 20 75/50 -- -- -- -- -- --    09/14/24 2345 95 °F (35 °C) 95 20 70/49 -- -- -- -- -- --    09/14/24 2335 -- 80 20 86/52 63 96 % -- -- -- --    09/14/24 2330 -- 79 25 102/48 69 97 % -- -- -- --    09/14/24 2325 -- 79 17 90/54 68 96 % -- -- -- --    09/14/24 2321 94.5 °F (34.7 °C) 80 18 88/51 -- 97 % None (Room air) -- -- --    09/14/24 2315 -- 80 17 89/51 65 98 % -- -- -- --    09/14/24 2310 -- 75 17 88/50 68 98 % -- -- -- --    09/14/24 2306 -- 78 22 81/51 -- 97 % Nasal cannula -- -- --    09/14/24 2301 -- 83 16 74/47 57 97 % -- -- -- --    09/14/24 2300 -- 85 18 67/46 52 96 % -- -- -- --    09/14/24 2259 -- -- -- -- -- -- -- -- 14 --    09/14/24 2255 -- 86 17 64/42 49 90 % -- -- -- --    09/14/24 2254 -- 84 16 -- -- 90 % None (Room air) Lying -- --              Pertinent Labs/Diagnostic Test Results:   Radiology:  XR chest 1 view portable   ED Interpretation by Zo Sarmiento DO (09/15 0010)   No acute abnormality in the chest.        Cardiology:  No orders to display     GI:  No orders to display           Results from last 7 days   Lab Units 09/14/24 2305 09/14/24 2303   WBC Thousand/uL  --  8.44   HEMOGLOBIN g/dL  --  8.3*   I STAT HEMOGLOBIN g/dl 7.5*  --    HEMATOCRIT %  --  25.6*   HEMATOCRIT, ISTAT % 22*  --    PLATELETS Thousands/uL  --  209   TOTAL NEUT ABS Thousands/µL  --  7.32         Results from last 7 days   Lab Units 09/14/24 2305 09/14/24  2303   SODIUM mmol/L  --  140   POTASSIUM mmol/L  --  3.6   CHLORIDE mmol/L  --  109*   CO2 mmol/L  --  22   CO2, I-STAT mmol/L 22  --    ANION GAP mmol/L  --  9   BUN mg/dL  --  12   CREATININE mg/dL  --  0.68   EGFR ml/min/1.73sq m  --  105   CALCIUM mg/dL  --  6.9*   CALCIUM, IONIZED, ISTAT mmol/L 1.04*  --    MAGNESIUM mg/dL  --  2.0     Results from last 7 days   Lab Units 09/14/24  2303   AST U/L 13   ALT U/L 10   ALK PHOS U/L 73   TOTAL PROTEIN g/dL 4.3*   ALBUMIN g/dL 2.3*   TOTAL BILIRUBIN mg/dL 0.42   BILIRUBIN DIRECT mg/dL 0.11  "    Results from last 7 days   Lab Units 09/14/24  2254   POC GLUCOSE mg/dl 216*     Results from last 7 days   Lab Units 09/14/24  2303   GLUCOSE RANDOM mg/dL 214*             No results found for: \"BETA-HYDROXYBUTYRATE\"       Results from last 7 days   Lab Units 09/14/24  2306   PH TORIN  7.298*   PCO2 TORIN mm Hg 47.7   PO2 TORIN mm Hg 30.3*   HCO3 TORIN mmol/L 22.8*   BASE EXC TORIN mmol/L -3.6   O2 CONTENT TORIN ml/dL 7.1   O2 HGB, VENOUS % 52.1*     Results from last 7 days   Lab Units 09/14/24  2305   PH, TORIN I-STAT  7.307   PCO2, TORIN ISTAT mm HG 41.6*   PO2, TORIN ISTAT mm HG 19.0*   HCO3, TORIN ISTAT mmol/L 20.8*   I STAT BASE EXC mmol/L -5*   I STAT O2 SAT % 26*         Results from last 7 days   Lab Units 09/14/24  2303   HS TNI 0HR ng/L 4         Results from last 7 days   Lab Units 09/14/24  2303   PROTIME seconds 17.0*   INR  1.31*   PTT seconds 33         Results from last 7 days   Lab Units 09/14/24  2306   PROCALCITONIN ng/ml 0.12     Results from last 7 days   Lab Units 09/14/24  2303   LACTIC ACID mmol/L 4.4*                         Results from last 7 days   Lab Units 09/14/24  2354 09/14/24  2309   UNIT PRODUCT CODE  Q1714C33  G1909Q60 [id number]  [id number]   UNIT NUMBER  [id number]  [id number]  [id number]   UNITABO  O  O O  O   UNITRH  POS  POS POS  POS   CROSSMATCH  Compatible  Compatible Compatible  Compatible   UNIT DISPENSE STATUS  Crossmatched  Crossmatched Return to Inv  Presumed Trans   UNIT PRODUCT VOL ml 350  350 300  300         Results from last 7 days   Lab Units 09/14/24  2303   LIPASE u/L <6*       ED Treatment-Medication Administration from 09/14/2024 2251 to 09/15/2024 0026         Date/Time Order Dose Route Action     09/14/2024 2317 sodium chloride 0.9 % bolus 1,000 mL 1,000 mL Intravenous New Bag     09/14/2024 2318 lactated ringers bolus 1,000 mL 1,000 mL Intravenous New Bag     09/14/2024 2314 pantoprazole (PROTONIX) 80 mg in sodium chloride 0.9 % " 100 mL IVPB 80 mg Intravenous New Bag     09/14/2024 2311 ondansetron (FOR EMS ONLY) (ZOFRAN) 4 mg/2 mL injection 8 mg 0 mg Does not apply Given to EMS     09/14/2024 2311 EPINEPHrine (FOR EMS ONLY) (ADRENALIN) injection 1 mg 0 mg Does not apply Given to EMS     09/14/2024 2332 desmopressin (DDAVP) 21.2 mcg in sodium chloride 0.9 % 50 mL IVPB 21.2 mcg Intravenous New Bag            Past Medical History:   Diagnosis Date    Cancer (HCC)     pancreatic    Coronary artery disease     Diabetes mellitus (HCC)     Hyperlipidemia     Hypertension     Pancreatic cancer (HCC)      Present on Admission:   Pancreatic adenocarcinoma (HCC)   Hemorrhagic shock (HCC)      Admitting Diagnosis: Hemorrhagic shock (HCC) [R57.8]  Acute blood loss anemia [D62]  Syncope [R55]  GI bleed [K92.2]  Hypothermia, initial encounter [T68.XXXA]  Age/Sex: 58 y.o. male  Admission Orders:  Scheduled Medications: None     Continuous IV Infusions: None     PRN Meds:  acetaminophen, 650 mg, Oral, Q6H PRN  glycopyrrolate, 0.1 mg, Intravenous, Q4H PRN  haloperidol lactate, 0.5 mg, Intravenous, Q2H PRN  LORazepam, 1 mg, Intravenous, Q10 Min PRN  morphine injection, 4 mg, Intravenous, Q10 Min PRN 9/15 x2  ondansetron, 4 mg, Intravenous, Q4H PRN        IP CONSULT TO PASTORAL CARE  IP CONSULT TO HOSPICE    Network Utilization Review Department  ATTENTION: Please call with any questions or concerns to [phone] and carefully listen to the prompts so that you are directed to the right person. All voicemails are confidential.   For Discharge needs, contact Care Management DC Support Team at [phone] opt. [phone]  Send all requests for admission clinical reviews, approved or denied determinations and any other requests to dedicated fax number below belonging to the campus where the patient is receiving treatment. List of dedicated fax numbers for the Facilities:  FACILITY NAME UR FAX NUMBER   ADMISSION DENIALS (Administrative/Medical Necessity) [phone]    DISCHARGE SUPPORT TEAM (NETWORK) [phone]   PARENT CHILD HEALTH (Maternity/NICU/Pediatrics) [phone]   [facility] [phone]   [facility] [phone]   [facility] [phone]   [facility] [phone]   formerly [facility] [phone]   [facility] [phone]   [facility] [phone]   [facility] [phone]   [facility] [phone]   [facility] [phone]   [facility] [phone]   [facility] [phone]

## 2024-09-15 NOTE — CASE MANAGEMENT
Case Management Assessment & Discharge Planning Note    Patient name Derick Richey  Location ICU ICU  MRN [id number]  : 1965 Date 9/15/2024       Current Admission Date: 2024  Current Admission Diagnosis:Hemorrhagic shock (HCC)   Patient Active Problem List    Diagnosis Date Noted Date Diagnosed    Comfort measures only status 09/15/2024     Hemorrhagic shock (HCC) 09/15/2024     Cancer related pain 2024     Counseling regarding advance care planning and goals of care 2024     History of known metastasis to liver 2024     Continuous opioid dependence (HCC) 05/15/2024     Primary hypertension 2024     Palliative care patient 2024     Pancreatic adenocarcinoma (HCC) 2023     Right upper quadrant abdominal pain 12/15/2023     Primary adenocarcinoma of pancreatic duct (HCC) 12/15/2023     SIRS (systemic inflammatory response syndrome) (HCC) 12/15/2023     Dehydration 2023     Mixed hyperlipidemia 2023     Coronary artery disease involving native coronary artery of native heart without angina pectoris 2023     Gastroesophageal reflux disease 2023     Primary insomnia 2023     Type 2 diabetes mellitus with hyperglycemia, with long-term current use of insulin (HCC) 2023     Right upper quadrant pain 11/15/2023     Attention deficit hyperactivity disorder (ADHD), combined type 10/14/2019     Depression with anxiety 2017       LOS (days): 0  Geometric Mean LOS (GMLOS) (days):   Days to GMLOS:     OBJECTIVE:    Risk of Unplanned Readmission Score: 26.47         Current admission status: Inpatient       Preferred Pharmacy:   [facility]/pharmacy #[id number] - [place] - [address]  [address]  [place] [address]  Phone: [phone] Fax: [phone]    Primary Care Provider: Elo Modi MD    Primary Insurance: BLUE CROSS  Secondary Insurance:     ASSESSMENT:  Active Health Care Proxies    There are no active  Health Care Proxies on file.       Advance Directives  Does patient have a Health Care POA?: Yes  Does patient have Advance Directives?: Yes  Advance Directives: Living will, Power of  for health care  Primary Contact: wife Suma         Readmission Root Cause  30 Day Readmission: No    Patient Information  Admitted from:: Home  Mental Status: Alert  During Assessment patient was accompanied by: Spouse (wife Suma)  Assessment information provided by:: Patient, Spouse  Primary Caregiver: Family  Caregiver's Name:: Suma  Caregiver's Relationship to Patient:: Family Member  Caregiver's Telephone Number:: [phone]  Support Systems: Spouse/significant other, Son (wife Suma and his 3 sons-Derick JACOBSON, Remberto and Daniel)  County of Residence: [place]  What city do you live in?: [facility]  Home entry access options. Select all that apply.: No steps to enter home  Type of Current Residence: Bi-level  Upon entering residence, is there a bedroom on the main floor (no further steps)?: No  A bedroom is located on the following floor levels of residence (select all that apply):: 2nd Floor  Upon entering residence, is there a bathroom on the main floor (no further steps)?: No  Indicate which floors of current residence have a bathroom (select all the apply):: 2nd Floor  Number of steps to 2nd floor from main floor: 2  Living Arrangements: Lives w/ Spouse/significant other, Lives w/ Son  Is patient a ?: Yes  Is patient active with [facility])?: No    Activities of Daily Living Prior to Admission  Functional Status: Assistance  Completes ADLs independently?: No  Level of ADL dependence: Assistance  Ambulates independently?: No  Level of ambulatory dependence: Assistance  Does patient use assisted devices?: Yes  Assisted Devices (DME) used: Bedside Commode, Shower Chair, Rollator, Other (Comment) (electric scooter)  Does patient currently own DME?: Yes  What DME does the patient currently own?: Bedside Commode,  Shower Chair, Other (Comment), Rollator (electric scooter)  Does patient have a history of Outpatient Therapy (PT/OT)?: Yes  Does the patient have a history of Short-Term Rehab?: No  Does patient have a history of HHC?: No  Does patient currently have HHC?: No         Patient Information Continued  Income Source: Unemployed  Does patient have prescription coverage?: Yes  Does patient receive dialysis treatments?: No  Does patient have a history of substance abuse?: No  Does patient have a history of Mental Health Diagnosis?: No    PHQ 2/9 Screening   Reviewed PHQ 2/9 Depression Screening Score?: No    Means of Transportation  Means of Transport to Appts:: Drives Self      Social Determinants of Health (SDOH)      Flowsheet Row Most Recent Value   Housing Stability    In the last 12 months, was there a time when you were not able to pay the mortgage or rent on time? N   In the past 12 months, how many times have you moved where you were living? 0   At any time in the past 12 months, were you homeless or living in a shelter (including now)? N   Transportation Needs    In the past 12 months, has lack of transportation kept you from medical appointments or from getting medications? no   In the past 12 months, has lack of transportation kept you from meetings, work, or from getting things needed for daily living? No   Food Insecurity    Within the past 12 months, you worried that your food would run out before you got the money to buy more. Never true   Within the past 12 months, the food you bought just didn't last and you didn't have money to get more. Never true   Utilities    In the past 12 months has the electric, gas, oil, or water company threatened to shut off services in your home? No            DISCHARGE DETAILS:    Discharge planning discussed with:: patient and wife Suma  Freedom of Choice: Yes  Comments - Freedom of Choice: Pt wishes to be d/christine home with hospice through  Hospice asap.  CM left message  for  Hospice to call back to discuss this case  CM contacted family/caregiver?: Yes  Were Treatment Team discharge recommendations reviewed with patient/caregiver?: Yes  Did patient/caregiver verbalize understanding of patient care needs?: Yes  Were patient/caregiver advised of the risks associated with not following Treatment Team discharge recommendations?: Yes    Contacts  Patient Contacts: Suma  Relationship to Patient:: Family  Contact Method: In Person  Reason/Outcome: Discharge Planning    Requested Home Health Care         Is the patient interested in HHC at discharge?: No    DME Referral Provided  Referral made for DME?: No    Other Referral/Resources/Interventions Provided:  Interventions: Hospice  Referral Comments:  Hospice pended    Would you like to participate in our [facility] service program?  : No - Declined    Treatment Team Recommendation: Hospice  Discharge Destination Plan:: Hospice  Transport at Discharge : BLS Ambulance

## 2024-09-15 NOTE — TELEPHONE ENCOUNTER
[facility] called, requesting a callback from ordering provider, regarding recent script for Morphine. Provider was paged via ESC.

## 2024-09-15 NOTE — ASSESSMENT & PLAN NOTE
Patient had recently discontinued aggressive treatment for his pancreatic cancer, opting for comfort measures.  Hospice care had not yet been set up.  Patient is very clear that he does not wish to be resuscitated, nor intubated for any reason including endoscopy.  He does not wish aggressive treatment at this time realizing that this is likely a life ending hemorrhage.  Emotional support to the patient and his family.  Code level 4  Morphine for pain and air hunger, Ativan for anxiety and agitation.  Glycopyrrolate for secretions if needed.  Nursing to contact provider if doses are not adequate for the patient's needs.

## 2024-09-15 NOTE — ED NOTES
Aleta FERGUSON at bedside discussing pt's plan of care and treatment options.  PT agreeing to comfort measures.  Pt will receive the remainder of this unit of blood.       Roselyn Choudhary RN  09/15/24 0009

## 2024-09-15 NOTE — DISCHARGE INSTR - AVS FIRST PAGE
Administer 5 mg (0.25 ml) of liquid morphine by mouth every 2 hours for pain or breathing discomfort

## 2024-09-16 NOTE — TELEPHONE ENCOUNTER
"Pt stated Oncology Provider: Dr Hanson    Actionable item:Patient's wife advised to call [facility] to discuss her needs, as patient is on hospice at [facility].    What is the reason for the call/chief complaint? Patient's wife called to discuss why Amoxicillin for tooth infection is no longer on his medication list. Patient has a tooth infection, and she noticed that it was on the list that was brought to their home by the hospice liaison. Active listening and emotional support offered to Suma who was very appreciative.      Answer Assessment - Initial Assessment Questions  1. LOCATION: \"Which tooth is hurting?\"  (e.g., right-side/left-side, upper/lower, front/back)      Tooth.  2. ONSET: \"When did the toothache start?\"  (e.g., hours, days)       ongoing  3. SEVERITY: \"How bad is the toothache?\"  (Scale 1-10; mild, moderate or severe)    - MILD (1-3): doesn't interfere with chewing     - MODERATE (4-7): interferes with chewing, interferes with normal activities, awakens from sleep      - SEVERE (8-10): unable to eat, unable to do any normal activities, excruciating pain       Unsure. Has a tooth infection  4. SWELLING: \"Is there any visible swelling of your face?\"      denies  5. OTHER SYMPTOMS: \"Do you have any other symptoms?\" (e.g., fever)      Patient has been experiencing fever due to tooth infection. Amoxicillin was removed from the list    Protocols used: Toothache-ADULT-[place]    " Pneumonia GIB (gastrointestinal bleeding)

## 2024-09-16 NOTE — TELEPHONE ENCOUNTER
Patients wife called in to let the doctor know that [facility] will be taking over.  Patient took a 180 degree turn on Saturday.    Call Suma with any questions or concerns

## 2024-09-16 NOTE — TELEPHONE ENCOUNTER
9/16    Time 8:25 AM    Spoke with the patient's wife.  He is doing very poorly.  He was in the hospital over the weekend after discussion apparently he was going to go onto hospice.  Hospice has been in touch with these with the family.    I also canceled his Port-A-Cath removal.  He is doing poorly hopefully they will continue good comfort measures at home.  Expressed my condolences to his wife.    Dr. Calderon.

## 2024-09-16 NOTE — CASE MANAGEMENT
[facility] received request for transport authorization from Care Manager.   Insurance: [facility]  Type of transport: BLS ()  Checked via: [employer]     Per patient's insurance, no prior authorization request is required for BLS transport     Care Manager notified: Kathrine Sutton     Please reach out to CM for updates on any clinical information.

## 2024-09-16 NOTE — TELEPHONE ENCOUNTER
Message from on call:    I received a message from the answering service about a call from this patient's wife regarding pain, nausea, vomiting, and poor p.o. intake.     Mr. Richey is a 58-year-old male with past medical history of stage IV pancreatic cancer found to have new liver metastasis and undergoing treatment with Abraxane and Gemzar after having failed treatment with FOLFIRINOX and then 5-FU with radiation.  The patient's wife endorsed significant and poorly controlled worsening pain over the last few days, and extremely poor p.o. intake of both fluid and solids often followed by emesis.  The patient stated that he has been taking Norco 10 every 12 hours staggered with Advil every 12 hours, that Zofran typically controls his nausea but that he does have breakthrough symptoms, and that he is taken MiraLAX in the past but is not currently taking it.  He is concerned for symptoms of opioids like constipation which she has had in the past.  His wife is a former long-term care and home health nurse who is very concerned about his current symptoms.     I recommended that he go to the emergency room for fluid resuscitation, titration of his pain management, and control of his nausea and vomiting to establish reliable p.o. intake.  The patient declined this recommendation despite my encouragement and his wife's persistent encouragement.     Alternatively, I recommended the following secondary plan.  I advised him to contact Dr. Calderon's office to schedule routine hydration infusions.  I wrote for Compazine to be used for breakthrough nausea and vomiting.  I wrote for Senokot to be given twice a day.  MiraLAX is likely not a good option at this time as the patient has poor fluid intake, and the ingestion of MiraLAX may result in worsening constipation.  I advised the patient to take Senokot even if he is not constipated in conjunction with my recommendation to increase his pain regimen to take his Norco more  frequently.  Based on his poor p.o. intake which may interfere with his ability to take p.o. Norco and his escalating pain, I recommended that he contact his palliative care team to discuss a fentanyl patch.  I did not prescribe the fentanyl patch over the phone as this would risk his current pain contract with palliative care.  I again reemphasized my primary recommendation that he go to the ED.     Kvng Conde DO, PGY4  Hematology/Oncology Fellow

## 2024-09-18 NOTE — TELEPHONE ENCOUNTER
Patients spouse called to inform Dr. Calderon patient passed away yesterday in her arms.  She states he didn't suffer.  She wants to thank Dr. Calderon.

## 2024-09-20 LAB
BACTERIA BLD CULT: NORMAL
BACTERIA BLD CULT: NORMAL